# Patient Record
Sex: MALE | Race: WHITE | NOT HISPANIC OR LATINO | Employment: FULL TIME | URBAN - METROPOLITAN AREA
[De-identification: names, ages, dates, MRNs, and addresses within clinical notes are randomized per-mention and may not be internally consistent; named-entity substitution may affect disease eponyms.]

---

## 2017-03-08 ENCOUNTER — OFFICE VISIT (OUTPATIENT)
Dept: NEUROLOGY | Facility: CLINIC | Age: 67
End: 2017-03-08
Payer: COMMERCIAL

## 2017-03-08 ENCOUNTER — LAB VISIT (OUTPATIENT)
Dept: LAB | Facility: HOSPITAL | Age: 67
End: 2017-03-08
Attending: PSYCHIATRY & NEUROLOGY
Payer: COMMERCIAL

## 2017-03-08 VITALS
HEART RATE: 72 BPM | WEIGHT: 194.25 LBS | HEIGHT: 66 IN | DIASTOLIC BLOOD PRESSURE: 66 MMHG | BODY MASS INDEX: 31.22 KG/M2 | SYSTOLIC BLOOD PRESSURE: 112 MMHG

## 2017-03-08 DIAGNOSIS — G40.409 GRAND MAL EPILEPSY, CONTROLLED: ICD-10-CM

## 2017-03-08 DIAGNOSIS — G40.409 GRAND MAL EPILEPSY, CONTROLLED: Primary | ICD-10-CM

## 2017-03-08 LAB
ALBUMIN SERPL BCP-MCNC: 4.3 G/DL
ALP SERPL-CCNC: 88 U/L
ALT SERPL W/O P-5'-P-CCNC: 29 U/L
ANION GAP SERPL CALC-SCNC: 7 MMOL/L
AST SERPL-CCNC: 25 U/L
BASOPHILS # BLD AUTO: 0.03 K/UL
BASOPHILS NFR BLD: 0.5 %
BILIRUB SERPL-MCNC: 0.4 MG/DL
BUN SERPL-MCNC: 24 MG/DL
CALCIUM SERPL-MCNC: 9.8 MG/DL
CHLORIDE SERPL-SCNC: 97 MMOL/L
CO2 SERPL-SCNC: 30 MMOL/L
CREAT SERPL-MCNC: 1 MG/DL
DIFFERENTIAL METHOD: ABNORMAL
EOSINOPHIL # BLD AUTO: 0.2 K/UL
EOSINOPHIL NFR BLD: 4.3 %
ERYTHROCYTE [DISTWIDTH] IN BLOOD BY AUTOMATED COUNT: 12.7 %
EST. GFR  (AFRICAN AMERICAN): >60 ML/MIN/1.73 M^2
EST. GFR  (NON AFRICAN AMERICAN): >60 ML/MIN/1.73 M^2
GLUCOSE SERPL-MCNC: 99 MG/DL
HCT VFR BLD AUTO: 38.8 %
HGB BLD-MCNC: 13.6 G/DL
LYMPHOCYTES # BLD AUTO: 1.9 K/UL
LYMPHOCYTES NFR BLD: 35 %
MCH RBC QN AUTO: 33.9 PG
MCHC RBC AUTO-ENTMCNC: 35.1 %
MCV RBC AUTO: 97 FL
MONOCYTES # BLD AUTO: 0.6 K/UL
MONOCYTES NFR BLD: 11.2 %
NEUTROPHILS # BLD AUTO: 2.7 K/UL
NEUTROPHILS NFR BLD: 48.8 %
PHENYTOIN SERPL-MCNC: 13.9 UG/ML
PLATELET # BLD AUTO: 186 K/UL
PMV BLD AUTO: 9.8 FL
POTASSIUM SERPL-SCNC: 4.2 MMOL/L
PROT SERPL-MCNC: 7.8 G/DL
RBC # BLD AUTO: 4.01 M/UL
SODIUM SERPL-SCNC: 134 MMOL/L
WBC # BLD AUTO: 5.52 K/UL

## 2017-03-08 PROCEDURE — 1160F RVW MEDS BY RX/DR IN RCRD: CPT | Mod: S$GLB,,, | Performed by: PSYCHIATRY & NEUROLOGY

## 2017-03-08 PROCEDURE — 85025 COMPLETE CBC W/AUTO DIFF WBC: CPT

## 2017-03-08 PROCEDURE — 99215 OFFICE O/P EST HI 40 MIN: CPT | Mod: S$GLB,,, | Performed by: PSYCHIATRY & NEUROLOGY

## 2017-03-08 PROCEDURE — 80185 ASSAY OF PHENYTOIN TOTAL: CPT

## 2017-03-08 PROCEDURE — 99999 PR PBB SHADOW E&M-EST. PATIENT-LVL III: CPT | Mod: PBBFAC,,, | Performed by: PSYCHIATRY & NEUROLOGY

## 2017-03-08 PROCEDURE — 80053 COMPREHEN METABOLIC PANEL: CPT

## 2017-03-08 PROCEDURE — 3078F DIAST BP <80 MM HG: CPT | Mod: S$GLB,,, | Performed by: PSYCHIATRY & NEUROLOGY

## 2017-03-08 PROCEDURE — 36415 COLL VENOUS BLD VENIPUNCTURE: CPT | Mod: PO

## 2017-03-08 PROCEDURE — 3074F SYST BP LT 130 MM HG: CPT | Mod: S$GLB,,, | Performed by: PSYCHIATRY & NEUROLOGY

## 2017-03-09 NOTE — PROGRESS NOTES
Progress note  Neurology      Neurology follow up:  For:  Epilepsy    SUBJECTIVE:      HPI:   He is doing fine. No report of seizure. He takes Dilantin 4 tab a day.    Past Medical History:   Diagnosis Date    Epilepsy     Last seizure 2010.     Hypertension     Tubular adenoma of colon 04/02/2012     Past Surgical History:   Procedure Laterality Date    TONSILLECTOMY       Family History   Problem Relation Age of Onset    Hypertension       Social History   Substance Use Topics    Smoking status: Never Smoker    Smokeless tobacco: Never Used    Alcohol use No       Review of patient's allergies indicates:  No Known Allergies   Patient Active Problem List   Diagnosis    Tubular adenoma of colon    Hypertension    Epilepsy    Abnormal glucose     Personal history of colonic polyps    Nuclear sclerosis of both eyes    Refractive error       Review of Systems   Constitutional: Negative for fever and weight loss.   HENT: Negative for ear pain, hearing loss and tinnitus.    Eyes: Negative for blurred vision, double vision, photophobia, pain, discharge and redness.   Respiratory: Negative for cough and shortness of breath.    Cardiovascular: Negative for chest pain, palpitations, claudication and leg swelling.   Gastrointestinal: Negative for abdominal pain, heartburn, nausea and vomiting.   Genitourinary: Negative for dysuria, flank pain, frequency and urgency.   Musculoskeletal: Negative for back pain, falls, joint pain, myalgias and neck pain.   Skin: Negative for itching and rash.   Neurological: Positive for seizures. Negative for dizziness, tingling, tremors, sensory change, speech change, focal weakness, loss of consciousness, weakness and headaches.   Endo/Heme/Allergies: Does not bruise/bleed easily.   Psychiatric/Behavioral: Negative for depression, hallucinations, memory loss and suicidal ideas. The patient is not nervous/anxious and does not have insomnia.              OBJECTIVE:     Vital  Signs (Most Recent)  Pulse: 72 (03/08/17 1057)  BP: 112/66 (03/08/17 1057)    Physical Exam   Constitutional: He is oriented to person, place, and time. He appears well-developed and well-nourished. No distress.   HENT:   Head: Normocephalic.   Right Ear: External ear normal.   Left Ear: External ear normal.   Mouth/Throat: Oropharynx is clear and moist.   Eyes: Conjunctivae and EOM are normal. Pupils are equal, round, and reactive to light.   Neck: Normal range of motion. Neck supple. No tracheal deviation present. No thyromegaly present.   Cardiovascular: Regular rhythm, normal heart sounds and intact distal pulses.    Pulmonary/Chest: Effort normal and breath sounds normal. No respiratory distress.   Abdominal: Soft. Bowel sounds are normal. There is no tenderness.   Musculoskeletal: He exhibits no edema or tenderness.   Lymphadenopathy:     He has no cervical adenopathy.   Neurological: He is alert and oriented to person, place, and time. He has normal strength and normal reflexes. He displays no tremor and normal reflexes. No cranial nerve deficit or sensory deficit. He exhibits normal muscle tone. Coordination normal. He displays no Babinski's sign on the right side. He displays no Babinski's sign on the left side.   Reflex Scores:       Tricep reflexes are 2+ on the right side and 2+ on the left side.       Bicep reflexes are 2+ on the right side and 2+ on the left side.       Brachioradialis reflexes are 2+ on the right side and 2+ on the left side.       Patellar reflexes are 2+ on the right side and 2+ on the left side.       Achilles reflexes are 2+ on the right side and 2+ on the left side.  Skin: Skin is warm and dry. No rash noted. He is not diaphoretic. No erythema. No pallor.   Psychiatric: He has a normal mood and affect. His behavior is normal. Judgment and thought content normal.         Strength  Deltoids Triceps Biceps Wrist Extension Wrist Flexion Hand    Upper: R 5/5 5/5 5/5 5/5 5/5 5/5     L 5/5 5/5 5/5 5/5 5/5 5/5     Iliopsoas Quadriceps Knee  Flexion Tibialis  anterior Gastro- cnemius EHL   Lower: R 5/5 5/5 5/5 5/5 5/5 5/5    L 5/5 5/5 5/5 5/5 5/5 5/5         Laboratory:  Lab Results   Component Value Date    WBC 5.52 03/08/2017    HGB 13.6 (L) 03/08/2017    HCT 38.8 (L) 03/08/2017     03/08/2017    CHOL 229 (H) 05/27/2016    TRIG 140 05/27/2016    HDL 46 05/27/2016    ALT 29 03/08/2017    AST 25 03/08/2017     (L) 03/08/2017    K 4.2 03/08/2017    CL 97 03/08/2017    CREATININE 1.0 03/08/2017    BUN 24 (H) 03/08/2017    CO2 30 (H) 03/08/2017    TSH 0.933 05/27/2016    PSA 0.43 02/24/2012    GLUF 93 05/14/2010    HGBA1C 5.3 03/06/2015                 ASSESSMENT/PLAN:     Assessment:   Epilepsy under fairly controlled.    Encounter Diagnosis   Name Primary?   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

## 2017-06-16 ENCOUNTER — OFFICE VISIT (OUTPATIENT)
Dept: OPHTHALMOLOGY | Facility: CLINIC | Age: 67
End: 2017-06-16
Payer: COMMERCIAL

## 2017-06-16 DIAGNOSIS — H52.4 BILATERAL PRESBYOPIA: ICD-10-CM

## 2017-06-16 DIAGNOSIS — E11.9 DIABETES MELLITUS TYPE 2 WITHOUT RETINOPATHY: Primary | ICD-10-CM

## 2017-06-16 PROCEDURE — 92014 COMPRE OPH EXAM EST PT 1/>: CPT | Mod: S$GLB,,, | Performed by: OPTOMETRIST

## 2017-06-16 PROCEDURE — 99999 PR PBB SHADOW E&M-EST. PATIENT-LVL I: CPT | Mod: PBBFAC,,, | Performed by: OPTOMETRIST

## 2017-06-16 PROCEDURE — 92015 DETERMINE REFRACTIVE STATE: CPT | Mod: S$GLB,,, | Performed by: OPTOMETRIST

## 2017-07-29 NOTE — PROGRESS NOTES
HPI     Pt's last full eye exam was 6/14/16 with [facility]. Pt states no pain or   discomfort. VA stable. No gtts. Pt wears PAL glasses. Not interested in   contact lenses. Would like a new rx for glasses.     Last edited by Hakeem Melendez, Patient Care Assistant on 6/16/2017  7:54   AM. (History)            Assessment /Plan     For exam results, see Encounter Report.    Diabetes mellitus type 2 without retinopathy    Nuclear cataract, bilateral    Bilateral presbyopia      No Background Diabetic Retinopathy    Minimal cataracts OU, not surgical    Dispense Final Rx for glasses.  RTC 1 year                    Impaired repetition No abnormalities noted

## 2017-08-18 DIAGNOSIS — I10 ESSENTIAL HYPERTENSION: ICD-10-CM

## 2017-08-18 RX ORDER — LISINOPRIL AND HYDROCHLOROTHIAZIDE 20; 25 MG/1; MG/1
TABLET ORAL
Qty: 90 TABLET | Refills: 0 | Status: SHIPPED | OUTPATIENT
Start: 2017-08-18 | End: 2017-09-21 | Stop reason: SDUPTHER

## 2017-08-30 DIAGNOSIS — G40.909 EPILEPSY WITHOUT STATUS EPILEPTICUS, NOT INTRACTABLE: Chronic | ICD-10-CM

## 2017-08-30 RX ORDER — PHENYTOIN SODIUM 100 MG/1
CAPSULE, EXTENDED RELEASE ORAL
Qty: 360 CAPSULE | Refills: 0 | Status: SHIPPED | OUTPATIENT
Start: 2017-08-30 | End: 2017-11-28 | Stop reason: SDUPTHER

## 2017-09-21 ENCOUNTER — OFFICE VISIT (OUTPATIENT)
Dept: INTERNAL MEDICINE | Facility: CLINIC | Age: 67
End: 2017-09-21
Payer: COMMERCIAL

## 2017-09-21 VITALS
HEART RATE: 101 BPM | BODY MASS INDEX: 30.16 KG/M2 | SYSTOLIC BLOOD PRESSURE: 124 MMHG | OXYGEN SATURATION: 97 % | TEMPERATURE: 99 F | WEIGHT: 187.63 LBS | DIASTOLIC BLOOD PRESSURE: 70 MMHG | HEIGHT: 66 IN

## 2017-09-21 DIAGNOSIS — L21.9 SEBORRHEIC DERMATITIS OF SCALP: ICD-10-CM

## 2017-09-21 DIAGNOSIS — Z00.00 ROUTINE GENERAL MEDICAL EXAMINATION AT A HEALTH CARE FACILITY: Primary | ICD-10-CM

## 2017-09-21 DIAGNOSIS — G40.909 NONINTRACTABLE EPILEPSY WITHOUT STATUS EPILEPTICUS, UNSPECIFIED EPILEPSY TYPE: Chronic | ICD-10-CM

## 2017-09-21 DIAGNOSIS — I10 ESSENTIAL HYPERTENSION: ICD-10-CM

## 2017-09-21 PROCEDURE — 99397 PER PM REEVAL EST PAT 65+ YR: CPT | Mod: S$GLB,,, | Performed by: FAMILY MEDICINE

## 2017-09-21 PROCEDURE — 99999 PR PBB SHADOW E&M-EST. PATIENT-LVL III: CPT | Mod: PBBFAC,,, | Performed by: FAMILY MEDICINE

## 2017-09-21 RX ORDER — LISINOPRIL AND HYDROCHLOROTHIAZIDE 20; 25 MG/1; MG/1
1 TABLET ORAL DAILY
Qty: 90 TABLET | Refills: 3 | Status: SHIPPED | OUTPATIENT
Start: 2017-09-21 | End: 2018-10-15 | Stop reason: SDUPTHER

## 2017-09-21 RX ORDER — KETOCONAZOLE 20 MG/ML
SHAMPOO, SUSPENSION TOPICAL
Qty: 120 ML | Refills: 0 | Status: SHIPPED | OUTPATIENT
Start: 2017-09-21 | End: 2019-03-26

## 2017-09-22 ENCOUNTER — LAB VISIT (OUTPATIENT)
Dept: LAB | Facility: HOSPITAL | Age: 67
End: 2017-09-22
Attending: FAMILY MEDICINE
Payer: COMMERCIAL

## 2017-09-22 DIAGNOSIS — Z00.00 ROUTINE GENERAL MEDICAL EXAMINATION AT A HEALTH CARE FACILITY: ICD-10-CM

## 2017-09-22 DIAGNOSIS — Z13.29 THYROID DISORDER SCREEN: ICD-10-CM

## 2017-09-22 DIAGNOSIS — Z13.220 SCREENING FOR LIPOID DISORDERS: ICD-10-CM

## 2017-09-22 LAB
ALBUMIN SERPL BCP-MCNC: 4 G/DL
ALP SERPL-CCNC: 89 U/L
ALT SERPL W/O P-5'-P-CCNC: 24 U/L
ANION GAP SERPL CALC-SCNC: 10 MMOL/L
AST SERPL-CCNC: 25 U/L
BASOPHILS # BLD AUTO: 0.03 K/UL
BASOPHILS NFR BLD: 0.5 %
BILIRUB SERPL-MCNC: 0.5 MG/DL
BUN SERPL-MCNC: 19 MG/DL
CALCIUM SERPL-MCNC: 9.6 MG/DL
CHLORIDE SERPL-SCNC: 100 MMOL/L
CHOLEST SERPL-MCNC: 218 MG/DL
CHOLEST/HDLC SERPL: 5.6 {RATIO}
CO2 SERPL-SCNC: 28 MMOL/L
CREAT SERPL-MCNC: 1 MG/DL
DIFFERENTIAL METHOD: ABNORMAL
EOSINOPHIL # BLD AUTO: 0.2 K/UL
EOSINOPHIL NFR BLD: 4.4 %
ERYTHROCYTE [DISTWIDTH] IN BLOOD BY AUTOMATED COUNT: 12.5 %
EST. GFR  (AFRICAN AMERICAN): >60 ML/MIN/1.73 M^2
EST. GFR  (NON AFRICAN AMERICAN): >60 ML/MIN/1.73 M^2
GLUCOSE SERPL-MCNC: 111 MG/DL
HCT VFR BLD AUTO: 38.1 %
HDLC SERPL-MCNC: 39 MG/DL
HDLC SERPL: 17.9 %
HGB BLD-MCNC: 13.9 G/DL
LDLC SERPL CALC-MCNC: 132.2 MG/DL
LYMPHOCYTES # BLD AUTO: 0.7 K/UL
LYMPHOCYTES NFR BLD: 12.8 %
MCH RBC QN AUTO: 34.6 PG
MCHC RBC AUTO-ENTMCNC: 36.5 G/DL
MCV RBC AUTO: 95 FL
MONOCYTES # BLD AUTO: 0.6 K/UL
MONOCYTES NFR BLD: 10.6 %
NEUTROPHILS # BLD AUTO: 3.9 K/UL
NEUTROPHILS NFR BLD: 71.5 %
NONHDLC SERPL-MCNC: 179 MG/DL
PLATELET # BLD AUTO: 150 K/UL
PMV BLD AUTO: 9.6 FL
POTASSIUM SERPL-SCNC: 4.4 MMOL/L
PROT SERPL-MCNC: 7.3 G/DL
RBC # BLD AUTO: 4.02 M/UL
SODIUM SERPL-SCNC: 138 MMOL/L
TRIGL SERPL-MCNC: 234 MG/DL
TSH SERPL DL<=0.005 MIU/L-ACNC: 1.24 UIU/ML
WBC # BLD AUTO: 5.48 K/UL

## 2017-09-22 PROCEDURE — 80061 LIPID PANEL: CPT

## 2017-09-22 PROCEDURE — 85025 COMPLETE CBC W/AUTO DIFF WBC: CPT

## 2017-09-22 PROCEDURE — 36415 COLL VENOUS BLD VENIPUNCTURE: CPT

## 2017-09-22 PROCEDURE — 84443 ASSAY THYROID STIM HORMONE: CPT

## 2017-09-22 PROCEDURE — 80053 COMPREHEN METABOLIC PANEL: CPT

## 2017-09-23 PROBLEM — L21.9 SEBORRHEIC DERMATITIS OF SCALP: Status: ACTIVE | Noted: 2017-09-23

## 2017-09-24 NOTE — PROGRESS NOTES
Subjective:       Patient ID: Conner Lombardi Jr. is a 67 y.o. male.    Chief Complaint: Annual Exam (rash,rt side neck, and lump rear of neck baseline.)    Patient presents to clinic today for annual physical exam.      Review of Systems   Constitutional: Negative for chills, fatigue, fever and unexpected weight change.   HENT: Positive for hearing loss (has had testing, hearing not recommended). Negative for congestion, dental problem, ear pain, rhinorrhea and trouble swallowing.    Eyes: Negative for pain and visual disturbance.   Respiratory: Negative for cough and shortness of breath.    Cardiovascular: Negative for chest pain, palpitations and leg swelling.   Gastrointestinal: Negative for abdominal distention, abdominal pain, blood in stool, constipation, diarrhea, nausea and vomiting.   Genitourinary: Negative for difficulty urinating, scrotal swelling and testicular pain.   Musculoskeletal: Positive for arthralgias (finger with prolonged writing). Negative for myalgias.   Skin: Positive for rash (neck/hairline).   Neurological: Negative for dizziness, weakness, numbness and headaches.   Hematological: Negative for adenopathy. Does not bruise/bleed easily.   Psychiatric/Behavioral: Positive for sleep disturbance. Negative for dysphoric mood. The patient is not nervous/anxious.        Objective:      Physical Exam   Constitutional: He is oriented to person, place, and time. He appears well-developed and well-nourished. No distress.   HENT:   Head: Normocephalic and atraumatic.   Right Ear: Hearing, tympanic membrane, external ear and ear canal normal.   Left Ear: Hearing, tympanic membrane, external ear and ear canal normal.   Nose: Nose normal.   Mouth/Throat: Uvula is midline, oropharynx is clear and moist and mucous membranes are normal.   Rash noted on lower scalp/hairline consistent with seborrheic dermatitis   Eyes: Conjunctivae, EOM and lids are normal. Pupils are equal, round, and reactive to light.  No scleral icterus.   Neck: Normal range of motion. Neck supple. No thyromegaly present.   Cardiovascular: Normal rate and regular rhythm.  Exam reveals no gallop and no friction rub.    No murmur heard.  Pulmonary/Chest: Effort normal and breath sounds normal. He has no wheezes. He has no rales.   Abdominal: Soft. Bowel sounds are normal. He exhibits no distension and no mass. There is no hepatosplenomegaly. There is no tenderness.   Musculoskeletal: Normal range of motion. He exhibits no edema or tenderness.   Lymphadenopathy:     He has no cervical adenopathy.   Neurological: He is alert and oriented to person, place, and time. No cranial nerve deficit. Coordination normal.   Reflex Scores:       Patellar reflexes are 2+ on the right side and 2+ on the left side.  Skin: Skin is warm and dry. No rash noted.   Psychiatric: He has a normal mood and affect.   Vitals reviewed.      Assessment:       1. Routine general medical examination at a health care facility    2. Nonintractable epilepsy without status epilepticus, unspecified epilepsy type    3. Essential hypertension    4. Seborrheic dermatitis of scalp        Plan:     Problem List Items Addressed This Visit     Epilepsy (Chronic)    Overview     Last seizure 2010. Followed by Dr. Ramirez, Neurology         Essential hypertension    Current Assessment & Plan     Controlled, continue lisinopril-HCTZ         Relevant Medications    lisinopril-hydrochlorothiazide (PRINZIDE,ZESTORETIC) 20-25 mg Tab    Seborrheic dermatitis of scalp    Relevant Medications    ketoconazole (NIZORAL) 2 % shampoo      Other Visit Diagnoses     Routine general medical examination at a health care facility    -  Primary          Health Maintenance reviewed/updated. Given Rx for flu vaccine. Patient requests to defer pneumovax.

## 2017-10-11 ENCOUNTER — OFFICE VISIT (OUTPATIENT)
Dept: NEUROLOGY | Facility: CLINIC | Age: 67
End: 2017-10-11
Payer: COMMERCIAL

## 2017-10-11 ENCOUNTER — LAB VISIT (OUTPATIENT)
Dept: LAB | Facility: HOSPITAL | Age: 67
End: 2017-10-11
Attending: PSYCHIATRY & NEUROLOGY
Payer: COMMERCIAL

## 2017-10-11 VITALS
WEIGHT: 192.69 LBS | HEIGHT: 66 IN | SYSTOLIC BLOOD PRESSURE: 130 MMHG | DIASTOLIC BLOOD PRESSURE: 72 MMHG | BODY MASS INDEX: 30.97 KG/M2

## 2017-10-11 DIAGNOSIS — G40.409 GRAND MAL EPILEPSY, CONTROLLED: ICD-10-CM

## 2017-10-11 DIAGNOSIS — G40.409 GRAND MAL EPILEPSY, CONTROLLED: Primary | ICD-10-CM

## 2017-10-11 LAB
COMPLEXED PSA SERPL-MCNC: 0.6 NG/ML
PHENYTOIN SERPL-MCNC: 15.4 UG/ML

## 2017-10-11 PROCEDURE — 99999 PR PBB SHADOW E&M-EST. PATIENT-LVL III: CPT | Mod: PBBFAC,,, | Performed by: PSYCHIATRY & NEUROLOGY

## 2017-10-11 PROCEDURE — 84153 ASSAY OF PSA TOTAL: CPT

## 2017-10-11 PROCEDURE — 36415 COLL VENOUS BLD VENIPUNCTURE: CPT | Mod: PO

## 2017-10-11 PROCEDURE — 80185 ASSAY OF PHENYTOIN TOTAL: CPT

## 2017-10-11 PROCEDURE — 99215 OFFICE O/P EST HI 40 MIN: CPT | Mod: S$GLB,,, | Performed by: PSYCHIATRY & NEUROLOGY

## 2017-10-11 NOTE — PROGRESS NOTES
Progress note  Neurology      Neurology follow up:  For:  Epilepsy    SUBJECTIVE:      HPI:   He is doing fine. No report of seizure. He takes Dilantin 4 tab a day.    Past Medical History:   Diagnosis Date    Epilepsy     Last seizure 2010.     Hypertension     Tubular adenoma of colon 04/02/2012     Past Surgical History:   Procedure Laterality Date    TONSILLECTOMY       Family History   Problem Relation Age of Onset    Hypertension       Social History   Substance Use Topics    Smoking status: Never Smoker    Smokeless tobacco: Never Used    Alcohol use No       Review of patient's allergies indicates:  No Known Allergies   Patient Active Problem List   Diagnosis    Tubular adenoma of colon    Hypertension    Epilepsy    Abnormal glucose     Personal history of colonic polyps    Nuclear sclerosis of both eyes    Refractive error       Review of Systems   Constitutional: Negative for fever and weight loss.   HENT: Negative for ear pain, hearing loss and tinnitus.    Eyes: Negative for blurred vision, double vision, photophobia, pain, discharge and redness.   Respiratory: Negative for cough and shortness of breath.    Cardiovascular: Negative for chest pain, palpitations, claudication and leg swelling.   Gastrointestinal: Negative for abdominal pain, heartburn, nausea and vomiting.   Genitourinary: Negative for dysuria, flank pain, frequency and urgency.   Musculoskeletal: Negative for back pain, falls, joint pain, myalgias and neck pain.   Skin: Negative for itching and rash.   Neurological: Positive for seizures. Negative for dizziness, tingling, tremors, sensory change, speech change, focal weakness, loss of consciousness, weakness and headaches.   Endo/Heme/Allergies: Does not bruise/bleed easily.   Psychiatric/Behavioral: Negative for depression, hallucinations, memory loss and suicidal ideas. The patient is not nervous/anxious and does not have insomnia.              OBJECTIVE:          Physical Exam   Constitutional: He is oriented to person, place, and time. He appears well-developed and well-nourished. No distress.   HENT:   Head: Normocephalic.   Right Ear: External ear normal.   Left Ear: External ear normal.   Mouth/Throat: Oropharynx is clear and moist.   Eyes: Conjunctivae and EOM are normal. Pupils are equal, round, and reactive to light.   Neck: Normal range of motion. Neck supple. No tracheal deviation present. No thyromegaly present.   Cardiovascular: Regular rhythm, normal heart sounds and intact distal pulses.    Pulmonary/Chest: Effort normal and breath sounds normal. No respiratory distress.   Abdominal: Soft. Bowel sounds are normal. There is no tenderness.   Musculoskeletal: He exhibits no edema or tenderness.   Lymphadenopathy:     He has no cervical adenopathy.   Neurological: He is alert and oriented to person, place, and time. He has normal strength and normal reflexes. He displays no tremor and normal reflexes. No cranial nerve deficit or sensory deficit. He exhibits normal muscle tone. Coordination normal. He displays no Babinski's sign on the right side. He displays no Babinski's sign on the left side.   Reflex Scores:       Tricep reflexes are 2+ on the right side and 2+ on the left side.       Bicep reflexes are 2+ on the right side and 2+ on the left side.       Brachioradialis reflexes are 2+ on the right side and 2+ on the left side.       Patellar reflexes are 2+ on the right side and 2+ on the left side.       Achilles reflexes are 2+ on the right side and 2+ on the left side.  Skin: Skin is warm and dry. No rash noted. He is not diaphoretic. No erythema. No pallor.   Psychiatric: He has a normal mood and affect. His behavior is normal. Judgment and thought content normal.         Strength  Deltoids Triceps Biceps Wrist Extension Wrist Flexion Hand    Upper: R 5/5 5/5 5/5 5/5 5/5 5/5    L 5/5 5/5 5/5 5/5 5/5 5/5     Iliopsoas Quadriceps Knee  Flexion  Tibialis  anterior Gastro- cnemius EHL   Lower: R 5/5 5/5 5/5 5/5 5/5 5/5    L 5/5 5/5 5/5 5/5 5/5 5/5       ASSESSMENT/PLAN:     Assessment:   Epilepsy under fairly controlled.    Encounter Diagnosis   Name Primary?   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

## 2017-10-16 ENCOUNTER — TELEPHONE (OUTPATIENT)
Dept: NEUROLOGY | Facility: CLINIC | Age: 67
End: 2017-10-16

## 2017-10-16 NOTE — TELEPHONE ENCOUNTER
----- Message from John Paul Ramirez MD sent at 10/12/2017  8:13 AM CDT -----  Result is normal

## 2017-11-28 DIAGNOSIS — G40.909 EPILEPSY WITHOUT STATUS EPILEPTICUS, NOT INTRACTABLE: Chronic | ICD-10-CM

## 2017-11-28 RX ORDER — PHENYTOIN SODIUM 100 MG/1
CAPSULE, EXTENDED RELEASE ORAL
Qty: 360 CAPSULE | Refills: 0 | Status: SHIPPED | OUTPATIENT
Start: 2017-11-28 | End: 2018-02-25 | Stop reason: SDUPTHER

## 2018-02-25 DIAGNOSIS — G40.909 EPILEPSY WITHOUT STATUS EPILEPTICUS, NOT INTRACTABLE: Chronic | ICD-10-CM

## 2018-02-26 RX ORDER — PHENYTOIN SODIUM 100 MG/1
CAPSULE, EXTENDED RELEASE ORAL
Qty: 360 CAPSULE | Refills: 0 | Status: SHIPPED | OUTPATIENT
Start: 2018-02-26 | End: 2018-05-29 | Stop reason: SDUPTHER

## 2018-03-21 ENCOUNTER — OFFICE VISIT (OUTPATIENT)
Dept: INTERNAL MEDICINE | Facility: CLINIC | Age: 68
End: 2018-03-21
Payer: COMMERCIAL

## 2018-03-21 ENCOUNTER — LAB VISIT (OUTPATIENT)
Dept: LAB | Facility: HOSPITAL | Age: 68
End: 2018-03-21
Attending: FAMILY MEDICINE
Payer: COMMERCIAL

## 2018-03-21 VITALS
HEART RATE: 89 BPM | WEIGHT: 193.56 LBS | OXYGEN SATURATION: 96 % | TEMPERATURE: 97 F | BODY MASS INDEX: 31.11 KG/M2 | HEIGHT: 66 IN | DIASTOLIC BLOOD PRESSURE: 82 MMHG | SYSTOLIC BLOOD PRESSURE: 126 MMHG

## 2018-03-21 DIAGNOSIS — R73.09 ABNORMAL GLUCOSE: Chronic | ICD-10-CM

## 2018-03-21 DIAGNOSIS — Z23 NEED FOR 23-POLYVALENT PNEUMOCOCCAL POLYSACCHARIDE VACCINE: ICD-10-CM

## 2018-03-21 DIAGNOSIS — E66.9 OBESITY (BMI 30.0-34.9): ICD-10-CM

## 2018-03-21 DIAGNOSIS — I10 ESSENTIAL HYPERTENSION: Primary | ICD-10-CM

## 2018-03-21 DIAGNOSIS — D12.6 TUBULAR ADENOMA OF COLON: ICD-10-CM

## 2018-03-21 DIAGNOSIS — G40.909 NONINTRACTABLE EPILEPSY WITHOUT STATUS EPILEPTICUS, UNSPECIFIED EPILEPSY TYPE: Chronic | ICD-10-CM

## 2018-03-21 DIAGNOSIS — I10 ESSENTIAL HYPERTENSION: ICD-10-CM

## 2018-03-21 PROBLEM — E66.811 OBESITY (BMI 30.0-34.9): Status: ACTIVE | Noted: 2018-03-21

## 2018-03-21 LAB
ANION GAP SERPL CALC-SCNC: 9 MMOL/L
BUN SERPL-MCNC: 15 MG/DL
CALCIUM SERPL-MCNC: 10.1 MG/DL
CHLORIDE SERPL-SCNC: 102 MMOL/L
CO2 SERPL-SCNC: 27 MMOL/L
CREAT SERPL-MCNC: 0.8 MG/DL
EST. GFR  (AFRICAN AMERICAN): >60 ML/MIN/1.73 M^2
EST. GFR  (NON AFRICAN AMERICAN): >60 ML/MIN/1.73 M^2
GLUCOSE SERPL-MCNC: 112 MG/DL
POTASSIUM SERPL-SCNC: 4.4 MMOL/L
SODIUM SERPL-SCNC: 138 MMOL/L

## 2018-03-21 PROCEDURE — 3074F SYST BP LT 130 MM HG: CPT | Mod: CPTII,S$GLB,, | Performed by: FAMILY MEDICINE

## 2018-03-21 PROCEDURE — 90732 PPSV23 VACC 2 YRS+ SUBQ/IM: CPT | Mod: S$GLB,,, | Performed by: FAMILY MEDICINE

## 2018-03-21 PROCEDURE — 99214 OFFICE O/P EST MOD 30 MIN: CPT | Mod: 25,S$GLB,, | Performed by: FAMILY MEDICINE

## 2018-03-21 PROCEDURE — 90471 IMMUNIZATION ADMIN: CPT | Mod: S$GLB,,, | Performed by: FAMILY MEDICINE

## 2018-03-21 PROCEDURE — 3079F DIAST BP 80-89 MM HG: CPT | Mod: CPTII,S$GLB,, | Performed by: FAMILY MEDICINE

## 2018-03-21 PROCEDURE — 99999 PR PBB SHADOW E&M-EST. PATIENT-LVL III: CPT | Mod: PBBFAC,,, | Performed by: FAMILY MEDICINE

## 2018-03-21 PROCEDURE — 80048 BASIC METABOLIC PNL TOTAL CA: CPT

## 2018-03-21 PROCEDURE — 36415 COLL VENOUS BLD VENIPUNCTURE: CPT

## 2018-03-21 NOTE — PROGRESS NOTES
Subjective:       Patient ID: Conner Lombardi Jr. is a 67 y.o. male.    Chief Complaint: Follow-up    Patient presents to clinic today for followup of hypertension, well controlled on lisinopril-HCTZ. He reports no seizure activity and continues to follow up with Dr. Ramirez, Neurology. He reports intermittent ankle pain that occurs when first getting up to walk, 1/10, improves after moving around for a bit. He is not exercising currently or focusing on healthy diet.      Review of Systems   Constitutional: Negative for chills, fatigue, fever and unexpected weight change.   Eyes: Negative for visual disturbance.   Respiratory: Negative for shortness of breath.    Cardiovascular: Negative for chest pain.   Musculoskeletal: Negative for myalgias.   Neurological: Negative for headaches.       Objective:      Physical Exam   Constitutional: He is oriented to person, place, and time. He appears well-developed and well-nourished. No distress.   HENT:   Head: Normocephalic and atraumatic.   Eyes: Conjunctivae and EOM are normal. Pupils are equal, round, and reactive to light. No scleral icterus.   Cardiovascular: Normal rate and regular rhythm.  Exam reveals no gallop and no friction rub.    No murmur heard.  Pulmonary/Chest: Effort normal and breath sounds normal.   Neurological: He is alert and oriented to person, place, and time. No cranial nerve deficit. Gait normal.   Psychiatric: He has a normal mood and affect.   Vitals reviewed.      Assessment:       1. Essential hypertension    2. Nonintractable epilepsy without status epilepticus, unspecified epilepsy type    3. Abnormal glucose     4. Tubular adenoma of colon    5. Obesity (BMI 30.0-34.9)    6. Need for 23-polyvalent pneumococcal polysaccharide vaccine        Plan:     Problem List Items Addressed This Visit     Epilepsy (Chronic)    Overview     Last seizure 2010. Followed by Dr. Ramirez, Neurology         Abnormal glucose  (Chronic)    Current Assessment & Plan      Last glucose 111, reassessing with lab today         Tubular adenoma of colon    Current Assessment & Plan     Colonoscopy up to date         Essential hypertension - Primary    Current Assessment & Plan     Controlled, continue lisinopril-HCTZ         Relevant Orders    Basic metabolic panel    Obesity (BMI 30.0-34.9)    Current Assessment & Plan     Discussed the importance of weight loss in preventing chronic disease. Advised lifestyle modifications including diet and exercise to lose weight. Patient expressed understanding.             Other Visit Diagnoses     Need for 23-polyvalent pneumococcal polysaccharide vaccine        Relevant Orders    Pneumococcal Polysaccharide Vaccine (23 Valent) (SQ/IM) (Completed)          Health Maintenance reviewed/updated.

## 2018-03-21 NOTE — ASSESSMENT & PLAN NOTE
Discussed the importance of weight loss in preventing chronic disease. Advised lifestyle modifications including diet and exercise to lose weight. Patient expressed understanding.

## 2018-03-31 DIAGNOSIS — R73.9 HYPERGLYCEMIA: Primary | ICD-10-CM

## 2018-04-03 NOTE — PROGRESS NOTES
Left message for patient to return call regarding results.    Also mailed letter with appt slip for lab

## 2018-04-11 ENCOUNTER — LAB VISIT (OUTPATIENT)
Dept: LAB | Facility: HOSPITAL | Age: 68
End: 2018-04-11
Attending: FAMILY MEDICINE
Payer: COMMERCIAL

## 2018-04-11 ENCOUNTER — OFFICE VISIT (OUTPATIENT)
Dept: NEUROLOGY | Facility: CLINIC | Age: 68
End: 2018-04-11
Payer: COMMERCIAL

## 2018-04-11 VITALS
WEIGHT: 193.13 LBS | RESPIRATION RATE: 20 BRPM | SYSTOLIC BLOOD PRESSURE: 132 MMHG | HEIGHT: 66 IN | HEART RATE: 68 BPM | BODY MASS INDEX: 31.04 KG/M2 | DIASTOLIC BLOOD PRESSURE: 84 MMHG

## 2018-04-11 DIAGNOSIS — G40.409 GRAND MAL EPILEPSY, CONTROLLED: ICD-10-CM

## 2018-04-11 DIAGNOSIS — G40.409 GRAND MAL EPILEPSY, CONTROLLED: Primary | ICD-10-CM

## 2018-04-11 DIAGNOSIS — R73.9 HYPERGLYCEMIA: ICD-10-CM

## 2018-04-11 LAB
ALBUMIN SERPL BCP-MCNC: 4.2 G/DL
ALP SERPL-CCNC: 83 U/L
ALT SERPL W/O P-5'-P-CCNC: 26 U/L
ANION GAP SERPL CALC-SCNC: 10 MMOL/L
AST SERPL-CCNC: 23 U/L
BASOPHILS # BLD AUTO: 0.03 K/UL
BASOPHILS NFR BLD: 0.6 %
BILIRUB SERPL-MCNC: 0.4 MG/DL
BUN SERPL-MCNC: 21 MG/DL
CALCIUM SERPL-MCNC: 9.9 MG/DL
CHLORIDE SERPL-SCNC: 103 MMOL/L
CO2 SERPL-SCNC: 25 MMOL/L
CREAT SERPL-MCNC: 0.9 MG/DL
DIFFERENTIAL METHOD: ABNORMAL
EOSINOPHIL # BLD AUTO: 0.3 K/UL
EOSINOPHIL NFR BLD: 5.7 %
ERYTHROCYTE [DISTWIDTH] IN BLOOD BY AUTOMATED COUNT: 13.2 %
EST. GFR  (AFRICAN AMERICAN): >60 ML/MIN/1.73 M^2
EST. GFR  (NON AFRICAN AMERICAN): >60 ML/MIN/1.73 M^2
ESTIMATED AVG GLUCOSE: 111 MG/DL
GLUCOSE SERPL-MCNC: 112 MG/DL
HBA1C MFR BLD HPLC: 5.5 %
HCT VFR BLD AUTO: 38.8 %
HGB BLD-MCNC: 13.1 G/DL
IMM GRANULOCYTES # BLD AUTO: 0.02 K/UL
IMM GRANULOCYTES NFR BLD AUTO: 0.4 %
LYMPHOCYTES # BLD AUTO: 1.6 K/UL
LYMPHOCYTES NFR BLD: 31 %
MCH RBC QN AUTO: 34 PG
MCHC RBC AUTO-ENTMCNC: 33.8 G/DL
MCV RBC AUTO: 101 FL
MONOCYTES # BLD AUTO: 0.7 K/UL
MONOCYTES NFR BLD: 12.8 %
NEUTROPHILS # BLD AUTO: 2.6 K/UL
NEUTROPHILS NFR BLD: 49.5 %
NRBC BLD-RTO: 0 /100 WBC
PHENYTOIN SERPL-MCNC: 12.9 UG/ML
PLATELET # BLD AUTO: 175 K/UL
PMV BLD AUTO: 9.8 FL
POTASSIUM SERPL-SCNC: 4.3 MMOL/L
PROT SERPL-MCNC: 7.4 G/DL
RBC # BLD AUTO: 3.85 M/UL
SODIUM SERPL-SCNC: 138 MMOL/L
WBC # BLD AUTO: 5.22 K/UL

## 2018-04-11 PROCEDURE — 3075F SYST BP GE 130 - 139MM HG: CPT | Mod: CPTII,S$GLB,, | Performed by: PSYCHIATRY & NEUROLOGY

## 2018-04-11 PROCEDURE — 80053 COMPREHEN METABOLIC PANEL: CPT

## 2018-04-11 PROCEDURE — 36415 COLL VENOUS BLD VENIPUNCTURE: CPT | Mod: PO

## 2018-04-11 PROCEDURE — 80185 ASSAY OF PHENYTOIN TOTAL: CPT

## 2018-04-11 PROCEDURE — 3079F DIAST BP 80-89 MM HG: CPT | Mod: CPTII,S$GLB,, | Performed by: PSYCHIATRY & NEUROLOGY

## 2018-04-11 PROCEDURE — 85025 COMPLETE CBC W/AUTO DIFF WBC: CPT

## 2018-04-11 PROCEDURE — 99999 PR PBB SHADOW E&M-EST. PATIENT-LVL III: CPT | Mod: PBBFAC,,, | Performed by: PSYCHIATRY & NEUROLOGY

## 2018-04-11 PROCEDURE — 83036 HEMOGLOBIN GLYCOSYLATED A1C: CPT

## 2018-04-11 PROCEDURE — 99215 OFFICE O/P EST HI 40 MIN: CPT | Mod: S$GLB,,, | Performed by: PSYCHIATRY & NEUROLOGY

## 2018-04-12 ENCOUNTER — PATIENT OUTREACH (OUTPATIENT)
Dept: ADMINISTRATIVE | Facility: HOSPITAL | Age: 68
End: 2018-04-12

## 2018-05-29 DIAGNOSIS — G40.909 EPILEPSY WITHOUT STATUS EPILEPTICUS, NOT INTRACTABLE: Chronic | ICD-10-CM

## 2018-05-29 RX ORDER — PHENYTOIN SODIUM 100 MG/1
CAPSULE, EXTENDED RELEASE ORAL
Qty: 360 CAPSULE | Refills: 0 | Status: SHIPPED | OUTPATIENT
Start: 2018-05-29 | End: 2018-08-30 | Stop reason: SDUPTHER

## 2018-06-15 ENCOUNTER — OFFICE VISIT (OUTPATIENT)
Dept: OPHTHALMOLOGY | Facility: CLINIC | Age: 68
End: 2018-06-15
Payer: COMMERCIAL

## 2018-06-15 DIAGNOSIS — E11.9 DIABETES MELLITUS TYPE 2 WITHOUT RETINOPATHY: Primary | ICD-10-CM

## 2018-06-15 DIAGNOSIS — H52.4 BILATERAL PRESBYOPIA: ICD-10-CM

## 2018-06-15 DIAGNOSIS — H43.391 VITREOUS FLOATER, RIGHT: ICD-10-CM

## 2018-06-15 PROCEDURE — 99999 PR PBB SHADOW E&M-EST. PATIENT-LVL II: CPT | Mod: PBBFAC,,, | Performed by: OPTOMETRIST

## 2018-06-15 PROCEDURE — 92015 DETERMINE REFRACTIVE STATE: CPT | Mod: S$GLB,,, | Performed by: OPTOMETRIST

## 2018-06-15 PROCEDURE — 92014 COMPRE OPH EXAM EST PT 1/>: CPT | Mod: S$GLB,,, | Performed by: OPTOMETRIST

## 2018-06-15 NOTE — PROGRESS NOTES
HPI     Diabetic Eye Exam    Additional comments: Yearly           Comments   Last seen by [facility] on 6/16/17 for yearly DM exam.   States changes in vision since last eye exam associated with a floater in   the right eye.  Patient describes the floater as an eyelash that comes and goes. Floaters   have been present for about 2-3 months.  Wears PAL glasses full-time last updated 10-13 years ago.  No other complaints   No drops  1. DM  2. NSC OU         Last edited by Sangeetha Hamm, PCT on 6/15/2018  8:08 AM. (History)              Assessment /Plan     For exam results, see Encounter Report.    Diabetes mellitus type 2 without retinopathy    Vitreous floater, right    Bilateral presbyopia      No Background Diabetic Retinopathy    Discussed signs and symptoms of retinal detachment.  Informed patient to return to clinic if any worsening of symptoms or decreased vision.    Dispense Final Rx for glasses.  RTC 1 year  Discussed above and answered questions.

## 2018-06-15 NOTE — PATIENT INSTRUCTIONS
Diabetes mellitus type 2 without retinopathy    Vitreous floater, right    Bilateral presbyopia      No Background Diabetic Retinopathy    Discussed signs and symptoms of retinal detachment.  Informed patient to return to clinic if any worsening of symptoms or decreased vision.    Dispense Final Rx for glasses.  RTC 1 year  Discussed above and answered questions.

## 2018-08-30 DIAGNOSIS — G40.909 EPILEPSY WITHOUT STATUS EPILEPTICUS, NOT INTRACTABLE: Chronic | ICD-10-CM

## 2018-08-30 RX ORDER — PHENYTOIN SODIUM 100 MG/1
CAPSULE, EXTENDED RELEASE ORAL
Qty: 360 CAPSULE | Refills: 0 | Status: SHIPPED | OUTPATIENT
Start: 2018-08-30 | End: 2018-11-25 | Stop reason: SDUPTHER

## 2018-09-26 ENCOUNTER — OFFICE VISIT (OUTPATIENT)
Dept: INTERNAL MEDICINE | Facility: CLINIC | Age: 68
End: 2018-09-26
Payer: COMMERCIAL

## 2018-09-26 VITALS
TEMPERATURE: 98 F | BODY MASS INDEX: 31.06 KG/M2 | SYSTOLIC BLOOD PRESSURE: 118 MMHG | WEIGHT: 192.44 LBS | OXYGEN SATURATION: 97 % | HEART RATE: 102 BPM | DIASTOLIC BLOOD PRESSURE: 76 MMHG

## 2018-09-26 DIAGNOSIS — I10 ESSENTIAL HYPERTENSION: ICD-10-CM

## 2018-09-26 DIAGNOSIS — Z00.00 ROUTINE GENERAL MEDICAL EXAMINATION AT A HEALTH CARE FACILITY: Primary | ICD-10-CM

## 2018-09-26 DIAGNOSIS — G40.909 NONINTRACTABLE EPILEPSY WITHOUT STATUS EPILEPTICUS, UNSPECIFIED EPILEPSY TYPE: Chronic | ICD-10-CM

## 2018-09-26 PROCEDURE — 90662 IIV NO PRSV INCREASED AG IM: CPT | Mod: S$GLB,,, | Performed by: FAMILY MEDICINE

## 2018-09-26 PROCEDURE — 99397 PER PM REEVAL EST PAT 65+ YR: CPT | Mod: 25,S$GLB,, | Performed by: FAMILY MEDICINE

## 2018-09-26 PROCEDURE — 3074F SYST BP LT 130 MM HG: CPT | Mod: CPTII,S$GLB,, | Performed by: FAMILY MEDICINE

## 2018-09-26 PROCEDURE — 90471 IMMUNIZATION ADMIN: CPT | Mod: S$GLB,,, | Performed by: FAMILY MEDICINE

## 2018-09-26 PROCEDURE — 99999 PR PBB SHADOW E&M-EST. PATIENT-LVL IV: CPT | Mod: PBBFAC,,, | Performed by: FAMILY MEDICINE

## 2018-09-26 PROCEDURE — 3078F DIAST BP <80 MM HG: CPT | Mod: CPTII,S$GLB,, | Performed by: FAMILY MEDICINE

## 2018-09-26 NOTE — PROGRESS NOTES
Subjective:       Patient ID: Conner Lombardi Jr. is a 68 y.o. male.    Chief Complaint: Annual Exam    Patient presents to clinic today for annual physical exam.      Review of Systems   Constitutional: Negative for chills, fatigue, fever and unexpected weight change.   HENT: Negative for congestion, dental problem, ear pain, hearing loss, rhinorrhea and trouble swallowing.    Eyes: Negative for pain and visual disturbance.   Respiratory: Negative for cough and shortness of breath.    Cardiovascular: Negative for chest pain, palpitations and leg swelling.   Gastrointestinal: Negative for abdominal distention, abdominal pain, blood in stool, constipation, diarrhea, nausea and vomiting.   Genitourinary: Negative for difficulty urinating, scrotal swelling and testicular pain.   Musculoskeletal: Positive for arthralgias (right ankle and left shoulder intermittently). Negative for myalgias.   Skin: Negative for rash.   Neurological: Negative for dizziness, weakness, numbness and headaches.   Hematological: Negative for adenopathy. Does not bruise/bleed easily.   Psychiatric/Behavioral: Negative for dysphoric mood and sleep disturbance. The patient is not nervous/anxious.        Objective:      Physical Exam   Constitutional: He is oriented to person, place, and time. He appears well-developed and well-nourished. No distress.   HENT:   Head: Normocephalic and atraumatic.   Right Ear: Hearing, tympanic membrane, external ear and ear canal normal.   Left Ear: Hearing, tympanic membrane, external ear and ear canal normal.   Nose: Nose normal.   Mouth/Throat: Uvula is midline, oropharynx is clear and moist and mucous membranes are normal.   Eyes: Conjunctivae, EOM and lids are normal. Pupils are equal, round, and reactive to light. No scleral icterus.   Neck: Normal range of motion. Neck supple. No thyromegaly present.   Cardiovascular: Normal rate and regular rhythm. Exam reveals no gallop and no friction rub.   No murmur  heard.  Pulmonary/Chest: Effort normal and breath sounds normal. He has no wheezes. He has no rales.   Abdominal: Soft. Bowel sounds are normal. He exhibits no distension and no mass. There is no hepatosplenomegaly. There is no tenderness.   Musculoskeletal: Normal range of motion. He exhibits no edema or tenderness.   Lymphadenopathy:     He has no cervical adenopathy.   Neurological: He is alert and oriented to person, place, and time. No cranial nerve deficit. Coordination normal.   Reflex Scores:       Patellar reflexes are 2+ on the right side and 2+ on the left side.  Skin: Skin is warm and dry. No rash noted.   Psychiatric: He has a normal mood and affect.   Vitals reviewed.      Assessment:       1. Routine general medical examination at a health care facility    2. Essential hypertension    3. Nonintractable epilepsy without status epilepticus, unspecified epilepsy type        Plan:     Problem List Items Addressed This Visit     Epilepsy (Chronic)    Overview     Last seizure 2010. Followed by Dr. Ramirez, Neurology         Essential hypertension    Current Assessment & Plan     Controlled, continue lisinopril-HCTZ           Other Visit Diagnoses     Routine general medical examination at a health care facility    -  Primary          Health Maintenance reviewed/updated. Flu vaccine today. Routine labs next week.  Advised he can try turmeric for aches/pains. Follow up if worse/persistent.

## 2018-10-02 ENCOUNTER — LAB VISIT (OUTPATIENT)
Dept: LAB | Facility: HOSPITAL | Age: 68
End: 2018-10-02
Payer: COMMERCIAL

## 2018-10-02 DIAGNOSIS — Z13.29 THYROID DISORDER SCREEN: ICD-10-CM

## 2018-10-02 DIAGNOSIS — Z00.00 ROUTINE GENERAL MEDICAL EXAMINATION AT A HEALTH CARE FACILITY: ICD-10-CM

## 2018-10-02 DIAGNOSIS — Z13.220 SCREENING FOR LIPOID DISORDERS: ICD-10-CM

## 2018-10-02 LAB
ALBUMIN SERPL BCP-MCNC: 4.4 G/DL
ALP SERPL-CCNC: 82 U/L
ALT SERPL W/O P-5'-P-CCNC: 27 U/L
ANION GAP SERPL CALC-SCNC: 9 MMOL/L
AST SERPL-CCNC: 25 U/L
BASOPHILS # BLD AUTO: 0.04 K/UL
BASOPHILS NFR BLD: 0.7 %
BILIRUB SERPL-MCNC: 0.5 MG/DL
BUN SERPL-MCNC: 29 MG/DL
CALCIUM SERPL-MCNC: 10.2 MG/DL
CHLORIDE SERPL-SCNC: 98 MMOL/L
CHOLEST SERPL-MCNC: 246 MG/DL
CHOLEST/HDLC SERPL: 5.6 {RATIO}
CO2 SERPL-SCNC: 29 MMOL/L
CREAT SERPL-MCNC: 1 MG/DL
DIFFERENTIAL METHOD: ABNORMAL
EOSINOPHIL # BLD AUTO: 0.4 K/UL
EOSINOPHIL NFR BLD: 6.5 %
ERYTHROCYTE [DISTWIDTH] IN BLOOD BY AUTOMATED COUNT: 12.5 %
EST. GFR  (AFRICAN AMERICAN): >60 ML/MIN/1.73 M^2
EST. GFR  (NON AFRICAN AMERICAN): >60 ML/MIN/1.73 M^2
GLUCOSE SERPL-MCNC: 108 MG/DL
HCT VFR BLD AUTO: 40.2 %
HDLC SERPL-MCNC: 44 MG/DL
HDLC SERPL: 17.9 %
HGB BLD-MCNC: 13.9 G/DL
IMM GRANULOCYTES # BLD AUTO: 0.01 K/UL
IMM GRANULOCYTES NFR BLD AUTO: 0.2 %
LDLC SERPL CALC-MCNC: 143.6 MG/DL
LYMPHOCYTES # BLD AUTO: 1.8 K/UL
LYMPHOCYTES NFR BLD: 32.6 %
MCH RBC QN AUTO: 34.5 PG
MCHC RBC AUTO-ENTMCNC: 34.6 G/DL
MCV RBC AUTO: 100 FL
MONOCYTES # BLD AUTO: 0.7 K/UL
MONOCYTES NFR BLD: 11.6 %
NEUTROPHILS # BLD AUTO: 2.7 K/UL
NEUTROPHILS NFR BLD: 48.4 %
NONHDLC SERPL-MCNC: 202 MG/DL
NRBC BLD-RTO: 0 /100 WBC
PLATELET # BLD AUTO: 192 K/UL
PMV BLD AUTO: 9.6 FL
POTASSIUM SERPL-SCNC: 4.3 MMOL/L
PROT SERPL-MCNC: 7.7 G/DL
RBC # BLD AUTO: 4.03 M/UL
SODIUM SERPL-SCNC: 136 MMOL/L
TRIGL SERPL-MCNC: 292 MG/DL
TSH SERPL DL<=0.005 MIU/L-ACNC: 1.3 UIU/ML
WBC # BLD AUTO: 5.58 K/UL

## 2018-10-02 PROCEDURE — 36415 COLL VENOUS BLD VENIPUNCTURE: CPT

## 2018-10-02 PROCEDURE — 84443 ASSAY THYROID STIM HORMONE: CPT

## 2018-10-02 PROCEDURE — 85025 COMPLETE CBC W/AUTO DIFF WBC: CPT

## 2018-10-02 PROCEDURE — 80061 LIPID PANEL: CPT

## 2018-10-02 PROCEDURE — 80053 COMPREHEN METABOLIC PANEL: CPT

## 2018-10-15 DIAGNOSIS — I10 ESSENTIAL HYPERTENSION: ICD-10-CM

## 2018-10-15 RX ORDER — LISINOPRIL AND HYDROCHLOROTHIAZIDE 20; 25 MG/1; MG/1
1 TABLET ORAL DAILY
Qty: 90 TABLET | Refills: 0 | Status: SHIPPED | OUTPATIENT
Start: 2018-10-15 | End: 2019-02-03 | Stop reason: SDUPTHER

## 2018-11-25 DIAGNOSIS — G40.909 EPILEPSY WITHOUT STATUS EPILEPTICUS, NOT INTRACTABLE: Chronic | ICD-10-CM

## 2018-11-26 RX ORDER — PHENYTOIN SODIUM 100 MG/1
CAPSULE, EXTENDED RELEASE ORAL
Qty: 360 CAPSULE | Refills: 0 | Status: SHIPPED | OUTPATIENT
Start: 2018-11-26 | End: 2019-02-26 | Stop reason: SDUPTHER

## 2019-01-21 ENCOUNTER — PATIENT MESSAGE (OUTPATIENT)
Dept: INTERNAL MEDICINE | Facility: CLINIC | Age: 69
End: 2019-01-21

## 2019-02-03 DIAGNOSIS — I10 ESSENTIAL HYPERTENSION: ICD-10-CM

## 2019-02-04 RX ORDER — LISINOPRIL AND HYDROCHLOROTHIAZIDE 20; 25 MG/1; MG/1
TABLET ORAL
Qty: 90 TABLET | Refills: 1 | Status: SHIPPED | OUTPATIENT
Start: 2019-02-04 | End: 2019-08-11 | Stop reason: SDUPTHER

## 2019-02-26 DIAGNOSIS — G40.909 EPILEPSY WITHOUT STATUS EPILEPTICUS, NOT INTRACTABLE: Chronic | ICD-10-CM

## 2019-02-26 RX ORDER — PHENYTOIN SODIUM 100 MG/1
CAPSULE, EXTENDED RELEASE ORAL
Qty: 360 CAPSULE | Refills: 0 | Status: SHIPPED | OUTPATIENT
Start: 2019-02-26 | End: 2019-05-24 | Stop reason: SDUPTHER

## 2019-03-26 ENCOUNTER — OFFICE VISIT (OUTPATIENT)
Dept: INTERNAL MEDICINE | Facility: CLINIC | Age: 69
End: 2019-03-26
Payer: COMMERCIAL

## 2019-03-26 ENCOUNTER — LAB VISIT (OUTPATIENT)
Dept: LAB | Facility: HOSPITAL | Age: 69
End: 2019-03-26
Attending: FAMILY MEDICINE
Payer: COMMERCIAL

## 2019-03-26 VITALS
BODY MASS INDEX: 29.87 KG/M2 | HEART RATE: 95 BPM | DIASTOLIC BLOOD PRESSURE: 78 MMHG | SYSTOLIC BLOOD PRESSURE: 136 MMHG | HEIGHT: 66 IN | TEMPERATURE: 97 F | WEIGHT: 185.88 LBS | OXYGEN SATURATION: 96 %

## 2019-03-26 DIAGNOSIS — I10 ESSENTIAL HYPERTENSION: Primary | ICD-10-CM

## 2019-03-26 DIAGNOSIS — I10 ESSENTIAL HYPERTENSION: ICD-10-CM

## 2019-03-26 DIAGNOSIS — G40.909 NONINTRACTABLE EPILEPSY WITHOUT STATUS EPILEPTICUS, UNSPECIFIED EPILEPSY TYPE: Chronic | ICD-10-CM

## 2019-03-26 LAB
ANION GAP SERPL CALC-SCNC: 9 MMOL/L (ref 8–16)
BUN SERPL-MCNC: 24 MG/DL (ref 8–23)
CALCIUM SERPL-MCNC: 10.3 MG/DL (ref 8.7–10.5)
CHLORIDE SERPL-SCNC: 101 MMOL/L (ref 95–110)
CO2 SERPL-SCNC: 29 MMOL/L (ref 23–29)
CREAT SERPL-MCNC: 1 MG/DL (ref 0.5–1.4)
EST. GFR  (AFRICAN AMERICAN): >60 ML/MIN/1.73 M^2
EST. GFR  (NON AFRICAN AMERICAN): >60 ML/MIN/1.73 M^2
GLUCOSE SERPL-MCNC: 103 MG/DL (ref 70–110)
POTASSIUM SERPL-SCNC: 4.7 MMOL/L (ref 3.5–5.1)
SODIUM SERPL-SCNC: 139 MMOL/L (ref 136–145)

## 2019-03-26 PROCEDURE — 3075F SYST BP GE 130 - 139MM HG: CPT | Mod: CPTII,S$GLB,, | Performed by: NURSE PRACTITIONER

## 2019-03-26 PROCEDURE — 3075F PR MOST RECENT SYSTOLIC BLOOD PRESS GE 130-139MM HG: ICD-10-PCS | Mod: CPTII,S$GLB,, | Performed by: NURSE PRACTITIONER

## 2019-03-26 PROCEDURE — 1101F PR PT FALLS ASSESS DOC 0-1 FALLS W/OUT INJ PAST YR: ICD-10-PCS | Mod: CPTII,S$GLB,, | Performed by: NURSE PRACTITIONER

## 2019-03-26 PROCEDURE — 3078F PR MOST RECENT DIASTOLIC BLOOD PRESSURE < 80 MM HG: ICD-10-PCS | Mod: CPTII,S$GLB,, | Performed by: NURSE PRACTITIONER

## 2019-03-26 PROCEDURE — 99999 PR PBB SHADOW E&M-EST. PATIENT-LVL III: ICD-10-PCS | Mod: PBBFAC,,, | Performed by: NURSE PRACTITIONER

## 2019-03-26 PROCEDURE — 99999 PR PBB SHADOW E&M-EST. PATIENT-LVL III: CPT | Mod: PBBFAC,,, | Performed by: NURSE PRACTITIONER

## 2019-03-26 PROCEDURE — 80048 BASIC METABOLIC PNL TOTAL CA: CPT

## 2019-03-26 PROCEDURE — 36415 COLL VENOUS BLD VENIPUNCTURE: CPT

## 2019-03-26 PROCEDURE — 3078F DIAST BP <80 MM HG: CPT | Mod: CPTII,S$GLB,, | Performed by: NURSE PRACTITIONER

## 2019-03-26 PROCEDURE — 99213 PR OFFICE/OUTPT VISIT, EST, LEVL III, 20-29 MIN: ICD-10-PCS | Mod: S$GLB,,, | Performed by: NURSE PRACTITIONER

## 2019-03-26 PROCEDURE — 99213 OFFICE O/P EST LOW 20 MIN: CPT | Mod: S$GLB,,, | Performed by: NURSE PRACTITIONER

## 2019-03-26 PROCEDURE — 1101F PT FALLS ASSESS-DOCD LE1/YR: CPT | Mod: CPTII,S$GLB,, | Performed by: NURSE PRACTITIONER

## 2019-03-26 NOTE — PROGRESS NOTES
Conner Lombardi Jr.  03/26/2019  [id number]    Tia Lopez MD  Patient Care Team:  Tia Lopez MD as PCP - General (Family Medicine)  John Paul Ramirez MD as Consulting Physician (Neurology)  Ann Valadez LPN as Care Coordinator (Internal Medicine)  Has the patient seen any provider outside of the [facility] network since the last visit? (no). If yes, HIPPA forms completed and records requested.        Visit Type:a scheduled routine follow-up visit    Chief Complaint:  Chief Complaint   Patient presents with    6 month follow up       History of Present Illness:    Patient presents for 6-month follow up of HTN. He reports occasional hearing loss R>L. Comes and goes. He is otherwise without concern.     History:  Past Medical History:   Diagnosis Date    Epilepsy     Last seizure 2010.     Hypertension     Tubular adenoma of colon 04/02/2012     Past Surgical History:   Procedure Laterality Date    COLONOSCOPY N/A 8/21/2015    Performed [facility]errano [facility] at [facility]    TONSILLECTOMY       Family History   Problem Relation Age of Onset    Hypertension Unknown      Social History     Socioeconomic History    Marital status:      Spouse name: Not on file    Number of children: Not on file    Years of education: Not on file    Highest education level: Not on file   Occupational History    Not on file   Social Needs    Financial resource strain: Not on file    Food insecurity:     Worry: Not on file     Inability: Not on file    Transportation needs:     Medical: Not on file     Non-medical: Not on file   Tobacco Use    Smoking status: Never Smoker    Smokeless tobacco: Never Used   Substance and Sexual Activity    Alcohol use: No     Alcohol/week: 0.0 oz    Drug use: No    Sexual activity: Not on file   Lifestyle    Physical activity:     Days per week: Not on file     Minutes per session: Not on file    Stress: Not on file   Relationships    Social connections:      Talks on phone: Not on file     Gets together: Not on file     Attends Taoism service: Not on file     Active member of club or organization: Not on file     Attends meetings of clubs or organizations: Not on file     Relationship status: Not on file    Intimate partner violence:     Fear of current or ex partner: Not on file     Emotionally abused: Not on file     Physically abused: Not on file     Forced sexual activity: Not on file   Other Topics Concern    Not on file   Social History Narrative    Not on file     Patient Active Problem List   Diagnosis    Tubular adenoma of colon    Essential hypertension    Epilepsy    Abnormal glucose     Personal history of colonic polyps    Nuclear sclerosis of both eyes    Refractive error    Seborrheic dermatitis of scalp    Obesity (BMI 30.0-34.9)     Review of patient's allergies indicates:  No Known Allergies    The following were reviewed at this visit: active problem list, medication list, allergies, family history, social history, and health maintenance.    Medications:  Current Outpatient Medications on File Prior to Visit   Medication Sig Dispense Refill    aspirin (ECOTRIN) 81 MG EC tablet Take 81 mg by mouth once daily.      lisinopril-hydrochlorothiazide (PRINZIDE,ZESTORETIC) 20-25 mg Tab TAKE 1 TABLET BY MOUTH EVERY DAY 90 tablet 1    phenytoin (DILANTIN) 100 MG ER capsule TAKE 4 CAPSULES BY MOUTH ONCE DAILY 360 capsule 0    [DISCONTINUED] ketoconazole (NIZORAL) 2 % shampoo Apply 5 to 10 mL to wet scalp, lather, leave on 3 to 5 minutes, and rinse; apply twice weekly for 30 days. 120 mL 0     No current facility-administered medications on file prior to visit.        Medications have been reviewed and reconciled with patient at this visit.  Barriers to medications present (no)    Adverse reactions to current medications (no)    Over the counter medications reviewed (Yes ), and if needed added to active Medication list at this visit.      Exam:  Wt Readings from Last 3 Encounters:   03/26/19 84.3 kg (185 lb 13.6 oz)   09/26/18 87.3 kg (192 lb 7.4 oz)   04/11/18 87.6 kg (193 lb 2 oz)     Temp Readings from Last 3 Encounters:   03/26/19 97 °F (36.1 °C) (Tympanic)   09/26/18 98.4 °F (36.9 °C) (Tympanic)   03/21/18 97.4 °F (36.3 °C) (Tympanic)     BP Readings from Last 3 Encounters:   03/26/19 136/78   09/26/18 118/76   04/11/18 132/84     Pulse Readings from Last 3 Encounters:   03/26/19 95   09/26/18 102   04/11/18 68     Body mass index is 30 kg/m².      Review of Systems   HENT: Positive for hearing loss.    Eyes: Negative for discharge.   Respiratory: Negative for wheezing.    Cardiovascular: Negative for chest pain and palpitations.   Gastrointestinal: Negative for blood in stool, constipation, diarrhea and vomiting.   Genitourinary: Negative for hematuria and urgency.   Musculoskeletal: Positive for joint pain (improving with tumeric). Negative for neck pain.   Neurological: Negative for weakness and headaches.   Endo/Heme/Allergies: Negative for polydipsia.     Physical Exam   Constitutional: He is oriented to person, place, and time. He appears well-developed and well-nourished. No distress.   HENT:   Head: Normocephalic and atraumatic.   Bilateral cerumen impaction. Able to almost fully remove soft, black wax from both ears with curette. Patient reported significant improvement in hearing. He tolerated well.   Eyes: Pupils are equal, round, and reactive to light. Conjunctivae and EOM are normal.   Cardiovascular: Normal rate and regular rhythm. Exam reveals no gallop and no friction rub.   No murmur heard.  Pulmonary/Chest: Effort normal and breath sounds normal.   Neurological: He is alert and oriented to person, place, and time.   Skin: Skin is warm and dry.   Psychiatric: He has a normal mood and affect.   Vitals reviewed.      Laboratory Reviewed ({Yes)  Lab Results   Component Value Date    WBC 5.58 10/02/2018    HGB 13.9 (L)  10/02/2018    HCT 40.2 10/02/2018     10/02/2018    CHOL 246 (H) 10/02/2018    TRIG 292 (H) 10/02/2018    HDL 44 10/02/2018    ALT 27 10/02/2018    AST 25 10/02/2018     10/02/2018    K 4.3 10/02/2018    CL 98 10/02/2018    CREATININE 1.0 10/02/2018    BUN 29 (H) 10/02/2018    CO2 29 10/02/2018    TSH 1.304 10/02/2018    PSA 0.43 02/24/2012    GLUF 93 05/14/2010    HGBA1C 5.5 04/11/2018       Conner was seen today for 6 month follow up.    Diagnoses and all orders for this visit:    Essential hypertension  Comments:  controlled, continue current medications  Orders:  -     Basic metabolic panel; Future    Nonintractable epilepsy without status epilepticus, unspecified epilepsy type  Comments:  followed by Dr. James Calvillo for ear wax buildup.         Care Plan/Goals: Reviewed  (N/A)  Goals     None          Follow up: Follow up in about 6 months (around 9/26/2019), or if symptoms worsen or fail to improve, for annual wellness/EPP with Dr. Lopez.    After visit summary was printed and given to patient upon discharge today.  Patient goals and care plan are included in After Visit Summary.    Answers for HPI/ROS submitted by the patient on 3/26/2019   activity change: No  unexpected weight change: No  rhinorrhea: No  trouble swallowing: No  visual disturbance: No  chest tightness: No  polyuria: No  difficulty urinating: No  joint swelling: No  arthralgias: Yes  confusion: No  dysphoric mood: No

## 2019-05-24 ENCOUNTER — PATIENT MESSAGE (OUTPATIENT)
Dept: NEUROLOGY | Facility: CLINIC | Age: 69
End: 2019-05-24

## 2019-05-24 DIAGNOSIS — G40.909 EPILEPSY WITHOUT STATUS EPILEPTICUS, NOT INTRACTABLE: Chronic | ICD-10-CM

## 2019-05-24 RX ORDER — PHENYTOIN SODIUM 100 MG/1
CAPSULE, EXTENDED RELEASE ORAL
Qty: 360 CAPSULE | Refills: 0 | Status: SHIPPED | OUTPATIENT
Start: 2019-05-24 | End: 2019-08-26 | Stop reason: SDUPTHER

## 2019-07-07 NOTE — MR AVS SNAPSHOT
[facility]  [address] [facility] [place] [address]  Phone: [phone]                  Conner Lombardi Jr.   3/8/2017 11:00 AM   Office Visit    Description:  Male : 1950   Provider:  John Paul Ramirez MD   Department:  [facility] - Neurology           Reason for Visit     Follow-up     Seizures           Diagnoses this Visit        Comments    Grand mal epilepsy, controlled    -  Primary            To Do List           Future Appointments        Provider Department Dept Phone    3/8/2017 12:15 PM LAB, SAME DAY [facility] [phone]    2017 8:00 AM [facility] Op[facility]mology [phone]    2017 9:00 AM John Paul Ramirez MD [facility] [phone]      Goals (5 Years of Data)     None      Follow-Up and Disposition     Return in about 6 months (around 2017).      [facility]     [facility] Nurse Care Line - 24/7 Assistance  Registered nurses in the [facility] provide clinical advisement, health education, appointment booking, and other advisory services.  Call for this free service at [phone].             Medications           Message regarding Medications     Verify the changes and/or additions to your medication regime listed below are the same as discussed with your clinician today.  If any of these changes or additions are incorrect, please notify your healthcare provider.             Verify that the below list of medications is an accurate representation of the medications you are currently taking.  If none reported, the list may be blank. If incorrect, please contact your healthcare provider. Carry this list with you in case of emergency.           Current Medications     aspirin (ECOTRIN) 81 MG EC tablet Take 81 mg by mouth once daily.    lisinopril-hydrochlorothiazide (PRINZIDE,ZESTORETIC) 20-25 mg Tab Take 1 tablet by mouth once daily.    phenytoin (DILANTIN) 100 MG ER capsule Take 4 capsules (400 mg  "total) by mouth once daily.           Clinical Reference Information           Your Vitals Were     BP Pulse Height Weight BMI    112/66 72 5' 6" (1.676 m) 88.1 kg (194 lb 3.6 oz) 31.35 kg/m2      Blood Pressure          Most Recent Value    BP  112/66      Allergies as of 3/8/2017     No Known Allergies      Immunizations Administered on Date of Encounter - 3/8/2017     None      Orders Placed During Today's Visit     Future Labs/Procedures Expected by Expires    CBC auto differential  3/8/2017 5/7/2018    Comprehensive metabolic panel  3/8/2017 5/7/2018    Phenytoin level, total  3/8/2017 5/7/2018      Language Assistance Services     ATTENTION: Language assistance services are available, free of charge. Please call [phone].      ATENCIÓN: Si елена kathleen, tiene a tovar disposición servicios gratuitos de asistencia lingüística. Llame al [phone].     [facility] Ý: N?u b?n nói Ti?ng Vi?t, có các d?ch v? h? tr? ngôn ng? mi?n phí dành cho b?n. G?i s? [phone].         [facility] complies with applicable Federal civil rights laws and does not discriminate on the basis of race, color, national origin, age, disability, or sex.        " None

## 2019-08-11 DIAGNOSIS — I10 ESSENTIAL HYPERTENSION: ICD-10-CM

## 2019-08-12 RX ORDER — LISINOPRIL AND HYDROCHLOROTHIAZIDE 20; 25 MG/1; MG/1
TABLET ORAL
Qty: 90 TABLET | Refills: 0 | Status: SHIPPED | OUTPATIENT
Start: 2019-08-12 | End: 2019-11-17 | Stop reason: SDUPTHER

## 2019-08-25 ENCOUNTER — PATIENT MESSAGE (OUTPATIENT)
Dept: INTERNAL MEDICINE | Facility: CLINIC | Age: 69
End: 2019-08-25

## 2019-08-25 DIAGNOSIS — G40.909 EPILEPSY WITHOUT STATUS EPILEPTICUS, NOT INTRACTABLE: Chronic | ICD-10-CM

## 2019-08-26 RX ORDER — PHENYTOIN SODIUM 100 MG/1
400 CAPSULE, EXTENDED RELEASE ORAL DAILY
Qty: 360 CAPSULE | Refills: 0 | Status: SHIPPED | OUTPATIENT
Start: 2019-08-26 | End: 2019-11-14 | Stop reason: SDUPTHER

## 2019-08-27 ENCOUNTER — PATIENT MESSAGE (OUTPATIENT)
Dept: INTERNAL MEDICINE | Facility: CLINIC | Age: 69
End: 2019-08-27

## 2019-11-14 ENCOUNTER — LAB VISIT (OUTPATIENT)
Dept: LAB | Facility: HOSPITAL | Age: 69
End: 2019-11-14
Attending: PSYCHIATRY & NEUROLOGY
Payer: COMMERCIAL

## 2019-11-14 ENCOUNTER — OFFICE VISIT (OUTPATIENT)
Dept: NEUROLOGY | Facility: CLINIC | Age: 69
End: 2019-11-14
Payer: COMMERCIAL

## 2019-11-14 VITALS
WEIGHT: 194.25 LBS | HEIGHT: 66 IN | HEART RATE: 95 BPM | BODY MASS INDEX: 31.22 KG/M2 | DIASTOLIC BLOOD PRESSURE: 80 MMHG | SYSTOLIC BLOOD PRESSURE: 140 MMHG

## 2019-11-14 DIAGNOSIS — I10 ESSENTIAL HYPERTENSION: ICD-10-CM

## 2019-11-14 DIAGNOSIS — G40.909 EPILEPSY WITHOUT STATUS EPILEPTICUS, NOT INTRACTABLE: Chronic | ICD-10-CM

## 2019-11-14 DIAGNOSIS — G40.309 NONINTRACTABLE GENERALIZED IDIOPATHIC EPILEPSY WITHOUT STATUS EPILEPTICUS: Chronic | ICD-10-CM

## 2019-11-14 DIAGNOSIS — G40.309 NONINTRACTABLE GENERALIZED IDIOPATHIC EPILEPSY WITHOUT STATUS EPILEPTICUS: Primary | Chronic | ICD-10-CM

## 2019-11-14 LAB
ALBUMIN SERPL BCP-MCNC: 4.5 G/DL (ref 3.5–5.2)
ALP SERPL-CCNC: 92 U/L (ref 55–135)
ALT SERPL W/O P-5'-P-CCNC: 23 U/L (ref 10–44)
ANION GAP SERPL CALC-SCNC: 8 MMOL/L (ref 8–16)
AST SERPL-CCNC: 21 U/L (ref 10–40)
BASOPHILS # BLD AUTO: 0.05 K/UL (ref 0–0.2)
BASOPHILS NFR BLD: 0.9 % (ref 0–1.9)
BILIRUB SERPL-MCNC: 0.3 MG/DL (ref 0.1–1)
BUN SERPL-MCNC: 18 MG/DL (ref 8–23)
CALCIUM SERPL-MCNC: 10.4 MG/DL (ref 8.7–10.5)
CHLORIDE SERPL-SCNC: 100 MMOL/L (ref 95–110)
CO2 SERPL-SCNC: 30 MMOL/L (ref 23–29)
CREAT SERPL-MCNC: 1.2 MG/DL (ref 0.5–1.4)
DIFFERENTIAL METHOD: ABNORMAL
EOSINOPHIL # BLD AUTO: 0.1 K/UL (ref 0–0.5)
EOSINOPHIL NFR BLD: 2.4 % (ref 0–8)
ERYTHROCYTE [DISTWIDTH] IN BLOOD BY AUTOMATED COUNT: 12.5 % (ref 11.5–14.5)
EST. GFR  (AFRICAN AMERICAN): >60 ML/MIN/1.73 M^2
EST. GFR  (NON AFRICAN AMERICAN): >60 ML/MIN/1.73 M^2
GLUCOSE SERPL-MCNC: 104 MG/DL (ref 70–110)
HCT VFR BLD AUTO: 39.6 % (ref 40–54)
HGB BLD-MCNC: 13.8 G/DL (ref 14–18)
IMM GRANULOCYTES # BLD AUTO: 0.01 K/UL (ref 0–0.04)
IMM GRANULOCYTES NFR BLD AUTO: 0.2 % (ref 0–0.5)
LYMPHOCYTES # BLD AUTO: 1.8 K/UL (ref 1–4.8)
LYMPHOCYTES NFR BLD: 33.4 % (ref 18–48)
MCH RBC QN AUTO: 34.6 PG (ref 27–31)
MCHC RBC AUTO-ENTMCNC: 34.8 G/DL (ref 32–36)
MCV RBC AUTO: 99 FL (ref 82–98)
MONOCYTES # BLD AUTO: 0.7 K/UL (ref 0.3–1)
MONOCYTES NFR BLD: 12.3 % (ref 4–15)
NEUTROPHILS # BLD AUTO: 2.7 K/UL (ref 1.8–7.7)
NEUTROPHILS NFR BLD: 50.8 % (ref 38–73)
NRBC BLD-RTO: 0 /100 WBC
PLATELET # BLD AUTO: 182 K/UL (ref 150–350)
PMV BLD AUTO: 10.4 FL (ref 9.2–12.9)
POTASSIUM SERPL-SCNC: 4.8 MMOL/L (ref 3.5–5.1)
PROT SERPL-MCNC: 7.8 G/DL (ref 6–8.4)
RBC # BLD AUTO: 3.99 M/UL (ref 4.6–6.2)
SODIUM SERPL-SCNC: 138 MMOL/L (ref 136–145)
WBC # BLD AUTO: 5.36 K/UL (ref 3.9–12.7)

## 2019-11-14 PROCEDURE — 99999 PR PBB SHADOW E&M-EST. PATIENT-LVL III: ICD-10-PCS | Mod: PBBFAC,,, | Performed by: PSYCHIATRY & NEUROLOGY

## 2019-11-14 PROCEDURE — 3079F PR MOST RECENT DIASTOLIC BLOOD PRESSURE 80-89 MM HG: ICD-10-PCS | Mod: CPTII,S$GLB,, | Performed by: PSYCHIATRY & NEUROLOGY

## 2019-11-14 PROCEDURE — 3077F PR MOST RECENT SYSTOLIC BLOOD PRESSURE >= 140 MM HG: ICD-10-PCS | Mod: CPTII,S$GLB,, | Performed by: PSYCHIATRY & NEUROLOGY

## 2019-11-14 PROCEDURE — 1101F PT FALLS ASSESS-DOCD LE1/YR: CPT | Mod: CPTII,S$GLB,, | Performed by: PSYCHIATRY & NEUROLOGY

## 2019-11-14 PROCEDURE — 36415 COLL VENOUS BLD VENIPUNCTURE: CPT | Mod: PO

## 2019-11-14 PROCEDURE — 99213 OFFICE O/P EST LOW 20 MIN: CPT | Mod: S$GLB,,, | Performed by: PSYCHIATRY & NEUROLOGY

## 2019-11-14 PROCEDURE — 3077F SYST BP >= 140 MM HG: CPT | Mod: CPTII,S$GLB,, | Performed by: PSYCHIATRY & NEUROLOGY

## 2019-11-14 PROCEDURE — 80053 COMPREHEN METABOLIC PANEL: CPT

## 2019-11-14 PROCEDURE — 99999 PR PBB SHADOW E&M-EST. PATIENT-LVL III: CPT | Mod: PBBFAC,,, | Performed by: PSYCHIATRY & NEUROLOGY

## 2019-11-14 PROCEDURE — 99213 PR OFFICE/OUTPT VISIT, EST, LEVL III, 20-29 MIN: ICD-10-PCS | Mod: S$GLB,,, | Performed by: PSYCHIATRY & NEUROLOGY

## 2019-11-14 PROCEDURE — 1101F PR PT FALLS ASSESS DOC 0-1 FALLS W/OUT INJ PAST YR: ICD-10-PCS | Mod: CPTII,S$GLB,, | Performed by: PSYCHIATRY & NEUROLOGY

## 2019-11-14 PROCEDURE — 85025 COMPLETE CBC W/AUTO DIFF WBC: CPT

## 2019-11-14 PROCEDURE — 3079F DIAST BP 80-89 MM HG: CPT | Mod: CPTII,S$GLB,, | Performed by: PSYCHIATRY & NEUROLOGY

## 2019-11-14 RX ORDER — PHENYTOIN SODIUM 100 MG/1
400 CAPSULE, EXTENDED RELEASE ORAL DAILY
Qty: 360 CAPSULE | Refills: 3 | Status: SHIPPED | OUTPATIENT
Start: 2019-11-14 | End: 2020-11-16 | Stop reason: SDUPTHER

## 2019-11-14 NOTE — PROGRESS NOTES
Subjective:      Patient ID: Conner Lombardi Jr. is a 69 y.o. male.    Chief Complaint:   Follow-up for epilepsy    The patient indicates that he 1st had seizure as an infant and has had a lifelong history of generalized tonic-clonic seizure.  The patient states that his 1st medication that he can recall was Mysoline that he had taken for a number of years.  At some point in time, the physicians added Dilantin to the Mysoline.  In 2010, his primary care physician withdrew him from Mysoline rather rapidly and he experienced a single generalized tonic clonic seizure.  Subsequently, he was placed back on Mysoline and then more gradually withdrawn from the Mysoline to be on Dilantin 400 mg once a day as his sole anticonvulsant.  The patient's last seizure occurred in 2010. Prior to that seizure, the patient states that it had been over 30 years for but throughout that entire time had been taking an anticonvulsant.    On today's visit, the patient denies any headache or dizziness.  He denies any sensation of imbalance.  He denies any blurred vision or double vision.  He has not had any fever or chills.  The patient states that he has been healthy and continues to work full-time.  He has no complaints to offer on today's visit.          ROS:  GENERAL: NO FEVER, CHILLS, FATIGABILITY OR WEIGHT LOSS.  SKIN: NO RASHES, ITCHING OR CHANGES IN COLOR OR TEXTURE OF SKIN.  HEAD: NO HEADACHES OR RECENT HEAD TRAUMA.  EYES: VISUAL ACUITY FINE. NO PHOTOPHOBIA, OCULAR PAIN OR DIPLOPIA.  EARS: DENIES EAR PAIN, DISCHARGE OR VERTIGO.  NOSE: NO LOSS OF SMELL, NO EPISTAXIS OR POSTNASAL DRIP.  MOUTH & THROAT: NO HOARSENESS OR CHANGE IN VOICE. NO EXCESSIVE GUM BLEEDING.  NODES: DENIES SWOLLEN GLANDS.  CHEST: DENIES AUSTIN, CYANOSIS, WHEEZING, COUGH AND SPUTUM PRODUCTION.  CARDIOVASCULAR: DENIES CHEST PAIN, PND, ORTHOPNEA OR REDUCED EXERCISE TOLERANCE.  ABDOMEN: APPETITE FINE. NO WEIGHT LOSS. DENIES DIARRHEA, ABDOMINAL PAIN, HEMATEMESIS OR BLOOD  IN STOOL.  URINARY: NO FLANK PAIN, DYSURIA OR HEMATURIA.  PERIPHERAL VASCULAR: NO CLAUDICATION OR CYANOSIS.  MUSCULOSKELETAL: NO JOINT STIFFNESS OR SWELLING. DENIES BACK PAIN.  NEUROLOGIC: NO HISTORY OF  PARALYSIS, ALTERATION OF GAIT OR COORDINATION.    Past Medical History:   Diagnosis Date    Epilepsy     Last seizure 2010.     Hypertension     Tubular adenoma of colon 04/02/2012     Past Surgical History:   Procedure Laterality Date    TONSILLECTOMY       Family History   Problem Relation Age of Onset    Hypertension Unknown      Social History     Socioeconomic History    Marital status:      Spouse name: Not on file    Number of children: Not on file    Years of education: Not on file    Highest education level: Not on file   Occupational History    Not on file   Social Needs    Financial resource strain: Not on file    Food insecurity:     Worry: Not on file     Inability: Not on file    Transportation needs:     Medical: Not on file     Non-medical: Not on file   Tobacco Use    Smoking status: Never Smoker    Smokeless tobacco: Never Used   Substance and Sexual Activity    Alcohol use: No     Alcohol/week: 0.0 standard drinks    Drug use: No    Sexual activity: Never   Lifestyle    Physical activity:     Days per week: Not on file     Minutes per session: Not on file    Stress: Not on file   Relationships    Social connections:     Talks on phone: Not on file     Gets together: Not on file     Attends [employer] service: Not on file     Active member of club or organization: Not on file     Attends meetings of clubs or organizations: Not on file     Relationship status: Not on file   Other Topics Concern    Not on file   Social History Narrative    Not on file         Objective:   PE:   VITAL SIGNS:   Height 5 ft 6 in, weight 88.1 kg, BMI 31.35  Vitals:    11/14/19 1008   BP: (!) 140/80   Pulse: 95     APPEARANCE: WELL NOURISHED, WELL DEVELOPED, IN NO ACUTE DISTRESS.    HEAD:  NORMOCEPHALIC, ATRAUMATIC.  EYES: PERRL. EOMI.  NON-ICTERIC SCLERAE.     NOSE: MUCOSA PINK. AIRWAY CLEAR.  MOUTH & THROAT: NO TONSILLAR ENLARGEMENT. NO PHARYNGEAL ERYTHEMA OR EXUDATE. NO STRIDOR.  NECK: SUPPLE. NO BRUITS.  CHEST: LUNGS CLEAR TO AUSCULTATION.  CARDIOVASCULAR: REGULAR RHYTHM WITHOUT SIGNIFICANT MURMURS.  ABDOMEN: BOWEL SOUNDS NORMAL. NOT DISTENDED.   MUSCULOSKELETAL:  NO BONY DEFORMITY SEEN.  MUSCLE TONE  AND MUSCLE MASS ARE NORMAL IN BOTH UPPER AND BOTH LOWER EXTREMITIES.    NEUROLOGIC:   MENTAL STATUS:  THE PATIENT IS WELL ORIENTED TO PERSON, TIME, PLACE, AND SITUATION.  THE PATIENT IS ATTENTIVE TO THE ENVIRONMENT AND COOPERATIVE FOR THE EXAM.  CRANIAL NERVES: II-XII GROSSLY INTACT. FUNDOSCOPIC EXAM IS NORMAL.  NO HEMORRHAGE, EXUDATE OR PAPILLEDEMA IS PRESENT. THE EXTRAOCULAR MUSCLES ARE INTACT IN THE CARDINAL DIRECTIONS OF GAZE.  NO PTOSIS IS PRESENT. FACIAL FEATURES ARE SYMMETRICAL.  SPEECH IS NORMAL IN FLUENCY, DICTION, AND PHRASING.  TONGUE PROTRUDES IN THE MIDLINE.    GAIT AND STATION:  ROMBERG IS NEGATIVE.  GOOD ALTERNATE ARMSWING WITH NORMAL GAIT.  MOTOR:  NO DOWNDRIFT OF EITHER ARM WHEN HELD AT SHOULDER LEVEL.  MANUAL MUSCLE TESTING OF PROXIMAL AND DISTAL MUSCLES OF BOTH UPPER AND LOWER EXTREMITIES IS NORMAL. MUSCLE MASS IS NORMAL.  MUSCLE TONE IS NORMAL.  SENSORY:  INTACT BOTH UPPER AND LOWER EXTREMITIES TO PIN PRICK, TOUCH, AND VIBRATION.  CEREBELLAR:  FINGER TO NOSE DONE WELL.  ALTERNATING MOVEMENTS INTACT.  NO INVOLUNTARY MOVEMENTS OR TREMOR SEEN.  REFLEXES:  STRETCH REFLEXES ARE 2+ BOTH UPPER AND LOWER EXTREMITIES.  PLANTAR STIMULATION IS FLEXOR BILATERALLY AND NO PATHOLOGICAL REFLEXES ARE SEEN              Assessment:     Encounter Diagnoses   Name Primary?    Nonintractable generalized idiopathic epilepsy without status epilepticus Yes    Essential hypertension     Epilepsy without status epilepticus, not intractable        Discussion:  The patient has a lifelong history of  generalized tonic-clonic seizure that has been very easily controlled with medication.  He is currently taking Dilantin 400 mg a day and has not had a seizures since 2010. He is not exhibiting any signs of Dilantin toxicity.      Plan:     1.  Lab today:  CBC and CMP  2.  Continue Dilantin 400 mg once a day at bedtime  3.  Return to Neurology for follow-up in 1 year.    This was a 25 min face-to-face visit with the patient with over 50% time spent counseling the patient regarding his management of seizure with Dilantin.  This note is generated with speech recognition software and is subject to transcription error and sound alike phrases that may be missed by proofreading.

## 2019-11-14 NOTE — LETTER
November 14, 2019      Tia Lopez MD  [id number] [place] [address] [place] [address]           [facility]  [id number] [place] [address] [place] [address]  Phone: [phone]          Patient: Conner Lombardi Jr.   MR Number: [id number]   YOB: 1950   Date of Visit: 11/14/2019       Dear Dr. Tia Lopez:    Thank you for referring Conner Lombardi to me for evaluation. Attached you will find relevant portions of my assessment and plan of care.    If you have questions, please do not hesitate to call me. I look forward to following Conner Lombardi along with you.    Sincerely,    Juan Rainey MD    Enclosure  CC:  No Recipients    If you would like to receive this communication electronically, please contact [email or web address] or [phone] to request more information on [employer] Link access.    For providers and/or their staff who would like to refer a patient to [facility], please contact us through our one-stop-shop provider referral line, [facility], at [phone].    If you feel you have received this communication in error or would no longer like to receive these types of communications, please e-mail [email or web address]

## 2019-11-17 DIAGNOSIS — I10 ESSENTIAL HYPERTENSION: ICD-10-CM

## 2019-11-18 RX ORDER — LISINOPRIL AND HYDROCHLOROTHIAZIDE 20; 25 MG/1; MG/1
TABLET ORAL
Qty: 90 TABLET | Refills: 1 | Status: SHIPPED | OUTPATIENT
Start: 2019-11-18 | End: 2020-05-27

## 2020-05-27 DIAGNOSIS — I10 ESSENTIAL HYPERTENSION: ICD-10-CM

## 2020-05-27 RX ORDER — LISINOPRIL AND HYDROCHLOROTHIAZIDE 20; 25 MG/1; MG/1
TABLET ORAL
Qty: 30 TABLET | Refills: 0 | Status: SHIPPED | OUTPATIENT
Start: 2020-05-27 | End: 2020-06-30 | Stop reason: SDUPTHER

## 2020-06-29 DIAGNOSIS — I10 ESSENTIAL HYPERTENSION: ICD-10-CM

## 2020-06-29 RX ORDER — LISINOPRIL AND HYDROCHLOROTHIAZIDE 20; 25 MG/1; MG/1
1 TABLET ORAL DAILY
Qty: 30 TABLET | Refills: 0 | Status: CANCELLED | OUTPATIENT
Start: 2020-06-29

## 2020-06-30 ENCOUNTER — OFFICE VISIT (OUTPATIENT)
Dept: INTERNAL MEDICINE | Facility: CLINIC | Age: 70
End: 2020-06-30
Payer: COMMERCIAL

## 2020-06-30 VITALS
DIASTOLIC BLOOD PRESSURE: 70 MMHG | WEIGHT: 190.94 LBS | OXYGEN SATURATION: 95 % | TEMPERATURE: 100 F | SYSTOLIC BLOOD PRESSURE: 108 MMHG | HEART RATE: 105 BPM | BODY MASS INDEX: 30.81 KG/M2

## 2020-06-30 DIAGNOSIS — Z00.00 ANNUAL PHYSICAL EXAM: Primary | ICD-10-CM

## 2020-06-30 DIAGNOSIS — I10 ESSENTIAL HYPERTENSION: ICD-10-CM

## 2020-06-30 DIAGNOSIS — E66.9 OBESITY (BMI 30.0-34.9): ICD-10-CM

## 2020-06-30 DIAGNOSIS — G40.309 NONINTRACTABLE GENERALIZED IDIOPATHIC EPILEPSY WITHOUT STATUS EPILEPTICUS: Chronic | ICD-10-CM

## 2020-06-30 DIAGNOSIS — R73.09 ABNORMAL GLUCOSE: Chronic | ICD-10-CM

## 2020-06-30 PROCEDURE — 3074F PR MOST RECENT SYSTOLIC BLOOD PRESSURE < 130 MM HG: ICD-10-PCS | Mod: CPTII,S$GLB,, | Performed by: NURSE PRACTITIONER

## 2020-06-30 PROCEDURE — 99999 PR PBB SHADOW E&M-EST. PATIENT-LVL III: CPT | Mod: PBBFAC,,, | Performed by: NURSE PRACTITIONER

## 2020-06-30 PROCEDURE — 3074F SYST BP LT 130 MM HG: CPT | Mod: CPTII,S$GLB,, | Performed by: NURSE PRACTITIONER

## 2020-06-30 PROCEDURE — 3078F PR MOST RECENT DIASTOLIC BLOOD PRESSURE < 80 MM HG: ICD-10-PCS | Mod: CPTII,S$GLB,, | Performed by: NURSE PRACTITIONER

## 2020-06-30 PROCEDURE — 3078F DIAST BP <80 MM HG: CPT | Mod: CPTII,S$GLB,, | Performed by: NURSE PRACTITIONER

## 2020-06-30 PROCEDURE — 99397 PER PM REEVAL EST PAT 65+ YR: CPT | Mod: S$GLB,,, | Performed by: NURSE PRACTITIONER

## 2020-06-30 PROCEDURE — 99999 PR PBB SHADOW E&M-EST. PATIENT-LVL III: ICD-10-PCS | Mod: PBBFAC,,, | Performed by: NURSE PRACTITIONER

## 2020-06-30 PROCEDURE — 99397 PR PREVENTIVE VISIT,EST,65 & OVER: ICD-10-PCS | Mod: S$GLB,,, | Performed by: NURSE PRACTITIONER

## 2020-06-30 RX ORDER — LISINOPRIL AND HYDROCHLOROTHIAZIDE 20; 25 MG/1; MG/1
1 TABLET ORAL DAILY
Qty: 90 TABLET | Refills: 1 | Status: SHIPPED | OUTPATIENT
Start: 2020-06-30 | End: 2020-12-30 | Stop reason: SDUPTHER

## 2020-06-30 NOTE — PROGRESS NOTES
Conner Lombardi Jr. 70 y.o. male     Chief Complaint:  Chief Complaint   Patient presents with    Medication Refill       History of Present Illness:  Pt is new to provider, but established in practice and c/o:      Annual/medication refill(prinzide)   PMH - epilepsy, obesity & HTN     HM needs:   Singles vaccine     No complaints     Exam:  Review of Systems   Constitutional: Negative for activity change, chills, fever and unexpected weight change.   HENT: Negative for trouble swallowing.    Eyes: Negative for visual disturbance.   Respiratory: Negative for cough and shortness of breath.    Cardiovascular: Negative for chest pain, palpitations and leg swelling.   Gastrointestinal: Negative for abdominal distention, abdominal pain, constipation, diarrhea, nausea and vomiting.   Genitourinary: Negative for difficulty urinating.   Skin: Negative for wound.   Neurological: Negative for speech difficulty.   Psychiatric/Behavioral: Negative for confusion.     Physical Exam  Vitals signs and nursing note reviewed.   Constitutional:       General: He is not in acute distress.     Appearance: Normal appearance. He is well-developed and overweight. He is not ill-appearing, toxic-appearing or diaphoretic.   HENT:      Head: Normocephalic and atraumatic.      Right Ear: External ear normal.      Left Ear: External ear normal.   Eyes:      General: No scleral icterus.        Right eye: No discharge.         Left eye: No discharge.      Conjunctiva/sclera: Conjunctivae normal.      Pupils: Pupils are equal, round, and reactive to light.   Neck:      Musculoskeletal: Normal range of motion.      Trachea: No tracheal deviation.   Cardiovascular:      Rate and Rhythm: Normal rate and regular rhythm.   Pulmonary:      Effort: Pulmonary effort is normal. No respiratory distress.      Breath sounds: Normal breath sounds. No stridor. No wheezing or rales.   Chest:      Chest wall: No tenderness.   Abdominal:      General: Bowel  sounds are normal.      Palpations: Abdomen is soft.   Musculoskeletal: Normal range of motion.   Skin:     General: Skin is warm and dry.      Coloration: Skin is not pale.      Findings: No erythema or rash.   Neurological:      Mental Status: He is alert and oriented to person, place, and time.   Psychiatric:         Speech: Speech normal.         Behavior: Behavior normal. Behavior is cooperative.         Thought Content: Thought content normal.         Judgment: Judgment normal.         Most Recent Laboratory Results Reviewed ({Yes)  Lab Results   Component Value Date    WBC 5.36 11/14/2019    HGB 13.8 (L) 11/14/2019    HCT 39.6 (L) 11/14/2019     11/14/2019    CHOL 246 (H) 10/02/2018    TRIG 292 (H) 10/02/2018    HDL 44 10/02/2018    ALT 23 11/14/2019    AST 21 11/14/2019     11/14/2019    K 4.8 11/14/2019     11/14/2019    CREATININE 1.2 11/14/2019    BUN 18 11/14/2019    CO2 30 (H) 11/14/2019    TSH 1.304 10/02/2018    PSA 0.43 02/24/2012    GLUF 93 05/14/2010    HGBA1C 5.5 04/11/2018       Assessment     ICD-10-CM ICD-9-CM   1. Annual physical exam  Z00.00 V70.0   2. Essential hypertension  I10 401.9   3. Abnormal glucose   R73.09 790.29   4. Nonintractable generalized idiopathic epilepsy without status epilepticus  G40.309 345.90   5. Obesity (BMI 30.0-34.9)  E66.9 278.00        Plan   Annual physical exam  -     CBC auto differential; Future; Expected date: 06/30/2020    Essential hypertension   controlled, continue current meds   -     Comprehensive metabolic panel; Future; Expected date: 06/30/2020  -     lisinopriL-hydrochlorothiazide (PRINZIDE,ZESTORETIC) 20-25 mg Tab; Take 1 tablet by mouth once daily.  Dispense: 90 tablet; Refill: 1    Abnormal glucose   -     Lipid Panel; Future; Expected date: 06/30/2020  -     TSH; Future; Expected date: 06/30/2020  -     Vitamin D; Future; Expected date: 06/30/2020  -     Hemoglobin A1C; Future; Expected date: 06/30/2020    Nonintractable  generalized idiopathic epilepsy without status epilepticus  Followed by neuro - [facility]   Controlled on dilantin 400mg daily     Obesity (BMI 30.0-34.9)  -     Vitamin D; Future; Expected date: 06/30/2020  - encouraged exercise most days and reduced intake of refined/processed foods including breads, cereals, muffins, pasta, cookies, sodas, candy and other packaged foods         Follow up in about 6 months (around 12/30/2020) for PCP follow up.    Future Appointments     Date Provider Specialty Appt Notes    12/30/2020 Tia Lopez MD Internal Medicine 6 mos. follow up

## 2020-07-01 ENCOUNTER — LAB VISIT (OUTPATIENT)
Dept: LAB | Facility: HOSPITAL | Age: 70
End: 2020-07-01
Attending: NURSE PRACTITIONER
Payer: COMMERCIAL

## 2020-07-01 DIAGNOSIS — I10 ESSENTIAL HYPERTENSION: ICD-10-CM

## 2020-07-01 DIAGNOSIS — Z00.00 ANNUAL PHYSICAL EXAM: ICD-10-CM

## 2020-07-01 DIAGNOSIS — E66.9 OBESITY (BMI 30.0-34.9): ICD-10-CM

## 2020-07-01 DIAGNOSIS — R73.09 ABNORMAL GLUCOSE: Chronic | ICD-10-CM

## 2020-07-01 LAB
25(OH)D3+25(OH)D2 SERPL-MCNC: 24 NG/ML (ref 30–96)
ALBUMIN SERPL BCP-MCNC: 4.2 G/DL (ref 3.5–5.2)
ALP SERPL-CCNC: 97 U/L (ref 55–135)
ALT SERPL W/O P-5'-P-CCNC: 24 U/L (ref 10–44)
ANION GAP SERPL CALC-SCNC: 8 MMOL/L (ref 8–16)
AST SERPL-CCNC: 22 U/L (ref 10–40)
BASOPHILS # BLD AUTO: 0.04 K/UL (ref 0–0.2)
BASOPHILS NFR BLD: 0.8 % (ref 0–1.9)
BILIRUB SERPL-MCNC: 0.4 MG/DL (ref 0.1–1)
BUN SERPL-MCNC: 18 MG/DL (ref 8–23)
CALCIUM SERPL-MCNC: 9.7 MG/DL (ref 8.7–10.5)
CHLORIDE SERPL-SCNC: 101 MMOL/L (ref 95–110)
CHOLEST SERPL-MCNC: 229 MG/DL (ref 120–199)
CHOLEST/HDLC SERPL: 5.1 {RATIO} (ref 2–5)
CO2 SERPL-SCNC: 29 MMOL/L (ref 23–29)
CREAT SERPL-MCNC: 0.9 MG/DL (ref 0.5–1.4)
DIFFERENTIAL METHOD: ABNORMAL
EOSINOPHIL # BLD AUTO: 0.3 K/UL (ref 0–0.5)
EOSINOPHIL NFR BLD: 6.5 % (ref 0–8)
ERYTHROCYTE [DISTWIDTH] IN BLOOD BY AUTOMATED COUNT: 12.8 % (ref 11.5–14.5)
EST. GFR  (AFRICAN AMERICAN): >60 ML/MIN/1.73 M^2
EST. GFR  (NON AFRICAN AMERICAN): >60 ML/MIN/1.73 M^2
ESTIMATED AVG GLUCOSE: 111 MG/DL (ref 68–131)
GLUCOSE SERPL-MCNC: 101 MG/DL (ref 70–110)
HBA1C MFR BLD HPLC: 5.5 % (ref 4–5.6)
HCT VFR BLD AUTO: 41 % (ref 40–54)
HDLC SERPL-MCNC: 45 MG/DL (ref 40–75)
HDLC SERPL: 19.7 % (ref 20–50)
HGB BLD-MCNC: 13.4 G/DL (ref 14–18)
IMM GRANULOCYTES # BLD AUTO: 0.01 K/UL (ref 0–0.04)
IMM GRANULOCYTES NFR BLD AUTO: 0.2 % (ref 0–0.5)
LDLC SERPL CALC-MCNC: 132.4 MG/DL (ref 63–159)
LYMPHOCYTES # BLD AUTO: 1.7 K/UL (ref 1–4.8)
LYMPHOCYTES NFR BLD: 33.3 % (ref 18–48)
MCH RBC QN AUTO: 34 PG (ref 27–31)
MCHC RBC AUTO-ENTMCNC: 32.7 G/DL (ref 32–36)
MCV RBC AUTO: 104 FL (ref 82–98)
MONOCYTES # BLD AUTO: 0.6 K/UL (ref 0.3–1)
MONOCYTES NFR BLD: 11.5 % (ref 4–15)
NEUTROPHILS # BLD AUTO: 2.5 K/UL (ref 1.8–7.7)
NEUTROPHILS NFR BLD: 47.7 % (ref 38–73)
NONHDLC SERPL-MCNC: 184 MG/DL
NRBC BLD-RTO: 0 /100 WBC
PLATELET # BLD AUTO: 197 K/UL (ref 150–350)
PMV BLD AUTO: 9.8 FL (ref 9.2–12.9)
POTASSIUM SERPL-SCNC: 4.4 MMOL/L (ref 3.5–5.1)
PROT SERPL-MCNC: 7.5 G/DL (ref 6–8.4)
RBC # BLD AUTO: 3.94 M/UL (ref 4.6–6.2)
SODIUM SERPL-SCNC: 138 MMOL/L (ref 136–145)
TRIGL SERPL-MCNC: 258 MG/DL (ref 30–150)
TSH SERPL DL<=0.005 MIU/L-ACNC: 1.22 UIU/ML (ref 0.4–4)
WBC # BLD AUTO: 5.22 K/UL (ref 3.9–12.7)

## 2020-07-01 PROCEDURE — 83036 HEMOGLOBIN GLYCOSYLATED A1C: CPT

## 2020-07-01 PROCEDURE — 84443 ASSAY THYROID STIM HORMONE: CPT

## 2020-07-01 PROCEDURE — 80053 COMPREHEN METABOLIC PANEL: CPT

## 2020-07-01 PROCEDURE — 36415 COLL VENOUS BLD VENIPUNCTURE: CPT

## 2020-07-01 PROCEDURE — 80061 LIPID PANEL: CPT

## 2020-07-01 PROCEDURE — 85025 COMPLETE CBC W/AUTO DIFF WBC: CPT

## 2020-07-01 PROCEDURE — 82306 VITAMIN D 25 HYDROXY: CPT

## 2020-11-07 ENCOUNTER — PATIENT MESSAGE (OUTPATIENT)
Dept: INTERNAL MEDICINE | Facility: CLINIC | Age: 70
End: 2020-11-07

## 2020-11-11 ENCOUNTER — PATIENT MESSAGE (OUTPATIENT)
Dept: INTERNAL MEDICINE | Facility: CLINIC | Age: 70
End: 2020-11-11

## 2020-11-13 ENCOUNTER — PATIENT MESSAGE (OUTPATIENT)
Dept: INTERNAL MEDICINE | Facility: CLINIC | Age: 70
End: 2020-11-13

## 2020-11-13 DIAGNOSIS — G40.909 EPILEPSY WITHOUT STATUS EPILEPTICUS, NOT INTRACTABLE: Chronic | ICD-10-CM

## 2020-11-16 DIAGNOSIS — G40.909 EPILEPSY WITHOUT STATUS EPILEPTICUS, NOT INTRACTABLE: Chronic | ICD-10-CM

## 2020-11-16 RX ORDER — PHENYTOIN SODIUM 100 MG/1
400 CAPSULE, EXTENDED RELEASE ORAL DAILY
Qty: 360 CAPSULE | Refills: 1 | Status: SHIPPED | OUTPATIENT
Start: 2020-11-16 | End: 2020-11-16 | Stop reason: SDUPTHER

## 2020-11-16 RX ORDER — PHENYTOIN SODIUM 100 MG/1
400 CAPSULE, EXTENDED RELEASE ORAL DAILY
Qty: 360 CAPSULE | Refills: 11 | Status: SHIPPED | OUTPATIENT
Start: 2020-11-16 | End: 2020-11-17 | Stop reason: SDUPTHER

## 2020-11-17 ENCOUNTER — LAB VISIT (OUTPATIENT)
Dept: LAB | Facility: HOSPITAL | Age: 70
End: 2020-11-17
Attending: NURSE PRACTITIONER
Payer: COMMERCIAL

## 2020-11-17 ENCOUNTER — OFFICE VISIT (OUTPATIENT)
Dept: NEUROLOGY | Facility: CLINIC | Age: 70
End: 2020-11-17
Payer: COMMERCIAL

## 2020-11-17 VITALS
HEART RATE: 72 BPM | RESPIRATION RATE: 17 BRPM | DIASTOLIC BLOOD PRESSURE: 78 MMHG | BODY MASS INDEX: 30.47 KG/M2 | SYSTOLIC BLOOD PRESSURE: 120 MMHG | WEIGHT: 189.63 LBS | HEIGHT: 66 IN

## 2020-11-17 DIAGNOSIS — I10 ESSENTIAL HYPERTENSION: ICD-10-CM

## 2020-11-17 DIAGNOSIS — D12.6 TUBULAR ADENOMA OF COLON: ICD-10-CM

## 2020-11-17 DIAGNOSIS — G40.309 NONINTRACTABLE GENERALIZED IDIOPATHIC EPILEPSY WITHOUT STATUS EPILEPTICUS: Primary | ICD-10-CM

## 2020-11-17 DIAGNOSIS — Z86.010 PERSONAL HISTORY OF COLONIC POLYPS: ICD-10-CM

## 2020-11-17 DIAGNOSIS — G40.909 NONINTRACTABLE EPILEPSY WITHOUT STATUS EPILEPTICUS, UNSPECIFIED EPILEPSY TYPE: ICD-10-CM

## 2020-11-17 DIAGNOSIS — R56.9 CONVULSIONS, UNSPECIFIED CONVULSION TYPE: ICD-10-CM

## 2020-11-17 DIAGNOSIS — H25.13 NUCLEAR SCLEROSIS OF BOTH EYES: ICD-10-CM

## 2020-11-17 DIAGNOSIS — L21.9 SEBORRHEIC DERMATITIS OF SCALP: ICD-10-CM

## 2020-11-17 DIAGNOSIS — R73.09 ABNORMAL GLUCOSE: ICD-10-CM

## 2020-11-17 DIAGNOSIS — E66.9 OBESITY (BMI 30.0-34.9): ICD-10-CM

## 2020-11-17 DIAGNOSIS — G40.909 EPILEPSY WITHOUT STATUS EPILEPTICUS, NOT INTRACTABLE: Chronic | ICD-10-CM

## 2020-11-17 DIAGNOSIS — H52.7 REFRACTIVE ERROR: ICD-10-CM

## 2020-11-17 LAB — PHENYTOIN SERPL-MCNC: 15 UG/ML (ref 10–20)

## 2020-11-17 PROCEDURE — 3008F BODY MASS INDEX DOCD: CPT | Mod: CPTII,S$GLB,, | Performed by: NURSE PRACTITIONER

## 2020-11-17 PROCEDURE — 1101F PT FALLS ASSESS-DOCD LE1/YR: CPT | Mod: CPTII,S$GLB,, | Performed by: NURSE PRACTITIONER

## 2020-11-17 PROCEDURE — 1159F MED LIST DOCD IN RCRD: CPT | Mod: S$GLB,,, | Performed by: NURSE PRACTITIONER

## 2020-11-17 PROCEDURE — 3078F DIAST BP <80 MM HG: CPT | Mod: CPTII,S$GLB,, | Performed by: NURSE PRACTITIONER

## 2020-11-17 PROCEDURE — 3288F FALL RISK ASSESSMENT DOCD: CPT | Mod: CPTII,S$GLB,, | Performed by: NURSE PRACTITIONER

## 2020-11-17 PROCEDURE — 36415 COLL VENOUS BLD VENIPUNCTURE: CPT

## 2020-11-17 PROCEDURE — 99999 PR PBB SHADOW E&M-EST. PATIENT-LVL III: CPT | Mod: PBBFAC,,, | Performed by: NURSE PRACTITIONER

## 2020-11-17 PROCEDURE — 99214 OFFICE O/P EST MOD 30 MIN: CPT | Mod: S$GLB,,, | Performed by: NURSE PRACTITIONER

## 2020-11-17 PROCEDURE — 3074F PR MOST RECENT SYSTOLIC BLOOD PRESSURE < 130 MM HG: ICD-10-PCS | Mod: CPTII,S$GLB,, | Performed by: NURSE PRACTITIONER

## 2020-11-17 PROCEDURE — 99999 PR PBB SHADOW E&M-EST. PATIENT-LVL III: ICD-10-PCS | Mod: PBBFAC,,, | Performed by: NURSE PRACTITIONER

## 2020-11-17 PROCEDURE — 99214 PR OFFICE/OUTPT VISIT, EST, LEVL IV, 30-39 MIN: ICD-10-PCS | Mod: S$GLB,,, | Performed by: NURSE PRACTITIONER

## 2020-11-17 PROCEDURE — 1126F AMNT PAIN NOTED NONE PRSNT: CPT | Mod: S$GLB,,, | Performed by: NURSE PRACTITIONER

## 2020-11-17 PROCEDURE — 3078F PR MOST RECENT DIASTOLIC BLOOD PRESSURE < 80 MM HG: ICD-10-PCS | Mod: CPTII,S$GLB,, | Performed by: NURSE PRACTITIONER

## 2020-11-17 PROCEDURE — 1101F PR PT FALLS ASSESS DOC 0-1 FALLS W/OUT INJ PAST YR: ICD-10-PCS | Mod: CPTII,S$GLB,, | Performed by: NURSE PRACTITIONER

## 2020-11-17 PROCEDURE — 1159F PR MEDICATION LIST DOCUMENTED IN MEDICAL RECORD: ICD-10-PCS | Mod: S$GLB,,, | Performed by: NURSE PRACTITIONER

## 2020-11-17 PROCEDURE — 3288F PR FALLS RISK ASSESSMENT DOCUMENTED: ICD-10-PCS | Mod: CPTII,S$GLB,, | Performed by: NURSE PRACTITIONER

## 2020-11-17 PROCEDURE — 3008F PR BODY MASS INDEX (BMI) DOCUMENTED: ICD-10-PCS | Mod: CPTII,S$GLB,, | Performed by: NURSE PRACTITIONER

## 2020-11-17 PROCEDURE — 3074F SYST BP LT 130 MM HG: CPT | Mod: CPTII,S$GLB,, | Performed by: NURSE PRACTITIONER

## 2020-11-17 PROCEDURE — 1126F PR PAIN SEVERITY QUANTIFIED, NO PAIN PRESENT: ICD-10-PCS | Mod: S$GLB,,, | Performed by: NURSE PRACTITIONER

## 2020-11-17 PROCEDURE — 80185 ASSAY OF PHENYTOIN TOTAL: CPT

## 2020-11-17 RX ORDER — PHENYTOIN SODIUM 100 MG/1
400 CAPSULE, EXTENDED RELEASE ORAL NIGHTLY
Qty: 360 CAPSULE | Refills: 11 | Status: SHIPPED | OUTPATIENT
Start: 2020-11-17 | End: 2021-11-16

## 2020-11-17 NOTE — PROGRESS NOTES
Subjective:       Patient ID: Conner Lombardi Jr. is a 70 y.o. male.    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 Rainey: The patient indicates that he 1st had seizure as an infant and has had a lifelong history of generalized tonic-clonic seizure.  The patient states that his 1st medication that he can recall was Mysoline that he had taken for a number of years.  At some point in time, the physicians added Dilantin to the Mysoline.  In 2010, his primary care physician withdrew him from Mysoline rather rapidly and he experienced a single generalized tonic clonic seizure.  Subsequently, he was placed back on Mysoline and then more gradually withdrawn from the Mysoline to be on Dilantin 400 mg once a day as his sole anticonvulsant.  The patient's last seizure occurred in 2010. Prior to that seizure, the patient states that it had been over 30 years for but throughout that entire time had been taking an anticonvulsant.     On today's visit, the patient denies any headache or dizziness.  He denies any sensation of imbalance.  He denies any blurred vision or double vision.  He has not had any fever or chills.  The patient states that he has been healthy and continues to work full-time.  He has no complaints to offer on today's visit.     Interval Note: 11-17-20: Patient is a former Patient of Dr. Cantrell, but new to me Patient present today for mangment of Epilepsy GTC/Focal partial epilespy. The patient  Reports he initially began having seizures at birth. The patient reports that they attributed to the traumatic use of forceps at his birth. The patient reports he would have Grand mal seizures, he would shaky and jerk and he would have post ictal phase following, other times the patient report he would be totally awake and he would know what's going on around him but he couldn't speak or if he spoke it would be totally inappropriate. The patient was initially started on  Mysoline ( Primidone) for seizure control and he was stable on it for many years. He states that his prior Neurologist Dr. Polanco recommended weaning the medication due to prolong use could potentially causue memory loss. Dr. Ramirez assisted with weaning him off the Mysoline in 2010 and he add Dilantin the patient report that his last seizure was during the time of weaning in 2010. The patient hasn't had seizure of any kind in 10 years. The patient reports no SE to Dilantin. The patient has been informed about new generation AED but states he would like to continue his current course of treatment.       Past Medical History:   Diagnosis Date    Epilepsy     Last seizure 2010.     Hypertension     Tubular adenoma of colon 04/02/2012     Social History     Socioeconomic History    Marital status:      Spouse name: Not on file    Number of children: Not on file    Years of education: Not on file    Highest education level: Not on file   Occupational History    Not on file   Social Needs    Financial resource strain: Not on file    Food insecurity     Worry: Not on file     Inability: Not on file    Transportation needs     Medical: Not on file     Non-medical: Not on file   Tobacco Use    Smoking status: Never Smoker    Smokeless tobacco: Never Used   Substance and Sexual Activity    Alcohol use: No     Alcohol/week: 0.0 standard drinks    Drug use: No    Sexual activity: Never   Lifestyle    Physical activity     Days per week: Not on file     Minutes per session: Not on file    Stress: Not on file   Relationships    Social connections     Talks on phone: Not on file     Gets together: Not on file     Attends [employer] service: Not on file     Active member of club or organization: Not on file     Attends meetings of clubs or organizations: Not on file     Relationship status: Not on file   Other Topics Concern    Not on file   Social History Narrative    Not on file     Past Surgical  History:   Procedure Laterality Date    TONSILLECTOMY       Review of Systems   Constitutional: Negative for activity change, appetite change, chills, diaphoresis, fatigue, fever and unexpected weight change.   HENT: Negative for congestion, dental problem, drooling, ear discharge, ear pain, facial swelling, hearing loss, mouth sores, nosebleeds, postnasal drip, rhinorrhea, sinus pressure, sinus pain, sneezing, sore throat, tinnitus, trouble swallowing and voice change.    Eyes: Negative for photophobia, pain, discharge, redness, itching and visual disturbance.   Respiratory: Negative for apnea, cough, choking, chest tightness, shortness of breath, wheezing and stridor.    Cardiovascular: Negative for chest pain, palpitations and leg swelling.   Gastrointestinal: Negative for abdominal distention, abdominal pain, anal bleeding, blood in stool, constipation, diarrhea, nausea, rectal pain and vomiting.   Endocrine: Negative for cold intolerance, heat intolerance, polydipsia, polyphagia and polyuria.   Genitourinary: Negative for decreased urine volume, difficulty urinating, dysuria, enuresis, flank pain, frequency, genital sores, hematuria and urgency.   Musculoskeletal: Negative for arthralgias, back pain, gait problem, joint swelling, myalgias, neck pain and neck stiffness.   Skin: Negative for color change, pallor, rash and wound.   Allergic/Immunologic: Negative for environmental allergies, food allergies and immunocompromised state.   Neurological: Positive for seizures. Negative for dizziness, tremors, syncope, facial asymmetry, speech difficulty, weakness, light-headedness, numbness and headaches.   Hematological: Negative for adenopathy. Does not bruise/bleed easily.   Psychiatric/Behavioral: Negative for agitation, behavioral problems, confusion, decreased concentration, dysphoric mood, hallucinations, self-injury, sleep disturbance and suicidal ideas. The patient is not nervous/anxious and is not  hyperactive.        Objective:      Neurologic Exam     Mental Status   Oriented to person, place, and time.   Registration: recalls 3 of 3 objects. Recall at 5 minutes: recalls 3 of 3 objects. Follows 3 step commands.   Attention: normal. Concentration: normal.   Speech: speech is normal   Level of consciousness: alert  Knowledge: good and consistent with education. Able to perform simple calculations.   Able to name object. Able to read. Able to repeat. Able to write. Normal comprehension.     Cranial Nerves     CN II   Visual fields full to confrontation.   Visual acuity: normal  Right visual field deficit: none  Left visual field deficit: none     CN III, IV, VI   Pupils are equal, round, and reactive to light.  Extraocular motions are normal.   Right pupil: Size: 2 mm. Shape: regular. Reactivity: brisk. Consensual response: intact. Accommodation: intact.   Left pupil: Size: 2 mm. Shape: regular. Reactivity: brisk. Consensual response: intact. Accommodation: intact.   CN III: no CN III palsy  CN VI: no CN VI palsy  Nystagmus: none   Diplopia: none  Ophthalmoparesis: none  Upgaze: normal  Downgaze: normal  Conjugate gaze: present  Vestibulo-ocular reflex: present    CN V   Facial sensation intact.   Right facial sensation deficit: none  Left facial sensation deficit: none  Right corneal reflex: normal  Left corneal reflex: normal    CN VII   Right facial weakness: none  Left facial weakness: none  Right taste: normal  Left taste: normal    CN VIII   CN VIII normal.   Hearing: intact    CN IX, X   CN IX normal.   CN X normal.   Palate: symmetric    CN XI   CN XI normal.   Right sternocleidomastoid strength: normal  Left sternocleidomastoid strength: normal  Right trapezius strength: normal  Left trapezius strength: normal    CN XII   CN XII normal.   Tongue: not atrophic  Fasciculations: absent  Tongue deviation: none    Motor Exam   Muscle bulk: normal  Overall muscle tone: normal  Right arm tone: normal  Left  arm tone: normal  Right arm pronator drift: absent  Left arm pronator drift: absent  Right leg tone: normal  Left leg tone: normal    Strength   Strength 5/5 throughout.   Right neck flexion: 5/5  Left neck flexion: 5/5  Right neck extension: 5/5  Left neck extension: 5/5  Right deltoid: 5/5  Left deltoid: 5/5  Right biceps: 5/5  Left biceps: 5/5  Right triceps: 5/5  Left triceps: 5/5  Right wrist flexion: 5/5  Left wrist flexion: 5/5  Right wrist extension: 5/5  Left wrist extension: 5/5  Right interossei: 5/5  Left interossei: 5/5  Right abdominals: 5/5  Left abdominals: 5/5  Right iliopsoas: 5/5  Left iliopsoas: 5/5  Right quadriceps: 5/5  Left quadriceps: 5/5  Right hamstrin/5  Left hamstrin/5  Right glutei: 5/5  Left glutei: 5/5  Right anterior tibial: 5/5  Left anterior tibial: 5/5  Right posterior tibial: 5/5  Left posterior tibial: 5/5  Right peroneal: 5/5  Left peroneal: 5/5  Right gastroc: 5/5  Left gastroc: 5/5    Sensory Exam   Light touch normal.   Right arm light touch: normal  Left arm light touch: normal  Right leg light touch: normal  Left leg light touch: normal  Vibration normal.   Right arm vibration: normal  Left arm vibration: normal  Right leg vibration: normal  Left leg vibration: normal  Proprioception normal.   Right arm proprioception: normal  Left arm proprioception: normal  Right leg proprioception: normal  Left leg proprioception: normal  Pinprick normal.   Right arm pinprick: normal  Left arm pinprick: normal  Right leg pinprick: normal  Left leg pinprick: normal  Sensory deficit distribution on left: lateral cutaneous thigh  Graphesthesia: normal  Stereognosis: normal    Gait, Coordination, and Reflexes     Gait  Gait: normal    Coordination   Romberg: negative  Finger to nose coordination: normal  Heel to shin coordination: normal  Tandem walking coordination: normal    Tremor   Resting tremor: absent  Intention tremor: absent  Action tremor: absent    Reflexes   Right  brachioradialis: 2+  Left brachioradialis: 2+  Right biceps: 2+  Left biceps: 2+  Right triceps: 2+  Left triceps: 2+  Right patellar: 2+  Left patellar: 2+  Right achilles: 2+  Left achilles: 2+  Right plantar: normal  Left plantar: normal  Right Harris: absent  Left Harris: absent  Right ankle clonus: absent  Left ankle clonus: absent  Right pendular knee jerk: absent  Left pendular knee jerk: absent      Physical Exam  Vitals signs reviewed.   Constitutional:       Appearance: He is well-developed.   HENT:      Head: Normocephalic and atraumatic.      Mouth/Throat:      Mouth: Mucous membranes are moist.      Pharynx: Oropharynx is clear.   Eyes:      Extraocular Movements: EOM normal.      Conjunctiva/sclera: Conjunctivae normal.      Pupils: Pupils are equal, round, and reactive to light.   Neck:      Musculoskeletal: Normal range of motion and neck supple.   Cardiovascular:      Rate and Rhythm: Normal rate.   Pulmonary:      Effort: Pulmonary effort is normal.      Breath sounds: Normal breath sounds.   Abdominal:      General: Bowel sounds are normal.      Palpations: Abdomen is soft.   Musculoskeletal: Normal range of motion.   Skin:     General: Skin is warm and dry.      Capillary Refill: Capillary refill takes 2 to 3 seconds.   Neurological:      Mental Status: He is alert and oriented to person, place, and time.      Coordination: Finger-Nose-Finger Test, Heel to Shin Test and Romberg Test normal.      Gait: Gait is intact. Tandem walk normal.      Deep Tendon Reflexes: Strength normal.      Reflex Scores:       Tricep reflexes are 2+ on the right side and 2+ on the left side.       Bicep reflexes are 2+ on the right side and 2+ on the left side.       Brachioradialis reflexes are 2+ on the right side and 2+ on the left side.       Patellar reflexes are 2+ on the right side and 2+ on the left side.       Achilles reflexes are 2+ on the right side and 2+ on the left side.  Psychiatric:          Attention and Perception: Attention and perception normal.         Mood and Affect: Mood and affect normal.         Speech: Speech normal.         Behavior: Behavior normal.         Thought Content: Thought content normal.         Cognition and Memory: Cognition and memory normal.         Judgment: Judgment normal.             Past/Current Medical/Surgical History, Past/Current Social History, Past/Current Family History and Past/Current Medications were reviewed in detail.    Significant Images:     MRI of Brain pending     Significant Labs:  Lab Results   Component Value Date    TSH 1.221 07/01/2020     Lab Results   Component Value Date    PHENYTOIN 12.9 04/11/2018    PHENOBARB 4.4 (L) 12/03/2010     Lab Results   Component Value Date    WBC 5.22 07/01/2020    HGB 13.4 (L) 07/01/2020    HCT 41.0 07/01/2020     (H) 07/01/2020     07/01/2020       CMP  Sodium   Date Value Ref Range Status   07/01/2020 138 136 - 145 mmol/L Final     Potassium   Date Value Ref Range Status   07/01/2020 4.4 3.5 - 5.1 mmol/L Final     Chloride   Date Value Ref Range Status   07/01/2020 101 95 - 110 mmol/L Final     CO2   Date Value Ref Range Status   07/01/2020 29 23 - 29 mmol/L Final     Glucose   Date Value Ref Range Status   07/01/2020 101 70 - 110 mg/dL Final     BUN   Date Value Ref Range Status   07/01/2020 18 8 - 23 mg/dL Final     Creatinine   Date Value Ref Range Status   07/01/2020 0.9 0.5 - 1.4 mg/dL Final     Calcium   Date Value Ref Range Status   07/01/2020 9.7 8.7 - 10.5 mg/dL Final     Total Protein   Date Value Ref Range Status   07/01/2020 7.5 6.0 - 8.4 g/dL Final     Albumin   Date Value Ref Range Status   07/01/2020 4.2 3.5 - 5.2 g/dL Final     Total Bilirubin   Date Value Ref Range Status   07/01/2020 0.4 0.1 - 1.0 mg/dL Final     Comment:     For infants and newborns, interpretation of results should be based  on gestational age, weight and in agreement with clinical  observations.  Premature Infant  recommended reference ranges:  Up to 24 hours.............<8.0 mg/dL  Up to 48 hours............<12.0 mg/dL  3-5 days..................<15.0 mg/dL  6-29 days.................<15.0 mg/dL       Alkaline Phosphatase   Date Value Ref Range Status   07/01/2020 97 55 - 135 U/L Final     AST   Date Value Ref Range Status   07/01/2020 22 10 - 40 U/L Final     ALT   Date Value Ref Range Status   07/01/2020 24 10 - 44 U/L Final     Anion Gap   Date Value Ref Range Status   07/01/2020 8 8 - 16 mmol/L Final     eGFR if    Date Value Ref Range Status   07/01/2020 >60.0 >60 mL/min/1.73 m^2 Final     eGFR if non    Date Value Ref Range Status   07/01/2020 >60.0 >60 mL/min/1.73 m^2 Final     Comment:     Calculation used to obtain the estimated glomerular filtration  rate (eGFR) is the CKD-EPI equation.        BMP  Lab Results   Component Value Date     07/01/2020    K 4.4 07/01/2020     07/01/2020    CO2 29 07/01/2020    BUN 18 07/01/2020    CREATININE 0.9 07/01/2020    CALCIUM 9.7 07/01/2020    ANIONGAP 8 07/01/2020    ESTGFRAFRICA >60.0 07/01/2020    EGFRNONAA >60.0 07/01/2020     Labs Reviewed:     05-    Phenytoin Level 12.8 NL     VITAL SIGNS WERE REVIEWED        GENERAL APPEARANCE:      The patient looks comfortable.     Body habitus is overweight.      No signs of respiratory distress.     Normal breathing pattern.     No dysmorphic features     Normal eye contact.      GENERAL MEDICAL EXAM:     HEENT:  Head is atraumatic normocephalic.      No tender temporal arteries. Fundoscopic (Ophthalmoscopic) exam showed no disc edema.       Neck and Axillae: No JVD. No visible lesions.     No carotid bruits. No thyromegaly. No lymphadenopathy.     Cardiopulmonary: No cyanosis. No tachypnea. Normal respiratory effort.     Clear breath sounds. S1, S2 with regular rhythm . No murmurs.      Gastrointestinal/Urogenital:  No jaundice. No stomas or lesions. No visible hernias. No  catheters. S/P Partial Colectomy.       Abdomen is soft non-tender. No masses or organomegaly.     Skin, Hair and Nails: RUE burn marks. No neurofibromatosis. No visible lesions.No stigmata of autoimmune disease. No clubbing.     Skin is warm and moist. No palpable masses.     Limbs: No varicose veins. No visible swelling.     No palpable edema. Pulses are symmetric. Pedal pulses are palpable.       Muskoskeletal: OA visible deformities.No visible lesions.     No spine tenderness. No signs of longstanding neuropathy. No dislocations or fractures.         Assessment:        1. Epilepsy without status epilepticus, not intractable    2. Convulsions, unspecified convulsion type        EPILEPSY CLASSIFICATION     SEMIOLOGY: GTC/ Partial complex seizure      EPILEPTOGENIC ZONE (S): UNKNOWN      ETIOLOGY: UNKNOWN      PRIOR AEDS: Mysoline Primidone      CURRENT AEDS: Dilantin      LAST SEIZURE DATE:         COMPREHENSIVE LIST OF AEDs:      Acetazolamide (AZM-Diamox)   Benzos: clonazepam (CZP Klonopin), lorazepam (LZP-Ativan), diazepam (DZP-Valium), clorazepate (CLZ- Tranxene)  Brivaracetam (BRV-Briviact)  Cannabidiol (CBD- Epidiolex)  Carbamazepine (CBZ-Tegretol)  Cenobamate (CNB-Xcopri)  Clobazam (CLB-Onfi)  Eslicarbazepine (ESL-Aptiom)  Ethosuximide (ESX-Zarontin)  Felbamate (FBM-Felbatol)  Gabapentin (GBP-Neurontin)  Lacosamide (LCM-Vimpat)  Lamotrigine (LTG-Lamitcal)  Levetiracetam (LEV- Keppra)  Oxcarbazepine (OXC-Trileptal)  Perampanel (PML-Fycompa)  Phenobarbital (PB)  Phenytoin (PHT-Dilantin) Current medication   Pregabalin (PGB-Lyrica)  Primidone (PRM) discontinued due to long-term  side effects   Retigabine (RTG- Potiga)   Rufinamide (RFN-Benzil)  Stiripentol (STP-Diacomit)  Tiagabine (TGB-Gabitril)  Topiramate (TPM-Topamax)  Valproate (VPA-Depakote)  Vigabatrin (VGB-Sabril)  Zonisamide (ZNS-Zonegran)      Plan:   GTC Epilepsy without status epilepticus, not intractable Focal-Partial Complex Epilepsy      Check Dilantin (Phenytoin) level    Continue Phenytoin (DILANTIN) 100 MG ER capsule; Take 4 capsules (400 mg total) by mouth every evening.  Dispense: 360 capsule; Refill: 11    MRI Brain Without Contrast    EEG w/awake & drowsy record 30 min routine       The patient was encouraged to maintain full traditional seizure precautions which include but not limited to avoid driving, avoid high altitudes, avoid being close to fire or fire source, avoid being close to a body of water or swimming alone, avoid operating heavy machinery and avoid using sharp objects if possible. The patient was encouraged to shower (without accumulation of water) instead of taking a bath if unsupervised. The patient was made aware that these precautions are especially important during concurrent illness. Adequate sleep and avoidance of alcohol as important measures to assure good seizure control were discussed with the patient. The patient was also advised not to care for children without company. The patient was advised to pad the side rails with pillows and blankets if applicable.I strongly recommended lowering the bed to the floor level to decrease the risk of falls.         Made the patient aware that the duration of restrictions/precautions varies and in [place] it is 6 months. The patient verbalized  full understanding.                  Continue AEDs INDEFINITELY, FOR GOOD AND NEVER SKIP A DOSE. The patient verbalized full understanding. Stressed the extreme medical, personal safety, public safety and legal importance of compliance and seizure control. Will give as many refills as possible with or without face-to-face evaluation to make sure the patient and any patient with epilepsy will never run out of medications. Running out of seizure medications should never happen under our care. I explained to the patient that he should not, under any circumstances, adjust or stop taking his seizure medication without discussing with me. The  patient verbalized full understanding.     AVOID any substance that could lower seizure threshold including but not limited to:      ALCOHOL AND WITHDRAWAL      TRAMADOL.     DEMEROL      ALL STIMULANTS-ALL ADHD MEDICATIONS.      CLOZAPINE.      BUPROPION.     CIPROFLOXACIN          Rescue therapy IN midazolam (Nayzilam) /IN diazepam (Valtoco)      SEIZURE FREEDOM DEFINITION: No seizures for 1 year or for 3 times the interval between the last 2 seizures whichever is longer (Some studies suggest 6 times even)!      Recommend Sleep Hygiene   Going to bed at the same time     Bedroom is only for sleep    No TV or computers in the bedroom    Avoid exercise at bedtime    Avoid stimulants like caffeine at bedtime    No heavy meals at bedtime    Exercise during the day    No major stimulating activity at bedtime      Follow up in 3 months or sooner if seizure changes frequency or presentation go to ED for further evaluation.     Continue AEDs INDEFINITELY, FOR GOOD AND NEVER SKIP A DOSE. The patient verbalized full understanding. Stressed the extreme medical, personal safety, public safety and legal importance of compliance and seizure control. Will give as many refills as possible with or without face-to-face evaluation to make sure the patient and any patient with epilepsy will never run out of medications. Running out of seizure medications should never happen under our care. I explained to the patient that he should not, under any circumstances, adjust or stop taking his seizure medication without discussing with me. The patient verbalized full understanding.      AVOID any substance that could lower seizure threshold including but not limited to:         MEDICAL/SURGICAL COMORBIDITIES      All relevant medical comorbidities noted and managed by primary care physician and medical care team.          MISCELLANEOUS MEDICAL PROBLEMS         HEALTHY LIFESTYLE AND PREVENTATIVE CARE     Encouraged the patient to adhere to  the age-appropriate health maintenance guidelines including screening tests and vaccinations.      Discussed the overall importance of healthy lifestyle, optimal weight, exercise, healthy diet, good sleep hygiene and avoiding drugs including smoking, alcohol and recreational drugs. The patient verbalized full understanding.         Advised the patient to follow COVID-19 prevention measures.         I spent  45 minutes face to face with the patient     More than 30  minutes of the time spent in counseling and coordination of care including discussions etiology of diagnosis (GTC Epilepsy without status epilepticus, not intractable Focal-Partial Complex Epilepsy ), pathogenesis of diagnosis, prognosis of diagnosis,, diagnostic results, impression and recommendations, diagnostic studies, management, risks and benefits of treatment, instructions of disease self-management, treatment instructions, follow up requirements, patient and family counseling/involvement in care compliance with treatment regimen. All of the patient's questions were answered during this discussion.           RTC in 6  months       Jamar Flaherty, MSN NP      Collaborating Provider: Michelle Pena MD, FAAN Neurologist/Epileptologist

## 2020-11-17 NOTE — PATIENT INSTRUCTIONS
Phenytoin capsules and extended-release capsules  What is this medicine?  PHENYTOIN (FEN i toyn) is used to control seizures in certain types of epilepsy. It is also used to prevent seizures during or after surgery.  How should I use this medicine?  Take this medicine by mouth with a glass of water. Follow the directions on the prescription label. If you are taking extended-release capsules, swallow them whole. Do not crush or chew. Take this medicine with food if it upsets your stomach. It may be best to take your medicine consistently with or without food. Take your doses at regular intervals. Do not take your medicine more often than directed. Do not stop taking this medicine suddenly. This increases the risk of seizures. Your doctor will tell you how much medicine to take. If your doctor wants you to stop the medicine, the dose will be slowly lowered over time to avoid any side effects.  Talk to your pediatrician regarding the use of this medicine in children. Special care may be needed.  What side effects may I notice from receiving this medicine?  Side effects that you should report to your doctor or health care professional as soon as possible:  · allergic reactions like skin rash, itching or hives, swelling of the face, lips, or tongue  · breathing problems  · changes in vision  · chest pain or tightness  · confusion  · dark yellow or brown urine  · fast or irregular heartbeat  · fever, sore throat  · headache  · loss of seizure control  · poor control of body movements or difficulty walking  · redness, blistering, peeling or loosening of the skin, including inside the mouth  · unusual bleeding or bruising, pinpoint red spots on skin  · vomiting  · worsening of mood, thoughts or actions of suicide or dying  · yellowing of the eyes or skin  Side effects that usually do not require medical attention (report to your doctor or health care professional if they continue or are  bothersome):  · constipation  · difficulty sleeping  · excessive hair growth on the face or body  · nausea  What may interact with this medicine?  Do not take this medicine with any of the following medications:  · delavirdine  · ibrutinib  · ranolazine  This medicine may also interact with the following medications:  · albendazole  · alcohol  · antiviral medicines for HIV or AIDS  · aspirin and aspirin-like medicines  · certain medicines for blood pressure like nifedipine, nimodipine, and verapamil  · certain medicines for cancer  · certain medicines for cholesterol like atorvastatin, simvastatin, and fluvastatin  · certain medicines for depression, anxiety, or psychotic disturbances  · certain medicines for fungal infections like ketoconazole and itraconazole  · certain medicines for irregular heart beat like amiodarone and quinidine  · certain medicines for seizures like carbamazepine, phenobarbital, and topiramate  · certain medicines for stomach problems like cimetidine and omeprazole  · chloramphenicol  · cyclosporine  · diazoxide  · digoxin  · disulfiram  · doxycycline  · female hormones, like estrogens and birth control pills  · furosemide  · halothane  · isoniazid  · medicines that relax muscles for surgery  · methylphenidate  · narcotic medicines for pain  · phenothiazines like chlorpromazine, mesoridazine, prochlorperazine, thioridazine  · praziquantel  · reserpine  · rifampin  · Andra's Wort  · steroid medicines like prednisone or cortisone  · sulfonamides like sulfamethoxazole or sulfasalazine  · supplements like folic acid or vitamin D  · theophylline  · ticlopidine  · tolbutamide  · warfarin  What if I miss a dose?  If you miss a dose, take it as soon as you can. If it is almost time for your next dose, take only that dose. Do not take double or extra doses.  Where should I keep my medicine?  Keep out of the reach of children.  Store at room temperature between 15 and 30 degrees C (59 and 86  degrees F). Protect from light and moisture. Throw away any unused medicine after the expiration date.  What should I tell my health care provider before I take this medicine?  They need to know if you have any of these conditions:  · an alcohol abuse problem  ·  ancestry  · blood disorders or disease  · diabetes  · heart problems  · kidney disease  · liver disease  · porphyria  · receiving radiation therapy  · suicidal thoughts, plans, or attempt; a previous suicide attempt by you or a family member  · thyroid disease  · an unusual or allergic reaction to phenytoin, other medicines, foods, dyes, or preservatives  · pregnant or trying to get pregnant  · breast-feeding  What should I watch for while using this medicine?  Visit your doctor or health care professional for regular checks on your progress. This medicine needs careful monitoring. Your doctor or health care professional may schedule regular blood tests.  Wear a medical ID bracelet or chain, and carry a card that describes your disease and details of your medicine and dosage times.  Do not change brands or dosage forms of this medicine without discussing the change with your doctor or health care professional.  You may get drowsy or dizzy. Do not drive, use machinery, or do anything that needs mental alertness until you know how this medicine affects you. Do not stand or sit up quickly, especially if you are an older patient. This reduces the risk of dizzy or fainting spells. Alcohol may interfere with the effect of this medicine. Avoid alcoholic drinks.  Birth control pills may not work properly while you are taking this medicine. Talk to your doctor about using an extra method of birth control.  This medicine can cause unusual growth of gum tissues. Visit your dentist regularly. Problems can arise if you need dental work, and in the day to day care of your teeth. Try to avoid damage to your teeth and gums when you brush or floss your teeth.  Do not  take antacids at the same time as this medicine. If you get an upset stomach and want to take an antacid or medicine for diarrhea, make sure there is an interval of 2 to 3 hours before or after you took your phenytoin.  The use of this medicine may increase the chance of suicidal thoughts or actions. Pay special attention to how you are responding while on this medicine. Any worsening of mood, or thoughts of suicide or dying should be reported to your health care professional right away.  Women who become pregnant while using this medicine may enroll in the [place] Antiepileptic Drug Pregnancy Registry by calling 1-424.358.4252. This registry collects information about the safety of antiepileptic drug use during pregnancy.  NOTE:This sheet is a summary. It may not cover all possible information. If you have questions about this medicine, talk to your doctor, pharmacist, or health care provider. Copyright© 2017 [employer]        Phenytoin capsules and extended-release capsules  What is this medicine?  PHENYTOIN (FEN i toyn) is used to control seizures in certain types of epilepsy. It is also used to prevent seizures during or after surgery.  How should I use this medicine?  Take this medicine by mouth with a glass of water. Follow the directions on the prescription label. If you are taking extended-release capsules, swallow them whole. Do not crush or chew. Take this medicine with food if it upsets your stomach. It may be best to take your medicine consistently with or without food. Take your doses at regular intervals. Do not take your medicine more often than directed. Do not stop taking this medicine suddenly. This increases the risk of seizures. Your doctor will tell you how much medicine to take. If your doctor wants you to stop the medicine, the dose will be slowly lowered over time to avoid any side effects.  Talk to your pediatrician regarding the use of this medicine in children. Special care may  be needed.  What side effects may I notice from receiving this medicine?  Side effects that you should report to your doctor or health care professional as soon as possible:  · allergic reactions like skin rash, itching or hives, swelling of the face, lips, or tongue  · breathing problems  · changes in vision  · chest pain or tightness  · confusion  · dark yellow or brown urine  · fast or irregular heartbeat  · fever, sore throat  · headache  · loss of seizure control  · poor control of body movements or difficulty walking  · redness, blistering, peeling or loosening of the skin, including inside the mouth  · unusual bleeding or bruising, pinpoint red spots on skin  · vomiting  · worsening of mood, thoughts or actions of suicide or dying  · yellowing of the eyes or skin  Side effects that usually do not require medical attention (report to your doctor or health care professional if they continue or are bothersome):  · constipation  · difficulty sleeping  · excessive hair growth on the face or body  · nausea  What may interact with this medicine?  Do not take this medicine with any of the following medications:  · delavirdine  · ibrutinib  · ranolazine  This medicine may also interact with the following medications:  · albendazole  · alcohol  · antiviral medicines for HIV or AIDS  · aspirin and aspirin-like medicines  · certain medicines for blood pressure like nifedipine, nimodipine, and verapamil  · certain medicines for cancer  · certain medicines for cholesterol like atorvastatin, simvastatin, and fluvastatin  · certain medicines for depression, anxiety, or psychotic disturbances  · certain medicines for fungal infections like ketoconazole and itraconazole  · certain medicines for irregular heart beat like amiodarone and quinidine  · certain medicines for seizures like carbamazepine, phenobarbital, and topiramate  · certain medicines for stomach problems like cimetidine and  omeprazole  · chloramphenicol  · cyclosporine  · diazoxide  · digoxin  · disulfiram  · doxycycline  · female hormones, like estrogens and birth control pills  · furosemide  · halothane  · isoniazid  · medicines that relax muscles for surgery  · methylphenidate  · narcotic medicines for pain  · phenothiazines like chlorpromazine, mesoridazine, prochlorperazine, thioridazine  · praziquantel  · reserpine  · rifampin  · [place]ley's Wort  · steroid medicines like prednisone or cortisone  · sulfonamides like sulfamethoxazole or sulfasalazine  · supplements like folic acid or vitamin D  · theophylline  · ticlopidine  · tolbutamide  · warfarin  What if I miss a dose?  If you miss a dose, take it as soon as you can. If it is almost time for your next dose, take only that dose. Do not take double or extra doses.  Where should I keep my medicine?  Keep out of the reach of children.  Store at room temperature between 15 and 30 degrees C (59 and 86 degrees F). Protect from light and moisture. Throw away any unused medicine after the expiration date.  What should I tell my health care provider before I take this medicine?  They need to know if you have any of these conditions:  · an alcohol abuse problem  ·  ancestry  · blood disorders or disease  · diabetes  · heart problems  · kidney disease  · liver disease  · porphyria  · receiving radiation therapy  · suicidal thoughts, plans, or attempt; a previous suicide attempt by you or a family member  · thyroid disease  · an unusual or allergic reaction to phenytoin, other medicines, foods, dyes, or preservatives  · pregnant or trying to get pregnant  · breast-feeding  What should I watch for while using this medicine?  Visit your doctor or health care professional for regular checks on your progress. This medicine needs careful monitoring. Your doctor or health care professional may schedule regular blood tests.  Wear a medical ID bracelet or chain, and carry a card that  describes your disease and details of your medicine and dosage times.  Do not change brands or dosage forms of this medicine without discussing the change with your doctor or health care professional.  You may get drowsy or dizzy. Do not drive, use machinery, or do anything that needs mental alertness until you know how this medicine affects you. Do not stand or sit up quickly, especially if you are an older patient. This reduces the risk of dizzy or fainting spells. Alcohol may interfere with the effect of this medicine. Avoid alcoholic drinks.  Birth control pills may not work properly while you are taking this medicine. Talk to your doctor about using an extra method of birth control.  This medicine can cause unusual growth of gum tissues. Visit your dentist regularly. Problems can arise if you need dental work, and in the day to day care of your teeth. Try to avoid damage to your teeth and gums when you brush or floss your teeth.  Do not take antacids at the same time as this medicine. If you get an upset stomach and want to take an antacid or medicine for diarrhea, make sure there is an interval of 2 to 3 hours before or after you took your phenytoin.  The use of this medicine may increase the chance of suicidal thoughts or actions. Pay special attention to how you are responding while on this medicine. Any worsening of mood, or thoughts of suicide or dying should be reported to your health care professional right away.  Women who become pregnant while using this medicine may enroll in the [place] Antiepileptic Drug Pregnancy Registry by calling 1-463.415.9939. This registry collects information about the safety of antiepileptic drug use during pregnancy.  NOTE:This sheet is a summary. It may not cover all possible information. If you have questions about this medicine, talk to your doctor, pharmacist, or health care provider. Copyright© 2017 [employer]        Self-Care for Epilepsy  You can do many  things to help control your seizures. First, follow your treatment plan. If your healthcare provider has prescribed medicines, be sure to take them as directed. Also, take the following steps.    Track and avoid triggers  Triggers are things that seem to provoke seizures. Keep track of your triggers and try to avoid them. Here are 2 common triggers and ways to cope with them:  · Too little sleep. Be sure to get enough sleep. If you have trouble sleeping, talk to your healthcare provider.  · Alcohol and drugs. Avoid alcohol. Never take any illegal drugs. If you do have substance abuse issues, seek the help of your healthcare provider for the safest way to become free of alcohol and drugs.   Keep a healthy lifestyle  A healthy lifestyle can help you feel good and cope better with epilepsy.  · Exercise often. Frequent exercise can help keep you healthy. Try to exercise for 30 minutes most days of the week. Yoga is a good choice.  · Eat well and regularly. Good nutrition can give you energy and make you feel better. Eat lots of fruits, vegetables, and whole grains. Avoid skipping meals, because seizures are more likely if you have low blood sugar.  · Control stress. Keeping stress levels low can help you cope better with epilepsy. To manage stress, try an exercise program.  · Manage illness. Get proper treatment when youre sick. Check with your healthcare provider and pharmacist about the risk of seizures with medicines you take for illnesses. If your healthcare provider has prescribed antiepileptic medicines, be sure to take them even when youre ill.  Date Last Reviewed: 9/8/2015  © 1334-2620 [employer]. [address] [place] [address], [place] [address]. All rights reserved. This information is not intended as a substitute for professional medical care. Always follow your healthcare professional's instructions.

## 2020-11-19 ENCOUNTER — TELEPHONE (OUTPATIENT)
Dept: NEUROLOGY | Facility: CLINIC | Age: 70
End: 2020-11-19

## 2020-11-19 NOTE — TELEPHONE ENCOUNTER
----- Message from Rekha Jacobsen sent at 11/19/2020  8:38 AM CST -----  Contact: Patient [phone]  Patient is returning a phone call.  Who left a message for the patient: this office  Does patient know what this is regarding: n/a    Please call and advise.    Thank You

## 2020-11-30 ENCOUNTER — HOSPITAL ENCOUNTER (OUTPATIENT)
Dept: RADIOLOGY | Facility: HOSPITAL | Age: 70
Discharge: HOME | End: 2020-11-30
Attending: NURSE PRACTITIONER
Payer: COMMERCIAL

## 2020-11-30 DIAGNOSIS — R56.9 CONVULSIONS, UNSPECIFIED CONVULSION TYPE: ICD-10-CM

## 2020-11-30 DIAGNOSIS — G40.909 NONINTRACTABLE EPILEPSY WITHOUT STATUS EPILEPTICUS, UNSPECIFIED EPILEPSY TYPE: ICD-10-CM

## 2020-11-30 PROCEDURE — 70551 MRI BRAIN STEM W/O DYE: CPT | Mod: TC

## 2020-12-03 ENCOUNTER — TELEPHONE (OUTPATIENT)
Dept: NEUROLOGY | Facility: CLINIC | Age: 70
End: 2020-12-03

## 2020-12-03 NOTE — TELEPHONE ENCOUNTER
----- Message from Shira Qureshi sent at 12/3/2020  8:46 AM CST -----  Regarding: missed call  Type:  Patient Returning Call    Who Called:pt  Who Left Message for Patient:staff  Does the patient know what this is regarding?:n/a  Would the patient rather a call back or a response via [facility]? Call back   Best Call Back Number:[phone]  Additional Information: Please call back>Thanks

## 2020-12-21 ENCOUNTER — PATIENT MESSAGE (OUTPATIENT)
Dept: INTERNAL MEDICINE | Facility: CLINIC | Age: 70
End: 2020-12-21

## 2020-12-30 ENCOUNTER — PATIENT MESSAGE (OUTPATIENT)
Dept: INTERNAL MEDICINE | Facility: CLINIC | Age: 70
End: 2020-12-30

## 2020-12-30 DIAGNOSIS — I10 ESSENTIAL HYPERTENSION: ICD-10-CM

## 2020-12-30 RX ORDER — LISINOPRIL AND HYDROCHLOROTHIAZIDE 20; 25 MG/1; MG/1
1 TABLET ORAL DAILY
Qty: 30 TABLET | Refills: 0 | Status: SHIPPED | OUTPATIENT
Start: 2020-12-30 | End: 2021-02-01 | Stop reason: SDUPTHER

## 2021-01-21 ENCOUNTER — LAB VISIT (OUTPATIENT)
Dept: LAB | Facility: HOSPITAL | Age: 71
End: 2021-01-21
Attending: FAMILY MEDICINE
Payer: COMMERCIAL

## 2021-01-21 ENCOUNTER — OFFICE VISIT (OUTPATIENT)
Dept: INTERNAL MEDICINE | Facility: CLINIC | Age: 71
End: 2021-01-21
Payer: COMMERCIAL

## 2021-01-21 VITALS
WEIGHT: 180.88 LBS | OXYGEN SATURATION: 96 % | TEMPERATURE: 98 F | DIASTOLIC BLOOD PRESSURE: 82 MMHG | BODY MASS INDEX: 29.07 KG/M2 | SYSTOLIC BLOOD PRESSURE: 138 MMHG | HEART RATE: 100 BPM | HEIGHT: 66 IN

## 2021-01-21 DIAGNOSIS — E78.2 MIXED HYPERLIPIDEMIA: ICD-10-CM

## 2021-01-21 DIAGNOSIS — Z12.11 COLON CANCER SCREENING: ICD-10-CM

## 2021-01-21 DIAGNOSIS — E55.9 VITAMIN D DEFICIENCY: ICD-10-CM

## 2021-01-21 DIAGNOSIS — E66.3 OVERWEIGHT (BMI 25.0-29.9): ICD-10-CM

## 2021-01-21 DIAGNOSIS — Z86.010 PERSONAL HISTORY OF COLONIC POLYPS: ICD-10-CM

## 2021-01-21 DIAGNOSIS — D64.9 ANEMIA, UNSPECIFIED TYPE: ICD-10-CM

## 2021-01-21 DIAGNOSIS — I10 ESSENTIAL HYPERTENSION: Primary | ICD-10-CM

## 2021-01-21 LAB
25(OH)D3+25(OH)D2 SERPL-MCNC: 22 NG/ML (ref 30–96)
ALBUMIN SERPL BCP-MCNC: 4.1 G/DL (ref 3.5–5.2)
ALP SERPL-CCNC: 100 U/L (ref 55–135)
ALT SERPL W/O P-5'-P-CCNC: 20 U/L (ref 10–44)
ANION GAP SERPL CALC-SCNC: 10 MMOL/L (ref 8–16)
AST SERPL-CCNC: 20 U/L (ref 10–40)
BASOPHILS # BLD AUTO: 0.05 K/UL (ref 0–0.2)
BASOPHILS NFR BLD: 1 % (ref 0–1.9)
BILIRUB SERPL-MCNC: 0.3 MG/DL (ref 0.1–1)
BUN SERPL-MCNC: 16 MG/DL (ref 8–23)
CALCIUM SERPL-MCNC: 9.9 MG/DL (ref 8.7–10.5)
CHLORIDE SERPL-SCNC: 103 MMOL/L (ref 95–110)
CHOLEST SERPL-MCNC: 219 MG/DL (ref 120–199)
CHOLEST/HDLC SERPL: 5.2 {RATIO} (ref 2–5)
CO2 SERPL-SCNC: 28 MMOL/L (ref 23–29)
CREAT SERPL-MCNC: 0.8 MG/DL (ref 0.5–1.4)
DIFFERENTIAL METHOD: ABNORMAL
EOSINOPHIL # BLD AUTO: 0.3 K/UL (ref 0–0.5)
EOSINOPHIL NFR BLD: 7 % (ref 0–8)
ERYTHROCYTE [DISTWIDTH] IN BLOOD BY AUTOMATED COUNT: 12.3 % (ref 11.5–14.5)
EST. GFR  (AFRICAN AMERICAN): >60 ML/MIN/1.73 M^2
EST. GFR  (NON AFRICAN AMERICAN): >60 ML/MIN/1.73 M^2
FERRITIN SERPL-MCNC: 96 NG/ML (ref 20–300)
GLUCOSE SERPL-MCNC: 108 MG/DL (ref 70–110)
HCT VFR BLD AUTO: 40.1 % (ref 40–54)
HDLC SERPL-MCNC: 42 MG/DL (ref 40–75)
HDLC SERPL: 19.2 % (ref 20–50)
HGB BLD-MCNC: 13.3 G/DL (ref 14–18)
IMM GRANULOCYTES # BLD AUTO: 0.01 K/UL (ref 0–0.04)
IMM GRANULOCYTES NFR BLD AUTO: 0.2 % (ref 0–0.5)
IRON SERPL-MCNC: 95 UG/DL (ref 45–160)
LDLC SERPL CALC-MCNC: 141.2 MG/DL (ref 63–159)
LYMPHOCYTES # BLD AUTO: 1.7 K/UL (ref 1–4.8)
LYMPHOCYTES NFR BLD: 35.2 % (ref 18–48)
MCH RBC QN AUTO: 34.5 PG (ref 27–31)
MCHC RBC AUTO-ENTMCNC: 33.2 G/DL (ref 32–36)
MCV RBC AUTO: 104 FL (ref 82–98)
MONOCYTES # BLD AUTO: 0.6 K/UL (ref 0.3–1)
MONOCYTES NFR BLD: 11.9 % (ref 4–15)
NEUTROPHILS # BLD AUTO: 2.2 K/UL (ref 1.8–7.7)
NEUTROPHILS NFR BLD: 44.7 % (ref 38–73)
NONHDLC SERPL-MCNC: 177 MG/DL
NRBC BLD-RTO: 0 /100 WBC
PLATELET # BLD AUTO: 183 K/UL (ref 150–350)
PMV BLD AUTO: 10.1 FL (ref 9.2–12.9)
POTASSIUM SERPL-SCNC: 4.9 MMOL/L (ref 3.5–5.1)
PROT SERPL-MCNC: 7.2 G/DL (ref 6–8.4)
RBC # BLD AUTO: 3.86 M/UL (ref 4.6–6.2)
SATURATED IRON: 27 % (ref 20–50)
SODIUM SERPL-SCNC: 141 MMOL/L (ref 136–145)
TOTAL IRON BINDING CAPACITY: 346 UG/DL (ref 250–450)
TRANSFERRIN SERPL-MCNC: 234 MG/DL (ref 200–375)
TRIGL SERPL-MCNC: 179 MG/DL (ref 30–150)
WBC # BLD AUTO: 4.86 K/UL (ref 3.9–12.7)

## 2021-01-21 PROCEDURE — 3079F PR MOST RECENT DIASTOLIC BLOOD PRESSURE 80-89 MM HG: ICD-10-PCS | Mod: CPTII,S$GLB,, | Performed by: FAMILY MEDICINE

## 2021-01-21 PROCEDURE — 1101F PT FALLS ASSESS-DOCD LE1/YR: CPT | Mod: CPTII,S$GLB,, | Performed by: FAMILY MEDICINE

## 2021-01-21 PROCEDURE — 80061 LIPID PANEL: CPT

## 2021-01-21 PROCEDURE — 3008F PR BODY MASS INDEX (BMI) DOCUMENTED: ICD-10-PCS | Mod: CPTII,S$GLB,, | Performed by: FAMILY MEDICINE

## 2021-01-21 PROCEDURE — 3075F PR MOST RECENT SYSTOLIC BLOOD PRESS GE 130-139MM HG: ICD-10-PCS | Mod: CPTII,S$GLB,, | Performed by: FAMILY MEDICINE

## 2021-01-21 PROCEDURE — 85025 COMPLETE CBC W/AUTO DIFF WBC: CPT

## 2021-01-21 PROCEDURE — 80053 COMPREHEN METABOLIC PANEL: CPT

## 2021-01-21 PROCEDURE — 3075F SYST BP GE 130 - 139MM HG: CPT | Mod: CPTII,S$GLB,, | Performed by: FAMILY MEDICINE

## 2021-01-21 PROCEDURE — 3288F FALL RISK ASSESSMENT DOCD: CPT | Mod: CPTII,S$GLB,, | Performed by: FAMILY MEDICINE

## 2021-01-21 PROCEDURE — 99999 PR PBB SHADOW E&M-EST. PATIENT-LVL III: ICD-10-PCS | Mod: PBBFAC,,, | Performed by: FAMILY MEDICINE

## 2021-01-21 PROCEDURE — 3008F BODY MASS INDEX DOCD: CPT | Mod: CPTII,S$GLB,, | Performed by: FAMILY MEDICINE

## 2021-01-21 PROCEDURE — 99214 PR OFFICE/OUTPT VISIT, EST, LEVL IV, 30-39 MIN: ICD-10-PCS | Mod: S$GLB,,, | Performed by: FAMILY MEDICINE

## 2021-01-21 PROCEDURE — 1126F AMNT PAIN NOTED NONE PRSNT: CPT | Mod: S$GLB,,, | Performed by: FAMILY MEDICINE

## 2021-01-21 PROCEDURE — 1126F PR PAIN SEVERITY QUANTIFIED, NO PAIN PRESENT: ICD-10-PCS | Mod: S$GLB,,, | Performed by: FAMILY MEDICINE

## 2021-01-21 PROCEDURE — 82306 VITAMIN D 25 HYDROXY: CPT

## 2021-01-21 PROCEDURE — 82728 ASSAY OF FERRITIN: CPT

## 2021-01-21 PROCEDURE — 1159F PR MEDICATION LIST DOCUMENTED IN MEDICAL RECORD: ICD-10-PCS | Mod: S$GLB,,, | Performed by: FAMILY MEDICINE

## 2021-01-21 PROCEDURE — 83540 ASSAY OF IRON: CPT

## 2021-01-21 PROCEDURE — 99214 OFFICE O/P EST MOD 30 MIN: CPT | Mod: S$GLB,,, | Performed by: FAMILY MEDICINE

## 2021-01-21 PROCEDURE — 36415 COLL VENOUS BLD VENIPUNCTURE: CPT

## 2021-01-21 PROCEDURE — 1159F MED LIST DOCD IN RCRD: CPT | Mod: S$GLB,,, | Performed by: FAMILY MEDICINE

## 2021-01-21 PROCEDURE — 99999 PR PBB SHADOW E&M-EST. PATIENT-LVL III: CPT | Mod: PBBFAC,,, | Performed by: FAMILY MEDICINE

## 2021-01-21 PROCEDURE — 1101F PR PT FALLS ASSESS DOC 0-1 FALLS W/OUT INJ PAST YR: ICD-10-PCS | Mod: CPTII,S$GLB,, | Performed by: FAMILY MEDICINE

## 2021-01-21 PROCEDURE — 3288F PR FALLS RISK ASSESSMENT DOCUMENTED: ICD-10-PCS | Mod: CPTII,S$GLB,, | Performed by: FAMILY MEDICINE

## 2021-01-21 PROCEDURE — 3079F DIAST BP 80-89 MM HG: CPT | Mod: CPTII,S$GLB,, | Performed by: FAMILY MEDICINE

## 2021-01-22 PROBLEM — E55.9 VITAMIN D DEFICIENCY: Status: ACTIVE | Noted: 2021-01-22

## 2021-01-22 PROBLEM — E78.2 MIXED HYPERLIPIDEMIA: Status: ACTIVE | Noted: 2021-01-22

## 2021-01-22 PROBLEM — E66.3 OVERWEIGHT (BMI 25.0-29.9): Status: ACTIVE | Noted: 2018-03-21

## 2021-01-22 PROBLEM — D64.9 ANEMIA: Status: ACTIVE | Noted: 2021-01-22

## 2021-02-01 DIAGNOSIS — I10 ESSENTIAL HYPERTENSION: ICD-10-CM

## 2021-02-01 RX ORDER — LISINOPRIL AND HYDROCHLOROTHIAZIDE 20; 25 MG/1; MG/1
1 TABLET ORAL DAILY
Qty: 30 TABLET | Refills: 11 | Status: SHIPPED | OUTPATIENT
Start: 2021-02-01 | End: 2022-02-15

## 2021-02-05 DIAGNOSIS — E55.9 VITAMIN D DEFICIENCY: Primary | ICD-10-CM

## 2021-02-05 RX ORDER — VIT C/E/ZN/COPPR/LUTEIN/ZEAXAN 250MG-90MG
1000 CAPSULE ORAL DAILY
Refills: 0 | COMMUNITY
Start: 2021-02-05

## 2021-02-18 ENCOUNTER — IMMUNIZATION (OUTPATIENT)
Dept: INTERNAL MEDICINE | Facility: CLINIC | Age: 71
End: 2021-02-18
Payer: COMMERCIAL

## 2021-02-18 DIAGNOSIS — Z23 NEED FOR VACCINATION: Primary | ICD-10-CM

## 2021-02-18 PROCEDURE — 91300 COVID-19, MRNA, LNP-S, PF, 30 MCG/0.3 ML DOSE VACCINE: CPT | Mod: PBBFAC | Performed by: INTERNAL MEDICINE

## 2021-03-03 ENCOUNTER — HOSPITAL ENCOUNTER (OUTPATIENT)
Dept: PULMONOLOGY | Facility: HOSPITAL | Age: 71
Discharge: HOME | End: 2021-03-03
Attending: NURSE PRACTITIONER
Payer: COMMERCIAL

## 2021-03-03 DIAGNOSIS — G40.909 NONINTRACTABLE EPILEPSY WITHOUT STATUS EPILEPTICUS, UNSPECIFIED EPILEPSY TYPE: ICD-10-CM

## 2021-03-03 PROCEDURE — 95819 EEG AWAKE AND ASLEEP: CPT

## 2021-03-03 PROCEDURE — 95819 PR EEG,W/AWAKE & ASLEEP RECORD: ICD-10-PCS | Mod: 26,,, | Performed by: PSYCHIATRY & NEUROLOGY

## 2021-03-03 PROCEDURE — 95819 EEG AWAKE AND ASLEEP: CPT | Mod: 26,,, | Performed by: PSYCHIATRY & NEUROLOGY

## 2021-03-04 ENCOUNTER — TELEPHONE (OUTPATIENT)
Dept: NEUROLOGY | Facility: CLINIC | Age: 71
End: 2021-03-04

## 2021-03-11 ENCOUNTER — IMMUNIZATION (OUTPATIENT)
Dept: INTERNAL MEDICINE | Facility: CLINIC | Age: 71
End: 2021-03-11
Payer: COMMERCIAL

## 2021-03-11 DIAGNOSIS — Z23 NEED FOR VACCINATION: Primary | ICD-10-CM

## 2021-03-11 PROCEDURE — 91300 COVID-19, MRNA, LNP-S, PF, 30 MCG/0.3 ML DOSE VACCINE: CPT | Mod: PBBFAC | Performed by: FAMILY MEDICINE

## 2021-03-11 PROCEDURE — 0002A COVID-19, MRNA, LNP-S, PF, 30 MCG/0.3 ML DOSE VACCINE: CPT | Mod: PBBFAC | Performed by: FAMILY MEDICINE

## 2021-05-20 ENCOUNTER — PATIENT OUTREACH (OUTPATIENT)
Dept: ADMINISTRATIVE | Facility: OTHER | Age: 71
End: 2021-05-20

## 2021-05-24 ENCOUNTER — OFFICE VISIT (OUTPATIENT)
Dept: NEUROLOGY | Facility: CLINIC | Age: 71
End: 2021-05-24
Payer: COMMERCIAL

## 2021-05-24 ENCOUNTER — LAB VISIT (OUTPATIENT)
Dept: LAB | Facility: HOSPITAL | Age: 71
End: 2021-05-24
Attending: PSYCHIATRY & NEUROLOGY
Payer: COMMERCIAL

## 2021-05-24 VITALS
DIASTOLIC BLOOD PRESSURE: 76 MMHG | SYSTOLIC BLOOD PRESSURE: 122 MMHG | OXYGEN SATURATION: 98 % | HEIGHT: 66 IN | WEIGHT: 184.94 LBS | HEART RATE: 90 BPM | RESPIRATION RATE: 16 BRPM | BODY MASS INDEX: 29.72 KG/M2

## 2021-05-24 DIAGNOSIS — Z86.010 PERSONAL HISTORY OF COLONIC POLYPS: ICD-10-CM

## 2021-05-24 DIAGNOSIS — E55.9 VITAMIN D DEFICIENCY: ICD-10-CM

## 2021-05-24 DIAGNOSIS — E78.2 MIXED HYPERLIPIDEMIA: ICD-10-CM

## 2021-05-24 DIAGNOSIS — E66.3 OVERWEIGHT (BMI 25.0-29.9): ICD-10-CM

## 2021-05-24 DIAGNOSIS — I10 ESSENTIAL HYPERTENSION: ICD-10-CM

## 2021-05-24 DIAGNOSIS — G40.109 TEMPORAL LOBE EPILEPSY: Primary | ICD-10-CM

## 2021-05-24 DIAGNOSIS — Z12.11 SPECIAL SCREENING FOR MALIGNANT NEOPLASM OF COLON: Primary | ICD-10-CM

## 2021-05-24 DIAGNOSIS — D64.9 ANEMIA, UNSPECIFIED TYPE: ICD-10-CM

## 2021-05-24 DIAGNOSIS — H25.13 NUCLEAR SCLEROSIS OF BOTH EYES: ICD-10-CM

## 2021-05-24 DIAGNOSIS — E66.9 OBESITY (BMI 30.0-34.9): ICD-10-CM

## 2021-05-24 DIAGNOSIS — H52.7 REFRACTIVE ERROR: ICD-10-CM

## 2021-05-24 DIAGNOSIS — D12.6 TUBULAR ADENOMA OF COLON: ICD-10-CM

## 2021-05-24 DIAGNOSIS — G40.109 TEMPORAL LOBE EPILEPSY: ICD-10-CM

## 2021-05-24 DIAGNOSIS — R73.09 ABNORMAL GLUCOSE: ICD-10-CM

## 2021-05-24 DIAGNOSIS — R56.9 CONVULSIONS, UNSPECIFIED CONVULSION TYPE: ICD-10-CM

## 2021-05-24 DIAGNOSIS — L21.9 SEBORRHEIC DERMATITIS OF SCALP: ICD-10-CM

## 2021-05-24 PROBLEM — E66.811 OBESITY (BMI 30.0-34.9): Status: ACTIVE | Noted: 2021-05-24

## 2021-05-24 PROCEDURE — 1126F AMNT PAIN NOTED NONE PRSNT: CPT | Mod: S$GLB,,, | Performed by: PSYCHIATRY & NEUROLOGY

## 2021-05-24 PROCEDURE — 99215 PR OFFICE/OUTPT VISIT, EST, LEVL V, 40-54 MIN: ICD-10-PCS | Mod: S$GLB,,, | Performed by: PSYCHIATRY & NEUROLOGY

## 2021-05-24 PROCEDURE — 80185 ASSAY OF PHENYTOIN TOTAL: CPT | Performed by: PSYCHIATRY & NEUROLOGY

## 2021-05-24 PROCEDURE — 99999 PR PBB SHADOW E&M-EST. PATIENT-LVL IV: ICD-10-PCS | Mod: PBBFAC,,, | Performed by: PSYCHIATRY & NEUROLOGY

## 2021-05-24 PROCEDURE — 3288F PR FALLS RISK ASSESSMENT DOCUMENTED: ICD-10-PCS | Mod: CPTII,S$GLB,, | Performed by: PSYCHIATRY & NEUROLOGY

## 2021-05-24 PROCEDURE — 36415 COLL VENOUS BLD VENIPUNCTURE: CPT | Performed by: PSYCHIATRY & NEUROLOGY

## 2021-05-24 PROCEDURE — 3008F PR BODY MASS INDEX (BMI) DOCUMENTED: ICD-10-PCS | Mod: CPTII,S$GLB,, | Performed by: PSYCHIATRY & NEUROLOGY

## 2021-05-24 PROCEDURE — 3008F BODY MASS INDEX DOCD: CPT | Mod: CPTII,S$GLB,, | Performed by: PSYCHIATRY & NEUROLOGY

## 2021-05-24 PROCEDURE — 1126F PR PAIN SEVERITY QUANTIFIED, NO PAIN PRESENT: ICD-10-PCS | Mod: S$GLB,,, | Performed by: PSYCHIATRY & NEUROLOGY

## 2021-05-24 PROCEDURE — 1101F PR PT FALLS ASSESS DOC 0-1 FALLS W/OUT INJ PAST YR: ICD-10-PCS | Mod: CPTII,S$GLB,, | Performed by: PSYCHIATRY & NEUROLOGY

## 2021-05-24 PROCEDURE — 99417 PR PROLONGED SVC, OUTPT, W/WO DIRECT PT CONTACT,  EA ADDTL 15 MIN: ICD-10-PCS | Mod: S$GLB,,, | Performed by: PSYCHIATRY & NEUROLOGY

## 2021-05-24 PROCEDURE — 99999 PR PBB SHADOW E&M-EST. PATIENT-LVL IV: CPT | Mod: PBBFAC,,, | Performed by: PSYCHIATRY & NEUROLOGY

## 2021-05-24 PROCEDURE — 1101F PT FALLS ASSESS-DOCD LE1/YR: CPT | Mod: CPTII,S$GLB,, | Performed by: PSYCHIATRY & NEUROLOGY

## 2021-05-24 PROCEDURE — 3288F FALL RISK ASSESSMENT DOCD: CPT | Mod: CPTII,S$GLB,, | Performed by: PSYCHIATRY & NEUROLOGY

## 2021-05-24 PROCEDURE — 99215 OFFICE O/P EST HI 40 MIN: CPT | Mod: S$GLB,,, | Performed by: PSYCHIATRY & NEUROLOGY

## 2021-05-24 PROCEDURE — 99417 PROLNG OP E/M EACH 15 MIN: CPT | Mod: S$GLB,,, | Performed by: PSYCHIATRY & NEUROLOGY

## 2021-05-25 ENCOUNTER — TELEPHONE (OUTPATIENT)
Dept: ENDOSCOPY | Facility: HOSPITAL | Age: 71
End: 2021-05-25

## 2021-05-25 ENCOUNTER — PATIENT MESSAGE (OUTPATIENT)
Dept: ENDOSCOPY | Facility: HOSPITAL | Age: 71
End: 2021-05-25

## 2021-05-25 DIAGNOSIS — Z12.11 COLON CANCER SCREENING: Primary | ICD-10-CM

## 2021-05-25 LAB — PHENYTOIN SERPL-MCNC: 13.6 UG/ML (ref 10–20)

## 2021-05-26 ENCOUNTER — CLINICAL SUPPORT (OUTPATIENT)
Dept: AUDIOLOGY | Facility: CLINIC | Age: 71
End: 2021-05-26
Payer: COMMERCIAL

## 2021-05-26 ENCOUNTER — OFFICE VISIT (OUTPATIENT)
Dept: OTOLARYNGOLOGY | Facility: CLINIC | Age: 71
End: 2021-05-26
Payer: COMMERCIAL

## 2021-05-26 VITALS — HEART RATE: 94 BPM | SYSTOLIC BLOOD PRESSURE: 133 MMHG | TEMPERATURE: 98 F | DIASTOLIC BLOOD PRESSURE: 73 MMHG

## 2021-05-26 DIAGNOSIS — H61.23 BILATERAL IMPACTED CERUMEN: ICD-10-CM

## 2021-05-26 DIAGNOSIS — H93.13 TINNITUS, BILATERAL: ICD-10-CM

## 2021-05-26 DIAGNOSIS — H90.3 SENSORINEURAL HEARING LOSS, ASYMMETRICAL: Primary | ICD-10-CM

## 2021-05-26 PROCEDURE — 1126F PR PAIN SEVERITY QUANTIFIED, NO PAIN PRESENT: ICD-10-PCS | Mod: S$GLB,,, | Performed by: PHYSICIAN ASSISTANT

## 2021-05-26 PROCEDURE — 99999 PR PBB SHADOW E&M-EST. PATIENT-LVL I: ICD-10-PCS | Mod: PBBFAC,,, | Performed by: AUDIOLOGIST-HEARING AID FITTER

## 2021-05-26 PROCEDURE — 92557 COMPREHENSIVE HEARING TEST: CPT | Mod: S$GLB,,, | Performed by: AUDIOLOGIST-HEARING AID FITTER

## 2021-05-26 PROCEDURE — 92567 PR TYMPA2METRY: ICD-10-PCS | Mod: S$GLB,,, | Performed by: AUDIOLOGIST-HEARING AID FITTER

## 2021-05-26 PROCEDURE — 92557 PR COMPREHENSIVE HEARING TEST: ICD-10-PCS | Mod: S$GLB,,, | Performed by: AUDIOLOGIST-HEARING AID FITTER

## 2021-05-26 PROCEDURE — 69210 REMOVE IMPACTED EAR WAX UNI: CPT | Mod: S$GLB,,, | Performed by: PHYSICIAN ASSISTANT

## 2021-05-26 PROCEDURE — 99203 PR OFFICE/OUTPT VISIT, NEW, LEVL III, 30-44 MIN: ICD-10-PCS | Mod: 25,S$GLB,, | Performed by: PHYSICIAN ASSISTANT

## 2021-05-26 PROCEDURE — 1126F AMNT PAIN NOTED NONE PRSNT: CPT | Mod: S$GLB,,, | Performed by: PHYSICIAN ASSISTANT

## 2021-05-26 PROCEDURE — 99999 PR PBB SHADOW E&M-EST. PATIENT-LVL III: ICD-10-PCS | Mod: PBBFAC,,, | Performed by: PHYSICIAN ASSISTANT

## 2021-05-26 PROCEDURE — 1159F PR MEDICATION LIST DOCUMENTED IN MEDICAL RECORD: ICD-10-PCS | Mod: S$GLB,,, | Performed by: PHYSICIAN ASSISTANT

## 2021-05-26 PROCEDURE — 92567 TYMPANOMETRY: CPT | Mod: S$GLB,,, | Performed by: AUDIOLOGIST-HEARING AID FITTER

## 2021-05-26 PROCEDURE — 1159F MED LIST DOCD IN RCRD: CPT | Mod: S$GLB,,, | Performed by: PHYSICIAN ASSISTANT

## 2021-05-26 PROCEDURE — 99999 PR PBB SHADOW E&M-EST. PATIENT-LVL I: CPT | Mod: PBBFAC,,, | Performed by: AUDIOLOGIST-HEARING AID FITTER

## 2021-05-26 PROCEDURE — 69210 PR REMOVAL IMPACTED CERUMEN REQUIRING INSTRUMENTATION, UNILATERAL: ICD-10-PCS | Mod: S$GLB,,, | Performed by: PHYSICIAN ASSISTANT

## 2021-05-26 PROCEDURE — 99203 OFFICE O/P NEW LOW 30 MIN: CPT | Mod: 25,S$GLB,, | Performed by: PHYSICIAN ASSISTANT

## 2021-05-26 PROCEDURE — 99999 PR PBB SHADOW E&M-EST. PATIENT-LVL III: CPT | Mod: PBBFAC,,, | Performed by: PHYSICIAN ASSISTANT

## 2021-05-26 RX ORDER — SODIUM, POTASSIUM,MAG SULFATES 17.5-3.13G
1 SOLUTION, RECONSTITUTED, ORAL ORAL DAILY
Qty: 1 KIT | Refills: 0 | Status: SHIPPED | OUTPATIENT
Start: 2021-05-26 | End: 2021-05-28

## 2021-07-06 ENCOUNTER — PATIENT MESSAGE (OUTPATIENT)
Dept: INTERNAL MEDICINE | Facility: CLINIC | Age: 71
End: 2021-07-06

## 2021-07-06 DIAGNOSIS — Z12.11 COLON CANCER SCREENING: Primary | ICD-10-CM

## 2021-07-06 RX ORDER — SODIUM, POTASSIUM,MAG SULFATES 17.5-3.13G
1 SOLUTION, RECONSTITUTED, ORAL ORAL DAILY
Qty: 1 KIT | Refills: 0 | Status: SHIPPED | OUTPATIENT
Start: 2021-07-06 | End: 2021-07-08

## 2021-07-12 ENCOUNTER — HOSPITAL ENCOUNTER (OUTPATIENT)
Facility: HOSPITAL | Age: 71
Discharge: HOME | End: 2021-07-12
Attending: INTERNAL MEDICINE | Admitting: INTERNAL MEDICINE
Payer: COMMERCIAL

## 2021-07-12 ENCOUNTER — ANESTHESIA (OUTPATIENT)
Dept: ENDOSCOPY | Facility: HOSPITAL | Age: 71
End: 2021-07-12
Payer: COMMERCIAL

## 2021-07-12 ENCOUNTER — ANESTHESIA EVENT (OUTPATIENT)
Dept: ENDOSCOPY | Facility: HOSPITAL | Age: 71
End: 2021-07-12
Payer: COMMERCIAL

## 2021-07-12 VITALS
TEMPERATURE: 98 F | DIASTOLIC BLOOD PRESSURE: 78 MMHG | SYSTOLIC BLOOD PRESSURE: 114 MMHG | BODY MASS INDEX: 29.05 KG/M2 | HEART RATE: 77 BPM | RESPIRATION RATE: 16 BRPM | WEIGHT: 180.75 LBS | OXYGEN SATURATION: 98 % | HEIGHT: 66 IN

## 2021-07-12 DIAGNOSIS — K63.5 POLYP OF COLON, UNSPECIFIED PART OF COLON, UNSPECIFIED TYPE: ICD-10-CM

## 2021-07-12 DIAGNOSIS — D12.6 TUBULAR ADENOMA OF COLON: Primary | ICD-10-CM

## 2021-07-12 PROCEDURE — 63600175 PHARM REV CODE 636 W HCPCS: Performed by: NURSE ANESTHETIST, CERTIFIED REGISTERED

## 2021-07-12 PROCEDURE — 45385 PR COLONOSCOPY,REMV LESN,SNARE: ICD-10-PCS | Mod: 33,,, | Performed by: INTERNAL MEDICINE

## 2021-07-12 PROCEDURE — 88305 TISSUE EXAM BY PATHOLOGIST: CPT | Performed by: PATHOLOGY

## 2021-07-12 PROCEDURE — 27201089 HC SNARE, DISP (ANY): Performed by: INTERNAL MEDICINE

## 2021-07-12 PROCEDURE — 45385 COLONOSCOPY W/LESION REMOVAL: CPT | Performed by: INTERNAL MEDICINE

## 2021-07-12 PROCEDURE — 37000009 HC ANESTHESIA EA ADD 15 MINS: Performed by: INTERNAL MEDICINE

## 2021-07-12 PROCEDURE — 88305 TISSUE EXAM BY PATHOLOGIST: ICD-10-PCS | Mod: 26,,, | Performed by: PATHOLOGY

## 2021-07-12 PROCEDURE — 45385 COLONOSCOPY W/LESION REMOVAL: CPT | Mod: 33,,, | Performed by: INTERNAL MEDICINE

## 2021-07-12 PROCEDURE — 88305 TISSUE EXAM BY PATHOLOGIST: CPT | Mod: 26,,, | Performed by: PATHOLOGY

## 2021-07-12 PROCEDURE — 37000008 HC ANESTHESIA 1ST 15 MINUTES: Performed by: INTERNAL MEDICINE

## 2021-07-12 PROCEDURE — 25000003 PHARM REV CODE 250: Performed by: NURSE ANESTHETIST, CERTIFIED REGISTERED

## 2021-07-12 RX ORDER — SODIUM CHLORIDE, SODIUM LACTATE, POTASSIUM CHLORIDE, CALCIUM CHLORIDE 600; 310; 30; 20 MG/100ML; MG/100ML; MG/100ML; MG/100ML
INJECTION, SOLUTION INTRAVENOUS CONTINUOUS PRN
Status: DISCONTINUED | OUTPATIENT
Start: 2021-07-12 | End: 2021-07-12

## 2021-07-12 RX ORDER — LIDOCAINE HYDROCHLORIDE 10 MG/ML
INJECTION, SOLUTION EPIDURAL; INFILTRATION; INTRACAUDAL; PERINEURAL
Status: DISCONTINUED | OUTPATIENT
Start: 2021-07-12 | End: 2021-07-12

## 2021-07-12 RX ORDER — PROPOFOL 10 MG/ML
VIAL (ML) INTRAVENOUS
Status: DISCONTINUED | OUTPATIENT
Start: 2021-07-12 | End: 2021-07-12

## 2021-07-12 RX ADMIN — PROPOFOL 50 MG: 10 INJECTION, EMULSION INTRAVENOUS at 07:07

## 2021-07-12 RX ADMIN — PROPOFOL 90 MG: 10 INJECTION, EMULSION INTRAVENOUS at 08:07

## 2021-07-12 RX ADMIN — PROPOFOL 100 MG: 10 INJECTION, EMULSION INTRAVENOUS at 07:07

## 2021-07-12 RX ADMIN — SODIUM CHLORIDE, SODIUM LACTATE, POTASSIUM CHLORIDE, AND CALCIUM CHLORIDE: .6; .31; .03; .02 INJECTION, SOLUTION INTRAVENOUS at 07:07

## 2021-07-12 RX ADMIN — LIDOCAINE HYDROCHLORIDE 50 MG: 10 INJECTION, SOLUTION EPIDURAL; INFILTRATION; INTRACAUDAL; PERINEURAL at 07:07

## 2021-07-13 ENCOUNTER — LAB VISIT (OUTPATIENT)
Dept: LAB | Facility: HOSPITAL | Age: 71
End: 2021-07-13
Attending: FAMILY MEDICINE
Payer: COMMERCIAL

## 2021-07-13 DIAGNOSIS — D64.9 ANEMIA, UNSPECIFIED TYPE: ICD-10-CM

## 2021-07-13 DIAGNOSIS — E55.9 VITAMIN D DEFICIENCY: ICD-10-CM

## 2021-07-13 DIAGNOSIS — I10 ESSENTIAL HYPERTENSION: ICD-10-CM

## 2021-07-13 DIAGNOSIS — E78.2 MIXED HYPERLIPIDEMIA: ICD-10-CM

## 2021-07-13 LAB
25(OH)D3+25(OH)D2 SERPL-MCNC: 35 NG/ML (ref 30–96)
ALBUMIN SERPL BCP-MCNC: 4.3 G/DL (ref 3.5–5.2)
ALP SERPL-CCNC: 96 U/L (ref 55–135)
ALT SERPL W/O P-5'-P-CCNC: 22 U/L (ref 10–44)
ANION GAP SERPL CALC-SCNC: 11 MMOL/L (ref 8–16)
AST SERPL-CCNC: 21 U/L (ref 10–40)
BASOPHILS # BLD AUTO: 0.05 K/UL (ref 0–0.2)
BASOPHILS NFR BLD: 1 % (ref 0–1.9)
BILIRUB SERPL-MCNC: 0.4 MG/DL (ref 0.1–1)
BUN SERPL-MCNC: 19 MG/DL (ref 8–23)
CALCIUM SERPL-MCNC: 10.1 MG/DL (ref 8.7–10.5)
CHLORIDE SERPL-SCNC: 105 MMOL/L (ref 95–110)
CHOLEST SERPL-MCNC: 240 MG/DL (ref 120–199)
CHOLEST/HDLC SERPL: 6 {RATIO} (ref 2–5)
CO2 SERPL-SCNC: 26 MMOL/L (ref 23–29)
CREAT SERPL-MCNC: 1.1 MG/DL (ref 0.5–1.4)
DIFFERENTIAL METHOD: ABNORMAL
EOSINOPHIL # BLD AUTO: 0.4 K/UL (ref 0–0.5)
EOSINOPHIL NFR BLD: 7.9 % (ref 0–8)
ERYTHROCYTE [DISTWIDTH] IN BLOOD BY AUTOMATED COUNT: 12.5 % (ref 11.5–14.5)
EST. GFR  (AFRICAN AMERICAN): >60 ML/MIN/1.73 M^2
EST. GFR  (NON AFRICAN AMERICAN): >60 ML/MIN/1.73 M^2
GLUCOSE SERPL-MCNC: 109 MG/DL (ref 70–110)
HCT VFR BLD AUTO: 41.9 % (ref 40–54)
HDLC SERPL-MCNC: 40 MG/DL (ref 40–75)
HDLC SERPL: 16.7 % (ref 20–50)
HGB BLD-MCNC: 14.2 G/DL (ref 14–18)
IMM GRANULOCYTES # BLD AUTO: 0 K/UL (ref 0–0.04)
IMM GRANULOCYTES NFR BLD AUTO: 0 % (ref 0–0.5)
LDLC SERPL CALC-MCNC: 143.4 MG/DL (ref 63–159)
LYMPHOCYTES # BLD AUTO: 1.6 K/UL (ref 1–4.8)
LYMPHOCYTES NFR BLD: 33.1 % (ref 18–48)
MCH RBC QN AUTO: 34.5 PG (ref 27–31)
MCHC RBC AUTO-ENTMCNC: 33.9 G/DL (ref 32–36)
MCV RBC AUTO: 102 FL (ref 82–98)
MONOCYTES # BLD AUTO: 0.5 K/UL (ref 0.3–1)
MONOCYTES NFR BLD: 10.9 % (ref 4–15)
NEUTROPHILS # BLD AUTO: 2.3 K/UL (ref 1.8–7.7)
NEUTROPHILS NFR BLD: 47.1 % (ref 38–73)
NONHDLC SERPL-MCNC: 200 MG/DL
NRBC BLD-RTO: 0 /100 WBC
PLATELET # BLD AUTO: 199 K/UL (ref 150–450)
PMV BLD AUTO: 10.1 FL (ref 9.2–12.9)
POTASSIUM SERPL-SCNC: 4.2 MMOL/L (ref 3.5–5.1)
PROT SERPL-MCNC: 7.4 G/DL (ref 6–8.4)
RBC # BLD AUTO: 4.11 M/UL (ref 4.6–6.2)
SODIUM SERPL-SCNC: 142 MMOL/L (ref 136–145)
TRIGL SERPL-MCNC: 283 MG/DL (ref 30–150)
TSH SERPL DL<=0.005 MIU/L-ACNC: 0.89 UIU/ML (ref 0.4–4)
WBC # BLD AUTO: 4.96 K/UL (ref 3.9–12.7)

## 2021-07-13 PROCEDURE — 80061 LIPID PANEL: CPT | Performed by: FAMILY MEDICINE

## 2021-07-13 PROCEDURE — 36415 COLL VENOUS BLD VENIPUNCTURE: CPT | Performed by: FAMILY MEDICINE

## 2021-07-13 PROCEDURE — 82306 VITAMIN D 25 HYDROXY: CPT | Performed by: FAMILY MEDICINE

## 2021-07-13 PROCEDURE — 84443 ASSAY THYROID STIM HORMONE: CPT | Performed by: FAMILY MEDICINE

## 2021-07-13 PROCEDURE — 80053 COMPREHEN METABOLIC PANEL: CPT | Performed by: FAMILY MEDICINE

## 2021-07-13 PROCEDURE — 85025 COMPLETE CBC W/AUTO DIFF WBC: CPT | Performed by: FAMILY MEDICINE

## 2021-07-15 LAB
FINAL PATHOLOGIC DIAGNOSIS: NORMAL
GROSS: NORMAL
Lab: NORMAL

## 2021-07-22 ENCOUNTER — OFFICE VISIT (OUTPATIENT)
Dept: INTERNAL MEDICINE | Facility: CLINIC | Age: 71
End: 2021-07-22
Payer: COMMERCIAL

## 2021-07-22 VITALS
OXYGEN SATURATION: 96 % | SYSTOLIC BLOOD PRESSURE: 120 MMHG | RESPIRATION RATE: 20 BRPM | DIASTOLIC BLOOD PRESSURE: 70 MMHG | TEMPERATURE: 98 F | WEIGHT: 184.88 LBS | HEART RATE: 100 BPM | BODY MASS INDEX: 29.84 KG/M2

## 2021-07-22 DIAGNOSIS — E55.9 VITAMIN D DEFICIENCY: ICD-10-CM

## 2021-07-22 DIAGNOSIS — E78.2 MIXED HYPERLIPIDEMIA: ICD-10-CM

## 2021-07-22 DIAGNOSIS — I10 ESSENTIAL HYPERTENSION: ICD-10-CM

## 2021-07-22 DIAGNOSIS — Z00.00 ROUTINE GENERAL MEDICAL EXAMINATION AT A HEALTH CARE FACILITY: Primary | ICD-10-CM

## 2021-07-22 DIAGNOSIS — G40.909 NONINTRACTABLE EPILEPSY WITHOUT STATUS EPILEPTICUS, UNSPECIFIED EPILEPSY TYPE: ICD-10-CM

## 2021-07-22 DIAGNOSIS — D64.9 ANEMIA, UNSPECIFIED TYPE: ICD-10-CM

## 2021-07-22 PROCEDURE — 99999 PR PBB SHADOW E&M-EST. PATIENT-LVL IV: ICD-10-PCS | Mod: PBBFAC,,, | Performed by: FAMILY MEDICINE

## 2021-07-22 PROCEDURE — 99397 PR PREVENTIVE VISIT,EST,65 & OVER: ICD-10-PCS | Mod: S$GLB,,, | Performed by: FAMILY MEDICINE

## 2021-07-22 PROCEDURE — 3078F DIAST BP <80 MM HG: CPT | Mod: CPTII,S$GLB,, | Performed by: FAMILY MEDICINE

## 2021-07-22 PROCEDURE — 3008F PR BODY MASS INDEX (BMI) DOCUMENTED: ICD-10-PCS | Mod: CPTII,S$GLB,, | Performed by: FAMILY MEDICINE

## 2021-07-22 PROCEDURE — 3074F PR MOST RECENT SYSTOLIC BLOOD PRESSURE < 130 MM HG: ICD-10-PCS | Mod: CPTII,S$GLB,, | Performed by: FAMILY MEDICINE

## 2021-07-22 PROCEDURE — 1126F AMNT PAIN NOTED NONE PRSNT: CPT | Mod: CPTII,S$GLB,, | Performed by: FAMILY MEDICINE

## 2021-07-22 PROCEDURE — 1100F PR PT FALLS ASSESS DOC 2+ FALLS/FALL W/INJURY/YR: ICD-10-PCS | Mod: CPTII,S$GLB,, | Performed by: FAMILY MEDICINE

## 2021-07-22 PROCEDURE — 1100F PTFALLS ASSESS-DOCD GE2>/YR: CPT | Mod: CPTII,S$GLB,, | Performed by: FAMILY MEDICINE

## 2021-07-22 PROCEDURE — 3074F SYST BP LT 130 MM HG: CPT | Mod: CPTII,S$GLB,, | Performed by: FAMILY MEDICINE

## 2021-07-22 PROCEDURE — 1126F PR PAIN SEVERITY QUANTIFIED, NO PAIN PRESENT: ICD-10-PCS | Mod: CPTII,S$GLB,, | Performed by: FAMILY MEDICINE

## 2021-07-22 PROCEDURE — 3288F PR FALLS RISK ASSESSMENT DOCUMENTED: ICD-10-PCS | Mod: CPTII,S$GLB,, | Performed by: FAMILY MEDICINE

## 2021-07-22 PROCEDURE — 3008F BODY MASS INDEX DOCD: CPT | Mod: CPTII,S$GLB,, | Performed by: FAMILY MEDICINE

## 2021-07-22 PROCEDURE — 99999 PR PBB SHADOW E&M-EST. PATIENT-LVL IV: CPT | Mod: PBBFAC,,, | Performed by: FAMILY MEDICINE

## 2021-07-22 PROCEDURE — 3288F FALL RISK ASSESSMENT DOCD: CPT | Mod: CPTII,S$GLB,, | Performed by: FAMILY MEDICINE

## 2021-07-22 PROCEDURE — 3078F PR MOST RECENT DIASTOLIC BLOOD PRESSURE < 80 MM HG: ICD-10-PCS | Mod: CPTII,S$GLB,, | Performed by: FAMILY MEDICINE

## 2021-07-22 PROCEDURE — 99397 PER PM REEVAL EST PAT 65+ YR: CPT | Mod: S$GLB,,, | Performed by: FAMILY MEDICINE

## 2022-01-19 ENCOUNTER — LAB VISIT (OUTPATIENT)
Dept: LAB | Facility: HOSPITAL | Age: 72
End: 2022-01-19
Attending: FAMILY MEDICINE
Payer: COMMERCIAL

## 2022-01-19 DIAGNOSIS — D64.9 ANEMIA, UNSPECIFIED TYPE: ICD-10-CM

## 2022-01-19 DIAGNOSIS — E78.2 MIXED HYPERLIPIDEMIA: ICD-10-CM

## 2022-01-19 DIAGNOSIS — E55.9 VITAMIN D DEFICIENCY: ICD-10-CM

## 2022-01-19 LAB
25(OH)D3+25(OH)D2 SERPL-MCNC: 29 NG/ML (ref 30–96)
ALBUMIN SERPL BCP-MCNC: 4.1 G/DL (ref 3.5–5.2)
ALP SERPL-CCNC: 90 U/L (ref 55–135)
ALT SERPL W/O P-5'-P-CCNC: 25 U/L (ref 10–44)
ANION GAP SERPL CALC-SCNC: 10 MMOL/L (ref 8–16)
AST SERPL-CCNC: 20 U/L (ref 10–40)
BASOPHILS # BLD AUTO: 0.04 K/UL (ref 0–0.2)
BASOPHILS NFR BLD: 0.9 % (ref 0–1.9)
BILIRUB SERPL-MCNC: 0.5 MG/DL (ref 0.1–1)
BUN SERPL-MCNC: 17 MG/DL (ref 8–23)
CALCIUM SERPL-MCNC: 9.5 MG/DL (ref 8.7–10.5)
CHLORIDE SERPL-SCNC: 103 MMOL/L (ref 95–110)
CHOLEST SERPL-MCNC: 245 MG/DL (ref 120–199)
CHOLEST/HDLC SERPL: 5.4 {RATIO} (ref 2–5)
CO2 SERPL-SCNC: 27 MMOL/L (ref 23–29)
CREAT SERPL-MCNC: 0.8 MG/DL (ref 0.5–1.4)
DIFFERENTIAL METHOD: ABNORMAL
EOSINOPHIL # BLD AUTO: 0.3 K/UL (ref 0–0.5)
EOSINOPHIL NFR BLD: 6.1 % (ref 0–8)
ERYTHROCYTE [DISTWIDTH] IN BLOOD BY AUTOMATED COUNT: 12.4 % (ref 11.5–14.5)
EST. GFR  (AFRICAN AMERICAN): >60 ML/MIN/1.73 M^2
EST. GFR  (NON AFRICAN AMERICAN): >60 ML/MIN/1.73 M^2
GLUCOSE SERPL-MCNC: 116 MG/DL (ref 70–110)
HCT VFR BLD AUTO: 38.5 % (ref 40–54)
HDLC SERPL-MCNC: 45 MG/DL (ref 40–75)
HDLC SERPL: 18.4 % (ref 20–50)
HGB BLD-MCNC: 13.1 G/DL (ref 14–18)
IMM GRANULOCYTES # BLD AUTO: 0.02 K/UL (ref 0–0.04)
IMM GRANULOCYTES NFR BLD AUTO: 0.4 % (ref 0–0.5)
LDLC SERPL CALC-MCNC: 159.2 MG/DL (ref 63–159)
LYMPHOCYTES # BLD AUTO: 1.8 K/UL (ref 1–4.8)
LYMPHOCYTES NFR BLD: 38.9 % (ref 18–48)
MCH RBC QN AUTO: 34.6 PG (ref 27–31)
MCHC RBC AUTO-ENTMCNC: 34 G/DL (ref 32–36)
MCV RBC AUTO: 102 FL (ref 82–98)
MONOCYTES # BLD AUTO: 0.6 K/UL (ref 0.3–1)
MONOCYTES NFR BLD: 12 % (ref 4–15)
NEUTROPHILS # BLD AUTO: 1.9 K/UL (ref 1.8–7.7)
NEUTROPHILS NFR BLD: 41.7 % (ref 38–73)
NONHDLC SERPL-MCNC: 200 MG/DL
NRBC BLD-RTO: 0 /100 WBC
PLATELET # BLD AUTO: 215 K/UL (ref 150–450)
PMV BLD AUTO: 10.2 FL (ref 9.2–12.9)
POTASSIUM SERPL-SCNC: 4.4 MMOL/L (ref 3.5–5.1)
PROT SERPL-MCNC: 7 G/DL (ref 6–8.4)
RBC # BLD AUTO: 3.79 M/UL (ref 4.6–6.2)
SODIUM SERPL-SCNC: 140 MMOL/L (ref 136–145)
TRIGL SERPL-MCNC: 204 MG/DL (ref 30–150)
WBC # BLD AUTO: 4.6 K/UL (ref 3.9–12.7)

## 2022-01-19 PROCEDURE — 80061 LIPID PANEL: CPT | Performed by: FAMILY MEDICINE

## 2022-01-19 PROCEDURE — 36415 COLL VENOUS BLD VENIPUNCTURE: CPT | Performed by: FAMILY MEDICINE

## 2022-01-19 PROCEDURE — 80053 COMPREHEN METABOLIC PANEL: CPT | Performed by: FAMILY MEDICINE

## 2022-01-19 PROCEDURE — 85025 COMPLETE CBC W/AUTO DIFF WBC: CPT | Performed by: FAMILY MEDICINE

## 2022-01-19 PROCEDURE — 82306 VITAMIN D 25 HYDROXY: CPT | Performed by: FAMILY MEDICINE

## 2022-01-27 ENCOUNTER — OFFICE VISIT (OUTPATIENT)
Dept: INTERNAL MEDICINE | Facility: CLINIC | Age: 72
End: 2022-01-27
Payer: COMMERCIAL

## 2022-01-27 VITALS
HEART RATE: 102 BPM | WEIGHT: 188.5 LBS | TEMPERATURE: 98 F | HEIGHT: 66 IN | SYSTOLIC BLOOD PRESSURE: 138 MMHG | BODY MASS INDEX: 30.29 KG/M2 | DIASTOLIC BLOOD PRESSURE: 72 MMHG | OXYGEN SATURATION: 96 %

## 2022-01-27 DIAGNOSIS — D64.9 ANEMIA, UNSPECIFIED TYPE: ICD-10-CM

## 2022-01-27 DIAGNOSIS — R73.9 HYPERGLYCEMIA: ICD-10-CM

## 2022-01-27 DIAGNOSIS — E66.9 OBESITY (BMI 30.0-34.9): ICD-10-CM

## 2022-01-27 DIAGNOSIS — E78.2 MIXED HYPERLIPIDEMIA: Primary | ICD-10-CM

## 2022-01-27 DIAGNOSIS — I10 ESSENTIAL HYPERTENSION: ICD-10-CM

## 2022-01-27 DIAGNOSIS — E55.9 VITAMIN D DEFICIENCY: ICD-10-CM

## 2022-01-27 PROCEDURE — 90694 FLU VACCINE - QUADRIVALENT - ADJUVANTED: ICD-10-PCS | Mod: S$GLB,,, | Performed by: FAMILY MEDICINE

## 2022-01-27 PROCEDURE — 99214 PR OFFICE/OUTPT VISIT, EST, LEVL IV, 30-39 MIN: ICD-10-PCS | Mod: 25,S$GLB,, | Performed by: FAMILY MEDICINE

## 2022-01-27 PROCEDURE — 3288F FALL RISK ASSESSMENT DOCD: CPT | Mod: CPTII,S$GLB,, | Performed by: FAMILY MEDICINE

## 2022-01-27 PROCEDURE — 1126F PR PAIN SEVERITY QUANTIFIED, NO PAIN PRESENT: ICD-10-PCS | Mod: CPTII,S$GLB,, | Performed by: FAMILY MEDICINE

## 2022-01-27 PROCEDURE — 90694 VACC AIIV4 NO PRSRV 0.5ML IM: CPT | Mod: S$GLB,,, | Performed by: FAMILY MEDICINE

## 2022-01-27 PROCEDURE — 1159F PR MEDICATION LIST DOCUMENTED IN MEDICAL RECORD: ICD-10-PCS | Mod: CPTII,S$GLB,, | Performed by: FAMILY MEDICINE

## 2022-01-27 PROCEDURE — 99214 OFFICE O/P EST MOD 30 MIN: CPT | Mod: 25,S$GLB,, | Performed by: FAMILY MEDICINE

## 2022-01-27 PROCEDURE — 3075F PR MOST RECENT SYSTOLIC BLOOD PRESS GE 130-139MM HG: ICD-10-PCS | Mod: CPTII,S$GLB,, | Performed by: FAMILY MEDICINE

## 2022-01-27 PROCEDURE — 90471 IMMUNIZATION ADMIN: CPT | Mod: S$GLB,,, | Performed by: FAMILY MEDICINE

## 2022-01-27 PROCEDURE — 1101F PT FALLS ASSESS-DOCD LE1/YR: CPT | Mod: CPTII,S$GLB,, | Performed by: FAMILY MEDICINE

## 2022-01-27 PROCEDURE — 3078F PR MOST RECENT DIASTOLIC BLOOD PRESSURE < 80 MM HG: ICD-10-PCS | Mod: CPTII,S$GLB,, | Performed by: FAMILY MEDICINE

## 2022-01-27 PROCEDURE — 4010F PR ACE/ARB THEARPY RXD/TAKEN: ICD-10-PCS | Mod: CPTII,S$GLB,, | Performed by: FAMILY MEDICINE

## 2022-01-27 PROCEDURE — 3075F SYST BP GE 130 - 139MM HG: CPT | Mod: CPTII,S$GLB,, | Performed by: FAMILY MEDICINE

## 2022-01-27 PROCEDURE — 90471 FLU VACCINE - QUADRIVALENT - ADJUVANTED: ICD-10-PCS | Mod: S$GLB,,, | Performed by: FAMILY MEDICINE

## 2022-01-27 PROCEDURE — 3288F PR FALLS RISK ASSESSMENT DOCUMENTED: ICD-10-PCS | Mod: CPTII,S$GLB,, | Performed by: FAMILY MEDICINE

## 2022-01-27 PROCEDURE — 1101F PR PT FALLS ASSESS DOC 0-1 FALLS W/OUT INJ PAST YR: ICD-10-PCS | Mod: CPTII,S$GLB,, | Performed by: FAMILY MEDICINE

## 2022-01-27 PROCEDURE — 1126F AMNT PAIN NOTED NONE PRSNT: CPT | Mod: CPTII,S$GLB,, | Performed by: FAMILY MEDICINE

## 2022-01-27 PROCEDURE — 3008F PR BODY MASS INDEX (BMI) DOCUMENTED: ICD-10-PCS | Mod: CPTII,S$GLB,, | Performed by: FAMILY MEDICINE

## 2022-01-27 PROCEDURE — 3008F BODY MASS INDEX DOCD: CPT | Mod: CPTII,S$GLB,, | Performed by: FAMILY MEDICINE

## 2022-01-27 PROCEDURE — 99999 PR PBB SHADOW E&M-EST. PATIENT-LVL IV: CPT | Mod: PBBFAC,,, | Performed by: FAMILY MEDICINE

## 2022-01-27 PROCEDURE — 3078F DIAST BP <80 MM HG: CPT | Mod: CPTII,S$GLB,, | Performed by: FAMILY MEDICINE

## 2022-01-27 PROCEDURE — 99999 PR PBB SHADOW E&M-EST. PATIENT-LVL IV: ICD-10-PCS | Mod: PBBFAC,,, | Performed by: FAMILY MEDICINE

## 2022-01-27 PROCEDURE — 1159F MED LIST DOCD IN RCRD: CPT | Mod: CPTII,S$GLB,, | Performed by: FAMILY MEDICINE

## 2022-01-27 PROCEDURE — 4010F ACE/ARB THERAPY RXD/TAKEN: CPT | Mod: CPTII,S$GLB,, | Performed by: FAMILY MEDICINE

## 2022-01-27 NOTE — PATIENT INSTRUCTIONS
Moderna and Pfizer vaccine booster shots are approved for adults at increased risk at least six months after their initial immunization and Dwight & Dwight (J&J) COVID-19 vaccine booster shots are approved for all adults at least two months after their initial immunization.    Booster doses for all three COVID-19 vaccines, Pfizer, Moderna and Dwight & Dwight, are now available to the public and are being administered at [facility] vaccination locations.     If you received an mRNA vaccine (Pfizer or Moderna) it is recommended that you receive the same booster dose as your original vaccine series. However, all patients eligible for a booster dose may decide to mix and match your booster dose.     Below are the current mix and match options [facility] currently offers:    Pfizer Vaccine Recipients:   Booster Dose 6 months following 2nd dose can be, in order of recommendation:  o Pfizer Booster Dose,  o Moderna Booster Dose, or  o Dwight & Dwight Booster Dose    Dwight & Dwight Vaccine Recipients:    Booster Dose 2 months following initial dose can be, in order of recommendation:  o Pfizer Booster Dose,  o Moderna Booster Dose, or  o Dwight & Dwight Booster Dose    Both Pfizer and Dwight & Dwight boosters have the same dosage as the original vaccine regimen.    Moderna Vaccine Recipients:    Booster Dose 6 months following 2nd dose can be, in order of recommendation:  o Moderna Booster Dose,  o Pfizer Booster Dose, or  o Dwight & Dwight Booster Dose    The Moderna booster is half the dosage of the original two-dose Moderna series, and [facility] will be following this guidance as outlined by the FDA and CDC.    International patients who have received a non-[place] approved COVID-19 vaccine are eligible for either a Pfizer booster dose or a Dwight & Dwight booster dose 28 days following their original vaccine dose.     Please schedule your appointment via [facility] or by calling [phone].  Walk-ins will also be accepted as supply allows.    To learn more about COVID-19 vaccination and community vaccine locations, please visit [email or web address].

## 2022-01-27 NOTE — PROGRESS NOTES
Subjective:       Patient ID: Conner Lombardi Jr. is a 71 y.o. male.    Chief Complaint: Follow-up    Patient presents to clinic today for followup of chronic conditions.    Review of Systems   Constitutional: Negative for chills, fatigue, fever and unexpected weight change.   Eyes: Negative for visual disturbance.   Respiratory: Negative for shortness of breath.    Cardiovascular: Negative for chest pain.   Musculoskeletal: Negative for myalgias.   Neurological: Negative for headaches.         Objective:      Physical Exam  Vitals reviewed.   Constitutional:       General: He is not in acute distress.     Appearance: He is well-developed.   HENT:      Head: Normocephalic and atraumatic.   Eyes:      General: Lids are normal. No scleral icterus.     Extraocular Movements: Extraocular movements intact.      Conjunctiva/sclera: Conjunctivae normal.      Pupils: Pupils are equal, round, and reactive to light.   Pulmonary:      Effort: Pulmonary effort is normal.   Neurological:      Mental Status: He is alert and oriented to person, place, and time.      Cranial Nerves: No cranial nerve deficit.      Gait: Gait normal.   Psychiatric:         Mood and Affect: Mood and affect normal.         Assessment:       1. Mixed hyperlipidemia    2. Essential hypertension    3. Anemia, unspecified type    4. Vitamin D deficiency    5. Hyperglycemia    6. Obesity (BMI 30.0-34.9)        Plan:     Problem List Items Addressed This Visit     Anemia    Current Assessment & Plan     stable         Essential hypertension    Current Assessment & Plan     Controlled, continue lisinopril-HCTZ         Relevant Orders    TSH    CBC Auto Differential    Mixed hyperlipidemia - Primary    Current Assessment & Plan     TG improving, LDL increased; discussed medications; patient prefers to work on diet and we will consider medication if not improving         Relevant Orders    Comprehensive Metabolic Panel    Lipid Panel    Obesity (BMI 30.0-34.9)     Current Assessment & Plan     Body mass index is 30.42 kg/m².    Wt Readings from Last 10 Encounters:   01/27/22 85.5 kg (188 lb 7.9 oz)   07/22/21 83.8 kg (184 lb 13.7 oz)   07/12/21 82 kg (180 lb 12.4 oz)   05/24/21 83.9 kg (184 lb 15.5 oz)   01/21/21 82.1 kg (180 lb 14.2 oz)   11/17/20 86 kg (189 lb 9.5 oz)   06/30/20 86.6 kg (190 lb 14.7 oz)   11/14/19 88.1 kg (194 lb 3.6 oz)   03/26/19 84.3 kg (185 lb 13.6 oz)   09/26/18 87.3 kg (192 lb 7.4 oz)              Vitamin D deficiency    Current Assessment & Plan     Low; advised to resume supplement           Other Visit Diagnoses     Hyperglycemia        Relevant Orders    Hemoglobin A1C          Health Maintenance reviewed/updated.  Flu vaccine today.  Discussed eligibility for covid booster, given scheduling information.  Discussed shingrix.

## 2022-01-30 NOTE — ASSESSMENT & PLAN NOTE
TG improving, LDL increased; discussed medications; patient prefers to work on diet and we will consider medication if not improving

## 2022-01-30 NOTE — ASSESSMENT & PLAN NOTE
Body mass index is 30.42 kg/m².    Wt Readings from Last 10 Encounters:   01/27/22 85.5 kg (188 lb 7.9 oz)   07/22/21 83.8 kg (184 lb 13.7 oz)   07/12/21 82 kg (180 lb 12.4 oz)   05/24/21 83.9 kg (184 lb 15.5 oz)   01/21/21 82.1 kg (180 lb 14.2 oz)   11/17/20 86 kg (189 lb 9.5 oz)   06/30/20 86.6 kg (190 lb 14.7 oz)   11/14/19 88.1 kg (194 lb 3.6 oz)   03/26/19 84.3 kg (185 lb 13.6 oz)   09/26/18 87.3 kg (192 lb 7.4 oz)

## 2022-05-23 ENCOUNTER — OFFICE VISIT (OUTPATIENT)
Dept: NEUROLOGY | Facility: CLINIC | Age: 72
End: 2022-05-23
Payer: COMMERCIAL

## 2022-05-23 ENCOUNTER — LAB VISIT (OUTPATIENT)
Dept: LAB | Facility: HOSPITAL | Age: 72
End: 2022-05-23
Attending: NURSE PRACTITIONER
Payer: COMMERCIAL

## 2022-05-23 VITALS
OXYGEN SATURATION: 95 % | DIASTOLIC BLOOD PRESSURE: 75 MMHG | BODY MASS INDEX: 30.4 KG/M2 | RESPIRATION RATE: 16 BRPM | WEIGHT: 189.13 LBS | HEIGHT: 66 IN | SYSTOLIC BLOOD PRESSURE: 114 MMHG | HEART RATE: 99 BPM

## 2022-05-23 DIAGNOSIS — G40.109 TEMPORAL LOBE EPILEPSY: Primary | ICD-10-CM

## 2022-05-23 DIAGNOSIS — R56.9 CONVULSIONS, UNSPECIFIED CONVULSION TYPE: ICD-10-CM

## 2022-05-23 DIAGNOSIS — G40.909 NONINTRACTABLE EPILEPSY WITHOUT STATUS EPILEPTICUS, UNSPECIFIED EPILEPSY TYPE: ICD-10-CM

## 2022-05-23 DIAGNOSIS — G40.109 TEMPORAL LOBE EPILEPSY: ICD-10-CM

## 2022-05-23 LAB — PHENYTOIN SERPL-MCNC: 12.8 UG/ML (ref 10–20)

## 2022-05-23 PROCEDURE — 1126F PR PAIN SEVERITY QUANTIFIED, NO PAIN PRESENT: ICD-10-PCS | Mod: CPTII,S$GLB,, | Performed by: NURSE PRACTITIONER

## 2022-05-23 PROCEDURE — 1159F MED LIST DOCD IN RCRD: CPT | Mod: CPTII,S$GLB,, | Performed by: NURSE PRACTITIONER

## 2022-05-23 PROCEDURE — 4010F ACE/ARB THERAPY RXD/TAKEN: CPT | Mod: CPTII,S$GLB,, | Performed by: NURSE PRACTITIONER

## 2022-05-23 PROCEDURE — 36415 COLL VENOUS BLD VENIPUNCTURE: CPT | Performed by: NURSE PRACTITIONER

## 2022-05-23 PROCEDURE — 80185 ASSAY OF PHENYTOIN TOTAL: CPT | Performed by: NURSE PRACTITIONER

## 2022-05-23 PROCEDURE — 3074F SYST BP LT 130 MM HG: CPT | Mod: CPTII,S$GLB,, | Performed by: NURSE PRACTITIONER

## 2022-05-23 PROCEDURE — 3074F PR MOST RECENT SYSTOLIC BLOOD PRESSURE < 130 MM HG: ICD-10-PCS | Mod: CPTII,S$GLB,, | Performed by: NURSE PRACTITIONER

## 2022-05-23 PROCEDURE — 3288F FALL RISK ASSESSMENT DOCD: CPT | Mod: CPTII,S$GLB,, | Performed by: NURSE PRACTITIONER

## 2022-05-23 PROCEDURE — 3078F PR MOST RECENT DIASTOLIC BLOOD PRESSURE < 80 MM HG: ICD-10-PCS | Mod: CPTII,S$GLB,, | Performed by: NURSE PRACTITIONER

## 2022-05-23 PROCEDURE — 1101F PR PT FALLS ASSESS DOC 0-1 FALLS W/OUT INJ PAST YR: ICD-10-PCS | Mod: CPTII,S$GLB,, | Performed by: NURSE PRACTITIONER

## 2022-05-23 PROCEDURE — 99213 OFFICE O/P EST LOW 20 MIN: CPT | Mod: S$GLB,,, | Performed by: NURSE PRACTITIONER

## 2022-05-23 PROCEDURE — 3288F PR FALLS RISK ASSESSMENT DOCUMENTED: ICD-10-PCS | Mod: CPTII,S$GLB,, | Performed by: NURSE PRACTITIONER

## 2022-05-23 PROCEDURE — 3008F PR BODY MASS INDEX (BMI) DOCUMENTED: ICD-10-PCS | Mod: CPTII,S$GLB,, | Performed by: NURSE PRACTITIONER

## 2022-05-23 PROCEDURE — 99999 PR PBB SHADOW E&M-EST. PATIENT-LVL IV: CPT | Mod: PBBFAC,,, | Performed by: NURSE PRACTITIONER

## 2022-05-23 PROCEDURE — 3078F DIAST BP <80 MM HG: CPT | Mod: CPTII,S$GLB,, | Performed by: NURSE PRACTITIONER

## 2022-05-23 PROCEDURE — 1160F RVW MEDS BY RX/DR IN RCRD: CPT | Mod: CPTII,S$GLB,, | Performed by: NURSE PRACTITIONER

## 2022-05-23 PROCEDURE — 1160F PR REVIEW ALL MEDS BY PRESCRIBER/CLIN PHARMACIST DOCUMENTED: ICD-10-PCS | Mod: CPTII,S$GLB,, | Performed by: NURSE PRACTITIONER

## 2022-05-23 PROCEDURE — 99999 PR PBB SHADOW E&M-EST. PATIENT-LVL IV: ICD-10-PCS | Mod: PBBFAC,,, | Performed by: NURSE PRACTITIONER

## 2022-05-23 PROCEDURE — 3008F BODY MASS INDEX DOCD: CPT | Mod: CPTII,S$GLB,, | Performed by: NURSE PRACTITIONER

## 2022-05-23 PROCEDURE — 4010F PR ACE/ARB THEARPY RXD/TAKEN: ICD-10-PCS | Mod: CPTII,S$GLB,, | Performed by: NURSE PRACTITIONER

## 2022-05-23 PROCEDURE — 1126F AMNT PAIN NOTED NONE PRSNT: CPT | Mod: CPTII,S$GLB,, | Performed by: NURSE PRACTITIONER

## 2022-05-23 PROCEDURE — 1101F PT FALLS ASSESS-DOCD LE1/YR: CPT | Mod: CPTII,S$GLB,, | Performed by: NURSE PRACTITIONER

## 2022-05-23 PROCEDURE — 99213 PR OFFICE/OUTPT VISIT, EST, LEVL III, 20-29 MIN: ICD-10-PCS | Mod: S$GLB,,, | Performed by: NURSE PRACTITIONER

## 2022-05-23 PROCEDURE — 1159F PR MEDICATION LIST DOCUMENTED IN MEDICAL RECORD: ICD-10-PCS | Mod: CPTII,S$GLB,, | Performed by: NURSE PRACTITIONER

## 2022-05-23 RX ORDER — PHENYTOIN SODIUM 100 MG/1
400 CAPSULE, EXTENDED RELEASE ORAL NIGHTLY
Qty: 360 CAPSULE | Refills: 11 | Status: SHIPPED | OUTPATIENT
Start: 2022-05-23 | End: 2023-02-06

## 2022-05-23 NOTE — PROGRESS NOTES
Subjective:       Patient ID: Conner Lombardi Jr. is a 71 y.o. male.    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 Lopez for seizure evaluation.     The patient is new to me. Used to see Dr. Ramirez and Dr. Rainey.    The patient started having seizures since he was an infant after forceps delivery. The seizures start with dizziness, could progress to inability to communicate and GTC (grand mal seizures). He was maintained on Primidone and PHT was added much later. The last seizure was in  and was attributed to rapid tapering of Primidone. He is currently doing very well on  mg QHS with no seizure and no side effects. On 11- PHT was 15.0. On 11- Brain MRI was unremarkable. On 03- EEG LT TLE.       Interval Note 05-: Patient present for follow up for Epilepsy [facility]. Patient doing quite well, no seizures. The last seizure was in . He is currently doing very well on  mg QHS no side effects. 05- PHT Level 13.6. No new neurological complaints.       Review of Systems   Constitutional: Negative for appetite change and fatigue.   HENT: Negative for hearing loss and tinnitus.    Eyes: Negative for photophobia and visual disturbance.   Respiratory: Negative for apnea and shortness of breath.    Cardiovascular: Negative for chest pain and palpitations.   Gastrointestinal: Negative for nausea and vomiting.   Endocrine: Negative for cold intolerance and heat intolerance.   Genitourinary: Negative for difficulty urinating and urgency.   Musculoskeletal: Negative for arthralgias, back pain, gait problem, joint swelling, myalgias, neck pain and neck stiffness.   Skin: Negative for color change and rash.   Allergic/Immunologic: Negative for environmental allergies and immunocompromised state.   Neurological: Negative for dizziness, tremors, seizures, syncope, facial asymmetry,  speech difficulty, weakness, light-headedness, numbness and headaches.   Hematological: Negative for adenopathy. Does not bruise/bleed easily.   Psychiatric/Behavioral: Negative for agitation, behavioral problems, confusion, decreased concentration, dysphoric mood, hallucinations, self-injury, sleep disturbance and suicidal ideas. The patient is not hyperactive.                  Current Outpatient Medications:     aspirin (ECOTRIN) 81 MG EC tablet, Take 81 mg by mouth once daily., Disp: , Rfl:     cholecalciferol, vitamin D3, (VITAMIN D3) 25 mcg (1,000 unit) capsule, Take 1 capsule (1,000 Units total) by mouth once daily., Disp: , Rfl: 0    KRILL OIL ORAL, Take by mouth., Disp: , Rfl:     lisinopriL-hydrochlorothiazide (PRINZIDE,ZESTORETIC) 20-25 mg Tab, TAKE 1 TABLET BY MOUTH EVERY DAY, Disp: 30 tablet, Rfl: 11    phenytoin (DILANTIN) 100 MG ER capsule, Take 4 capsules (400 mg total) by mouth every evening., Disp: 360 capsule, Rfl: 11  Past Medical History:   Diagnosis Date    Epilepsy     Last seizure 2010.     Hypertension     Tubular adenoma of colon 04/02/2012     Past Surgical History:   Procedure Laterality Date    COLONOSCOPY N/A 7/12/2021    Procedure: COLONOSCOPY;  Surgeon: Subhash Martin MD;  Location: [facility];  Service: Endoscopy;  Laterality: N/A;    TONSILLECTOMY      VASECTOMY  1984/1985     Social History     Socioeconomic History    Marital status:    Tobacco Use    Smoking status: Never Smoker    Smokeless tobacco: Never Used   Substance and Sexual Activity    Alcohol use: No     Alcohol/week: 0.0 standard drinks    Drug use: Never    Sexual activity: Not Currently     Partners: Female     Birth control/protection: None             Past/Current Medical/Surgical History, Past/Current Social History, Past/Current Family History and Past/Current Medications were reviewed in detail.        Objective:           VITAL SIGNS WERE REVIEWED      GENERAL APPEARANCE:     The  patient looks comfortable.    BMI 29.85     No signs of respiratory distress.    Normal breathing pattern.    No dysmorphic features    Normal eye contact.     GENERAL MEDICAL EXAM:    HEENT:  Head is atraumatic normocephalic. Fundoscopic (Ophthalmoscopic) exam showed no disc edema.      Neck and Axillae: No JVD. No visible lesions.    Cardiopulmonary: No cyanosis. No tachypnea. Normal respiratory effort.    Gastrointestinal/Urogenital:  No jaundice. No stomas or lesions. No visible hernias. No catheters.     Skin, Hair and Nails: No pathognonomic skin rash. No neurofibromatosis. No visible lesions.No stigmata of autoimmune disease. No clubbing.    Limbs: No varicose veins. No visible swelling.    Muskoskeletal: No visible deformities.No visible lesions.           Neurologic Exam     Mental Status   Oriented to person, place, and time.   Registration: recalls 3 of 3 objects. Recall at 5 minutes: recalls 3 of 3 objects. Follows 3 step commands.   Attention: normal. Concentration: normal.   Speech: speech is normal   Level of consciousness: alert  Knowledge: good and consistent with education. Able to perform simple calculations.   Able to name object. Able to read. Able to repeat. Able to write. Normal comprehension.     Cranial Nerves   Cranial nerves II through XII intact.     CN II   Visual fields full to confrontation.   Visual acuity: normal with correction  Right visual field deficit: none  Left visual field deficit: none     CN III, IV, VI   Pupils are equal, round, and reactive to light.  Extraocular motions are normal.   Right pupil: Size: 2 mm. Shape: regular. Reactivity: brisk. Consensual response: intact. Accommodation: intact.   Left pupil: Size: 2 mm. Shape: regular. Reactivity: brisk. Consensual response: intact. Accommodation: intact.   CN III: no CN III palsy  CN VI: no CN VI palsy  Nystagmus: none   Diplopia: none  Ophthalmoparesis: none  Upgaze: normal  Downgaze: normal  Conjugate gaze:  present  Vestibulo-ocular reflex: present    CN V   Facial sensation intact.   Right facial sensation deficit: none  Left facial sensation deficit: none    CN VII   Facial expression full, symmetric.   Right facial weakness: none  Left facial weakness: none    CN VIII   CN VIII normal.   Hearing: intact    CN IX, X   CN IX normal.   CN X normal.   Palate: symmetric    CN XI   CN XI normal.   Right sternocleidomastoid strength: normal  Left sternocleidomastoid strength: normal  Right trapezius strength: normal  Left trapezius strength: normal    CN XII   CN XII normal.   Tongue: not atrophic  Fasciculations: absent  Tongue deviation: none    Motor Exam   Muscle bulk: normal  Overall muscle tone: normal  Right arm tone: normal  Left arm tone: normal  Right arm pronator drift: absent  Left arm pronator drift: absent  Right leg tone: normal  Left leg tone: normal    Strength   Strength 5/5 throughout.   Right neck flexion: 5/5  Left neck flexion: 5/5  Right neck extension: 5/5  Left neck extension: 5/5  Right deltoid: 5/5  Left deltoid: 5/5  Right biceps: 5/5  Left biceps: 5/5  Right triceps: 5/5  Left triceps: 5/5  Right wrist flexion: 5/5  Left wrist flexion: 5/5  Right wrist extension: 5/5  Left wrist extension: 5/5  Right interossei: 5/5  Left interossei: 5/5  Right iliopsoas: 5/5  Left iliopsoas: 5/5  Right quadriceps: 5/5  Left quadriceps: 5/5  Right hamstrin/5  Left hamstrin/5  Right glutei: 5/5  Left glutei: 5/5  Right anterior tibial: 5/5  Left anterior tibial: 5/5  Right posterior tibial: 5/5  Left posterior tibial: 5/5  Right peroneal: 5/5  Left peroneal: 5/5  Right gastroc: 5/5  Left gastroc: 5/5    Sensory Exam   Light touch normal.   Right arm light touch: normal  Left arm light touch: normal  Right leg light touch: normal  Left leg light touch: normal  Vibration normal.   Right arm vibration: normal  Left arm vibration: normal  Right leg vibration: normal  Left leg vibration:  normal  Proprioception normal.   Right arm proprioception: normal  Left arm proprioception: normal  Right leg proprioception: normal  Left leg proprioception: normal  Pinprick normal.   Right arm pinprick: normal  Left arm pinprick: normal  Right leg pinprick: normal  Left leg pinprick: normal  Graphesthesia: normal  Stereognosis: normal    Gait, Coordination, and Reflexes     Gait  Gait: normal    Coordination   Romberg: negative  Finger to nose coordination: normal  Heel to shin coordination: normal    Tremor   Resting tremor: absent  Intention tremor: absent  Action tremor: absent    Reflexes   Right brachioradialis: 2+  Left brachioradialis: 2+  Right biceps: 2+  Left biceps: 2+  Right triceps: 2+  Left triceps: 2+  Right patellar: 2+  Left patellar: 2+  Right achilles: 1+  Left achilles: 1+  Right plantar: normal  Left plantar: normal  Right Harris: absent  Left Harris: absent  Right ankle clonus: absent  Left ankle clonus: absent  Right pendular knee jerk: absent  Left pendular knee jerk: absent      Lab Results   Component Value Date    WBC 4.60 01/19/2022    HGB 13.1 (L) 01/19/2022    HCT 38.5 (L) 01/19/2022     (H) 01/19/2022     01/19/2022     Sodium   Date Value Ref Range Status   01/19/2022 140 136 - 145 mmol/L Final     Potassium   Date Value Ref Range Status   01/19/2022 4.4 3.5 - 5.1 mmol/L Final     Chloride   Date Value Ref Range Status   01/19/2022 103 95 - 110 mmol/L Final     CO2   Date Value Ref Range Status   01/19/2022 27 23 - 29 mmol/L Final     Glucose   Date Value Ref Range Status   01/19/2022 116 (H) 70 - 110 mg/dL Final     BUN   Date Value Ref Range Status   01/19/2022 17 8 - 23 mg/dL Final     Creatinine   Date Value Ref Range Status   01/19/2022 0.8 0.5 - 1.4 mg/dL Final     Calcium   Date Value Ref Range Status   01/19/2022 9.5 8.7 - 10.5 mg/dL Final     Total Protein   Date Value Ref Range Status   01/19/2022 7.0 6.0 - 8.4 g/dL Final     Albumin   Date Value Ref Range  Status   01/19/2022 4.1 3.5 - 5.2 g/dL Final     Total Bilirubin   Date Value Ref Range Status   01/19/2022 0.5 0.1 - 1.0 mg/dL Final     Comment:     For infants and newborns, interpretation of results should be based  on gestational age, weight and in agreement with clinical  observations.    Premature Infant recommended reference ranges:  Up to 24 hours.............<8.0 mg/dL  Up to 48 hours............<12.0 mg/dL  3-5 days..................<15.0 mg/dL  6-29 days.................<15.0 mg/dL       Alkaline Phosphatase   Date Value Ref Range Status   01/19/2022 90 55 - 135 U/L Final     AST   Date Value Ref Range Status   01/19/2022 20 10 - 40 U/L Final     ALT   Date Value Ref Range Status   01/19/2022 25 10 - 44 U/L Final     Anion Gap   Date Value Ref Range Status   01/19/2022 10 8 - 16 mmol/L Final     eGFR if    Date Value Ref Range Status   01/19/2022 >60.0 >60 mL/min/1.73 m^2 Final     eGFR if non    Date Value Ref Range Status   01/19/2022 >60.0 >60 mL/min/1.73 m^2 Final     Comment:     Calculation used to obtain the estimated glomerular filtration  rate (eGFR) is the CKD-EPI equation.        No results found for: [id number]  Lab Results   Component Value Date    TSH 0.888 07/13/2021    I4ZBEWI 110 06/23/2010    E0RQLWN 4.5 06/23/2010 11-     Brain MRI was unremarkable.       03-    EEG LT TLE       AED LEVELS          AED: PHT  NORMAL LEVEL RANGE: 10-20   DATE LEVEL   11- 15.0   05- 13.6   05-  12.8 NL                       Reviewed the neuroimaging independently       Assessment:       1. Temporal lobe epilepsy    2. Nonintractable epilepsy without status epilepticus, unspecified epilepsy type    3. Convulsions, unspecified convulsion type          EPILEPSY CLASSIFICATION    SEMIOLOGY: VERTIGINOUS AURA-->APHASIC SEIZURE-->GTC     EPILEPTOGENIC ZONE (S): LT TL     ETIOLOGY: UNKNOWN     PRIOR AEDS: PRM.    CURRENT AEDS: PHT    LAST  SEIZURE DATE:       COMPREHENSIVE LIST OF AEDs:     Acetazolamide (AZM-Diamox)   Benzos: clonazepam (CZP Klonopin), lorazepam (LZP-Ativan), diazepam (DZP-Valium), clorazepate (CLZ- Tranxene)  Brivaracetam (BRV-Briviact)  Cannabidiol (CBD- Epidiolex)  Carbamazepine (CBZ-Tegretol)  Cenobamate (CNB-Xcopri)  Clobazam (CLB-Onfi)  Eslicarbazepine (ESL-Aptiom)  Ethosuximide (ESX-Zarontin)  Felbamate (FBM-Felbatol)  Fenfluramine (FFA-Fintepla)  Gabapentin (GBP-Neurontin)  Lacosamide (LCM-Vimpat)  Lamotrigine (LTG-Lamitcal)  Levetiracetam (LEV- Keppra)  Oxcarbazepine (OXC-Trileptal)  Perampanel (PML-Fycompa)  Phenobarbital (PB)  Phenytoin (PHT-Dilantin)  Pregabalin (PGB-Lyrica)  Primidone (PRM)   Retigabine (RTG- Potiga) Discontinued in 2017  Rufinamide (RFN-Benzil)  Stiripentol (STP-Diacomit)  Tiagabine (TGB-Gabitril)  Topiramate (TPM-Topamax)  Valproate (VPA-Depakote)  Vigabatrin (VGB-Sabril)  Zonisamide (ZNS-Zonegran)    Plan:       NON-LESIONAL LT TLE, WELL-CONTROLED ON PHT MONOTHERAPY         The patient was encouraged to maintain full traditional seizure precautions which include but not limited to being careful with driving, high altitudes, being close to fire or fire source, being close to a body of water or swimming alone,operating heavy machinery and avoid using sharp objects if possible. The patient was encouraged to shower (without accumulation of water) instead of taking a bath if unsupervised. The patient was made aware that these precautions are especially important during concurrent illness. Adequate sleep and avoidance of alcohol as important measures to assure good seizure control were discussed with the patient. The patient was also advised to be careful with caring for children without company. The patient was advised to pad the side rails with pillows and blankets if applicable and sleep as close to the floor as possible. I strongly recommended lowering the bed to the floor level to decrease the  risk of falls. I also instructed the patient to avoid safety sensitive duties. In general, any activity that requires full awareness and would result in serious injury to self and others if a seizure occurs should be approached carefully. The patient verbalized full understanding.    The patient has been seizure-free for >12 months, compliant with regimen with therapeutic AED level. The patient meets the legal criteria to resume driving. I explained to the patient that from a medical standpoint seizure-freedom is defined differently ( 1 year or 3 times the duration between the last 2 seizures). In addition, I explained to the patient that the risk of breakthrough seizures will ALWAYS be there. I instructed the patient to avoid alcohol, get enough sleep and be complaint with AED regimen. I instructed the patient to avoid driving if AED was skipped, if drank alcohol, sleep-deprived or not feeling well. The patient verbalized full understanding.     Had a very long discussion regarding PHT being effective but a risky AED with toxicity potential, narrow therapeutic window/index, interactions with many medications that are metabolized by the liver, interaction with radiation therapy and chemotherapy, numerous short term and long term detrimental SEs like gum problems, balance problems, neuropathy. Knowing all that, the patient does not want to make any changes.    PHT level.    Continue  mg QHS.       Continue AEDs INDEFINITELY, FOR GOOD AND NEVER SKIP A DOSE. The patient verbalized full understanding. Stressed the extreme medical, personal safety, public safety and legal importance of compliance and seizure control. Will give as many refills as possible with or without face-to-face evaluation to make sure the patient and any patient with epilepsy will never run out of medications. Running out of seizure medications should never happen under our care. I explained to the patient that he should not, under any  circumstances, adjust or stop taking his seizure medication without discussing with me. The patient verbalized full understanding.       AVOID any substance that could lower seizure threshold including but not limited to:      ALCOHOL AND WITHDRAWAL      TRAMADOL.     DEMEROL      ALL STIMULANTS-ALL ADHD MEDICATIONS.      CLOZAPINE.      BUPROPION.     CIPROFLOXACIN                   MEDICAL/SURGICAL COMORBIDITIES     All relevant medical comorbidities noted and managed by primary care physician and medical care team.          MISCELLANEOUS MEDICAL PROBLEMS       HEALTHY LIFESTYLE AND PREVENTATIVE CARE    The patient to adhere to the age-appropriate health maintenance guidelines including screening tests and vaccinations. The patient to adhere to  healthy lifestyle, optimal weight, exercise, healthy diet, good sleep hygiene and avoiding drugs including smoking, alcohol and recreational drugs.        RTC annually      Jamar Flaherty, MSN NP      Collaborating Provider: Michelle Pena MD, FAAN Neurologist/Epileptologist      F-F 20 M >50% C

## 2022-05-25 ENCOUNTER — TELEPHONE (OUTPATIENT)
Dept: NEUROLOGY | Facility: CLINIC | Age: 72
End: 2022-05-25
Payer: COMMERCIAL

## 2022-07-05 ENCOUNTER — OFFICE VISIT (OUTPATIENT)
Dept: INTERNAL MEDICINE | Facility: CLINIC | Age: 72
End: 2022-07-05
Payer: COMMERCIAL

## 2022-07-05 VITALS
DIASTOLIC BLOOD PRESSURE: 80 MMHG | HEIGHT: 66 IN | SYSTOLIC BLOOD PRESSURE: 136 MMHG | OXYGEN SATURATION: 97 % | WEIGHT: 185.44 LBS | HEART RATE: 95 BPM | TEMPERATURE: 98 F | BODY MASS INDEX: 29.8 KG/M2 | RESPIRATION RATE: 18 BRPM

## 2022-07-05 DIAGNOSIS — I10 ESSENTIAL HYPERTENSION: ICD-10-CM

## 2022-07-05 DIAGNOSIS — M54.31 RIGHT SCIATIC NERVE PAIN: Primary | ICD-10-CM

## 2022-07-05 PROCEDURE — 3075F PR MOST RECENT SYSTOLIC BLOOD PRESS GE 130-139MM HG: ICD-10-PCS | Mod: CPTII,S$GLB,, | Performed by: PHYSICIAN ASSISTANT

## 2022-07-05 PROCEDURE — 3075F SYST BP GE 130 - 139MM HG: CPT | Mod: CPTII,S$GLB,, | Performed by: PHYSICIAN ASSISTANT

## 2022-07-05 PROCEDURE — 1101F PT FALLS ASSESS-DOCD LE1/YR: CPT | Mod: CPTII,S$GLB,, | Performed by: PHYSICIAN ASSISTANT

## 2022-07-05 PROCEDURE — 1101F PR PT FALLS ASSESS DOC 0-1 FALLS W/OUT INJ PAST YR: ICD-10-PCS | Mod: CPTII,S$GLB,, | Performed by: PHYSICIAN ASSISTANT

## 2022-07-05 PROCEDURE — 3079F DIAST BP 80-89 MM HG: CPT | Mod: CPTII,S$GLB,, | Performed by: PHYSICIAN ASSISTANT

## 2022-07-05 PROCEDURE — 3008F PR BODY MASS INDEX (BMI) DOCUMENTED: ICD-10-PCS | Mod: CPTII,S$GLB,, | Performed by: PHYSICIAN ASSISTANT

## 2022-07-05 PROCEDURE — 1159F MED LIST DOCD IN RCRD: CPT | Mod: CPTII,S$GLB,, | Performed by: PHYSICIAN ASSISTANT

## 2022-07-05 PROCEDURE — 4010F PR ACE/ARB THEARPY RXD/TAKEN: ICD-10-PCS | Mod: CPTII,S$GLB,, | Performed by: PHYSICIAN ASSISTANT

## 2022-07-05 PROCEDURE — 1126F PR PAIN SEVERITY QUANTIFIED, NO PAIN PRESENT: ICD-10-PCS | Mod: CPTII,S$GLB,, | Performed by: PHYSICIAN ASSISTANT

## 2022-07-05 PROCEDURE — 3079F PR MOST RECENT DIASTOLIC BLOOD PRESSURE 80-89 MM HG: ICD-10-PCS | Mod: CPTII,S$GLB,, | Performed by: PHYSICIAN ASSISTANT

## 2022-07-05 PROCEDURE — 99214 PR OFFICE/OUTPT VISIT, EST, LEVL IV, 30-39 MIN: ICD-10-PCS | Mod: S$GLB,,, | Performed by: PHYSICIAN ASSISTANT

## 2022-07-05 PROCEDURE — 99214 OFFICE O/P EST MOD 30 MIN: CPT | Mod: S$GLB,,, | Performed by: PHYSICIAN ASSISTANT

## 2022-07-05 PROCEDURE — 3288F FALL RISK ASSESSMENT DOCD: CPT | Mod: CPTII,S$GLB,, | Performed by: PHYSICIAN ASSISTANT

## 2022-07-05 PROCEDURE — 99999 PR PBB SHADOW E&M-EST. PATIENT-LVL IV: ICD-10-PCS | Mod: PBBFAC,,, | Performed by: PHYSICIAN ASSISTANT

## 2022-07-05 PROCEDURE — 1159F PR MEDICATION LIST DOCUMENTED IN MEDICAL RECORD: ICD-10-PCS | Mod: CPTII,S$GLB,, | Performed by: PHYSICIAN ASSISTANT

## 2022-07-05 PROCEDURE — 3288F PR FALLS RISK ASSESSMENT DOCUMENTED: ICD-10-PCS | Mod: CPTII,S$GLB,, | Performed by: PHYSICIAN ASSISTANT

## 2022-07-05 PROCEDURE — 1160F PR REVIEW ALL MEDS BY PRESCRIBER/CLIN PHARMACIST DOCUMENTED: ICD-10-PCS | Mod: CPTII,S$GLB,, | Performed by: PHYSICIAN ASSISTANT

## 2022-07-05 PROCEDURE — 4010F ACE/ARB THERAPY RXD/TAKEN: CPT | Mod: CPTII,S$GLB,, | Performed by: PHYSICIAN ASSISTANT

## 2022-07-05 PROCEDURE — 1126F AMNT PAIN NOTED NONE PRSNT: CPT | Mod: CPTII,S$GLB,, | Performed by: PHYSICIAN ASSISTANT

## 2022-07-05 PROCEDURE — 99999 PR PBB SHADOW E&M-EST. PATIENT-LVL IV: CPT | Mod: PBBFAC,,, | Performed by: PHYSICIAN ASSISTANT

## 2022-07-05 PROCEDURE — 3008F BODY MASS INDEX DOCD: CPT | Mod: CPTII,S$GLB,, | Performed by: PHYSICIAN ASSISTANT

## 2022-07-05 PROCEDURE — 1160F RVW MEDS BY RX/DR IN RCRD: CPT | Mod: CPTII,S$GLB,, | Performed by: PHYSICIAN ASSISTANT

## 2022-07-05 RX ORDER — NAPROXEN 500 MG/1
500 TABLET ORAL 2 TIMES DAILY PRN
Qty: 20 TABLET | Refills: 0 | Status: SHIPPED | OUTPATIENT
Start: 2022-07-05 | End: 2022-07-15

## 2022-07-05 RX ORDER — TIZANIDINE 4 MG/1
4 TABLET ORAL EVERY 6 HOURS PRN
Qty: 20 TABLET | Refills: 0 | Status: SHIPPED | OUTPATIENT
Start: 2022-07-05 | End: 2022-07-14 | Stop reason: SDUPTHER

## 2022-07-05 NOTE — PROGRESS NOTES
"Subjective:      Patient ID: Conner Lombardi Jr. is a 72 y.o. male.    Chief Complaint: Back Pain    Patient is new to me, being seen today for back pain for several days.  Denies known trauma/injury.  Pain is lower back and radiates to R buttocks and leg.  Denies numbness/tingling.  Has tried Tylenol, heating pad, ice pack.  Seems to be improving somewhat.      History of intermittent back pain but has been 20+yrs.     Last visit Jan 2022 with Dr. Lopez     Review of Systems   Constitutional: Negative for chills, diaphoresis and fever.   HENT: Negative for congestion, rhinorrhea and sore throat.    Respiratory: Negative for cough, shortness of breath and wheezing.    Gastrointestinal: Negative for abdominal pain, constipation, diarrhea, nausea and vomiting.   Genitourinary: Negative for dysuria, frequency and hematuria.        Denies bowel/bladder incontinence    Musculoskeletal: Positive for back pain and myalgias.   Skin: Negative for rash.   Neurological: Negative for dizziness, light-headedness, numbness and headaches.       Objective:   /80   Pulse 95   Temp 97.9 °F (36.6 °C)   Resp 18   Ht 5' 6" (1.676 m)   Wt 84.1 kg (185 lb 6.5 oz)   SpO2 97%   BMI 29.93 kg/m²   Physical Exam  Constitutional:       General: He is not in acute distress.     Appearance: Normal appearance. He is well-developed. He is not ill-appearing.   HENT:      Head: Normocephalic and atraumatic.   Cardiovascular:      Rate and Rhythm: Normal rate and regular rhythm.      Heart sounds: Normal heart sounds. No murmur heard.  Pulmonary:      Effort: Pulmonary effort is normal. No respiratory distress.      Breath sounds: Normal breath sounds. No decreased breath sounds.   Musculoskeletal:      Lumbar back: Normal.        Back:       Right lower leg: No edema.      Left lower leg: No edema.   Skin:     General: Skin is warm and dry.      Findings: No rash.   Psychiatric:         Speech: Speech normal.         Behavior: " Behavior normal.         Thought Content: Thought content normal.       Assessment:      1. Right sciatic nerve pain    2. Essential hypertension       Plan:   Right sciatic nerve pain  -     naproxen (NAPROSYN) 500 MG tablet; Take 1 tablet (500 mg total) by mouth 2 (two) times daily as needed (pain).  Dispense: 20 tablet; Refill: 0  -     tiZANidine (ZANAFLEX) 4 MG tablet; Take 1 tablet (4 mg total) by mouth every 6 (six) hours as needed (spasm).  Dispense: 20 tablet; Refill: 0    Essential hypertension   Controlled on current regimen    Discussed RICE and short course NSAIDs  Caution with muscle relaxer as may cause drowsiness   Consider PT if symptoms worsen or do not improve     Discussed worsening signs/symptoms and when to return to clinic or go to ED.   Patient expresses understanding and agrees with treatment plan.

## 2022-07-14 DIAGNOSIS — M54.31 RIGHT SCIATIC NERVE PAIN: ICD-10-CM

## 2022-07-14 RX ORDER — TIZANIDINE 4 MG/1
4 TABLET ORAL EVERY 6 HOURS PRN
Qty: 10 TABLET | Refills: 0 | Status: SHIPPED | OUTPATIENT
Start: 2022-07-14 | End: 2022-07-24

## 2022-07-14 NOTE — TELEPHONE ENCOUNTER
Small supply sent, if still having pain I recommend referral to back and spine clinic, let me know if this is needed

## 2022-07-14 NOTE — TELEPHONE ENCOUNTER
Spoke with pt about medication and back an spine referral and pt stated the pain is better and its no way near what it was, pt stated he is also grateful for the option of referral but does not need it at this time but will give us a call if pain keeps coming back

## 2022-07-29 ENCOUNTER — OFFICE VISIT (OUTPATIENT)
Dept: INTERNAL MEDICINE | Facility: CLINIC | Age: 72
End: 2022-07-29
Payer: COMMERCIAL

## 2022-07-29 VITALS
HEART RATE: 107 BPM | DIASTOLIC BLOOD PRESSURE: 70 MMHG | SYSTOLIC BLOOD PRESSURE: 110 MMHG | WEIGHT: 184.5 LBS | OXYGEN SATURATION: 97 % | TEMPERATURE: 98 F | BODY MASS INDEX: 29.65 KG/M2 | HEIGHT: 66 IN

## 2022-07-29 DIAGNOSIS — I10 ESSENTIAL HYPERTENSION: ICD-10-CM

## 2022-07-29 DIAGNOSIS — E55.9 VITAMIN D DEFICIENCY: ICD-10-CM

## 2022-07-29 DIAGNOSIS — Z00.00 ROUTINE GENERAL MEDICAL EXAMINATION AT A HEALTH CARE FACILITY: Primary | ICD-10-CM

## 2022-07-29 DIAGNOSIS — E78.2 MIXED HYPERLIPIDEMIA: ICD-10-CM

## 2022-07-29 DIAGNOSIS — Z12.83 SKIN CANCER SCREENING: ICD-10-CM

## 2022-07-29 DIAGNOSIS — D64.9 ANEMIA, UNSPECIFIED TYPE: ICD-10-CM

## 2022-07-29 DIAGNOSIS — G40.909 NONINTRACTABLE EPILEPSY WITHOUT STATUS EPILEPTICUS, UNSPECIFIED EPILEPSY TYPE: ICD-10-CM

## 2022-07-29 DIAGNOSIS — Z23 NEED FOR SHINGLES VACCINE: ICD-10-CM

## 2022-07-29 PROBLEM — E66.9 OBESITY (BMI 30.0-34.9): Status: RESOLVED | Noted: 2021-05-24 | Resolved: 2022-07-29

## 2022-07-29 PROBLEM — R56.9 CONVULSIONS: Status: RESOLVED | Noted: 2020-11-17 | Resolved: 2022-07-29

## 2022-07-29 PROBLEM — E66.811 OBESITY (BMI 30.0-34.9): Status: RESOLVED | Noted: 2021-05-24 | Resolved: 2022-07-29

## 2022-07-29 PROCEDURE — 1159F PR MEDICATION LIST DOCUMENTED IN MEDICAL RECORD: ICD-10-PCS | Mod: CPTII,S$GLB,, | Performed by: PHYSICIAN ASSISTANT

## 2022-07-29 PROCEDURE — 3288F PR FALLS RISK ASSESSMENT DOCUMENTED: ICD-10-PCS | Mod: CPTII,S$GLB,, | Performed by: PHYSICIAN ASSISTANT

## 2022-07-29 PROCEDURE — 99999 PR PBB SHADOW E&M-EST. PATIENT-LVL IV: ICD-10-PCS | Mod: PBBFAC,,, | Performed by: PHYSICIAN ASSISTANT

## 2022-07-29 PROCEDURE — 1101F PR PT FALLS ASSESS DOC 0-1 FALLS W/OUT INJ PAST YR: ICD-10-PCS | Mod: CPTII,S$GLB,, | Performed by: PHYSICIAN ASSISTANT

## 2022-07-29 PROCEDURE — 90471 ZOSTER RECOMBINANT VACCINE: ICD-10-PCS | Mod: S$GLB,,, | Performed by: PHYSICIAN ASSISTANT

## 2022-07-29 PROCEDURE — 1126F AMNT PAIN NOTED NONE PRSNT: CPT | Mod: CPTII,S$GLB,, | Performed by: PHYSICIAN ASSISTANT

## 2022-07-29 PROCEDURE — 3288F FALL RISK ASSESSMENT DOCD: CPT | Mod: CPTII,S$GLB,, | Performed by: PHYSICIAN ASSISTANT

## 2022-07-29 PROCEDURE — 1159F MED LIST DOCD IN RCRD: CPT | Mod: CPTII,S$GLB,, | Performed by: PHYSICIAN ASSISTANT

## 2022-07-29 PROCEDURE — 3078F PR MOST RECENT DIASTOLIC BLOOD PRESSURE < 80 MM HG: ICD-10-PCS | Mod: CPTII,S$GLB,, | Performed by: PHYSICIAN ASSISTANT

## 2022-07-29 PROCEDURE — 99397 PER PM REEVAL EST PAT 65+ YR: CPT | Mod: 25,S$GLB,, | Performed by: PHYSICIAN ASSISTANT

## 2022-07-29 PROCEDURE — 4010F ACE/ARB THERAPY RXD/TAKEN: CPT | Mod: CPTII,S$GLB,, | Performed by: PHYSICIAN ASSISTANT

## 2022-07-29 PROCEDURE — 90471 IMMUNIZATION ADMIN: CPT | Mod: S$GLB,,, | Performed by: PHYSICIAN ASSISTANT

## 2022-07-29 PROCEDURE — 4010F PR ACE/ARB THEARPY RXD/TAKEN: ICD-10-PCS | Mod: CPTII,S$GLB,, | Performed by: PHYSICIAN ASSISTANT

## 2022-07-29 PROCEDURE — 1101F PT FALLS ASSESS-DOCD LE1/YR: CPT | Mod: CPTII,S$GLB,, | Performed by: PHYSICIAN ASSISTANT

## 2022-07-29 PROCEDURE — 3074F PR MOST RECENT SYSTOLIC BLOOD PRESSURE < 130 MM HG: ICD-10-PCS | Mod: CPTII,S$GLB,, | Performed by: PHYSICIAN ASSISTANT

## 2022-07-29 PROCEDURE — 90750 ZOSTER RECOMBINANT VACCINE: ICD-10-PCS | Mod: S$GLB,,, | Performed by: PHYSICIAN ASSISTANT

## 2022-07-29 PROCEDURE — 3074F SYST BP LT 130 MM HG: CPT | Mod: CPTII,S$GLB,, | Performed by: PHYSICIAN ASSISTANT

## 2022-07-29 PROCEDURE — 99397 PR PREVENTIVE VISIT,EST,65 & OVER: ICD-10-PCS | Mod: 25,S$GLB,, | Performed by: PHYSICIAN ASSISTANT

## 2022-07-29 PROCEDURE — 3008F BODY MASS INDEX DOCD: CPT | Mod: CPTII,S$GLB,, | Performed by: PHYSICIAN ASSISTANT

## 2022-07-29 PROCEDURE — 3078F DIAST BP <80 MM HG: CPT | Mod: CPTII,S$GLB,, | Performed by: PHYSICIAN ASSISTANT

## 2022-07-29 PROCEDURE — 99999 PR PBB SHADOW E&M-EST. PATIENT-LVL IV: CPT | Mod: PBBFAC,,, | Performed by: PHYSICIAN ASSISTANT

## 2022-07-29 PROCEDURE — 1126F PR PAIN SEVERITY QUANTIFIED, NO PAIN PRESENT: ICD-10-PCS | Mod: CPTII,S$GLB,, | Performed by: PHYSICIAN ASSISTANT

## 2022-07-29 PROCEDURE — 3008F PR BODY MASS INDEX (BMI) DOCUMENTED: ICD-10-PCS | Mod: CPTII,S$GLB,, | Performed by: PHYSICIAN ASSISTANT

## 2022-07-29 PROCEDURE — 90750 HZV VACC RECOMBINANT IM: CPT | Mod: S$GLB,,, | Performed by: PHYSICIAN ASSISTANT

## 2022-07-29 NOTE — PATIENT INSTRUCTIONS
Moderna and Pfizer vaccine booster shots are approved for adults at increased risk at least six months after their initial immunization and Dwight & Dwight (J&J) COVID-19 vaccine booster shots are approved for all adults at least two months after their initial immunization.    Booster doses for all three COVID-19 vaccines, Pfizer, Moderna and Dwight & Dwight, are now available to the public and are being administered at [facility] vaccination locations.     If you received an mRNA vaccine (Pfizer or Moderna) it is recommended that you receive the same booster dose as your original vaccine series. However, all patients eligible for a booster dose may decide to mix and match your booster dose.     Below are the current mix and match options [facility] currently offers:    Pfizer Vaccine Recipients:  Booster Dose 6 months following 2nd dose can be, in order of recommendation:  Pfizer Booster Dose,  Moderna Booster Dose, or  Dwight & Dwight Booster Dose    Dwight & Dwight Vaccine Recipients:   Booster Dose 2 months following initial dose can be, in order of recommendation:  Pfizer Booster Dose,  Moderna Booster Dose, or  Dwight & Dwight Booster Dose    Both Pfizer and Dwight & Dwight boosters have the same dosage as the original vaccine regimen.    Moderna Vaccine Recipients:   Booster Dose 6 months following 2nd dose can be, in order of recommendation:  Moderna Booster Dose,  Pfizer Booster Dose, or  Dwight & Dwight Booster Dose    The Moderna booster is half the dosage of the original two-dose Moderna series, and [facility] will be following this guidance as outlined by the FDA and CDC.    International patients who have received a non-[place] approved COVID-19 vaccine are eligible for either a Pfizer booster dose or a Dwight & Dwight booster dose 28 days following their original vaccine dose.     Please schedule your appointment via [facility] or by calling [phone]. Walk-ins will also be  accepted as supply allows.    To learn more about COVID-19 vaccination and community vaccine locations, please visit [email or web address].

## 2022-07-29 NOTE — PROGRESS NOTES
Subjective:       Patient ID: Conner Lombardi Jr. is a 72 y.o. male.    Chief Complaint: Annual Exam      Patient Care Team:  Tia Lopez MD as PCP - General (Family Medicine)  John Paul Ramirez MD (Inactive) as Consulting Physician (Neurology)  Ann Valadez LPN as Care Coordinator (Internal Medicine)  Giselle Bauman PA-C as Physician Assistant (Internal Medicine)    Patient presents to clinic today for annual physical exam.      Review of Systems   Constitutional: Negative for chills, fatigue, fever and unexpected weight change.   HENT: Negative for congestion, dental problem, ear pain, hearing loss, rhinorrhea and trouble swallowing.    Eyes: Negative for pain and visual disturbance.   Respiratory: Negative for cough and shortness of breath.    Cardiovascular: Negative for chest pain, palpitations and leg swelling.   Gastrointestinal: Negative for abdominal distention, abdominal pain, blood in stool, constipation, diarrhea, nausea and vomiting.   Genitourinary: Negative for difficulty urinating, scrotal swelling and testicular pain.   Musculoskeletal: Positive for arthralgias ( chronic - left shoulder). Negative for myalgias.   Skin: Negative for rash.   Neurological: Negative for dizziness, weakness, numbness and headaches.   Hematological: Negative for adenopathy. Bruises/bleeds easily.   Psychiatric/Behavioral: Positive for sleep disturbance ( acute, possibly work related). Negative for dysphoric mood. The patient is not nervous/anxious.        Objective:      Physical Exam  Vitals and nursing note reviewed.   Constitutional:       General: He is not in acute distress.     Appearance: He is well-developed.   HENT:      Head: Normocephalic and atraumatic.      Right Ear: Hearing, tympanic membrane, ear canal and external ear normal.      Left Ear: Hearing, tympanic membrane, ear canal and external ear normal.      Nose: Nose normal.      Mouth/Throat:      Lips: Pink.      Mouth: Mucous  membranes are moist.      Pharynx: Oropharynx is clear. Uvula midline.   Eyes:      General: Lids are normal. No scleral icterus.     Conjunctiva/sclera: Conjunctivae normal.      Pupils: Pupils are equal, round, and reactive to light.   Neck:      Thyroid: No thyromegaly.   Cardiovascular:      Rate and Rhythm: Normal rate and regular rhythm.      Pulses: Normal pulses.   Pulmonary:      Effort: Pulmonary effort is normal.      Breath sounds: Normal breath sounds. No wheezing or rales.   Abdominal:      General: Bowel sounds are normal. There is no distension.      Palpations: Abdomen is soft. There is no mass.      Tenderness: There is no abdominal tenderness.   Musculoskeletal:         General: No tenderness. Normal range of motion.      Cervical back: Normal range of motion and neck supple.      Right lower leg: No edema.      Left lower leg: No edema.   Lymphadenopathy:      Cervical: No cervical adenopathy.   Skin:     General: Skin is warm and dry.      Findings: No rash.   Neurological:      Mental Status: He is alert.      Cranial Nerves: No cranial nerve deficit.   Psychiatric:         Mood and Affect: Mood and affect normal.         Assessment:       1. Routine general medical examination at a health care facility    2. Essential hypertension    3. Mixed hyperlipidemia    4. Anemia, unspecified type    5. Nonintractable epilepsy without status epilepticus, unspecified epilepsy type    6. Vitamin D deficiency    7. Need for shingles vaccine    8. Skin cancer screening        Plan:   1. Routine general medical examination at a health care facility    2. Essential hypertension  Assessment & Plan:  /70, controlled, continue lisinopril-HCTZ      3. Mixed hyperlipidemia  Assessment & Plan:  Status pending lab, continue Krill oil    Orders:  -     Comprehensive Metabolic Panel  -     Lipid Panel    4. Anemia, unspecified type  Assessment & Plan:  Status pending lab      5. Nonintractable epilepsy without  status epilepticus, unspecified epilepsy type  Overview:  Last seizure 2010. Followed by Neurology, continue current treatment plan      6. Vitamin D deficiency  Assessment & Plan:  Status pending lab, continue supplement    Orders:  -     Vitamin D    7. Need for shingles vaccine  -     Zoster Recombinant Vaccine    8. Skin cancer screening  -     Ambulatory referral/consult to Dermatology      Fasting labs ASAP  Shingrix today  Discussed Covid booster, scheduling information given.  6 month f/u with Dr. Lopez scheduled with fasting labs PTA  Health Maintenance reviewed/updated.    Went over sleep hygiene recommendations and taking over-the-counter melatonin as needed.

## 2022-08-08 ENCOUNTER — LAB VISIT (OUTPATIENT)
Dept: LAB | Facility: HOSPITAL | Age: 72
End: 2022-08-08
Payer: COMMERCIAL

## 2022-08-08 DIAGNOSIS — R73.9 HYPERGLYCEMIA: ICD-10-CM

## 2022-08-08 DIAGNOSIS — I10 ESSENTIAL HYPERTENSION: ICD-10-CM

## 2022-08-08 DIAGNOSIS — E78.2 MIXED HYPERLIPIDEMIA: ICD-10-CM

## 2022-08-08 LAB
ALBUMIN SERPL BCP-MCNC: 3.9 G/DL (ref 3.5–5.2)
ALP SERPL-CCNC: 105 U/L (ref 55–135)
ALT SERPL W/O P-5'-P-CCNC: 27 U/L (ref 10–44)
ANION GAP SERPL CALC-SCNC: 10 MMOL/L (ref 8–16)
AST SERPL-CCNC: 24 U/L (ref 10–40)
BASOPHILS # BLD AUTO: 0.02 K/UL (ref 0–0.2)
BASOPHILS NFR BLD: 0.4 % (ref 0–1.9)
BILIRUB SERPL-MCNC: 0.4 MG/DL (ref 0.1–1)
BUN SERPL-MCNC: 20 MG/DL (ref 8–23)
CALCIUM SERPL-MCNC: 9.1 MG/DL (ref 8.7–10.5)
CHLORIDE SERPL-SCNC: 105 MMOL/L (ref 95–110)
CHOLEST SERPL-MCNC: 224 MG/DL (ref 120–199)
CHOLEST/HDLC SERPL: 5.5 {RATIO} (ref 2–5)
CO2 SERPL-SCNC: 26 MMOL/L (ref 23–29)
CREAT SERPL-MCNC: 0.9 MG/DL (ref 0.5–1.4)
DIFFERENTIAL METHOD: ABNORMAL
EOSINOPHIL # BLD AUTO: 0.5 K/UL (ref 0–0.5)
EOSINOPHIL NFR BLD: 9.9 % (ref 0–8)
ERYTHROCYTE [DISTWIDTH] IN BLOOD BY AUTOMATED COUNT: 12.7 % (ref 11.5–14.5)
EST. GFR  (NO RACE VARIABLE): >60 ML/MIN/1.73 M^2
ESTIMATED AVG GLUCOSE: 120 MG/DL (ref 68–131)
GLUCOSE SERPL-MCNC: 127 MG/DL (ref 70–110)
HBA1C MFR BLD: 5.8 % (ref 4–5.6)
HCT VFR BLD AUTO: 37 % (ref 40–54)
HDLC SERPL-MCNC: 41 MG/DL (ref 40–75)
HDLC SERPL: 18.3 % (ref 20–50)
HGB BLD-MCNC: 12.7 G/DL (ref 14–18)
IMM GRANULOCYTES # BLD AUTO: 0.01 K/UL (ref 0–0.04)
IMM GRANULOCYTES NFR BLD AUTO: 0.2 % (ref 0–0.5)
LDLC SERPL CALC-MCNC: 151.6 MG/DL (ref 63–159)
LYMPHOCYTES # BLD AUTO: 1.5 K/UL (ref 1–4.8)
LYMPHOCYTES NFR BLD: 27.9 % (ref 18–48)
MCH RBC QN AUTO: 35.3 PG (ref 27–31)
MCHC RBC AUTO-ENTMCNC: 34.3 G/DL (ref 32–36)
MCV RBC AUTO: 103 FL (ref 82–98)
MONOCYTES # BLD AUTO: 0.8 K/UL (ref 0.3–1)
MONOCYTES NFR BLD: 14.7 % (ref 4–15)
NEUTROPHILS # BLD AUTO: 2.6 K/UL (ref 1.8–7.7)
NEUTROPHILS NFR BLD: 46.9 % (ref 38–73)
NONHDLC SERPL-MCNC: 183 MG/DL
NRBC BLD-RTO: 0 /100 WBC
PLATELET # BLD AUTO: 189 K/UL (ref 150–450)
PMV BLD AUTO: 9.9 FL (ref 9.2–12.9)
POTASSIUM SERPL-SCNC: 4.7 MMOL/L (ref 3.5–5.1)
PROT SERPL-MCNC: 6.9 G/DL (ref 6–8.4)
RBC # BLD AUTO: 3.6 M/UL (ref 4.6–6.2)
SODIUM SERPL-SCNC: 141 MMOL/L (ref 136–145)
TRIGL SERPL-MCNC: 157 MG/DL (ref 30–150)
TSH SERPL DL<=0.005 MIU/L-ACNC: 1.06 UIU/ML (ref 0.4–4)
WBC # BLD AUTO: 5.44 K/UL (ref 3.9–12.7)

## 2022-08-08 PROCEDURE — 36415 COLL VENOUS BLD VENIPUNCTURE: CPT | Performed by: FAMILY MEDICINE

## 2022-08-08 PROCEDURE — 83036 HEMOGLOBIN GLYCOSYLATED A1C: CPT | Performed by: FAMILY MEDICINE

## 2022-08-08 PROCEDURE — 80053 COMPREHEN METABOLIC PANEL: CPT | Performed by: FAMILY MEDICINE

## 2022-08-08 PROCEDURE — 84443 ASSAY THYROID STIM HORMONE: CPT | Performed by: FAMILY MEDICINE

## 2022-08-08 PROCEDURE — 80061 LIPID PANEL: CPT | Performed by: FAMILY MEDICINE

## 2022-08-08 PROCEDURE — 85025 COMPLETE CBC W/AUTO DIFF WBC: CPT | Performed by: FAMILY MEDICINE

## 2022-08-15 DIAGNOSIS — R73.03 PREDIABETES: ICD-10-CM

## 2022-08-15 DIAGNOSIS — D64.9 ANEMIA, UNSPECIFIED TYPE: Primary | ICD-10-CM

## 2022-08-26 ENCOUNTER — OFFICE VISIT (OUTPATIENT)
Dept: DERMATOLOGY | Facility: CLINIC | Age: 72
End: 2022-08-26
Payer: COMMERCIAL

## 2022-08-26 DIAGNOSIS — D18.00 HEMANGIOMA, UNSPECIFIED SITE: ICD-10-CM

## 2022-08-26 DIAGNOSIS — L81.4 SOLAR LENTIGO: ICD-10-CM

## 2022-08-26 DIAGNOSIS — L57.0 ACTINIC KERATOSES: Primary | ICD-10-CM

## 2022-08-26 DIAGNOSIS — Z12.83 SKIN CANCER SCREENING: ICD-10-CM

## 2022-08-26 DIAGNOSIS — D22.9 MULTIPLE BENIGN NEVI: ICD-10-CM

## 2022-08-26 DIAGNOSIS — L82.1 SEBORRHEIC KERATOSES: ICD-10-CM

## 2022-08-26 DIAGNOSIS — B07.9 VERRUCA VULGARIS: ICD-10-CM

## 2022-08-26 PROCEDURE — 1160F PR REVIEW ALL MEDS BY PRESCRIBER/CLIN PHARMACIST DOCUMENTED: ICD-10-PCS | Mod: CPTII,S$GLB,, | Performed by: STUDENT IN AN ORGANIZED HEALTH CARE EDUCATION/TRAINING PROGRAM

## 2022-08-26 PROCEDURE — 99203 OFFICE O/P NEW LOW 30 MIN: CPT | Mod: 25,S$GLB,, | Performed by: STUDENT IN AN ORGANIZED HEALTH CARE EDUCATION/TRAINING PROGRAM

## 2022-08-26 PROCEDURE — 17000 PR DESTRUCTION(LASER SURGERY,CRYOSURGERY,CHEMOSURGERY),PREMALIGNANT LESIONS,FIRST LESION: ICD-10-PCS | Mod: 59,S$GLB,, | Performed by: STUDENT IN AN ORGANIZED HEALTH CARE EDUCATION/TRAINING PROGRAM

## 2022-08-26 PROCEDURE — 1126F AMNT PAIN NOTED NONE PRSNT: CPT | Mod: CPTII,S$GLB,, | Performed by: STUDENT IN AN ORGANIZED HEALTH CARE EDUCATION/TRAINING PROGRAM

## 2022-08-26 PROCEDURE — 1126F PR PAIN SEVERITY QUANTIFIED, NO PAIN PRESENT: ICD-10-PCS | Mod: CPTII,S$GLB,, | Performed by: STUDENT IN AN ORGANIZED HEALTH CARE EDUCATION/TRAINING PROGRAM

## 2022-08-26 PROCEDURE — 4010F ACE/ARB THERAPY RXD/TAKEN: CPT | Mod: CPTII,S$GLB,, | Performed by: STUDENT IN AN ORGANIZED HEALTH CARE EDUCATION/TRAINING PROGRAM

## 2022-08-26 PROCEDURE — 3044F HG A1C LEVEL LT 7.0%: CPT | Mod: CPTII,S$GLB,, | Performed by: STUDENT IN AN ORGANIZED HEALTH CARE EDUCATION/TRAINING PROGRAM

## 2022-08-26 PROCEDURE — 4010F PR ACE/ARB THEARPY RXD/TAKEN: ICD-10-PCS | Mod: CPTII,S$GLB,, | Performed by: STUDENT IN AN ORGANIZED HEALTH CARE EDUCATION/TRAINING PROGRAM

## 2022-08-26 PROCEDURE — 99203 PR OFFICE/OUTPT VISIT, NEW, LEVL III, 30-44 MIN: ICD-10-PCS | Mod: 25,S$GLB,, | Performed by: STUDENT IN AN ORGANIZED HEALTH CARE EDUCATION/TRAINING PROGRAM

## 2022-08-26 PROCEDURE — 1159F MED LIST DOCD IN RCRD: CPT | Mod: CPTII,S$GLB,, | Performed by: STUDENT IN AN ORGANIZED HEALTH CARE EDUCATION/TRAINING PROGRAM

## 2022-08-26 PROCEDURE — 1160F RVW MEDS BY RX/DR IN RCRD: CPT | Mod: CPTII,S$GLB,, | Performed by: STUDENT IN AN ORGANIZED HEALTH CARE EDUCATION/TRAINING PROGRAM

## 2022-08-26 PROCEDURE — 99999 PR PBB SHADOW E&M-EST. PATIENT-LVL III: CPT | Mod: PBBFAC,,, | Performed by: STUDENT IN AN ORGANIZED HEALTH CARE EDUCATION/TRAINING PROGRAM

## 2022-08-26 PROCEDURE — 3288F PR FALLS RISK ASSESSMENT DOCUMENTED: ICD-10-PCS | Mod: CPTII,S$GLB,, | Performed by: STUDENT IN AN ORGANIZED HEALTH CARE EDUCATION/TRAINING PROGRAM

## 2022-08-26 PROCEDURE — 1159F PR MEDICATION LIST DOCUMENTED IN MEDICAL RECORD: ICD-10-PCS | Mod: CPTII,S$GLB,, | Performed by: STUDENT IN AN ORGANIZED HEALTH CARE EDUCATION/TRAINING PROGRAM

## 2022-08-26 PROCEDURE — 17110 DESTRUCTION B9 LES UP TO 14: CPT | Mod: S$GLB,,, | Performed by: STUDENT IN AN ORGANIZED HEALTH CARE EDUCATION/TRAINING PROGRAM

## 2022-08-26 PROCEDURE — 3288F FALL RISK ASSESSMENT DOCD: CPT | Mod: CPTII,S$GLB,, | Performed by: STUDENT IN AN ORGANIZED HEALTH CARE EDUCATION/TRAINING PROGRAM

## 2022-08-26 PROCEDURE — 1101F PR PT FALLS ASSESS DOC 0-1 FALLS W/OUT INJ PAST YR: ICD-10-PCS | Mod: CPTII,S$GLB,, | Performed by: STUDENT IN AN ORGANIZED HEALTH CARE EDUCATION/TRAINING PROGRAM

## 2022-08-26 PROCEDURE — 17000 DESTRUCT PREMALG LESION: CPT | Mod: 59,S$GLB,, | Performed by: STUDENT IN AN ORGANIZED HEALTH CARE EDUCATION/TRAINING PROGRAM

## 2022-08-26 PROCEDURE — 17110 PR DESTRUCTION BENIGN LESIONS UP TO 14: ICD-10-PCS | Mod: S$GLB,,, | Performed by: STUDENT IN AN ORGANIZED HEALTH CARE EDUCATION/TRAINING PROGRAM

## 2022-08-26 PROCEDURE — 99999 PR PBB SHADOW E&M-EST. PATIENT-LVL III: ICD-10-PCS | Mod: PBBFAC,,, | Performed by: STUDENT IN AN ORGANIZED HEALTH CARE EDUCATION/TRAINING PROGRAM

## 2022-08-26 PROCEDURE — 1101F PT FALLS ASSESS-DOCD LE1/YR: CPT | Mod: CPTII,S$GLB,, | Performed by: STUDENT IN AN ORGANIZED HEALTH CARE EDUCATION/TRAINING PROGRAM

## 2022-08-26 PROCEDURE — 3044F PR MOST RECENT HEMOGLOBIN A1C LEVEL <7.0%: ICD-10-PCS | Mod: CPTII,S$GLB,, | Performed by: STUDENT IN AN ORGANIZED HEALTH CARE EDUCATION/TRAINING PROGRAM

## 2022-08-26 NOTE — PATIENT INSTRUCTIONS

## 2022-08-26 NOTE — PROGRESS NOTES
Patient Information  Name: Conner Lombardi Jr.  : 1950  MRN: [id number]     Referring Physician:  Dr. Bauman   Primary Care Physician:  Dr. Tia Lopez MD   Date of Visit: 2022      Subjective:       Conner Lombardi Jr. is a 72 y.o. male who presents for   Chief Complaint   Patient presents with    Skin Check     Patient presents today for a skin check. He denies a h/o skin cancer. He has not spots of concern today but his doctor referred him to us.      HPI  Patient here for skin check.     Does patient have a personal hx of skin cancers? no  Does patient have family hx of melanoma?  no  Does patient have hx of strong sun exposure or tanning bed use in the past? no  Patient was last seen:Visit date not found     Prior notes by myself reviewed.   Clinical documentation obtained by nursing staff reviewed.    Review of Systems   Skin: Negative for itching and rash.        Objective:    Physical Exam   Constitutional: He appears well-developed and well-nourished. No distress.   Neurological: He is alert and oriented to person, place, and time. He is not disoriented.   Psychiatric: He has a normal mood and affect.   Skin:   Areas Examined (abnormalities noted in diagram):   Scalp / Hair Palpated and Inspected  Head / Face Inspection Performed  Neck Inspection Performed  Chest / Axilla Inspection Performed  Abdomen Inspection Performed  Genitals / Buttocks / Groin Inspection Performed  Back Inspection Performed  RUE Inspected  LUE Inspection Performed  RLE Inspected  LLE Inspection Performed  Nails and Digits Inspection Performed                       Diagram Legend     Erythematous scaling macule/papule c/w actinic keratosis       Vascular papule c/w angioma      Pigmented verrucoid papule/plaque c/w seborrheic keratosis      Yellow umbilicated papule c/w sebaceous hyperplasia      Irregularly shaped tan macule c/w lentigo     1-2 mm smooth white papules consistent with Milia      Movable  subcutaneous cyst with punctum c/w epidermal inclusion cyst      Subcutaneous movable cyst c/w pilar cyst      Firm pink to brown papule c/w dermatofibroma      Pedunculated fleshy papule(s) c/w skin tag(s)      Evenly pigmented macule c/w junctional nevus     Mildly variegated pigmented, slightly irregular-bordered macule c/w mildly atypical nevus      Flesh colored to evenly pigmented papule c/w intradermal nevus       Pink pearly papule/plaque c/w basal cell carcinoma      Erythematous hyperkeratotic cursted plaque c/w SCC      Surgical scar with no sign of skin cancer recurrence      Open and closed comedones      Inflammatory papules and pustules      Verrucoid papule consistent consistent with wart     Erythematous eczematous patches and plaques     Dystrophic onycholytic nail with subungual debris c/w onychomycosis     Umbilicated papule    Erythematous-base heme-crusted tan verrucoid plaque consistent with inflamed seborrheic keratosis     Erythematous Silvery Scaling Plaque c/w Psoriasis     See annotation      No images are attached to the encounter or orders placed in the encounter.    [] Data reviewed  [] Independent review of test  [] Management discussed with another provider    Assessment / Plan:        Actinic keratoses  Cryosurgery Procedure Note    Verbal consent from the patient is obtained including, but not limited to, risk of hypopigmentation/hyperpigmentation, scar, recurrence of lesion. The patient is aware of the precancerous quality and need for treatment of these lesions. Liquid nitrogen cryosurgery is applied to the 1 actinic keratoses, as detailed in the physical exam, to produce a freeze injury. The patient is aware that blisters may form and is instructed on wound care with gentle cleansing and use of vaseline ointment to keep moist until healed. The patient is supplied a handout on cryosurgery and is instructed to call if lesions do not completely resolve.    Skin cancer  screening  Total body skin examination performed today including at least 12 points as noted in physical examination. No lesions suspicious for malignancy noted.    Recommend daily sun protection/avoidance, use of at least SPF 30, broad spectrum sunscreen (OTC drug), skin self examinations, and routine physician surveillance to optimize early detection    Hemangioma, unspecified site  This is a benign vascular lesion. Reassurance given. No treatment required.     Verruca vulgaris  Cryosurgery procedure note:    Verbal consent from the patient is obtained including, but not limited to, risk of hypopigmentation/hyperpigmentation, scar, recurrence of lesion. Liquid nitrogen cryosurgery is applied to 1 verruca, as detailed in the physical exam, to produce a freeze injury. 2 consecutive freeze thaw cycles are applied to each verruca. The patient is aware that blisters (possibly blood blisters) may form.    Solar lentigo  This is a benign hyperpigmented sun induced lesion. Recommend daily sun protection/avoidance and use of at least SPF 30, broad spectrum sunscreen (OTC drug) will reduce the number of new lesions. Treatment of these benign lesions are considered cosmetic.    Multiple benign nevi  Discussed ABCDE's of nevi.  Monitor for new mole or moles that are becoming bigger, darker, irritated, or developing irregular borders. Brochure provided. Instructed patient to observe lesion(s) for changes and follow up in clinic if changes are noted. Patient to monitor skin at home for new or changing lesions.     Seborrheic keratoses  These are benign inherited growths without a malignant potential. Reassurance given to patient. No treatment is necessary.              LOS NUMBER AND COMPLEXITY OF PROBLEMS    COMPLEXITY OF DATA RISK TOTAL TIME (m)   06985  [id number] [] 1 self-limited or minor problem [x] Minimal to none [] No treatment recommended or patient to monitor 15-29  10-19   08064  57795 Low  [] 2 or > self limited or  minor problems  [] 1 stable chronic illness  [] 1 acute, uncomplicated illness or injury Limited (2)  [] Prior external notes from each unique source  [] Review result of each unique test  [] Order each unique test [x]  Low  OTC medications, minor skin biopsy 30-44  20-29   69750  72644 Moderate  []  1 or > chronic illness with progression, exacerbation or SE of treatment  [x]  2 or more stable chronic illnesses  []  1 acute illness with systemic symptoms  []  1 acute complicated injury  []  1 undiagnosed new problem with uncertain prognosis Moderate (1/3 below)  []  3 or more data items        *Now includes assessment requiring independent historian  []  Independent interpretation of a test  []  Discuss management/test with another provider Moderate  []  Prescription drug mgmt  []  Minor surgery with risk discussed  []  Mgmt limited by social determinates 45-59  30-39   61567  47885 High  []  1 or more chronic illness with severe exacerbation, progression or SE of treatment  []  1 acute or chronic illness/injury that poses a threat to life or bodily function Extensive (2/3 below)  []  3 or more data items        *Now includes assessment requiring independent historian.  []  Independent interpretation of a test  []  Discuss management/test with another provider High  []  Major surgery with risk discussed  []  Drug therapy requiring intensive monitoring for toxicity  []  Hospitalization  []  Decision for DNR 60-74  40-54      No follow-ups on file.    Fernanda Cordoba MD, FAAD  [facility]             c

## 2022-08-29 ENCOUNTER — LAB VISIT (OUTPATIENT)
Dept: LAB | Facility: HOSPITAL | Age: 72
End: 2022-08-29
Attending: PHYSICIAN ASSISTANT
Payer: COMMERCIAL

## 2022-08-29 DIAGNOSIS — D64.9 ANEMIA, UNSPECIFIED TYPE: ICD-10-CM

## 2022-08-29 LAB
FERRITIN SERPL-MCNC: 167 NG/ML (ref 20–300)
FOLATE SERPL-MCNC: 8.3 NG/ML (ref 4–24)
IRON SERPL-MCNC: 148 UG/DL (ref 45–160)
SATURATED IRON: 45 % (ref 20–50)
TOTAL IRON BINDING CAPACITY: 332 UG/DL (ref 250–450)
TRANSFERRIN SERPL-MCNC: 224 MG/DL (ref 200–375)
VIT B12 SERPL-MCNC: 1029 PG/ML (ref 210–950)

## 2022-08-29 PROCEDURE — 82728 ASSAY OF FERRITIN: CPT | Performed by: PHYSICIAN ASSISTANT

## 2022-08-29 PROCEDURE — 82607 VITAMIN B-12: CPT | Performed by: PHYSICIAN ASSISTANT

## 2022-08-29 PROCEDURE — 84466 ASSAY OF TRANSFERRIN: CPT | Performed by: PHYSICIAN ASSISTANT

## 2022-08-29 PROCEDURE — 36415 COLL VENOUS BLD VENIPUNCTURE: CPT | Performed by: PHYSICIAN ASSISTANT

## 2022-08-29 PROCEDURE — 82746 ASSAY OF FOLIC ACID SERUM: CPT | Performed by: PHYSICIAN ASSISTANT

## 2023-01-23 ENCOUNTER — LAB VISIT (OUTPATIENT)
Dept: LAB | Facility: HOSPITAL | Age: 73
End: 2023-01-23
Attending: PHYSICIAN ASSISTANT
Payer: COMMERCIAL

## 2023-01-23 DIAGNOSIS — E78.2 MIXED HYPERLIPIDEMIA: ICD-10-CM

## 2023-01-23 DIAGNOSIS — R73.03 PREDIABETES: ICD-10-CM

## 2023-01-23 DIAGNOSIS — E55.9 VITAMIN D DEFICIENCY: ICD-10-CM

## 2023-01-23 LAB
25(OH)D3+25(OH)D2 SERPL-MCNC: 33 NG/ML (ref 30–96)
ALBUMIN SERPL BCP-MCNC: 4.1 G/DL (ref 3.5–5.2)
ALP SERPL-CCNC: 87 U/L (ref 55–135)
ALT SERPL W/O P-5'-P-CCNC: 24 U/L (ref 10–44)
ANION GAP SERPL CALC-SCNC: 9 MMOL/L (ref 8–16)
AST SERPL-CCNC: 19 U/L (ref 10–40)
BILIRUB SERPL-MCNC: 0.4 MG/DL (ref 0.1–1)
BUN SERPL-MCNC: 22 MG/DL (ref 8–23)
CALCIUM SERPL-MCNC: 9.9 MG/DL (ref 8.7–10.5)
CHLORIDE SERPL-SCNC: 104 MMOL/L (ref 95–110)
CHOLEST SERPL-MCNC: 226 MG/DL (ref 120–199)
CHOLEST/HDLC SERPL: 5.5 {RATIO} (ref 2–5)
CO2 SERPL-SCNC: 28 MMOL/L (ref 23–29)
CREAT SERPL-MCNC: 1 MG/DL (ref 0.5–1.4)
EST. GFR  (NO RACE VARIABLE): >60 ML/MIN/1.73 M^2
ESTIMATED AVG GLUCOSE: 120 MG/DL (ref 68–131)
GLUCOSE SERPL-MCNC: 105 MG/DL (ref 70–110)
HBA1C MFR BLD: 5.8 % (ref 4–5.6)
HDLC SERPL-MCNC: 41 MG/DL (ref 40–75)
HDLC SERPL: 18.1 % (ref 20–50)
LDLC SERPL CALC-MCNC: 147.4 MG/DL (ref 63–159)
NONHDLC SERPL-MCNC: 185 MG/DL
POTASSIUM SERPL-SCNC: 4.7 MMOL/L (ref 3.5–5.1)
PROT SERPL-MCNC: 7.4 G/DL (ref 6–8.4)
SODIUM SERPL-SCNC: 141 MMOL/L (ref 136–145)
TRIGL SERPL-MCNC: 188 MG/DL (ref 30–150)

## 2023-01-23 PROCEDURE — 82306 VITAMIN D 25 HYDROXY: CPT | Performed by: PHYSICIAN ASSISTANT

## 2023-01-23 PROCEDURE — 80061 LIPID PANEL: CPT | Performed by: PHYSICIAN ASSISTANT

## 2023-01-23 PROCEDURE — 36415 COLL VENOUS BLD VENIPUNCTURE: CPT | Performed by: PHYSICIAN ASSISTANT

## 2023-01-23 PROCEDURE — 83036 HEMOGLOBIN GLYCOSYLATED A1C: CPT | Performed by: PHYSICIAN ASSISTANT

## 2023-01-23 PROCEDURE — 80053 COMPREHEN METABOLIC PANEL: CPT | Performed by: PHYSICIAN ASSISTANT

## 2023-01-30 ENCOUNTER — OFFICE VISIT (OUTPATIENT)
Dept: INTERNAL MEDICINE | Facility: CLINIC | Age: 73
End: 2023-01-30
Payer: COMMERCIAL

## 2023-01-30 VITALS
WEIGHT: 183.06 LBS | HEART RATE: 92 BPM | SYSTOLIC BLOOD PRESSURE: 122 MMHG | OXYGEN SATURATION: 97 % | BODY MASS INDEX: 29.42 KG/M2 | TEMPERATURE: 98 F | HEIGHT: 66 IN | DIASTOLIC BLOOD PRESSURE: 74 MMHG

## 2023-01-30 DIAGNOSIS — E55.9 VITAMIN D DEFICIENCY: ICD-10-CM

## 2023-01-30 DIAGNOSIS — E78.2 MIXED HYPERLIPIDEMIA: ICD-10-CM

## 2023-01-30 DIAGNOSIS — R73.03 PREDIABETES: ICD-10-CM

## 2023-01-30 DIAGNOSIS — I10 ESSENTIAL HYPERTENSION: Primary | ICD-10-CM

## 2023-01-30 DIAGNOSIS — G40.909 NONINTRACTABLE EPILEPSY WITHOUT STATUS EPILEPTICUS, UNSPECIFIED EPILEPSY TYPE: ICD-10-CM

## 2023-01-30 PROCEDURE — 3288F FALL RISK ASSESSMENT DOCD: CPT | Mod: CPTII,S$GLB,, | Performed by: FAMILY MEDICINE

## 2023-01-30 PROCEDURE — 90694 VACC AIIV4 NO PRSRV 0.5ML IM: CPT | Mod: S$GLB,,, | Performed by: FAMILY MEDICINE

## 2023-01-30 PROCEDURE — 3288F PR FALLS RISK ASSESSMENT DOCUMENTED: ICD-10-PCS | Mod: CPTII,S$GLB,, | Performed by: FAMILY MEDICINE

## 2023-01-30 PROCEDURE — 3044F HG A1C LEVEL LT 7.0%: CPT | Mod: CPTII,S$GLB,, | Performed by: FAMILY MEDICINE

## 2023-01-30 PROCEDURE — 4010F PR ACE/ARB THEARPY RXD/TAKEN: ICD-10-PCS | Mod: CPTII,S$GLB,, | Performed by: FAMILY MEDICINE

## 2023-01-30 PROCEDURE — 90471 IMMUNIZATION ADMIN: CPT | Mod: S$GLB,,, | Performed by: FAMILY MEDICINE

## 2023-01-30 PROCEDURE — 1126F PR PAIN SEVERITY QUANTIFIED, NO PAIN PRESENT: ICD-10-PCS | Mod: CPTII,S$GLB,, | Performed by: FAMILY MEDICINE

## 2023-01-30 PROCEDURE — 3074F PR MOST RECENT SYSTOLIC BLOOD PRESSURE < 130 MM HG: ICD-10-PCS | Mod: CPTII,S$GLB,, | Performed by: FAMILY MEDICINE

## 2023-01-30 PROCEDURE — 90471 FLU VACCINE - QUADRIVALENT - ADJUVANTED: ICD-10-PCS | Mod: S$GLB,,, | Performed by: FAMILY MEDICINE

## 2023-01-30 PROCEDURE — 1101F PT FALLS ASSESS-DOCD LE1/YR: CPT | Mod: CPTII,S$GLB,, | Performed by: FAMILY MEDICINE

## 2023-01-30 PROCEDURE — 99214 OFFICE O/P EST MOD 30 MIN: CPT | Mod: 25,S$GLB,, | Performed by: FAMILY MEDICINE

## 2023-01-30 PROCEDURE — 4010F ACE/ARB THERAPY RXD/TAKEN: CPT | Mod: CPTII,S$GLB,, | Performed by: FAMILY MEDICINE

## 2023-01-30 PROCEDURE — 3008F PR BODY MASS INDEX (BMI) DOCUMENTED: ICD-10-PCS | Mod: CPTII,S$GLB,, | Performed by: FAMILY MEDICINE

## 2023-01-30 PROCEDURE — 99999 PR PBB SHADOW E&M-EST. PATIENT-LVL IV: CPT | Mod: PBBFAC,,, | Performed by: FAMILY MEDICINE

## 2023-01-30 PROCEDURE — 1101F PR PT FALLS ASSESS DOC 0-1 FALLS W/OUT INJ PAST YR: ICD-10-PCS | Mod: CPTII,S$GLB,, | Performed by: FAMILY MEDICINE

## 2023-01-30 PROCEDURE — 1126F AMNT PAIN NOTED NONE PRSNT: CPT | Mod: CPTII,S$GLB,, | Performed by: FAMILY MEDICINE

## 2023-01-30 PROCEDURE — 3008F BODY MASS INDEX DOCD: CPT | Mod: CPTII,S$GLB,, | Performed by: FAMILY MEDICINE

## 2023-01-30 PROCEDURE — 99214 PR OFFICE/OUTPT VISIT, EST, LEVL IV, 30-39 MIN: ICD-10-PCS | Mod: 25,S$GLB,, | Performed by: FAMILY MEDICINE

## 2023-01-30 PROCEDURE — 3078F DIAST BP <80 MM HG: CPT | Mod: CPTII,S$GLB,, | Performed by: FAMILY MEDICINE

## 2023-01-30 PROCEDURE — 3078F PR MOST RECENT DIASTOLIC BLOOD PRESSURE < 80 MM HG: ICD-10-PCS | Mod: CPTII,S$GLB,, | Performed by: FAMILY MEDICINE

## 2023-01-30 PROCEDURE — 90694 FLU VACCINE - QUADRIVALENT - ADJUVANTED: ICD-10-PCS | Mod: S$GLB,,, | Performed by: FAMILY MEDICINE

## 2023-01-30 PROCEDURE — 3044F PR MOST RECENT HEMOGLOBIN A1C LEVEL <7.0%: ICD-10-PCS | Mod: CPTII,S$GLB,, | Performed by: FAMILY MEDICINE

## 2023-01-30 PROCEDURE — 1159F MED LIST DOCD IN RCRD: CPT | Mod: CPTII,S$GLB,, | Performed by: FAMILY MEDICINE

## 2023-01-30 PROCEDURE — 1159F PR MEDICATION LIST DOCUMENTED IN MEDICAL RECORD: ICD-10-PCS | Mod: CPTII,S$GLB,, | Performed by: FAMILY MEDICINE

## 2023-01-30 PROCEDURE — 3074F SYST BP LT 130 MM HG: CPT | Mod: CPTII,S$GLB,, | Performed by: FAMILY MEDICINE

## 2023-01-30 PROCEDURE — 99999 PR PBB SHADOW E&M-EST. PATIENT-LVL IV: ICD-10-PCS | Mod: PBBFAC,,, | Performed by: FAMILY MEDICINE

## 2023-01-30 RX ORDER — LISINOPRIL AND HYDROCHLOROTHIAZIDE 20; 25 MG/1; MG/1
1 TABLET ORAL DAILY
Qty: 90 TABLET | Refills: 3 | Status: SHIPPED | OUTPATIENT
Start: 2023-01-30 | End: 2024-02-19

## 2023-01-30 NOTE — PATIENT INSTRUCTIONS
"Check with your pharmacist regarding shingrix vaccine #2.    The bivalent covid booster is now available. You can schedule an appointment for the vaccine through [facility] or ask  to assist you with scheduling an appointment for the vaccine.    The bivalent vaccines, known as the "Omicron" vaccines, target both the original strain of COVID-19 as well as the Omicron variant, which is now the predominant strain in the [place].    The Omicron booster is authorized for individuals 12 years and older and is given two months after last dose of primary or booster shot.   "

## 2023-01-31 NOTE — PROGRESS NOTES
Subjective:       Patient ID: Conner Lombardi Jr. is a 72 y.o. male.    Chief Complaint: Follow-up (6 mo f/u)    Patient presents to clinic today for followup of chronic conditions.    Review of Systems   Constitutional:  Negative for chills, fatigue, fever and unexpected weight change.   Eyes:  Negative for visual disturbance.   Respiratory:  Negative for shortness of breath.    Cardiovascular:  Negative for chest pain.   Musculoskeletal:  Negative for myalgias.   Neurological:  Negative for headaches.       Objective:      Physical Exam  Vitals reviewed.   Constitutional:       General: He is not in acute distress.     Appearance: He is well-developed.   HENT:      Head: Normocephalic and atraumatic.   Eyes:      General: Lids are normal. No scleral icterus.     Extraocular Movements: Extraocular movements intact.      Conjunctiva/sclera: Conjunctivae normal.      Pupils: Pupils are equal, round, and reactive to light.   Pulmonary:      Effort: Pulmonary effort is normal.   Neurological:      Mental Status: He is alert and oriented to person, place, and time.      Cranial Nerves: No cranial nerve deficit.      Gait: Gait normal.   Psychiatric:         Mood and Affect: Mood and affect normal.       Assessment:       1. Essential hypertension    2. Mixed hyperlipidemia    3. Prediabetes    4. Vitamin D deficiency    5. Nonintractable epilepsy without status epilepticus, unspecified epilepsy type          Plan:   1. Essential hypertension  Assessment & Plan:  Controlled, continue lisinopril-HCTZ    Orders:  -     TSH; Future; Expected date: 07/30/2023  -     CBC Auto Differential; Future; Expected date: 07/30/2023  -     lisinopriL-hydrochlorothiazide (PRINZIDE,ZESTORETIC) 20-25 mg Tab; Take 1 tablet by mouth once daily.  Dispense: 90 tablet; Refill: 3    2. Mixed hyperlipidemia  Assessment & Plan:  TG elevated, advised low sugar diet    Lab Results   Component Value Date    CHOL 226 (H) 01/23/2023    LDLCALC 147.4  01/23/2023    TRIG 188 (H) 01/23/2023    HDL 41 01/23/2023    ALT 24 01/23/2023    AST 19 01/23/2023    ALKPHOS 87 01/23/2023         Orders:  -     Comprehensive Metabolic Panel; Future; Expected date: 07/30/2023  -     Lipid Panel; Future; Expected date: 07/30/2023    3. Prediabetes  Assessment & Plan:  Stable, low sugar diet will improve    Orders:  -     Hemoglobin A1C; Future; Expected date: 07/30/2023    4. Vitamin D deficiency  Assessment & Plan:  Controlled, continue supplement     Orders:  -     Vitamin D; Future; Expected date: 07/30/2023    5. Nonintractable epilepsy without status epilepticus, unspecified epilepsy type  Overview:  Last seizure 2010. Followed by Neurology, continue current treatment plan      Other orders  -     Influenza - Quadrivalent (Adjuvanted)        Health Maintenance reviewed/updated.

## 2023-01-31 NOTE — ASSESSMENT & PLAN NOTE
TG elevated, advised low sugar diet    Lab Results   Component Value Date    CHOL 226 (H) 01/23/2023    LDLCALC 147.4 01/23/2023    TRIG 188 (H) 01/23/2023    HDL 41 01/23/2023    ALT 24 01/23/2023    AST 19 01/23/2023    ALKPHOS 87 01/23/2023

## 2023-07-22 DIAGNOSIS — G40.909 NONINTRACTABLE EPILEPSY WITHOUT STATUS EPILEPTICUS, UNSPECIFIED EPILEPSY TYPE: ICD-10-CM

## 2023-07-24 ENCOUNTER — LAB VISIT (OUTPATIENT)
Dept: LAB | Facility: HOSPITAL | Age: 73
End: 2023-07-24
Attending: FAMILY MEDICINE
Payer: COMMERCIAL

## 2023-07-24 DIAGNOSIS — I10 ESSENTIAL HYPERTENSION: ICD-10-CM

## 2023-07-24 DIAGNOSIS — E55.9 VITAMIN D DEFICIENCY: ICD-10-CM

## 2023-07-24 DIAGNOSIS — R73.03 PREDIABETES: ICD-10-CM

## 2023-07-24 DIAGNOSIS — E78.2 MIXED HYPERLIPIDEMIA: ICD-10-CM

## 2023-07-24 LAB
25(OH)D3+25(OH)D2 SERPL-MCNC: 37 NG/ML (ref 30–96)
ALBUMIN SERPL BCP-MCNC: 4 G/DL (ref 3.5–5.2)
ALP SERPL-CCNC: 90 U/L (ref 55–135)
ALT SERPL W/O P-5'-P-CCNC: 21 U/L (ref 10–44)
ANION GAP SERPL CALC-SCNC: 12 MMOL/L (ref 8–16)
AST SERPL-CCNC: 20 U/L (ref 10–40)
BASOPHILS # BLD AUTO: 0.04 K/UL (ref 0–0.2)
BASOPHILS NFR BLD: 0.8 % (ref 0–1.9)
BILIRUB SERPL-MCNC: 0.4 MG/DL (ref 0.1–1)
BUN SERPL-MCNC: 19 MG/DL (ref 8–23)
CALCIUM SERPL-MCNC: 10.2 MG/DL (ref 8.7–10.5)
CHLORIDE SERPL-SCNC: 103 MMOL/L (ref 95–110)
CHOLEST SERPL-MCNC: 202 MG/DL (ref 120–199)
CHOLEST/HDLC SERPL: 4.7 {RATIO} (ref 2–5)
CO2 SERPL-SCNC: 25 MMOL/L (ref 23–29)
CREAT SERPL-MCNC: 1 MG/DL (ref 0.5–1.4)
DIFFERENTIAL METHOD: ABNORMAL
EOSINOPHIL # BLD AUTO: 0.5 K/UL (ref 0–0.5)
EOSINOPHIL NFR BLD: 8.7 % (ref 0–8)
ERYTHROCYTE [DISTWIDTH] IN BLOOD BY AUTOMATED COUNT: 12.7 % (ref 11.5–14.5)
EST. GFR  (NO RACE VARIABLE): >60 ML/MIN/1.73 M^2
ESTIMATED AVG GLUCOSE: 117 MG/DL (ref 68–131)
GLUCOSE SERPL-MCNC: 115 MG/DL (ref 70–110)
HBA1C MFR BLD: 5.7 % (ref 4–5.6)
HCT VFR BLD AUTO: 37.8 % (ref 40–54)
HDLC SERPL-MCNC: 43 MG/DL (ref 40–75)
HDLC SERPL: 21.3 % (ref 20–50)
HGB BLD-MCNC: 13.1 G/DL (ref 14–18)
IMM GRANULOCYTES # BLD AUTO: 0.01 K/UL (ref 0–0.04)
IMM GRANULOCYTES NFR BLD AUTO: 0.2 % (ref 0–0.5)
LDLC SERPL CALC-MCNC: 121 MG/DL (ref 63–159)
LYMPHOCYTES # BLD AUTO: 1.6 K/UL (ref 1–4.8)
LYMPHOCYTES NFR BLD: 29.5 % (ref 18–48)
MCH RBC QN AUTO: 34.4 PG (ref 27–31)
MCHC RBC AUTO-ENTMCNC: 34.7 G/DL (ref 32–36)
MCV RBC AUTO: 99 FL (ref 82–98)
MONOCYTES # BLD AUTO: 0.6 K/UL (ref 0.3–1)
MONOCYTES NFR BLD: 11.4 % (ref 4–15)
NEUTROPHILS # BLD AUTO: 2.6 K/UL (ref 1.8–7.7)
NEUTROPHILS NFR BLD: 49.4 % (ref 38–73)
NONHDLC SERPL-MCNC: 159 MG/DL
NRBC BLD-RTO: 0 /100 WBC
PLATELET # BLD AUTO: 219 K/UL (ref 150–450)
PMV BLD AUTO: 10.1 FL (ref 9.2–12.9)
POTASSIUM SERPL-SCNC: 4.3 MMOL/L (ref 3.5–5.1)
PROT SERPL-MCNC: 7.3 G/DL (ref 6–8.4)
RBC # BLD AUTO: 3.81 M/UL (ref 4.6–6.2)
SODIUM SERPL-SCNC: 140 MMOL/L (ref 136–145)
TRIGL SERPL-MCNC: 190 MG/DL (ref 30–150)
TSH SERPL DL<=0.005 MIU/L-ACNC: 1.53 UIU/ML (ref 0.4–4)
WBC # BLD AUTO: 5.28 K/UL (ref 3.9–12.7)

## 2023-07-24 PROCEDURE — 36415 COLL VENOUS BLD VENIPUNCTURE: CPT | Performed by: FAMILY MEDICINE

## 2023-07-24 PROCEDURE — 80061 LIPID PANEL: CPT | Performed by: FAMILY MEDICINE

## 2023-07-24 PROCEDURE — 84443 ASSAY THYROID STIM HORMONE: CPT | Performed by: FAMILY MEDICINE

## 2023-07-24 PROCEDURE — 83036 HEMOGLOBIN GLYCOSYLATED A1C: CPT | Performed by: FAMILY MEDICINE

## 2023-07-24 PROCEDURE — 85025 COMPLETE CBC W/AUTO DIFF WBC: CPT | Performed by: FAMILY MEDICINE

## 2023-07-24 PROCEDURE — 82306 VITAMIN D 25 HYDROXY: CPT | Performed by: FAMILY MEDICINE

## 2023-07-24 PROCEDURE — 80053 COMPREHEN METABOLIC PANEL: CPT | Performed by: FAMILY MEDICINE

## 2023-07-24 RX ORDER — PHENYTOIN SODIUM 100 MG/1
100 CAPSULE, EXTENDED RELEASE ORAL DAILY
Qty: 360 CAPSULE | Refills: 1 | Status: SHIPPED | OUTPATIENT
Start: 2023-07-24 | End: 2023-08-22 | Stop reason: SDUPTHER

## 2023-08-07 ENCOUNTER — OFFICE VISIT (OUTPATIENT)
Dept: INTERNAL MEDICINE | Facility: CLINIC | Age: 73
End: 2023-08-07
Payer: COMMERCIAL

## 2023-08-07 VITALS
TEMPERATURE: 97 F | HEART RATE: 111 BPM | OXYGEN SATURATION: 97 % | BODY MASS INDEX: 29.91 KG/M2 | SYSTOLIC BLOOD PRESSURE: 132 MMHG | DIASTOLIC BLOOD PRESSURE: 78 MMHG | WEIGHT: 185.31 LBS

## 2023-08-07 DIAGNOSIS — R73.03 PREDIABETES: ICD-10-CM

## 2023-08-07 DIAGNOSIS — D64.9 ANEMIA, UNSPECIFIED TYPE: ICD-10-CM

## 2023-08-07 DIAGNOSIS — Z23 NEED FOR SHINGLES VACCINE: ICD-10-CM

## 2023-08-07 DIAGNOSIS — E78.2 MIXED HYPERLIPIDEMIA: ICD-10-CM

## 2023-08-07 DIAGNOSIS — G40.909 NONINTRACTABLE EPILEPSY WITHOUT STATUS EPILEPTICUS, UNSPECIFIED EPILEPSY TYPE: Primary | ICD-10-CM

## 2023-08-07 DIAGNOSIS — E55.9 VITAMIN D DEFICIENCY: ICD-10-CM

## 2023-08-07 DIAGNOSIS — I10 ESSENTIAL HYPERTENSION: ICD-10-CM

## 2023-08-07 PROBLEM — K63.5 POLYP OF COLON: Status: RESOLVED | Noted: 2021-07-12 | Resolved: 2023-08-07

## 2023-08-07 PROCEDURE — 3008F PR BODY MASS INDEX (BMI) DOCUMENTED: ICD-10-PCS | Mod: CPTII,S$GLB,, | Performed by: PHYSICIAN ASSISTANT

## 2023-08-07 PROCEDURE — 1159F PR MEDICATION LIST DOCUMENTED IN MEDICAL RECORD: ICD-10-PCS | Mod: CPTII,S$GLB,, | Performed by: PHYSICIAN ASSISTANT

## 2023-08-07 PROCEDURE — 4010F ACE/ARB THERAPY RXD/TAKEN: CPT | Mod: CPTII,S$GLB,, | Performed by: PHYSICIAN ASSISTANT

## 2023-08-07 PROCEDURE — 3075F SYST BP GE 130 - 139MM HG: CPT | Mod: CPTII,S$GLB,, | Performed by: PHYSICIAN ASSISTANT

## 2023-08-07 PROCEDURE — 1159F MED LIST DOCD IN RCRD: CPT | Mod: CPTII,S$GLB,, | Performed by: PHYSICIAN ASSISTANT

## 2023-08-07 PROCEDURE — 99999 PR PBB SHADOW E&M-EST. PATIENT-LVL IV: ICD-10-PCS | Mod: PBBFAC,,, | Performed by: PHYSICIAN ASSISTANT

## 2023-08-07 PROCEDURE — 3288F FALL RISK ASSESSMENT DOCD: CPT | Mod: CPTII,S$GLB,, | Performed by: PHYSICIAN ASSISTANT

## 2023-08-07 PROCEDURE — 1160F PR REVIEW ALL MEDS BY PRESCRIBER/CLIN PHARMACIST DOCUMENTED: ICD-10-PCS | Mod: CPTII,S$GLB,, | Performed by: PHYSICIAN ASSISTANT

## 2023-08-07 PROCEDURE — 99999 PR PBB SHADOW E&M-EST. PATIENT-LVL IV: CPT | Mod: PBBFAC,,, | Performed by: PHYSICIAN ASSISTANT

## 2023-08-07 PROCEDURE — 99397 PR PREVENTIVE VISIT,EST,65 & OVER: ICD-10-PCS | Mod: 25,S$GLB,, | Performed by: PHYSICIAN ASSISTANT

## 2023-08-07 PROCEDURE — 90471 PR IMMUNIZ ADMIN,1 SINGLE/COMB VAC/TOXOID: ICD-10-PCS | Mod: S$GLB,,, | Performed by: PHYSICIAN ASSISTANT

## 2023-08-07 PROCEDURE — 90471 IMMUNIZATION ADMIN: CPT | Mod: S$GLB,,, | Performed by: PHYSICIAN ASSISTANT

## 2023-08-07 PROCEDURE — 1160F RVW MEDS BY RX/DR IN RCRD: CPT | Mod: CPTII,S$GLB,, | Performed by: PHYSICIAN ASSISTANT

## 2023-08-07 PROCEDURE — 90750 HZV VACC RECOMBINANT IM: CPT | Mod: S$GLB,,, | Performed by: PHYSICIAN ASSISTANT

## 2023-08-07 PROCEDURE — 3078F PR MOST RECENT DIASTOLIC BLOOD PRESSURE < 80 MM HG: ICD-10-PCS | Mod: CPTII,S$GLB,, | Performed by: PHYSICIAN ASSISTANT

## 2023-08-07 PROCEDURE — 4010F PR ACE/ARB THEARPY RXD/TAKEN: ICD-10-PCS | Mod: CPTII,S$GLB,, | Performed by: PHYSICIAN ASSISTANT

## 2023-08-07 PROCEDURE — 3044F HG A1C LEVEL LT 7.0%: CPT | Mod: CPTII,S$GLB,, | Performed by: PHYSICIAN ASSISTANT

## 2023-08-07 PROCEDURE — 1125F AMNT PAIN NOTED PAIN PRSNT: CPT | Mod: CPTII,S$GLB,, | Performed by: PHYSICIAN ASSISTANT

## 2023-08-07 PROCEDURE — 3078F DIAST BP <80 MM HG: CPT | Mod: CPTII,S$GLB,, | Performed by: PHYSICIAN ASSISTANT

## 2023-08-07 PROCEDURE — 3008F BODY MASS INDEX DOCD: CPT | Mod: CPTII,S$GLB,, | Performed by: PHYSICIAN ASSISTANT

## 2023-08-07 PROCEDURE — 90750 PR ZOSTER VACCINE; RECOMBINANT, IM: ICD-10-PCS | Mod: S$GLB,,, | Performed by: PHYSICIAN ASSISTANT

## 2023-08-07 PROCEDURE — 99397 PER PM REEVAL EST PAT 65+ YR: CPT | Mod: 25,S$GLB,, | Performed by: PHYSICIAN ASSISTANT

## 2023-08-07 PROCEDURE — 1101F PR PT FALLS ASSESS DOC 0-1 FALLS W/OUT INJ PAST YR: ICD-10-PCS | Mod: CPTII,S$GLB,, | Performed by: PHYSICIAN ASSISTANT

## 2023-08-07 PROCEDURE — 3075F PR MOST RECENT SYSTOLIC BLOOD PRESS GE 130-139MM HG: ICD-10-PCS | Mod: CPTII,S$GLB,, | Performed by: PHYSICIAN ASSISTANT

## 2023-08-07 PROCEDURE — 1125F PR PAIN SEVERITY QUANTIFIED, PAIN PRESENT: ICD-10-PCS | Mod: CPTII,S$GLB,, | Performed by: PHYSICIAN ASSISTANT

## 2023-08-07 PROCEDURE — 3044F PR MOST RECENT HEMOGLOBIN A1C LEVEL <7.0%: ICD-10-PCS | Mod: CPTII,S$GLB,, | Performed by: PHYSICIAN ASSISTANT

## 2023-08-07 PROCEDURE — 1101F PT FALLS ASSESS-DOCD LE1/YR: CPT | Mod: CPTII,S$GLB,, | Performed by: PHYSICIAN ASSISTANT

## 2023-08-07 PROCEDURE — 3288F PR FALLS RISK ASSESSMENT DOCUMENTED: ICD-10-PCS | Mod: CPTII,S$GLB,, | Performed by: PHYSICIAN ASSISTANT

## 2023-08-07 NOTE — PROGRESS NOTES
Subjective:       Patient ID: Conner Lombardi Jr. is a 73 y.o. male.    Chief Complaint: Annual Exam      Patient presents to clinic today for annual physical exam.        Review of Systems   Constitutional:  Negative for chills, fatigue, fever and unexpected weight change.   HENT:  Negative for congestion, dental problem, ear pain, hearing loss, rhinorrhea and trouble swallowing.    Eyes:  Negative for pain and visual disturbance.   Respiratory:  Negative for cough and shortness of breath.    Cardiovascular:  Positive for leg swelling (intermittent, BL, worse in evening). Negative for chest pain and palpitations.   Gastrointestinal:  Negative for abdominal distention, abdominal pain, blood in stool, constipation, diarrhea, nausea and vomiting.   Genitourinary:  Negative for difficulty urinating, scrotal swelling and testicular pain.   Musculoskeletal:  Positive for arthralgias (chronic) and myalgias (chronic).   Skin:  Negative for rash.   Neurological:  Negative for dizziness, weakness, numbness and headaches.   Hematological:  Negative for adenopathy. Bruises/bleeds easily (chronic).   Psychiatric/Behavioral:  Negative for dysphoric mood and sleep disturbance. The patient is not nervous/anxious.        Objective:      Physical Exam  Vitals and nursing note reviewed.   Constitutional:       General: He is not in acute distress.     Appearance: He is well-developed.   HENT:      Head: Normocephalic and atraumatic.      Right Ear: Tympanic membrane, ear canal and external ear normal.      Left Ear: Tympanic membrane, ear canal and external ear normal.      Nose: Nose normal.      Mouth/Throat:      Lips: Pink.      Mouth: Mucous membranes are moist.      Pharynx: Oropharynx is clear. Uvula midline.   Eyes:      General: Lids are normal. No scleral icterus.     Conjunctiva/sclera: Conjunctivae normal.      Pupils: Pupils are equal, round, and reactive to light.   Neck:      Thyroid: No thyromegaly.   Cardiovascular:       Rate and Rhythm: Normal rate and regular rhythm.      Pulses: Normal pulses.   Pulmonary:      Effort: Pulmonary effort is normal.      Breath sounds: Normal breath sounds. No wheezing or rales.   Musculoskeletal:         General: No tenderness. Normal range of motion.      Cervical back: Normal range of motion and neck supple.      Right lower leg: No edema.      Left lower leg: No edema.   Lymphadenopathy:      Cervical: No cervical adenopathy.   Skin:     General: Skin is warm and dry.      Findings: No rash.   Neurological:      Mental Status: He is alert.      Cranial Nerves: No cranial nerve deficit.   Psychiatric:         Mood and Affect: Mood and affect normal.         Assessment:       1. Nonintractable epilepsy without status epilepticus, unspecified epilepsy type    2. Essential hypertension    3. Mixed hyperlipidemia    4. Anemia, unspecified type    5. Vitamin D deficiency    6. Prediabetes    7. Need for shingles vaccine        Plan:   1. Nonintractable epilepsy without status epilepticus, unspecified epilepsy type  Overview:  Last seizure 2010. Followed by Neurology, continue current treatment plan      2. Essential hypertension  Assessment & Plan:  /78, controlled, continue lisinopril-HCTZ  Lab Results   Component Value Date     07/24/2023    K 4.3 07/24/2023    BUN 19 07/24/2023    CREATININE 1.0 07/24/2023    ESTGFRAFRICA >60.0 01/19/2022    EGFRNONAA >60.0 01/19/2022    EGFRNORACEVR >60.0 07/24/2023          3. Mixed hyperlipidemia  Assessment & Plan:  Controlled, continue krill oil  Lab Results   Component Value Date    CHOL 202 (H) 07/24/2023    LDLCALC 121.0 07/24/2023    TRIG 190 (H) 07/24/2023    HDL 43 07/24/2023    ALT 21 07/24/2023    AST 20 07/24/2023    ALKPHOS 90 07/24/2023        Orders:  -     Comprehensive Metabolic Panel; Future; Expected date: 02/07/2024  -     Lipid Panel; Future; Expected date: 02/07/2024    4. Anemia, unspecified type  Assessment &  Plan:  Stable      5. Vitamin D deficiency  Assessment & Plan:  Continue supplement    Orders:  -     Vitamin D; Future; Expected date: 02/07/2024    6. Prediabetes  Assessment & Plan:  Diet controlled, stable  Lab Results   Component Value Date    HGBA1C 5.7 (H) 07/24/2023    HGBA1C 5.8 (H) 01/23/2023    LDLCALC 121.0 07/24/2023        Orders:  -     Hemoglobin A1C; Future; Expected date: 02/07/2024    7. Need for shingles vaccine  -     varicella-zoster [id number] (PF)(SHINGRIX) 50 mcg/0.5 mL injection        Recent labs reviewed with patient.    Shingles today  Declines covid booster  6 month f/u with Dr. Lopez scheduled with fasting labs PTA   Health Maintenance reviewed/updated.

## 2023-08-08 NOTE — ASSESSMENT & PLAN NOTE
Controlled, continue krill oil  Lab Results   Component Value Date    CHOL 202 (H) 07/24/2023    LDLCALC 121.0 07/24/2023    TRIG 190 (H) 07/24/2023    HDL 43 07/24/2023    ALT 21 07/24/2023    AST 20 07/24/2023    ALKPHOS 90 07/24/2023

## 2023-08-08 NOTE — ASSESSMENT & PLAN NOTE
Diet controlled, stable  Lab Results   Component Value Date    HGBA1C 5.7 (H) 07/24/2023    HGBA1C 5.8 (H) 01/23/2023    LDLCALC 121.0 07/24/2023

## 2023-08-08 NOTE — ASSESSMENT & PLAN NOTE
/78, controlled, continue lisinopril-HCTZ  Lab Results   Component Value Date     07/24/2023    K 4.3 07/24/2023    BUN 19 07/24/2023    CREATININE 1.0 07/24/2023    ESTGFRAFRICA >60.0 01/19/2022    EGFRNONAA >60.0 01/19/2022    EGFRNORACEVR >60.0 07/24/2023

## 2023-08-21 ENCOUNTER — PATIENT MESSAGE (OUTPATIENT)
Dept: NEUROLOGY | Facility: CLINIC | Age: 73
End: 2023-08-21
Payer: COMMERCIAL

## 2023-08-22 DIAGNOSIS — G40.909 NONINTRACTABLE EPILEPSY WITHOUT STATUS EPILEPTICUS, UNSPECIFIED EPILEPSY TYPE: ICD-10-CM

## 2023-08-22 RX ORDER — PHENYTOIN SODIUM 100 MG/1
400 CAPSULE, EXTENDED RELEASE ORAL DAILY
Qty: 360 CAPSULE | Refills: 0 | Status: SHIPPED | OUTPATIENT
Start: 2023-08-22 | End: 2024-01-19

## 2023-09-06 ENCOUNTER — OFFICE VISIT (OUTPATIENT)
Dept: INTERNAL MEDICINE | Facility: CLINIC | Age: 73
End: 2023-09-06
Payer: COMMERCIAL

## 2023-09-06 VITALS
TEMPERATURE: 97 F | BODY MASS INDEX: 29.51 KG/M2 | WEIGHT: 183.63 LBS | DIASTOLIC BLOOD PRESSURE: 78 MMHG | RESPIRATION RATE: 18 BRPM | SYSTOLIC BLOOD PRESSURE: 128 MMHG | HEIGHT: 66 IN | HEART RATE: 100 BPM | OXYGEN SATURATION: 97 %

## 2023-09-06 DIAGNOSIS — M79.652 ACUTE PAIN OF LEFT THIGH: Primary | ICD-10-CM

## 2023-09-06 PROCEDURE — 99999 PR PBB SHADOW E&M-EST. PATIENT-LVL IV: CPT | Mod: PBBFAC,,, | Performed by: FAMILY MEDICINE

## 2023-09-06 PROCEDURE — 99213 PR OFFICE/OUTPT VISIT, EST, LEVL III, 20-29 MIN: ICD-10-PCS | Mod: S$GLB,,, | Performed by: FAMILY MEDICINE

## 2023-09-06 PROCEDURE — 1160F RVW MEDS BY RX/DR IN RCRD: CPT | Mod: CPTII,S$GLB,, | Performed by: FAMILY MEDICINE

## 2023-09-06 PROCEDURE — 3078F DIAST BP <80 MM HG: CPT | Mod: CPTII,S$GLB,, | Performed by: FAMILY MEDICINE

## 2023-09-06 PROCEDURE — 3288F PR FALLS RISK ASSESSMENT DOCUMENTED: ICD-10-PCS | Mod: CPTII,S$GLB,, | Performed by: FAMILY MEDICINE

## 2023-09-06 PROCEDURE — 99999 PR PBB SHADOW E&M-EST. PATIENT-LVL IV: ICD-10-PCS | Mod: PBBFAC,,, | Performed by: FAMILY MEDICINE

## 2023-09-06 PROCEDURE — 1160F PR REVIEW ALL MEDS BY PRESCRIBER/CLIN PHARMACIST DOCUMENTED: ICD-10-PCS | Mod: CPTII,S$GLB,, | Performed by: FAMILY MEDICINE

## 2023-09-06 PROCEDURE — 4010F ACE/ARB THERAPY RXD/TAKEN: CPT | Mod: CPTII,S$GLB,, | Performed by: FAMILY MEDICINE

## 2023-09-06 PROCEDURE — 1101F PR PT FALLS ASSESS DOC 0-1 FALLS W/OUT INJ PAST YR: ICD-10-PCS | Mod: CPTII,S$GLB,, | Performed by: FAMILY MEDICINE

## 2023-09-06 PROCEDURE — 3008F PR BODY MASS INDEX (BMI) DOCUMENTED: ICD-10-PCS | Mod: CPTII,S$GLB,, | Performed by: FAMILY MEDICINE

## 2023-09-06 PROCEDURE — 1126F PR PAIN SEVERITY QUANTIFIED, NO PAIN PRESENT: ICD-10-PCS | Mod: CPTII,S$GLB,, | Performed by: FAMILY MEDICINE

## 2023-09-06 PROCEDURE — 3074F PR MOST RECENT SYSTOLIC BLOOD PRESSURE < 130 MM HG: ICD-10-PCS | Mod: CPTII,S$GLB,, | Performed by: FAMILY MEDICINE

## 2023-09-06 PROCEDURE — 3044F PR MOST RECENT HEMOGLOBIN A1C LEVEL <7.0%: ICD-10-PCS | Mod: CPTII,S$GLB,, | Performed by: FAMILY MEDICINE

## 2023-09-06 PROCEDURE — 1159F PR MEDICATION LIST DOCUMENTED IN MEDICAL RECORD: ICD-10-PCS | Mod: CPTII,S$GLB,, | Performed by: FAMILY MEDICINE

## 2023-09-06 PROCEDURE — 3008F BODY MASS INDEX DOCD: CPT | Mod: CPTII,S$GLB,, | Performed by: FAMILY MEDICINE

## 2023-09-06 PROCEDURE — 99213 OFFICE O/P EST LOW 20 MIN: CPT | Mod: S$GLB,,, | Performed by: FAMILY MEDICINE

## 2023-09-06 PROCEDURE — 1126F AMNT PAIN NOTED NONE PRSNT: CPT | Mod: CPTII,S$GLB,, | Performed by: FAMILY MEDICINE

## 2023-09-06 PROCEDURE — 3288F FALL RISK ASSESSMENT DOCD: CPT | Mod: CPTII,S$GLB,, | Performed by: FAMILY MEDICINE

## 2023-09-06 PROCEDURE — 4010F PR ACE/ARB THEARPY RXD/TAKEN: ICD-10-PCS | Mod: CPTII,S$GLB,, | Performed by: FAMILY MEDICINE

## 2023-09-06 PROCEDURE — 3044F HG A1C LEVEL LT 7.0%: CPT | Mod: CPTII,S$GLB,, | Performed by: FAMILY MEDICINE

## 2023-09-06 PROCEDURE — 3078F PR MOST RECENT DIASTOLIC BLOOD PRESSURE < 80 MM HG: ICD-10-PCS | Mod: CPTII,S$GLB,, | Performed by: FAMILY MEDICINE

## 2023-09-06 PROCEDURE — 1101F PT FALLS ASSESS-DOCD LE1/YR: CPT | Mod: CPTII,S$GLB,, | Performed by: FAMILY MEDICINE

## 2023-09-06 PROCEDURE — 3074F SYST BP LT 130 MM HG: CPT | Mod: CPTII,S$GLB,, | Performed by: FAMILY MEDICINE

## 2023-09-06 PROCEDURE — 1159F MED LIST DOCD IN RCRD: CPT | Mod: CPTII,S$GLB,, | Performed by: FAMILY MEDICINE

## 2023-09-06 NOTE — PROGRESS NOTES
Subjective:       Patient ID: Conner Lombadri Jr. is a 73 y.o. male.    Chief Complaint: Leg Pain    Patient presents to clinic today with concerns about left leg pain.  He reports left anterior thigh pain that would occur after prolonged sitting when he would stand up. Describes pain as sharp, pain resolved with sitting. He reports continuing to walk resolved the pain as well. Reports pain started last Wednesday, but seems to have resolved as of Sunday.  Denies current pain at this time.  Patient is otherwise without concerns today.    Review of Systems   Constitutional:  Negative for chills, fatigue, fever and unexpected weight change.   Eyes:  Negative for visual disturbance.   Respiratory:  Negative for shortness of breath.    Cardiovascular:  Negative for chest pain.   Musculoskeletal:  Negative for myalgias.   Neurological:  Negative for headaches.         Objective:      Physical Exam  Vitals reviewed.   Constitutional:       General: He is not in acute distress.     Appearance: He is well-developed.   HENT:      Head: Normocephalic and atraumatic.   Eyes:      General: Lids are normal. No scleral icterus.     Extraocular Movements: Extraocular movements intact.      Conjunctiva/sclera: Conjunctivae normal.      Pupils: Pupils are equal, round, and reactive to light.   Pulmonary:      Effort: Pulmonary effort is normal.   Musculoskeletal:      Left upper leg: No swelling, edema, deformity, lacerations, tenderness or bony tenderness.   Neurological:      Mental Status: He is alert and oriented to person, place, and time.      Cranial Nerves: No cranial nerve deficit.      Gait: Gait normal.   Psychiatric:         Mood and Affect: Mood and affect normal.         Assessment:       1. Acute pain of left thigh        Plan:   1. Acute pain of left thigh      Resolved at this time.  Advised patient that based on the history this seems most likely a muscular etiology.  Advised we can consider a muscle relaxer if his  symptoms recur.  Also advised moist heat if symptoms recur.  If he continues to have this pain intermittently advised to follow-up for further evaluation.  Patient expressed understanding and agreement with plan.

## 2023-09-06 NOTE — PATIENT INSTRUCTIONS
Get your flu vaccine this season, Flu vaccines start becoming available in September and we continue vaccinating through the winter months. The [facility] is having a drive-thru flu vaccine clinic on Saturday, September 23rd. Flu vaccines are also available at most local pharmacies.

## 2023-12-07 ENCOUNTER — HOSPITAL ENCOUNTER (OUTPATIENT)
Dept: RADIOLOGY | Facility: HOSPITAL | Age: 73
Discharge: HOME | End: 2023-12-07
Attending: FAMILY MEDICINE
Payer: COMMERCIAL

## 2023-12-07 ENCOUNTER — OFFICE VISIT (OUTPATIENT)
Dept: INTERNAL MEDICINE | Facility: CLINIC | Age: 73
End: 2023-12-07
Payer: COMMERCIAL

## 2023-12-07 VITALS
HEIGHT: 66 IN | SYSTOLIC BLOOD PRESSURE: 118 MMHG | BODY MASS INDEX: 29.51 KG/M2 | OXYGEN SATURATION: 97 % | HEART RATE: 99 BPM | DIASTOLIC BLOOD PRESSURE: 68 MMHG | TEMPERATURE: 98 F | RESPIRATION RATE: 18 BRPM | WEIGHT: 183.63 LBS

## 2023-12-07 DIAGNOSIS — Z23 NEED FOR VACCINATION: ICD-10-CM

## 2023-12-07 DIAGNOSIS — M54.42 LOW BACK PAIN WITH BILATERAL SCIATICA, UNSPECIFIED BACK PAIN LATERALITY, UNSPECIFIED CHRONICITY: ICD-10-CM

## 2023-12-07 DIAGNOSIS — M25.551 BILATERAL HIP PAIN: ICD-10-CM

## 2023-12-07 DIAGNOSIS — M25.552 BILATERAL HIP PAIN: ICD-10-CM

## 2023-12-07 DIAGNOSIS — M54.41 LOW BACK PAIN WITH BILATERAL SCIATICA, UNSPECIFIED BACK PAIN LATERALITY, UNSPECIFIED CHRONICITY: ICD-10-CM

## 2023-12-07 DIAGNOSIS — M54.41 LOW BACK PAIN WITH BILATERAL SCIATICA, UNSPECIFIED BACK PAIN LATERALITY, UNSPECIFIED CHRONICITY: Primary | ICD-10-CM

## 2023-12-07 DIAGNOSIS — M54.42 LOW BACK PAIN WITH BILATERAL SCIATICA, UNSPECIFIED BACK PAIN LATERALITY, UNSPECIFIED CHRONICITY: Primary | ICD-10-CM

## 2023-12-07 PROCEDURE — 3078F DIAST BP <80 MM HG: CPT | Mod: CPTII,S$GLB,, | Performed by: FAMILY MEDICINE

## 2023-12-07 PROCEDURE — 3078F PR MOST RECENT DIASTOLIC BLOOD PRESSURE < 80 MM HG: ICD-10-PCS | Mod: CPTII,S$GLB,, | Performed by: FAMILY MEDICINE

## 2023-12-07 PROCEDURE — 99999 PR PBB SHADOW E&M-EST. PATIENT-LVL V: ICD-10-PCS | Mod: PBBFAC,,, | Performed by: FAMILY MEDICINE

## 2023-12-07 PROCEDURE — 1126F AMNT PAIN NOTED NONE PRSNT: CPT | Mod: CPTII,S$GLB,, | Performed by: FAMILY MEDICINE

## 2023-12-07 PROCEDURE — 3044F HG A1C LEVEL LT 7.0%: CPT | Mod: CPTII,S$GLB,, | Performed by: FAMILY MEDICINE

## 2023-12-07 PROCEDURE — 1126F PR PAIN SEVERITY QUANTIFIED, NO PAIN PRESENT: ICD-10-PCS | Mod: CPTII,S$GLB,, | Performed by: FAMILY MEDICINE

## 2023-12-07 PROCEDURE — 99214 PR OFFICE/OUTPT VISIT, EST, LEVL IV, 30-39 MIN: ICD-10-PCS | Mod: 25,S$GLB,, | Performed by: FAMILY MEDICINE

## 2023-12-07 PROCEDURE — 90694 FLU VACCINE - QUADRIVALENT - ADJUVANTED: ICD-10-PCS | Mod: S$GLB,,, | Performed by: FAMILY MEDICINE

## 2023-12-07 PROCEDURE — 73521 XR HIPS BILATERAL 2 VIEW INCL AP PELVIS: ICD-10-PCS | Mod: 26,,, | Performed by: RADIOLOGY

## 2023-12-07 PROCEDURE — 90694 VACC AIIV4 NO PRSRV 0.5ML IM: CPT | Mod: S$GLB,,, | Performed by: FAMILY MEDICINE

## 2023-12-07 PROCEDURE — 4010F PR ACE/ARB THEARPY RXD/TAKEN: ICD-10-PCS | Mod: CPTII,S$GLB,, | Performed by: FAMILY MEDICINE

## 2023-12-07 PROCEDURE — 73521 X-RAY EXAM HIPS BI 2 VIEWS: CPT | Mod: TC

## 2023-12-07 PROCEDURE — 3008F BODY MASS INDEX DOCD: CPT | Mod: CPTII,S$GLB,, | Performed by: FAMILY MEDICINE

## 2023-12-07 PROCEDURE — 72100 X-RAY EXAM L-S SPINE 2/3 VWS: CPT | Mod: 26,,, | Performed by: RADIOLOGY

## 2023-12-07 PROCEDURE — 3288F FALL RISK ASSESSMENT DOCD: CPT | Mod: CPTII,S$GLB,, | Performed by: FAMILY MEDICINE

## 2023-12-07 PROCEDURE — 90471 IMMUNIZATION ADMIN: CPT | Mod: S$GLB,,, | Performed by: FAMILY MEDICINE

## 2023-12-07 PROCEDURE — 3074F PR MOST RECENT SYSTOLIC BLOOD PRESSURE < 130 MM HG: ICD-10-PCS | Mod: CPTII,S$GLB,, | Performed by: FAMILY MEDICINE

## 2023-12-07 PROCEDURE — 1101F PR PT FALLS ASSESS DOC 0-1 FALLS W/OUT INJ PAST YR: ICD-10-PCS | Mod: CPTII,S$GLB,, | Performed by: FAMILY MEDICINE

## 2023-12-07 PROCEDURE — 99999 PR PBB SHADOW E&M-EST. PATIENT-LVL V: CPT | Mod: PBBFAC,,, | Performed by: FAMILY MEDICINE

## 2023-12-07 PROCEDURE — 3288F PR FALLS RISK ASSESSMENT DOCUMENTED: ICD-10-PCS | Mod: CPTII,S$GLB,, | Performed by: FAMILY MEDICINE

## 2023-12-07 PROCEDURE — 72100 X-RAY EXAM L-S SPINE 2/3 VWS: CPT | Mod: TC

## 2023-12-07 PROCEDURE — 1101F PT FALLS ASSESS-DOCD LE1/YR: CPT | Mod: CPTII,S$GLB,, | Performed by: FAMILY MEDICINE

## 2023-12-07 PROCEDURE — 3044F PR MOST RECENT HEMOGLOBIN A1C LEVEL <7.0%: ICD-10-PCS | Mod: CPTII,S$GLB,, | Performed by: FAMILY MEDICINE

## 2023-12-07 PROCEDURE — 3074F SYST BP LT 130 MM HG: CPT | Mod: CPTII,S$GLB,, | Performed by: FAMILY MEDICINE

## 2023-12-07 PROCEDURE — 72100 XR LUMBAR SPINE AP AND LATERAL: ICD-10-PCS | Mod: 26,,, | Performed by: RADIOLOGY

## 2023-12-07 PROCEDURE — 3008F PR BODY MASS INDEX (BMI) DOCUMENTED: ICD-10-PCS | Mod: CPTII,S$GLB,, | Performed by: FAMILY MEDICINE

## 2023-12-07 PROCEDURE — 73521 X-RAY EXAM HIPS BI 2 VIEWS: CPT | Mod: 26,,, | Performed by: RADIOLOGY

## 2023-12-07 PROCEDURE — 99214 OFFICE O/P EST MOD 30 MIN: CPT | Mod: 25,S$GLB,, | Performed by: FAMILY MEDICINE

## 2023-12-07 PROCEDURE — 90471 FLU VACCINE - QUADRIVALENT - ADJUVANTED: ICD-10-PCS | Mod: S$GLB,,, | Performed by: FAMILY MEDICINE

## 2023-12-07 PROCEDURE — 4010F ACE/ARB THERAPY RXD/TAKEN: CPT | Mod: CPTII,S$GLB,, | Performed by: FAMILY MEDICINE

## 2023-12-07 RX ORDER — TIZANIDINE 2 MG/1
2-4 TABLET ORAL EVERY 8 HOURS PRN
Qty: 30 TABLET | Refills: 1 | Status: SHIPPED | OUTPATIENT
Start: 2023-12-07 | End: 2023-12-15

## 2023-12-07 NOTE — PROGRESS NOTES
Subjective:       Patient ID: Conner Lombardi Jr. is a 73 y.o. male.    Chief Complaint: Leg Pain (bilateral)    Patient presents to clinic today reporting intermittent bilateral leg pain. He reports bilateral hip pain that occurs after prolonged sitting when getting up. Also reports occasional low back pain. No current pain. Patient is otherwise without concerns today.      Review of Systems   Constitutional:  Positive for activity change. Negative for unexpected weight change.   HENT:  Negative for hearing loss, rhinorrhea and trouble swallowing.    Eyes:  Negative for discharge and visual disturbance.   Respiratory:  Negative for chest tightness and wheezing.    Cardiovascular:  Negative for chest pain and palpitations.   Gastrointestinal:  Negative for blood in stool, constipation, diarrhea and vomiting.   Endocrine: Negative for polydipsia and polyuria.   Genitourinary:  Negative for difficulty urinating, hematuria and urgency.   Musculoskeletal:  Positive for arthralgias. Negative for joint swelling and neck pain.   Neurological:  Negative for weakness and headaches.   Psychiatric/Behavioral:  Negative for confusion and dysphoric mood.          Objective:      Physical Exam  Vitals reviewed.   Constitutional:       General: He is not in acute distress.     Appearance: He is well-developed.   HENT:      Head: Normocephalic and atraumatic.   Eyes:      General: Lids are normal. No scleral icterus.     Extraocular Movements: Extraocular movements intact.      Conjunctiva/sclera: Conjunctivae normal.      Pupils: Pupils are equal, round, and reactive to light.   Pulmonary:      Effort: Pulmonary effort is normal.   Neurological:      Mental Status: He is alert and oriented to person, place, and time.      Cranial Nerves: No cranial nerve deficit.      Gait: Gait normal.   Psychiatric:         Mood and Affect: Mood and affect normal.         Assessment:       1. Low back pain with bilateral sciatica, unspecified  back pain laterality, unspecified chronicity    2. Bilateral hip pain    3. Need for vaccination        Plan:   1. Low back pain with bilateral sciatica, unspecified back pain laterality, unspecified chronicity  -     X-Ray Lumbar Spine AP And Lateral; Future; Expected date: 12/07/2023  -     Discontinue: tiZANidine (ZANAFLEX) 2 MG tablet; Take 1-2 tablets (2-4 mg total) by mouth every 8 (eight) hours as needed (leg pain/back pain). (Patient not taking: Reported on 12/15/2023)  Dispense: 30 tablet; Refill: 1    2. Bilateral hip pain  -     X-Ray Hips Bilateral 2 View Inc AP Pelvis; Future; Expected date: 12/07/2023    3. Need for vaccination  -     Influenza - Quadrivalent (Adjuvanted)      Plan physical therapy vs. Pain clinic referral after review of x-rays.  Health Maintenance reviewed/updated.

## 2023-12-08 DIAGNOSIS — M54.42 LOW BACK PAIN WITH BILATERAL SCIATICA, UNSPECIFIED BACK PAIN LATERALITY, UNSPECIFIED CHRONICITY: Primary | ICD-10-CM

## 2023-12-08 DIAGNOSIS — M16.0 PRIMARY OSTEOARTHRITIS OF BOTH HIPS: Primary | ICD-10-CM

## 2023-12-08 DIAGNOSIS — M54.41 LOW BACK PAIN WITH BILATERAL SCIATICA, UNSPECIFIED BACK PAIN LATERALITY, UNSPECIFIED CHRONICITY: Primary | ICD-10-CM

## 2023-12-15 ENCOUNTER — OFFICE VISIT (OUTPATIENT)
Dept: PAIN MEDICINE | Facility: CLINIC | Age: 73
End: 2023-12-15
Payer: COMMERCIAL

## 2023-12-15 VITALS
HEART RATE: 102 BPM | DIASTOLIC BLOOD PRESSURE: 78 MMHG | BODY MASS INDEX: 30.08 KG/M2 | WEIGHT: 187.19 LBS | SYSTOLIC BLOOD PRESSURE: 132 MMHG | HEIGHT: 66 IN

## 2023-12-15 DIAGNOSIS — M54.41 LOW BACK PAIN WITH BILATERAL SCIATICA, UNSPECIFIED BACK PAIN LATERALITY, UNSPECIFIED CHRONICITY: ICD-10-CM

## 2023-12-15 DIAGNOSIS — M54.42 LOW BACK PAIN WITH BILATERAL SCIATICA, UNSPECIFIED BACK PAIN LATERALITY, UNSPECIFIED CHRONICITY: ICD-10-CM

## 2023-12-15 DIAGNOSIS — M54.16 LUMBAR RADICULOPATHY: Primary | ICD-10-CM

## 2023-12-15 DIAGNOSIS — M54.9 DORSALGIA, UNSPECIFIED: ICD-10-CM

## 2023-12-15 DIAGNOSIS — M47.816 LUMBAR SPONDYLOSIS: ICD-10-CM

## 2023-12-15 DIAGNOSIS — M51.36 DDD (DEGENERATIVE DISC DISEASE), LUMBAR: ICD-10-CM

## 2023-12-15 DIAGNOSIS — M16.0 BILATERAL PRIMARY OSTEOARTHRITIS OF HIP: ICD-10-CM

## 2023-12-15 DIAGNOSIS — M48.07 SPINAL STENOSIS, LUMBOSACRAL REGION: ICD-10-CM

## 2023-12-15 PROCEDURE — 99999 PR PBB SHADOW E&M-EST. PATIENT-LVL IV: ICD-10-PCS | Mod: PBBFAC,,, | Performed by: ANESTHESIOLOGY

## 2023-12-15 PROCEDURE — 3078F DIAST BP <80 MM HG: CPT | Mod: CPTII,S$GLB,, | Performed by: ANESTHESIOLOGY

## 2023-12-15 PROCEDURE — 3008F BODY MASS INDEX DOCD: CPT | Mod: CPTII,S$GLB,, | Performed by: ANESTHESIOLOGY

## 2023-12-15 PROCEDURE — 99204 PR OFFICE/OUTPT VISIT, NEW, LEVL IV, 45-59 MIN: ICD-10-PCS | Mod: S$GLB,,, | Performed by: ANESTHESIOLOGY

## 2023-12-15 PROCEDURE — 3075F PR MOST RECENT SYSTOLIC BLOOD PRESS GE 130-139MM HG: ICD-10-PCS | Mod: CPTII,S$GLB,, | Performed by: ANESTHESIOLOGY

## 2023-12-15 PROCEDURE — 1126F PR PAIN SEVERITY QUANTIFIED, NO PAIN PRESENT: ICD-10-PCS | Mod: CPTII,S$GLB,, | Performed by: ANESTHESIOLOGY

## 2023-12-15 PROCEDURE — 99999 PR PBB SHADOW E&M-EST. PATIENT-LVL IV: CPT | Mod: PBBFAC,,, | Performed by: ANESTHESIOLOGY

## 2023-12-15 PROCEDURE — 99204 OFFICE O/P NEW MOD 45 MIN: CPT | Mod: S$GLB,,, | Performed by: ANESTHESIOLOGY

## 2023-12-15 PROCEDURE — 3044F HG A1C LEVEL LT 7.0%: CPT | Mod: CPTII,S$GLB,, | Performed by: ANESTHESIOLOGY

## 2023-12-15 PROCEDURE — 1159F PR MEDICATION LIST DOCUMENTED IN MEDICAL RECORD: ICD-10-PCS | Mod: CPTII,S$GLB,, | Performed by: ANESTHESIOLOGY

## 2023-12-15 PROCEDURE — 3288F PR FALLS RISK ASSESSMENT DOCUMENTED: ICD-10-PCS | Mod: CPTII,S$GLB,, | Performed by: ANESTHESIOLOGY

## 2023-12-15 PROCEDURE — 3044F PR MOST RECENT HEMOGLOBIN A1C LEVEL <7.0%: ICD-10-PCS | Mod: CPTII,S$GLB,, | Performed by: ANESTHESIOLOGY

## 2023-12-15 PROCEDURE — 4010F PR ACE/ARB THEARPY RXD/TAKEN: ICD-10-PCS | Mod: CPTII,S$GLB,, | Performed by: ANESTHESIOLOGY

## 2023-12-15 PROCEDURE — 4010F ACE/ARB THERAPY RXD/TAKEN: CPT | Mod: CPTII,S$GLB,, | Performed by: ANESTHESIOLOGY

## 2023-12-15 PROCEDURE — 3008F PR BODY MASS INDEX (BMI) DOCUMENTED: ICD-10-PCS | Mod: CPTII,S$GLB,, | Performed by: ANESTHESIOLOGY

## 2023-12-15 PROCEDURE — 3075F SYST BP GE 130 - 139MM HG: CPT | Mod: CPTII,S$GLB,, | Performed by: ANESTHESIOLOGY

## 2023-12-15 PROCEDURE — 1126F AMNT PAIN NOTED NONE PRSNT: CPT | Mod: CPTII,S$GLB,, | Performed by: ANESTHESIOLOGY

## 2023-12-15 PROCEDURE — 1101F PR PT FALLS ASSESS DOC 0-1 FALLS W/OUT INJ PAST YR: ICD-10-PCS | Mod: CPTII,S$GLB,, | Performed by: ANESTHESIOLOGY

## 2023-12-15 PROCEDURE — 3078F PR MOST RECENT DIASTOLIC BLOOD PRESSURE < 80 MM HG: ICD-10-PCS | Mod: CPTII,S$GLB,, | Performed by: ANESTHESIOLOGY

## 2023-12-15 PROCEDURE — 1159F MED LIST DOCD IN RCRD: CPT | Mod: CPTII,S$GLB,, | Performed by: ANESTHESIOLOGY

## 2023-12-15 PROCEDURE — 1101F PT FALLS ASSESS-DOCD LE1/YR: CPT | Mod: CPTII,S$GLB,, | Performed by: ANESTHESIOLOGY

## 2023-12-15 PROCEDURE — 3288F FALL RISK ASSESSMENT DOCD: CPT | Mod: CPTII,S$GLB,, | Performed by: ANESTHESIOLOGY

## 2023-12-15 RX ORDER — GABAPENTIN 300 MG/1
300 CAPSULE ORAL NIGHTLY
Qty: 30 CAPSULE | Refills: 1 | Status: SHIPPED | OUTPATIENT
Start: 2023-12-15 | End: 2024-01-25 | Stop reason: SDUPTHER

## 2023-12-15 NOTE — PROGRESS NOTES
New Patient Interventional Pain Note (Initial Visit)    Referring Physician: Tia Lopez MD    PCP: Tia Lopez MD    Chief Complaint:   Lower Back Pain       SUBJECTIVE:    Conner Lombardi Jr. is a 73 y.o. male who presents to the clinic for the evaluation of lower back pain.     Patient reports 4 month history of lower back pain.  Patient denies any significant inciting traumas to his lower back pain.  Patient denies any previous surgical intervention in his lumbar spine.  Lower back pain described as an aching throbbing pain that starts in the midline of lower back.  This pain will then radiate to his bilateral hips and into his bilateral anterior thighs to just above the knee.  Patient denies any significant radiation beyond this point.  Pain is worse with flexion and sitting for prolonged periods of time, better with stretching and anti-inflammatories.  Pain is currently rated a 2/10, but can increase to a 7/10 with exacerbating activities. Denies any fevers, chills, saddle anesthesia, or bowel and bladder incontinence      Non-Pharmacologic Treatments:  Physical Therapy/Home Exercise: yes  Ice/Heat:yes  TENS: no  Acupuncture: no  Massage: yes  Chiropractic: no        Previous Pain Medications:  Tylenol, ibuprofen, tizanidine, topicals     report:  Reviewed and consistent with medication use as prescribed.    Pain Procedures:   None    Pain Disability Index Review:         12/15/2023     8:34 AM   Last 3 PDI Scores   Pain Disability Index (PDI) 35       Imaging:       Results for orders placed during the hospital encounter of 12/07/23    X-Ray Lumbar Spine AP And Lateral    Narrative  EXAM: XR LUMBAR SPINE AP AND LATERAL    TECHNIQUE: 3 views of the lumbar spine    CLINICAL HISTORY: RFS#[M54.42]-Lumbago with sciatica, left side./[M54.41]-Lumbago with sciatica, right side.:    FINDINGS:  Moderate to severe multilevel discogenic degenerative change most notable at L4-5 and L5-S1.  Moderate  lower lumbar facet arthropathy.  Congenitally narrow spinal canal.  Lumbar vertebral body height and alignment are normal.  No evidence of acute fracture.    Impression  No acute radiographic abnormality.    Finalized on: 12/7/2023 4:53 PM By:  Rickie Sepulveda MD  [facility]# [id number]      2023-12-07 16:55:[id number]    [facility]    Results for orders placed during the hospital encounter of 12/07/23    X-Ray Hips Bilateral 2 View Inc AP Pelvis    Narrative  EXAM: XR HIPS BILATERAL 2 VIEW INCL AP PELVIS    TECHNIQUE: 5 views of the hips    CLINICAL HISTORY:  :RFS#[M25.551]-Pain in right hip./[M25.552]-Pain in left hip    FINDINGS:  Bone-on-bone bilateral hip joint space narrowing with articular surface sclerosis and osteophyte formation.  Mild remodeling of the superior aspect bilateral femoral heads.  No fracture, suspicious bone marrow lesion or other acute disease is seen in the pelvis or either hip.  Joint alignment is anatomic.  There is no radiographic evidence of femoral head osteonecrosis.    Impression  Bone-on-bone bilateral hip joint space narrowing.    Finalized on: 12/7/2023 3:25 PM By:  Rickie Sepulveda MD  [facility]# [id number]      2023-12-07 15:27:[id number]    [facility]          Past Medical History:   Diagnosis Date    Epilepsy     Last seizure 2010.     Hypertension     Tubular adenoma of colon 04/02/2012     Past Surgical History:   Procedure Laterality Date    COLONOSCOPY N/A 07/12/2021    Procedure: COLONOSCOPY;  Surgeon: Subhash Martin MD;  Location: [facility];  Service: Endoscopy;  Laterality: N/A;    TONSILLECTOMY      VASECTOMY  1984/1985     Social History     Socioeconomic History    Marital status:    Tobacco Use    Smoking status: Never    Smokeless tobacco: Never   Substance and Sexual Activity    Alcohol use: No    Drug use: Never    Sexual activity: Not Currently     Partners: Female     Birth control/protection: None     Social Determinants of Health     Financial Resource Strain: Low Risk  (12/6/2023)     Overall Financial Resource Strain (CARDIA)     Difficulty of Paying Living Expenses: Not hard at all   Food Insecurity: No Food Insecurity (12/6/2023)    Hunger Vital Sign     Worried About Running Out of Food in the Last Year: Never true     Ran Out of Food in the Last Year: Never true   Transportation Needs: No Transportation Needs (12/6/2023)    PRAPARE - Transportation     Lack of Transportation (Medical): No     Lack of Transportation (Non-Medical): No   Physical Activity: Insufficiently Active (12/6/2023)    Exercise Vital Sign     Days of Exercise per Week: 2 days     Minutes of Exercise per Session: 20 min   Stress: No Stress Concern Present (12/6/2023)    [employer] - Occupational Stress Questionnaire     Feeling of Stress : Only a little   Social Connections: Unknown (12/6/2023)    Social Connection and Isolation Panel [NHANES]     Frequency of Communication with Friends and Family: Once a week     Frequency of Social Gatherings with Friends and Family: Once a week     Active Member of Clubs or Organizations: No     Attends Club or Organization Meetings: Patient declined     Marital Status:    Housing Stability: Low Risk  (12/6/2023)    Housing Stability Vital Sign     Unable to Pay for Housing in the Last Year: No     Number of Places Lived in the Last Year: 1     Unstable Housing in the Last Year: No     Family History   Problem Relation Age of Onset    Hypertension Unknown     Cancer Mother         Brain    Cancer Father         Liver and lung    Hypertension Father        Review of patient's allergies indicates:  No Known Allergies    Current Outpatient Medications   Medication Sig    aspirin (ECOTRIN) 81 MG EC tablet Take 81 mg by mouth once daily.    cholecalciferol, vitamin D3, (VITAMIN D3) 25 mcg (1,000 unit) capsule Take 1 capsule (1,000 Units total) by mouth once daily.    KRILL OIL ORAL Take by mouth.    lisinopriL-hydrochlorothiazide (PRINZIDE,ZESTORETIC)  "20-25 mg Tab Take 1 tablet by mouth once daily.    phenytoin (DILANTIN) 100 MG ER capsule Take 4 capsules (400 mg total) by mouth once daily.    gabapentin (NEURONTIN) 300 MG capsule Take 1 capsule (300 mg total) by mouth every evening.     No current facility-administered medications for this visit.         ROS  Review of Systems   Constitutional:  Negative for activity change, appetite change and fever.   HENT:  Negative for facial swelling, rhinorrhea and sore throat.    Eyes:  Negative for pain and redness.   Respiratory:  Negative for cough, chest tightness, shortness of breath, wheezing and stridor.    Cardiovascular:  Negative for chest pain and palpitations.   Gastrointestinal:  Negative for abdominal pain, blood in stool, constipation, diarrhea, nausea and vomiting.   Endocrine: Negative for polydipsia, polyphagia and polyuria.   Genitourinary:  Negative for dysuria and hematuria.   Musculoskeletal:  Positive for arthralgias, back pain, gait problem and myalgias. Negative for joint swelling, neck pain and neck stiffness.   Skin:  Negative for rash.   Allergic/Immunologic: Negative for food allergies.   Neurological:  Positive for weakness and numbness. Negative for dizziness, tremors, seizures, syncope, facial asymmetry, speech difficulty, light-headedness and headaches.   Psychiatric/Behavioral:  Negative for agitation, hallucinations, self-injury and suicidal ideas. The patient is not nervous/anxious and is not hyperactive.             OBJECTIVE:  /78   Pulse 102   Ht 5' 6" (1.676 m)   Wt 84.9 kg (187 lb 2.7 oz)   BMI 30.21 kg/m²         Physical Exam  Constitutional:       General: He is not in acute distress.     Appearance: Normal appearance. He is not ill-appearing.   HENT:      Head: Normocephalic and atraumatic.      Nose: No congestion or rhinorrhea.   Eyes:      Extraocular Movements: Extraocular movements intact.      Pupils: Pupils are equal, round, and reactive to light.   Pulmonary: "      Effort: Pulmonary effort is normal.   Abdominal:      General: Abdomen is flat.      Palpations: Abdomen is soft.   Musculoskeletal:      Right hip: Tenderness and crepitus present. No deformity, lacerations or bony tenderness. Normal range of motion. Normal strength.      Left hip: Tenderness present. No deformity, lacerations, bony tenderness or crepitus. Normal range of motion. Decreased strength.   Skin:     General: Skin is warm and dry.      Capillary Refill: Capillary refill takes less than 2 seconds.   Neurological:      General: No focal deficit present.      Mental Status: He is alert and oriented to person, place, and time.      Sensory: No sensory deficit.      Motor: Weakness present. No abnormal muscle tone.      Gait: Gait normal.      Deep Tendon Reflexes:      Reflex Scores:       Patellar reflexes are 2+ on the right side and 2+ on the left side.       Achilles reflexes are 1+ on the right side.     Comments: 4/5 strength in left knee extension   Psychiatric:         Mood and Affect: Mood normal.         Behavior: Behavior normal.         Thought Content: Thought content normal.         Musculoskeletal:    Lumbar Exam  Incision: no  Pain with Flexion: yes, flexion worse than extension  Pain with Extension: yes  ROM:  Decreased  Paraspinous TTP:  Positive bilaterally  Facet TTP:  L5-S1  Facet Loading: positive bialterally  SLR: positive on the left at 75 degrees  SIJ TTP: negative bilaterally  JANA: positive on the left      LABS:  Lab Results   Component Value Date    WBC 5.28 07/24/2023    HGB 13.1 (L) 07/24/2023    HCT 37.8 (L) 07/24/2023    MCV 99 (H) 07/24/2023     07/24/2023       CMP  Sodium   Date Value Ref Range Status   07/24/2023 140 136 - 145 mmol/L Final     Potassium   Date Value Ref Range Status   07/24/2023 4.3 3.5 - 5.1 mmol/L Final     Chloride   Date Value Ref Range Status   07/24/2023 103 95 - 110 mmol/L Final     CO2   Date Value Ref Range Status   07/24/2023 25 23  - 29 mmol/L Final     Glucose   Date Value Ref Range Status   07/24/2023 115 (H) 70 - 110 mg/dL Final     BUN   Date Value Ref Range Status   07/24/2023 19 8 - 23 mg/dL Final     Creatinine   Date Value Ref Range Status   07/24/2023 1.0 0.5 - 1.4 mg/dL Final     Calcium   Date Value Ref Range Status   07/24/2023 10.2 8.7 - 10.5 mg/dL Final     Total Protein   Date Value Ref Range Status   07/24/2023 7.3 6.0 - 8.4 g/dL Final     Albumin   Date Value Ref Range Status   07/24/2023 4.0 3.5 - 5.2 g/dL Final     Total Bilirubin   Date Value Ref Range Status   07/24/2023 0.4 0.1 - 1.0 mg/dL Final     Comment:     For infants and newborns, interpretation of results should be based  on gestational age, weight and in agreement with clinical  observations.    Premature Infant recommended reference ranges:  Up to 24 hours.............<8.0 mg/dL  Up to 48 hours............<12.0 mg/dL  3-5 days..................<15.0 mg/dL  6-29 days.................<15.0 mg/dL       Alkaline Phosphatase   Date Value Ref Range Status   07/24/2023 90 55 - 135 U/L Final     AST   Date Value Ref Range Status   07/24/2023 20 10 - 40 U/L Final     ALT   Date Value Ref Range Status   07/24/2023 21 10 - 44 U/L Final     Anion Gap   Date Value Ref Range Status   07/24/2023 12 8 - 16 mmol/L Final     eGFR if    Date Value Ref Range Status   01/19/2022 >60.0 >60 mL/min/1.73 m^2 Final     eGFR if non    Date Value Ref Range Status   01/19/2022 >60.0 >60 mL/min/1.73 m^2 Final     Comment:     Calculation used to obtain the estimated glomerular filtration  rate (eGFR) is the CKD-EPI equation.          Lab Results   Component Value Date    HGBA1C 5.7 (H) 07/24/2023             ASSESSMENT:       73 y.o. year old male with lower back pain, consistent with     1. Lumbar radiculopathy  MRI Lumbar Spine Without Contrast    Ambulatory referral/consult to Physical/Occupational Therapy    gabapentin (NEURONTIN) 300 MG capsule      2.  Low back pain with bilateral sciatica, unspecified back pain laterality, unspecified chronicity  Ambulatory referral/consult to Pain Clinic    gabapentin (NEURONTIN) 300 MG capsule      3. Lumbar spondylosis  gabapentin (NEURONTIN) 300 MG capsule      4. DDD (degenerative disc disease), lumbar  gabapentin (NEURONTIN) 300 MG capsule      5. Dorsalgia, unspecified  gabapentin (NEURONTIN) 300 MG capsule      6. Spinal stenosis, lumbosacral region  Ambulatory referral/consult to Physical/Occupational Therapy    gabapentin (NEURONTIN) 300 MG capsule        Lumbar radiculopathy  -     MRI Lumbar Spine Without Contrast; Future; Expected date: 12/15/2023  -     Ambulatory referral/consult to Physical/Occupational Therapy; Future; Expected date: 12/22/2023  -     gabapentin (NEURONTIN) 300 MG capsule; Take 1 capsule (300 mg total) by mouth every evening.  Dispense: 30 capsule; Refill: 1    Low back pain with bilateral sciatica, unspecified back pain laterality, unspecified chronicity  -     Ambulatory referral/consult to Pain Clinic  -     gabapentin (NEURONTIN) 300 MG capsule; Take 1 capsule (300 mg total) by mouth every evening.  Dispense: 30 capsule; Refill: 1    Lumbar spondylosis  -     gabapentin (NEURONTIN) 300 MG capsule; Take 1 capsule (300 mg total) by mouth every evening.  Dispense: 30 capsule; Refill: 1    DDD (degenerative disc disease), lumbar  -     gabapentin (NEURONTIN) 300 MG capsule; Take 1 capsule (300 mg total) by mouth every evening.  Dispense: 30 capsule; Refill: 1    Dorsalgia, unspecified  -     gabapentin (NEURONTIN) 300 MG capsule; Take 1 capsule (300 mg total) by mouth every evening.  Dispense: 30 capsule; Refill: 1    Spinal stenosis, lumbosacral region  -     Ambulatory referral/consult to Physical/Occupational Therapy; Future; Expected date: 12/22/2023  -     gabapentin (NEURONTIN) 300 MG capsule; Take 1 capsule (300 mg total) by mouth every evening.  Dispense: 30 capsule; Refill: 1              PLAN:   - Interventions:   None at this time.  We will obtain updated imaging of lumbar spine to further evaluate lower back pain that appears to be consistent with lumbar radiculopathy and lumbar stenosis.  Possible overlapping bilateral hip osteoarthritis.  - Anticoagulation use:   No no anticoagulation    - Medications:   Start gabapentin 300 mg at night    - Therapy:    Patient has completed 6 weeks of formal physical therapy with moderate improvement.  We will for patient for a 2nd round of formal physical therapy for lower back pain and bilateral lower extremity pain consistent with lumbar stenosis.     - Imaging/Diagnostic:   X-rays of lumbar spine reviewed and findings discussed with patient.  Consistent for advanced loss of disc height at L5-S1 and congenital spinal narrowing.   We will obtain MRI of lumbar spine without contrast to further evaluate lower back pain and bilateral lumbar radiculopathy with possible lumbar stenosis symptoms were noted a congenital spinal narrowing on x-rays that is continued despite over 8 weeks of conservative therapy    - Consults:   None at this time        - Patient Questions: Answered all of the patient's questions regarding diagnosis, therapy, and treatment     This condition does not require this patient to take time off of work, and the primary goal of our Pain Management services is to improve the patient's functional capacity.     - Follow up visit: return to clinic in 5-6 weeks        The above plan and management options were discussed at length with patient. Patient is in agreement with the above and verbalized understanding.    I discussed the goals of interventional chronic pain management with the patient on today's visit.  I explained the utility of injections for diagnostic and therapeutic purposes.  We discussed a multimodal approach to pain including treating the patient's given worst pain at any given time.  We will use a systematic approach to  addressing pain.  We will also adopt a multimodal approach that includes injections, adjuvant medications, physical therapy, at times psychiatry.  There may be a limited role for opioid use intermittently in the treatment of pain, more particularly for acute pain although no one approach can be used as a sole treatment modality.    I emphasized the importance of regular exercise, core strengthening and stretching, diet and weight loss as a cornerstone of long-term pain management.      Helder Taylor MD  Interventional Pain Management  [facility]    Disclaimer:  This note was prepared using voice recognition system and is likely to have sound alike errors that may have been overlooked even after proof reading.  Please call me with any questions

## 2023-12-18 ENCOUNTER — HOSPITAL ENCOUNTER (OUTPATIENT)
Dept: RADIOLOGY | Facility: HOSPITAL | Age: 73
Discharge: HOME | End: 2023-12-18
Attending: ANESTHESIOLOGY
Payer: COMMERCIAL

## 2023-12-18 DIAGNOSIS — M54.16 LUMBAR RADICULOPATHY: ICD-10-CM

## 2023-12-18 PROCEDURE — 72148 MRI LUMBAR SPINE WITHOUT CONTRAST: ICD-10-PCS | Mod: 26,,, | Performed by: RADIOLOGY

## 2023-12-18 PROCEDURE — 72148 MRI LUMBAR SPINE W/O DYE: CPT | Mod: TC

## 2023-12-18 PROCEDURE — 72148 MRI LUMBAR SPINE W/O DYE: CPT | Mod: 26,,, | Performed by: RADIOLOGY

## 2024-01-19 DIAGNOSIS — G40.909 NONINTRACTABLE EPILEPSY WITHOUT STATUS EPILEPTICUS, UNSPECIFIED EPILEPSY TYPE: ICD-10-CM

## 2024-01-19 RX ORDER — PHENYTOIN SODIUM 100 MG/1
CAPSULE, EXTENDED RELEASE ORAL
Qty: 120 CAPSULE | Refills: 0 | Status: SHIPPED | OUTPATIENT
Start: 2024-01-19 | End: 2024-01-25 | Stop reason: SDUPTHER

## 2024-01-25 ENCOUNTER — LAB VISIT (OUTPATIENT)
Dept: LAB | Facility: HOSPITAL | Age: 74
End: 2024-01-25
Attending: NURSE PRACTITIONER
Payer: COMMERCIAL

## 2024-01-25 ENCOUNTER — OFFICE VISIT (OUTPATIENT)
Dept: PAIN MEDICINE | Facility: CLINIC | Age: 74
End: 2024-01-25
Payer: COMMERCIAL

## 2024-01-25 ENCOUNTER — OFFICE VISIT (OUTPATIENT)
Dept: NEUROLOGY | Facility: CLINIC | Age: 74
End: 2024-01-25
Payer: COMMERCIAL

## 2024-01-25 VITALS
WEIGHT: 185.19 LBS | RESPIRATION RATE: 17 BRPM | HEIGHT: 66 IN | BODY MASS INDEX: 29.76 KG/M2 | HEART RATE: 102 BPM | SYSTOLIC BLOOD PRESSURE: 143 MMHG | DIASTOLIC BLOOD PRESSURE: 82 MMHG

## 2024-01-25 VITALS
SYSTOLIC BLOOD PRESSURE: 130 MMHG | BODY MASS INDEX: 29.94 KG/M2 | RESPIRATION RATE: 16 BRPM | DIASTOLIC BLOOD PRESSURE: 68 MMHG | HEIGHT: 66 IN | WEIGHT: 186.31 LBS

## 2024-01-25 DIAGNOSIS — G40.109 TEMPORAL LOBE EPILEPSY: ICD-10-CM

## 2024-01-25 DIAGNOSIS — G40.909 NONINTRACTABLE EPILEPSY WITHOUT STATUS EPILEPTICUS, UNSPECIFIED EPILEPSY TYPE: ICD-10-CM

## 2024-01-25 DIAGNOSIS — M47.816 LUMBAR SPONDYLOSIS: ICD-10-CM

## 2024-01-25 DIAGNOSIS — M47.816 FACET ARTHRITIS OF LUMBAR REGION: ICD-10-CM

## 2024-01-25 DIAGNOSIS — M54.41 LOW BACK PAIN WITH BILATERAL SCIATICA, UNSPECIFIED BACK PAIN LATERALITY, UNSPECIFIED CHRONICITY: ICD-10-CM

## 2024-01-25 DIAGNOSIS — M54.9 DORSALGIA, UNSPECIFIED: ICD-10-CM

## 2024-01-25 DIAGNOSIS — M54.16 LUMBAR RADICULOPATHY: Primary | ICD-10-CM

## 2024-01-25 DIAGNOSIS — G40.909 NONINTRACTABLE EPILEPSY WITHOUT STATUS EPILEPTICUS, UNSPECIFIED EPILEPSY TYPE: Primary | ICD-10-CM

## 2024-01-25 DIAGNOSIS — M54.42 LOW BACK PAIN WITH BILATERAL SCIATICA, UNSPECIFIED BACK PAIN LATERALITY, UNSPECIFIED CHRONICITY: ICD-10-CM

## 2024-01-25 DIAGNOSIS — M48.07 SPINAL STENOSIS, LUMBOSACRAL REGION: ICD-10-CM

## 2024-01-25 DIAGNOSIS — M16.0 BILATERAL PRIMARY OSTEOARTHRITIS OF HIP: ICD-10-CM

## 2024-01-25 DIAGNOSIS — M51.36 DDD (DEGENERATIVE DISC DISEASE), LUMBAR: ICD-10-CM

## 2024-01-25 PROCEDURE — 1160F RVW MEDS BY RX/DR IN RCRD: CPT | Mod: CPTII,S$GLB,, | Performed by: NURSE PRACTITIONER

## 2024-01-25 PROCEDURE — 3075F SYST BP GE 130 - 139MM HG: CPT | Mod: CPTII,S$GLB,, | Performed by: NURSE PRACTITIONER

## 2024-01-25 PROCEDURE — 3079F DIAST BP 80-89 MM HG: CPT | Mod: CPTII,S$GLB,, | Performed by: PHYSICIAN ASSISTANT

## 2024-01-25 PROCEDURE — 3288F FALL RISK ASSESSMENT DOCD: CPT | Mod: CPTII,S$GLB,, | Performed by: NURSE PRACTITIONER

## 2024-01-25 PROCEDURE — 99213 OFFICE O/P EST LOW 20 MIN: CPT | Mod: S$GLB,,, | Performed by: NURSE PRACTITIONER

## 2024-01-25 PROCEDURE — 3078F DIAST BP <80 MM HG: CPT | Mod: CPTII,S$GLB,, | Performed by: NURSE PRACTITIONER

## 2024-01-25 PROCEDURE — 1125F AMNT PAIN NOTED PAIN PRSNT: CPT | Mod: CPTII,S$GLB,, | Performed by: NURSE PRACTITIONER

## 2024-01-25 PROCEDURE — 99999 PR PBB SHADOW E&M-EST. PATIENT-LVL III: CPT | Mod: PBBFAC,,, | Performed by: PHYSICIAN ASSISTANT

## 2024-01-25 PROCEDURE — 36415 COLL VENOUS BLD VENIPUNCTURE: CPT | Performed by: NURSE PRACTITIONER

## 2024-01-25 PROCEDURE — 1101F PT FALLS ASSESS-DOCD LE1/YR: CPT | Mod: CPTII,S$GLB,, | Performed by: NURSE PRACTITIONER

## 2024-01-25 PROCEDURE — 99999 PR PBB SHADOW E&M-EST. PATIENT-LVL III: CPT | Mod: PBBFAC,,, | Performed by: NURSE PRACTITIONER

## 2024-01-25 PROCEDURE — 3077F SYST BP >= 140 MM HG: CPT | Mod: CPTII,S$GLB,, | Performed by: PHYSICIAN ASSISTANT

## 2024-01-25 PROCEDURE — 1159F MED LIST DOCD IN RCRD: CPT | Mod: CPTII,S$GLB,, | Performed by: NURSE PRACTITIONER

## 2024-01-25 PROCEDURE — 1159F MED LIST DOCD IN RCRD: CPT | Mod: CPTII,S$GLB,, | Performed by: PHYSICIAN ASSISTANT

## 2024-01-25 PROCEDURE — 80185 ASSAY OF PHENYTOIN TOTAL: CPT | Performed by: NURSE PRACTITIONER

## 2024-01-25 PROCEDURE — 1125F AMNT PAIN NOTED PAIN PRSNT: CPT | Mod: CPTII,S$GLB,, | Performed by: PHYSICIAN ASSISTANT

## 2024-01-25 PROCEDURE — 3008F BODY MASS INDEX DOCD: CPT | Mod: CPTII,S$GLB,, | Performed by: PHYSICIAN ASSISTANT

## 2024-01-25 PROCEDURE — 99213 OFFICE O/P EST LOW 20 MIN: CPT | Mod: S$GLB,,, | Performed by: PHYSICIAN ASSISTANT

## 2024-01-25 PROCEDURE — 3008F BODY MASS INDEX DOCD: CPT | Mod: CPTII,S$GLB,, | Performed by: NURSE PRACTITIONER

## 2024-01-25 RX ORDER — PHENYTOIN SODIUM 100 MG/1
400 CAPSULE, EXTENDED RELEASE ORAL NIGHTLY
Qty: 360 CAPSULE | Refills: 3 | Status: SHIPPED | OUTPATIENT
Start: 2024-01-25 | End: 2024-02-19 | Stop reason: SDUPTHER

## 2024-01-25 RX ORDER — GABAPENTIN 300 MG/1
300 CAPSULE ORAL NIGHTLY
Qty: 30 CAPSULE | Refills: 2 | Status: SHIPPED | OUTPATIENT
Start: 2024-01-25 | End: 2024-05-19

## 2024-01-25 NOTE — PROGRESS NOTES
Subjective:       Patient ID: Conner Lombardi Jr. is a 73 y.o. male.    Chief Complaint: Temporal lobe epilepsy and Medication Refill          HPI       The patient was referred by Dr. Lopez for seizure evaluation.     The patient is new to me. Used to see Dr. Ramirez and Dr. Rainey.    The patient started having seizures since he was an infant after forceps delivery. The seizures start with dizziness, could progress to inability to communicate and GTC (grand mal seizures). He was maintained on Primidone and PHT was added much later. The last seizure was in  and was attributed to rapid tapering of Primidone. He is currently doing very well on  mg QHS with no seizure and no side effects. On 11- PHT was 15.0. On 11- Brain MRI was unremarkable. On 03- EEG LT TLE. Patient present for follow up for Epilepsy [facility]. Patient doing quite well, no seizures. The last seizure was in . He is currently doing very well on  mg QHS no side effects. 05- PHT Level 13.6. No new neurological complaints.       INTERVAL HISTORY 01-: Patient present for follow up for annual follow up for Epilepsy management and medication refills. Patient doing very well, no seizures. The last seizure was in . He is currently doing very well on  mg QHS no side effects. 05- PHT Level 13.6. No new neurological complaints.       Review of Systems   Constitutional:  Negative for appetite change and fatigue.   HENT:  Negative for hearing loss and tinnitus.    Eyes:  Negative for photophobia and visual disturbance.   Respiratory:  Negative for apnea and shortness of breath.    Cardiovascular:  Negative for chest pain and palpitations.   Gastrointestinal:  Negative for nausea and vomiting.   Endocrine: Negative for cold intolerance and heat intolerance.   Genitourinary:  Negative for difficulty urinating and urgency.   Musculoskeletal:  Negative for arthralgias, back pain,  gait problem, joint swelling, myalgias, neck pain and neck stiffness.   Skin:  Negative for color change and rash.   Allergic/Immunologic: Negative for environmental allergies and immunocompromised state.   Neurological:  Negative for dizziness, tremors, seizures, syncope, facial asymmetry, speech difficulty, weakness, light-headedness, numbness and headaches.   Hematological:  Negative for adenopathy. Does not bruise/bleed easily.   Psychiatric/Behavioral:  Negative for agitation, behavioral problems, confusion, decreased concentration, dysphoric mood, hallucinations, self-injury, sleep disturbance and suicidal ideas. The patient is not hyperactive.                  Current Outpatient Medications:     aspirin (ECOTRIN) 81 MG EC tablet, Take 81 mg by mouth once daily., Disp: , Rfl:     cholecalciferol, vitamin D3, (VITAMIN D3) 25 mcg (1,000 unit) capsule, Take 1 capsule (1,000 Units total) by mouth once daily., Disp: , Rfl: 0    gabapentin (NEURONTIN) 300 MG capsule, Take 1 capsule (300 mg total) by mouth every evening., Disp: 30 capsule, Rfl: 1    KRILL OIL ORAL, Take by mouth., Disp: , Rfl:     lisinopriL-hydrochlorothiazide (PRINZIDE,ZESTORETIC) 20-25 mg Tab, Take 1 tablet by mouth once daily., Disp: 90 tablet, Rfl: 3    phenytoin (DILANTIN) 100 MG ER capsule, Take 4 capsules (400 mg total) by mouth every evening., Disp: 360 capsule, Rfl: 3  Past Medical History:   Diagnosis Date    Epilepsy     Last seizure 2010.     Hypertension     Tubular adenoma of colon 04/02/2012     Past Surgical History:   Procedure Laterality Date    COLONOSCOPY N/A 07/12/2021    Procedure: COLONOSCOPY;  Surgeon: Subhash Martin MD;  Location: [facility];  Service: Endoscopy;  Laterality: N/A;    TONSILLECTOMY      VASECTOMY  1984/1985     Social History     Socioeconomic History    Marital status:    Tobacco Use    Smoking status: Never    Smokeless tobacco: Never   Substance and Sexual Activity    Alcohol use: No    Drug  use: Never    Sexual activity: Not Currently     Partners: Female     Birth control/protection: None     Social Determinants of Health     Financial Resource Strain: Low Risk  (12/6/2023)    Overall Financial Resource Strain (CARDIA)     Difficulty of Paying Living Expenses: Not hard at all   Food Insecurity: No Food Insecurity (12/6/2023)    Hunger Vital Sign     Worried About Running Out of Food in the Last Year: Never true     Ran Out of Food in the Last Year: Never true   Transportation Needs: No Transportation Needs (12/6/2023)    PRAPARE - Transportation     Lack of Transportation (Medical): No     Lack of Transportation (Non-Medical): No   Physical Activity: Insufficiently Active (12/6/2023)    Exercise Vital Sign     Days of Exercise per Week: 2 days     Minutes of Exercise per Session: 20 min   Stress: No Stress Concern Present (12/6/2023)    [employer] - Occupational Stress Questionnaire     Feeling of Stress : Only a little   Social Connections: Unknown (12/6/2023)    Social Connection and Isolation Panel [NHANES]     Frequency of Communication with Friends and Family: Once a week     Frequency of Social Gatherings with Friends and Family: Once a week     Active Member of Clubs or Organizations: No     Attends Club or Organization Meetings: Patient declined     Marital Status:    Housing Stability: Low Risk  (12/6/2023)    Housing Stability Vital Sign     Unable to Pay for Housing in the Last Year: No     Number of Places Lived in the Last Year: 1     Unstable Housing in the Last Year: No             Past/Current Medical/Surgical History, Past/Current Social History, Past/Current Family History and Past/Current Medications were reviewed in detail.        Objective:           VITAL SIGNS WERE REVIEWED      GENERAL APPEARANCE:     The patient looks comfortable.    BMI 30.07 KG     No signs of respiratory distress.    Normal breathing pattern.    No dysmorphic  features    Normal eye contact.     GENERAL MEDICAL EXAM:    HEENT:  Head is atraumatic normocephalic. Fundoscopic (Ophthalmoscopic) exam showed no disc edema.      Neck and Axillae: No JVD. No visible lesions.    Cardiopulmonary: No cyanosis. No tachypnea. Normal respiratory effort.    Gastrointestinal/Urogenital:  No jaundice. No stomas or lesions. No visible hernias. No catheters.     Skin, Hair and Nails: No pathognonomic skin rash. No neurofibromatosis. No visible lesions.No stigmata of autoimmune disease. No clubbing.    Limbs: No varicose veins. No visible swelling.    Muskoskeletal: No visible deformities.No visible lesions.           Neurologic Exam     Mental Status   Oriented to person, place, and time.   Registration: recalls 3 of 3 objects. Recall at 5 minutes: recalls 3 of 3 objects. Follows 3 step commands.   Attention: normal. Concentration: normal.   Speech: speech is normal   Level of consciousness: alert  Knowledge: good and consistent with education. Able to perform simple calculations.   Able to name object. Able to read. Able to repeat. Able to write. Normal comprehension.     Cranial Nerves   Cranial nerves II through XII intact.     CN II   Visual fields full to confrontation.   Visual acuity: normal with correction  Right visual field deficit: none  Left visual field deficit: none     CN III, IV, VI   Pupils are equal, round, and reactive to light.  Extraocular motions are normal.   Right pupil: Size: 2 mm. Shape: regular. Reactivity: brisk. Consensual response: intact. Accommodation: intact.   Left pupil: Size: 2 mm. Shape: regular. Reactivity: brisk. Consensual response: intact. Accommodation: intact.   CN III: no CN III palsy  CN VI: no CN VI palsy  Nystagmus: none   Diplopia: none  Ophthalmoparesis: none  Upgaze: normal  Downgaze: normal  Conjugate gaze: present  Vestibulo-ocular reflex: present    CN V   Facial sensation intact.   Right facial sensation deficit: none  Left facial  sensation deficit: none    CN VII   Facial expression full, symmetric.   Right facial weakness: none  Left facial weakness: none    CN VIII   CN VIII normal.   Hearing: intact    CN IX, X   CN IX normal.   CN X normal.   Palate: symmetric    CN XI   CN XI normal.   Right sternocleidomastoid strength: normal  Left sternocleidomastoid strength: normal  Right trapezius strength: normal  Left trapezius strength: normal    CN XII   CN XII normal.   Tongue: not atrophic  Fasciculations: absent  Tongue deviation: none    Motor Exam   Muscle bulk: normal  Overall muscle tone: normal  Right arm tone: normal  Left arm tone: normal  Right arm pronator drift: absent  Left arm pronator drift: absent  Right leg tone: normal  Left leg tone: normal    Strength   Strength 5/5 throughout.   Right neck flexion: 5/5  Left neck flexion: 5/5  Right neck extension: 5/5  Left neck extension: 5/5  Right deltoid: 5/5  Left deltoid: 5/5  Right biceps: 5/5  Left biceps: 5/5  Right triceps: 5/5  Left triceps: 5/5  Right wrist flexion: 5/5  Left wrist flexion: 5/5  Right wrist extension: 5/5  Left wrist extension: 5/5  Right interossei: 5/5  Left interossei: 5/5  Right iliopsoas: 5/5  Left iliopsoas: 5/5  Right quadriceps: 5/5  Left quadriceps: 5/5  Right hamstrin/5  Left hamstrin/5  Right glutei: 5/5  Left glutei: 5/5  Right anterior tibial: 5/5  Left anterior tibial: 5/5  Right posterior tibial: 5/5  Left posterior tibial: 5/5  Right peroneal: 5/5  Left peroneal: 5/5  Right gastroc: 5/5  Left gastroc: 5/5    Sensory Exam   Light touch normal.   Right arm light touch: normal  Left arm light touch: normal  Right leg light touch: normal  Left leg light touch: normal  Vibration normal.   Right arm vibration: normal  Left arm vibration: normal  Right leg vibration: normal  Left leg vibration: normal  Proprioception normal.   Right arm proprioception: normal  Left arm proprioception: normal  Right leg proprioception: normal  Left leg  proprioception: normal  Pinprick normal.   Right arm pinprick: normal  Left arm pinprick: normal  Right leg pinprick: normal  Left leg pinprick: normal  Graphesthesia: normal  Stereognosis: normal    Gait, Coordination, and Reflexes     Gait  Gait: normal    Coordination   Romberg: negative  Finger to nose coordination: normal  Heel to shin coordination: normal    Tremor   Resting tremor: absent  Intention tremor: absent  Action tremor: absent    Reflexes   Right brachioradialis: 2+  Left brachioradialis: 2+  Right biceps: 2+  Left biceps: 2+  Right triceps: 2+  Left triceps: 2+  Right patellar: 2+  Left patellar: 2+  Right achilles: 1+  Left achilles: 1+  Right plantar: normal  Left plantar: normal  Right Harris: absent  Left Hraris: absent  Right ankle clonus: absent  Left ankle clonus: absent  Right pendular knee jerk: absent  Left pendular knee jerk: absent      Lab Results   Component Value Date    WBC 5.28 07/24/2023    HGB 13.1 (L) 07/24/2023    HCT 37.8 (L) 07/24/2023    MCV 99 (H) 07/24/2023     07/24/2023     Sodium   Date Value Ref Range Status   07/24/2023 140 136 - 145 mmol/L Final     Potassium   Date Value Ref Range Status   07/24/2023 4.3 3.5 - 5.1 mmol/L Final     Chloride   Date Value Ref Range Status   07/24/2023 103 95 - 110 mmol/L Final     CO2   Date Value Ref Range Status   07/24/2023 25 23 - 29 mmol/L Final     Glucose   Date Value Ref Range Status   07/24/2023 115 (H) 70 - 110 mg/dL Final     BUN   Date Value Ref Range Status   07/24/2023 19 8 - 23 mg/dL Final     Creatinine   Date Value Ref Range Status   07/24/2023 1.0 0.5 - 1.4 mg/dL Final     Calcium   Date Value Ref Range Status   07/24/2023 10.2 8.7 - 10.5 mg/dL Final     Total Protein   Date Value Ref Range Status   07/24/2023 7.3 6.0 - 8.4 g/dL Final     Albumin   Date Value Ref Range Status   07/24/2023 4.0 3.5 - 5.2 g/dL Final     Total Bilirubin   Date Value Ref Range Status   07/24/2023 0.4 0.1 - 1.0 mg/dL Final      Comment:     For infants and newborns, interpretation of results should be based  on gestational age, weight and in agreement with clinical  observations.    Premature Infant recommended reference ranges:  Up to 24 hours.............<8.0 mg/dL  Up to 48 hours............<12.0 mg/dL  3-5 days..................<15.0 mg/dL  6-29 days.................<15.0 mg/dL       Alkaline Phosphatase   Date Value Ref Range Status   07/24/2023 90 55 - 135 U/L Final     AST   Date Value Ref Range Status   07/24/2023 20 10 - 40 U/L Final     ALT   Date Value Ref Range Status   07/24/2023 21 10 - 44 U/L Final     Anion Gap   Date Value Ref Range Status   07/24/2023 12 8 - 16 mmol/L Final     eGFR if    Date Value Ref Range Status   01/19/2022 >60.0 >60 mL/min/1.73 m^2 Final     eGFR if non    Date Value Ref Range Status   01/19/2022 >60.0 >60 mL/min/1.73 m^2 Final     Comment:     Calculation used to obtain the estimated glomerular filtration  rate (eGFR) is the CKD-EPI equation.        Lab Results   Component Value Date    CWKFRUUG09 1029 (H) 08/29/2022     Lab Results   Component Value Date    TSH 1.526 07/24/2023    J8PKZSM 110 06/23/2010    F2RIWSM 4.5 06/23/2010 11-     Brain MRI was unremarkable.       03-    EEG LT TLE       AED LEVELS    AED: PHT  NORMAL LEVEL RANGE: 10-20   DATE LEVEL   11- 15.0   05- 13.6   05- 12.8   01- 14.7                    Reviewed the neuroimaging independently       Assessment:       1. Nonintractable epilepsy without status epilepticus, unspecified epilepsy type    2. Temporal lobe epilepsy    3. Nonintractable epilepsy without status epilepticus, unspecified epilepsy type            EPILEPSY CLASSIFICATION    SEMIOLOGY: VERTIGINOUS AURA-->APHASIC SEIZURE-->GTC     EPILEPTOGENIC ZONE (S): LT TL     ETIOLOGY: UNKNOWN     PRIOR AEDS: PRM.    CURRENT AEDS: PHT    LAST SEIZURE DATE:       COMPREHENSIVE LIST OF AEDs:      Acetazolamide (AZM-Diamox)   Benzos: clonazepam (CZP Klonopin), lorazepam (LZP-Ativan), diazepam (DZP-Valium), clorazepate (CLZ- Tranxene)  Brivaracetam (BRV-Briviact)  Cannabidiol (CBD- Epidiolex)  Carbamazepine (CBZ-Tegretol)  Cenobamate (CNB-Xcopri)  Clobazam (CLB-Onfi)  Eslicarbazepine (ESL-Aptiom)  Ethosuximide (ESX-Zarontin)  Felbamate (FBM-Felbatol)  Fenfluramine (FFA-Fintepla)  Gabapentin (GBP-Neurontin)  Lacosamide (LCM-Vimpat)  Lamotrigine (LTG-Lamitcal)  Levetiracetam (LEV- Keppra)  Oxcarbazepine (OXC-Trileptal)  Perampanel (PML-Fycompa)  Phenobarbital (PB)  Phenytoin (PHT-Dilantin)  Pregabalin (PGB-Lyrica)  Primidone (PRM)   Retigabine (RTG- Potiga) Discontinued in 2017  Rufinamide (RFN-Benzil)  Stiripentol (STP-Diacomit)  Tiagabine (TGB-Gabitril)  Topiramate (TPM-Topamax)  Valproate (VPA-Depakote)  Vigabatrin (VGB-Sabril)  Zonisamide (ZNS-Zonegran)    Plan:       NON-LESIONAL LT TLE, WELL-CONTROLED ON PHT MONOTHERAPY         The patient was encouraged to maintain full traditional seizure precautions which include but not limited to being careful with driving, high altitudes, being close to fire or fire source, being close to a body of water or swimming alone,operating heavy machinery and avoid using sharp objects if possible. The patient was encouraged to shower (without accumulation of water) instead of taking a bath if unsupervised. The patient was made aware that these precautions are especially important during concurrent illness. Adequate sleep and avoidance of alcohol as important measures to assure good seizure control were discussed with the patient. The patient was also advised to be careful with caring for children without company. The patient was advised to pad the side rails with pillows and blankets if applicable and sleep as close to the floor as possible. I strongly recommended lowering the bed to the floor level to decrease the risk of falls. I also instructed the patient to avoid  safety sensitive duties. In general, any activity that requires full awareness and would result in serious injury to self and others if a seizure occurs should be approached carefully. The patient verbalized full understanding.    The patient has been seizure-free for >12 months, compliant with regimen with therapeutic AED level. The patient meets the legal criteria to resume driving. I explained to the patient that from a medical standpoint seizure-freedom is defined differently ( 1 year or 3 times the duration between the last 2 seizures). In addition, I explained to the patient that the risk of breakthrough seizures will ALWAYS be there. I instructed the patient to avoid alcohol, get enough sleep and be complaint with AED regimen. I instructed the patient to avoid driving if AED was skipped, if drank alcohol, sleep-deprived or not feeling well. The patient verbalized full understanding.     Had a very long discussion regarding PHT being effective but a risky AED with toxicity potential, narrow therapeutic window/index, interactions with many medications that are metabolized by the liver, interaction with radiation therapy and chemotherapy, numerous short term and long term detrimental SEs like gum problems, balance problems, neuropathy. Knowing all that, the patient does not want to make any changes.    PHT level    Continue  mg QHS. Refills sent       Continue AEDs INDEFINITELY, FOR GOOD AND NEVER SKIP A DOSE. The patient verbalized full understanding. Stressed the extreme medical, personal safety, public safety and legal importance of compliance and seizure control. Will give as many refills as possible with or without face-to-face evaluation to make sure the patient and any patient with epilepsy will never run out of medications. Running out of seizure medications should never happen under our care. I explained to the patient that he should not, under any circumstances, adjust or stop taking his seizure  medication without discussing with me. The patient verbalized full understanding.       AVOID any substance that could lower seizure threshold including but not limited to:      ALCOHOL AND WITHDRAWAL      TRAMADOL.     DEMEROL      ALL STIMULANTS-ALL ADHD MEDICATIONS.      CLOZAPINE.      BUPROPION.     CIPROFLOXACIN                   MEDICAL/SURGICAL COMORBIDITIES     All relevant medical comorbidities noted and managed by primary care physician and medical care team.          MISCELLANEOUS MEDICAL PROBLEMS       HEALTHY LIFESTYLE AND PREVENTATIVE CARE    The patient to adhere to the age-appropriate health maintenance guidelines including screening tests and vaccinations. The patient to adhere to  healthy lifestyle, optimal weight, exercise, healthy diet, good sleep hygiene and avoiding drugs including smoking, alcohol and recreational drugs.        RTC annually      Jamar Flaherty, MSN NP      Collaborating Provider: Michelle Pena MD, FAAN Neurologist/Epileptologist      F-F 20 M >50% C

## 2024-01-25 NOTE — PROGRESS NOTES
Est Patient Interventional Pain Note (Follow up Visit)    Referring Physician: No ref. provider found    PCP: Tia Lopez MD    Chief Complaint:   Lower Back Pain       SUBJECTIVE:    Interval History (1/25/2024):  Patient presents today for follow-up visit.  Patient was last seen on 12/15/2023. At that visit, the plan was to complete imaging of lumbar spine. Patient reports pain as 1/10 today.  His condition has improved. States he usually has increased lower back pain with driving for an hour or more but this pain has lessened. Whenever he has pain, it is either in the lower back or L thigh. It does not radiate from lower back down his leg. He also has Left knee pain at times.    Initial HPI (12/15/2023):  Conner Lombardi Jr. is a 73 y.o. male who presents to the clinic for the evaluation of lower back pain.     Patient reports 4 month history of lower back pain.  Patient denies any significant inciting traumas to his lower back pain.  Patient denies any previous surgical intervention in his lumbar spine.  Lower back pain described as an aching throbbing pain that starts in the midline of lower back.  This pain will then radiate to his bilateral hips and into his bilateral anterior thighs to just above the knee.  Patient denies any significant radiation beyond this point.  Pain is worse with flexion and sitting for prolonged periods of time, better with stretching and anti-inflammatories.  Pain is currently rated a 2/10, but can increase to a 7/10 with exacerbating activities. Denies any fevers, chills, saddle anesthesia, or bowel and bladder incontinence      Non-Pharmacologic Treatments:  Physical Therapy/Home Exercise: yes  Ice/Heat:yes  TENS: no  Acupuncture: no  Massage: yes  Chiropractic: no        Previous Pain Medications:  Tylenol, ibuprofen, tizanidine, topicals     report:  Reviewed and consistent with medication use as prescribed.    Pain Procedures:   None    Pain Disability Index  Review:         1/25/2024     1:48 PM 12/15/2023     8:34 AM   Last 3 PDI Scores   Pain Disability Index (PDI) 7 35     Imaging (Reviewed on 1/25/2024):      Results for orders placed during the hospital encounter of 12/18/23    MRI Lumbar Spine Without Contrast    Narrative  EXAMINATION:  MRI LUMBAR SPINE WITHOUT CONTRAST    CLINICAL HISTORY:  Spinal stenosis, lumbar;Low back pain, symptoms persist with > 6wks conservative treatment; Radiculopathy, lumbar region    TECHNIQUE:  Multiplanar, multisequence MR images were acquired from the thoracolumbar junction to the sacrum without contrast.    COMPARISON:  Lumbar radiograph 12/07/2023    FINDINGS:  Alignment: Within normal limits.    Vertebrae: Lumbar vertebral body heights are maintained.  Mild multilevel endplate degenerative changes throughout the lumbar spine.  Minor L4-L5 endplate edema.  No evidence of acute fracture.  Marrow signal is otherwise within normal limits.    Discs: Disc desiccation and height loss throughout the lumbar spine.  Disc height loss is most pronounced at L5-S1.    Cord: Within normal limits.  Conus terminates at L1-L2.    Degenerative findings:    T12-L1: Tiny central disc bulge.  Minor facet arthropathy.  No significant spinal canal stenosis or neural foraminal stenosis.    L1-L2: Minor broad-based posterior disc bulge and mild bilateral facet arthropathy with ligamentum flavum hypertrophy.  Mild spinal canal stenosis.  No significant neural foraminal stenosis.    L2-L3: Mild broad-based posterior disc bulge and bilateral facet arthropathy with ligamentum flavum hypertrophy contribute to moderate spinal canal stenosis and mild bilateral neural foraminal stenosis.    L3-L4: Broad-based posterior disc bulge and bilateral facet arthropathy with ligamentum flavum hypertrophy contribute to moderate to severe spinal canal stenosis and mild bilateral neural foraminal stenosis.    L4-L5: Broad-based posterior disc bulge.  Bilateral facet  arthropathy with ligamentum flavum hypertrophy and likely a tiny left anteriorly projecting facet synovial cyst.  Findings contribute to severe spinal canal stenosis and mild bilateral neural foraminal stenosis.    L5-S1: Broad-based posterior disc bulge and bilateral facet arthropathy ligamentum flavum hypertrophy contribute to mild spinal canal stenosis.  No significant neural foraminal stenosis.    Paraspinal muscles & soft tissues: There appear to be left parapelvic renal cysts.  Paraspinal soft tissues appear within normal limits.    Impression  1. Multilevel degenerative changes of the lumbar spine are most pronounced at L4-L5 with severe spinal canal stenosis and mild bilateral neural foraminal stenosis.  2. Moderate to severe L3-L4 spinal canal stenosis with mild bilateral neural foraminal stenosis.  3. Moderate L2-L3 spinal canal stenosis with mild bilateral neural foraminal stenosis.      Electronically signed by: Timo Davis  Date:    12/18/2023  Time:    17:01         Results for orders placed during the hospital encounter of 12/07/23    X-Ray Lumbar Spine AP And Lateral    Narrative  EXAM: XR LUMBAR SPINE AP AND LATERAL    TECHNIQUE: 3 views of the lumbar spine    CLINICAL HISTORY: RFS#[M54.42]-Lumbago with sciatica, left side./[M54.41]-Lumbago with sciatica, right side.:    FINDINGS:  Moderate to severe multilevel discogenic degenerative change most notable at L4-5 and L5-S1.  Moderate lower lumbar facet arthropathy.  Congenitally narrow spinal canal.  Lumbar vertebral body height and alignment are normal.  No evidence of acute fracture.    Impression  No acute radiographic abnormality.    Finalized on: 12/7/2023 4:53 PM By:  Rickie Sepulveda MD  [facility]# [id number]      2023-12-07 16:55:55.193    [facility]    Results for orders placed during the hospital encounter of 12/07/23      X-Ray Hips Bilateral 2 View Inc AP Pelvis    Narrative  EXAM: XR HIPS BILATERAL 2 VIEW INCL AP PELVIS    TECHNIQUE: 5 views of the  hips    CLINICAL HISTORY:  :RFS#[[id number]]-Pain in right hip./[M25.552]-Pain in left hip    FINDINGS:  Bone-on-bone bilateral hip joint space narrowing with articular surface sclerosis and osteophyte formation.  Mild remodeling of the superior aspect bilateral femoral heads.  No fracture, suspicious bone marrow lesion or other acute disease is seen in the pelvis or either hip.  Joint alignment is anatomic.  There is no radiographic evidence of femoral head osteonecrosis.    Impression  Bone-on-bone bilateral hip joint space narrowing.    Finalized on: 12/7/2023 3:25 PM By:  Rickie Sepulveda MD  [facility]# [id number]      2023-12-07 15:27:29.099    [facility]          Past Medical History:   Diagnosis Date    Epilepsy     Last seizure 2010.     Hypertension     Tubular adenoma of colon 04/02/2012     Past Surgical History:   Procedure Laterality Date    COLONOSCOPY N/A 07/12/2021    Procedure: COLONOSCOPY;  Surgeon: Subhash Martin MD;  Location: [facility];  Service: Endoscopy;  Laterality: N/A;    TONSILLECTOMY      VASECTOMY  1984/1985     Social History     Socioeconomic History    Marital status:    Tobacco Use    Smoking status: Never    Smokeless tobacco: Never   Substance and Sexual Activity    Alcohol use: No    Drug use: Never    Sexual activity: Not Currently     Partners: Female     Birth control/protection: None     Social Determinants of Health     Financial Resource Strain: Low Risk  (12/6/2023)    Overall Financial Resource Strain (CARDIA)     Difficulty of Paying Living Expenses: Not hard at all   Food Insecurity: No Food Insecurity (12/6/2023)    Hunger Vital Sign     Worried About Running Out of Food in the Last Year: Never true     Ran Out of Food in the Last Year: Never true   Transportation Needs: No Transportation Needs (12/6/2023)    PRAPARE - Transportation     Lack of Transportation (Medical): No     Lack of Transportation (Non-Medical): No   Physical Activity: Insufficiently Active (12/6/2023)     Exercise Vital Sign     Days of Exercise per Week: 2 days     Minutes of Exercise per Session: 20 min   Stress: No Stress Concern Present (12/6/2023)    [place] [employer] - Occupational Stress Questionnaire     Feeling of Stress : Only a little   Social Connections: Unknown (12/6/2023)    Social Connection and Isolation Panel [NHANES]     Frequency of Communication with Friends and Family: Once a week     Frequency of Social Gatherings with Friends and Family: Once a week     Active Member of Clubs or Organizations: No     Attends Club or Organization Meetings: Patient declined     Marital Status:    Housing Stability: Low Risk  (12/6/2023)    Housing Stability Vital Sign     Unable to Pay for Housing in the Last Year: No     Number of Places Lived in the Last Year: 1     Unstable Housing in the Last Year: No     Family History   Problem Relation Age of Onset    Hypertension Unknown     Cancer Mother         Brain    Cancer Father         Liver and lung    Hypertension Father        Review of patient's allergies indicates:  No Known Allergies    Current Outpatient Medications   Medication Sig    aspirin (ECOTRIN) 81 MG EC tablet Take 81 mg by mouth once daily.    cholecalciferol, vitamin D3, (VITAMIN D3) 25 mcg (1,000 unit) capsule Take 1 capsule (1,000 Units total) by mouth once daily.    gabapentin (NEURONTIN) 300 MG capsule Take 1 capsule (300 mg total) by mouth every evening.    KRILL OIL ORAL Take by mouth.    lisinopriL-hydrochlorothiazide (PRINZIDE,ZESTORETIC) 20-25 mg Tab Take 1 tablet by mouth once daily.    phenytoin (DILANTIN) 100 MG ER capsule Take 4 capsules (400 mg total) by mouth every evening.     No current facility-administered medications for this visit.         ROS  Review of Systems   Constitutional:  Negative for activity change, appetite change and fever.   HENT:  Negative for facial swelling, rhinorrhea and sore throat.    Eyes:  Negative for pain and redness.  "  Respiratory:  Negative for cough, chest tightness, shortness of breath, wheezing and stridor.    Cardiovascular:  Negative for chest pain and palpitations.   Gastrointestinal:  Negative for abdominal pain, blood in stool, constipation, diarrhea, nausea and vomiting.   Endocrine: Negative for polydipsia, polyphagia and polyuria.   Genitourinary:  Negative for dysuria and hematuria.   Musculoskeletal:  Positive for arthralgias, back pain and myalgias. Negative for joint swelling, neck pain and neck stiffness.   Skin:  Negative for rash.   Allergic/Immunologic: Negative for food allergies.   Neurological:  Negative for dizziness, tremors, seizures, syncope, facial asymmetry, speech difficulty, light-headedness and headaches.   Psychiatric/Behavioral:  Negative for agitation, hallucinations, self-injury and suicidal ideas. The patient is not nervous/anxious and is not hyperactive.             OBJECTIVE:  BP (!) 143/82   Pulse 102   Resp 17   Ht 5' 6" (1.676 m)   Wt 84 kg (185 lb 3 oz)   BMI 29.89 kg/m²         Physical Exam  Constitutional:       General: He is not in acute distress.     Appearance: Normal appearance. He is not ill-appearing.   HENT:      Head: Normocephalic and atraumatic.      Nose: No congestion or rhinorrhea.   Eyes:      Extraocular Movements: Extraocular movements intact.      Pupils: Pupils are equal, round, and reactive to light.   Pulmonary:      Effort: Pulmonary effort is normal.   Abdominal:      General: Abdomen is flat.      Palpations: Abdomen is soft.   Musculoskeletal:      Lumbar back: No tenderness or bony tenderness.      Right hip: No deformity, lacerations or bony tenderness. Normal range of motion. Normal strength.      Left hip: No deformity, lacerations, bony tenderness or crepitus. Normal range of motion.      Comments: With SLR testing, he only has pain in his thigh.    Skin:     General: Skin is warm and dry.      Capillary Refill: Capillary refill takes less than 2 " seconds.   Neurological:      General: No focal deficit present.      Mental Status: He is alert and oriented to person, place, and time.      Sensory: No sensory deficit.      Motor: No abnormal muscle tone.      Gait: Gait normal.      Deep Tendon Reflexes:      Reflex Scores:       Patellar reflexes are 2+ on the right side and 2+ on the left side.       Achilles reflexes are 1+ on the right side.  Psychiatric:         Mood and Affect: Mood normal.         Behavior: Behavior normal.         Thought Content: Thought content normal.           Musculoskeletal:    Lumbar Exam  Incision: no  Pain with Flexion: absent today  Pain with Extension: absent today    SLR: positive nestor- has pain in quads  SIJ TTP: negative bilaterally  JANA: absent        ASSESSMENT:       73 y.o. year old male with lower back pain, consistent with     1. Lumbar radiculopathy  gabapentin (NEURONTIN) 300 MG capsule      2. Lumbar spondylosis  gabapentin (NEURONTIN) 300 MG capsule      3. DDD (degenerative disc disease), lumbar  gabapentin (NEURONTIN) 300 MG capsule      4. Spinal stenosis, lumbosacral region  gabapentin (NEURONTIN) 300 MG capsule      5. Facet arthritis of lumbar region        6. Low back pain with bilateral sciatica, unspecified back pain laterality, unspecified chronicity  gabapentin (NEURONTIN) 300 MG capsule      7. Dorsalgia, unspecified  gabapentin (NEURONTIN) 300 MG capsule      8. Bilateral primary osteoarthritis of hip              PLAN:   - Interventions:   None at this time.  Consider injections for lower back pain and/or left hip pain in the future if needed.    - Anticoagulation use:   No no anticoagulation    - Medications:   Continue gabapentin 300 mg at night. Refills provided today.    - Therapy:    Patient instructed to reach out to [facility] physical therapy for evaluation and HEP.     - Imaging/Diagnostic:   X-rays of lumbar spine reviewed and findings discussed with patient.  Consistent for advanced loss of  disc height at L5-S1 and congenital spinal narrowing.    MRI of lumbar spine discussed and reviewed today. Findings significant for : Multilevel degenerative changes of the lumbar spine are most pronounced at L4-L5 with severe spinal canal stenosis     - Consults:   None at this time, however can consider referral to neurosurgery in the future.      - Patient Questions: Answered all of the patient's questions regarding diagnosis, therapy, and treatment     This condition does not require this patient to take time off of work, and the primary goal of our Pain Management services is to improve the patient's functional capacity.     - Follow up visit: return to clinic 6 months.      The above plan and management options were discussed at length with patient. Patient is in agreement with the above and verbalized understanding.    I discussed the goals of interventional chronic pain management with the patient on today's visit.  I explained the utility of injections for diagnostic and therapeutic purposes.  We discussed a multimodal approach to pain including treating the patient's given worst pain at any given time.  We will use a systematic approach to addressing pain.  We will also adopt a multimodal approach that includes injections, adjuvant medications, physical therapy, at times psychiatry.  There may be a limited role for opioid use intermittently in the treatment of pain, more particularly for acute pain although no one approach can be used as a sole treatment modality.    I emphasized the importance of regular exercise, core strengthening and stretching, diet and weight loss as a cornerstone of long-term pain management.      Chalino Louis PA-C  Interventional Pain Management  [facility] [place]

## 2024-01-26 LAB — PHENYTOIN SERPL-MCNC: 14.7 UG/ML (ref 10–20)

## 2024-01-31 ENCOUNTER — TELEPHONE (OUTPATIENT)
Dept: NEUROLOGY | Facility: CLINIC | Age: 74
End: 2024-01-31
Payer: COMMERCIAL

## 2024-01-31 ENCOUNTER — LAB VISIT (OUTPATIENT)
Dept: LAB | Facility: HOSPITAL | Age: 74
End: 2024-01-31
Attending: PHYSICIAN ASSISTANT
Payer: COMMERCIAL

## 2024-01-31 DIAGNOSIS — E78.2 MIXED HYPERLIPIDEMIA: ICD-10-CM

## 2024-01-31 DIAGNOSIS — R73.03 PREDIABETES: ICD-10-CM

## 2024-01-31 DIAGNOSIS — E55.9 VITAMIN D DEFICIENCY: ICD-10-CM

## 2024-01-31 LAB
ALBUMIN SERPL BCP-MCNC: 4 G/DL (ref 3.5–5.2)
ALP SERPL-CCNC: 88 U/L (ref 55–135)
ALT SERPL W/O P-5'-P-CCNC: 22 U/L (ref 10–44)
ANION GAP SERPL CALC-SCNC: 10 MMOL/L (ref 8–16)
AST SERPL-CCNC: 23 U/L (ref 10–40)
BILIRUB SERPL-MCNC: 0.3 MG/DL (ref 0.1–1)
BUN SERPL-MCNC: 24 MG/DL (ref 8–23)
CALCIUM SERPL-MCNC: 10.3 MG/DL (ref 8.7–10.5)
CHLORIDE SERPL-SCNC: 105 MMOL/L (ref 95–110)
CHOLEST SERPL-MCNC: 242 MG/DL (ref 120–199)
CHOLEST/HDLC SERPL: 5.5 {RATIO} (ref 2–5)
CO2 SERPL-SCNC: 25 MMOL/L (ref 23–29)
CREAT SERPL-MCNC: 1 MG/DL (ref 0.5–1.4)
EST. GFR  (NO RACE VARIABLE): >60 ML/MIN/1.73 M^2
ESTIMATED AVG GLUCOSE: 123 MG/DL (ref 68–131)
GLUCOSE SERPL-MCNC: 117 MG/DL (ref 70–110)
HBA1C MFR BLD: 5.9 % (ref 4–5.6)
HDLC SERPL-MCNC: 44 MG/DL (ref 40–75)
HDLC SERPL: 18.2 % (ref 20–50)
LDLC SERPL CALC-MCNC: 161.4 MG/DL (ref 63–159)
NONHDLC SERPL-MCNC: 198 MG/DL
POTASSIUM SERPL-SCNC: 4.2 MMOL/L (ref 3.5–5.1)
PROT SERPL-MCNC: 7.5 G/DL (ref 6–8.4)
SODIUM SERPL-SCNC: 140 MMOL/L (ref 136–145)
TRIGL SERPL-MCNC: 183 MG/DL (ref 30–150)

## 2024-01-31 PROCEDURE — 80053 COMPREHEN METABOLIC PANEL: CPT | Performed by: PHYSICIAN ASSISTANT

## 2024-01-31 PROCEDURE — 82306 VITAMIN D 25 HYDROXY: CPT | Performed by: PHYSICIAN ASSISTANT

## 2024-01-31 PROCEDURE — 36415 COLL VENOUS BLD VENIPUNCTURE: CPT | Performed by: PHYSICIAN ASSISTANT

## 2024-01-31 PROCEDURE — 80061 LIPID PANEL: CPT | Performed by: PHYSICIAN ASSISTANT

## 2024-01-31 PROCEDURE — 83036 HEMOGLOBIN GLYCOSYLATED A1C: CPT | Performed by: PHYSICIAN ASSISTANT

## 2024-01-31 NOTE — TELEPHONE ENCOUNTER
Attempted to contact patient wife in regards to going over lab results. I left a VM to contact office to further discuss.

## 2024-01-31 NOTE — TELEPHONE ENCOUNTER
----- Message from Stephanie Flaherty NP sent at 1/30/2024  8:46 PM CST -----  01-    PHT LEVEL 14.7 NL     NL LEVEL

## 2024-02-01 LAB — 25(OH)D3+25(OH)D2 SERPL-MCNC: 51 NG/ML (ref 30–96)

## 2024-02-05 ENCOUNTER — TELEPHONE (OUTPATIENT)
Dept: PAIN MEDICINE | Facility: CLINIC | Age: 74
End: 2024-02-05
Payer: COMMERCIAL

## 2024-02-05 NOTE — TELEPHONE ENCOUNTER
Called pt and informed him that he had a refill on his gabapentin waiting on him that the pharmacy. Pt verbalized understanding    Beulah VERGARA

## 2024-02-07 ENCOUNTER — OFFICE VISIT (OUTPATIENT)
Dept: INTERNAL MEDICINE | Facility: CLINIC | Age: 74
End: 2024-02-07
Payer: COMMERCIAL

## 2024-02-07 VITALS
OXYGEN SATURATION: 99 % | DIASTOLIC BLOOD PRESSURE: 88 MMHG | TEMPERATURE: 98 F | RESPIRATION RATE: 18 BRPM | BODY MASS INDEX: 29.48 KG/M2 | WEIGHT: 183.44 LBS | HEART RATE: 100 BPM | HEIGHT: 66 IN | SYSTOLIC BLOOD PRESSURE: 138 MMHG

## 2024-02-07 DIAGNOSIS — E78.2 MIXED HYPERLIPIDEMIA: ICD-10-CM

## 2024-02-07 DIAGNOSIS — I10 ESSENTIAL HYPERTENSION: Primary | ICD-10-CM

## 2024-02-07 DIAGNOSIS — R73.03 PREDIABETES: ICD-10-CM

## 2024-02-07 DIAGNOSIS — G40.109 TEMPORAL LOBE EPILEPSY: ICD-10-CM

## 2024-02-07 DIAGNOSIS — E55.9 VITAMIN D DEFICIENCY: ICD-10-CM

## 2024-02-07 PROCEDURE — 3044F HG A1C LEVEL LT 7.0%: CPT | Mod: CPTII,S$GLB,, | Performed by: FAMILY MEDICINE

## 2024-02-07 PROCEDURE — 99999 PR PBB SHADOW E&M-EST. PATIENT-LVL IV: CPT | Mod: PBBFAC,,, | Performed by: FAMILY MEDICINE

## 2024-02-07 PROCEDURE — 3288F FALL RISK ASSESSMENT DOCD: CPT | Mod: CPTII,S$GLB,, | Performed by: FAMILY MEDICINE

## 2024-02-07 PROCEDURE — 1101F PT FALLS ASSESS-DOCD LE1/YR: CPT | Mod: CPTII,S$GLB,, | Performed by: FAMILY MEDICINE

## 2024-02-07 PROCEDURE — 3008F BODY MASS INDEX DOCD: CPT | Mod: CPTII,S$GLB,, | Performed by: FAMILY MEDICINE

## 2024-02-07 PROCEDURE — 3079F DIAST BP 80-89 MM HG: CPT | Mod: CPTII,S$GLB,, | Performed by: FAMILY MEDICINE

## 2024-02-07 PROCEDURE — 1160F RVW MEDS BY RX/DR IN RCRD: CPT | Mod: CPTII,S$GLB,, | Performed by: FAMILY MEDICINE

## 2024-02-07 PROCEDURE — 1125F AMNT PAIN NOTED PAIN PRSNT: CPT | Mod: CPTII,S$GLB,, | Performed by: FAMILY MEDICINE

## 2024-02-07 PROCEDURE — 1159F MED LIST DOCD IN RCRD: CPT | Mod: CPTII,S$GLB,, | Performed by: FAMILY MEDICINE

## 2024-02-07 PROCEDURE — 3075F SYST BP GE 130 - 139MM HG: CPT | Mod: CPTII,S$GLB,, | Performed by: FAMILY MEDICINE

## 2024-02-07 PROCEDURE — 99214 OFFICE O/P EST MOD 30 MIN: CPT | Mod: S$GLB,,, | Performed by: FAMILY MEDICINE

## 2024-02-07 RX ORDER — ATORVASTATIN CALCIUM 40 MG/1
40 TABLET, FILM COATED ORAL DAILY
Qty: 90 TABLET | Refills: 3 | Status: SHIPPED | OUTPATIENT
Start: 2024-02-07

## 2024-02-07 NOTE — ASSESSMENT & PLAN NOTE
Above goal, elevated cardiovascular risk score, after discussion will start patient on atorvastatin 40 mg daily and reassess labs in 3 months; discussed risks/benefits of medication with patient; possible interaction with his phenytoin, message sent to his neurology team to monitor and/or adjust medications as needed; patient expressed understanding and agreement with plan    Lab Results   Component Value Date    CHOL 242 (H) 01/31/2024    LDLCALC 161.4 (H) 01/31/2024    TRIG 183 (H) 01/31/2024    HDL 44 01/31/2024    ALT 22 01/31/2024    AST 23 01/31/2024    ALKPHOS 88 01/31/2024     The 10-year ASCVD risk score (Clayton ALARCON, et al., 2019) is: 30%    Values used to calculate the score:      Age: 73 years      Sex: Male      Is Non- : No      Diabetic: No      Tobacco smoker: No      Systolic Blood Pressure: 138 mmHg      Is BP treated: Yes      HDL Cholesterol: 44 mg/dL      Total Cholesterol: 242 mg/dL

## 2024-02-07 NOTE — ASSESSMENT & PLAN NOTE
Stable, will monitor    Lab Results   Component Value Date    HGBA1C 5.9 (H) 01/31/2024    HGBA1C 5.7 (H) 07/24/2023    LDLCALC 161.4 (H) 01/31/2024

## 2024-02-07 NOTE — Clinical Note
Starting patient on statin when he returns from cruise on 2/16/24. Please advise him if any adjustments need to be made on dilantin or if he needs sooner dilantin level. Thank you!

## 2024-02-07 NOTE — PROGRESS NOTES
Subjective:       Patient ID: Conner Lombardi Jr. is a 73 y.o. male.    Chief Complaint: Follow-up    Patient presents to clinic today for followup of chronic conditions.    Review of Systems   Constitutional:  Negative for chills, fatigue, fever and unexpected weight change.   Eyes:  Negative for visual disturbance.   Respiratory:  Negative for shortness of breath.    Cardiovascular:  Negative for chest pain.   Musculoskeletal:  Negative for myalgias.   Neurological:  Negative for headaches.       Objective:      Physical Exam  Vitals reviewed.   Constitutional:       General: He is not in acute distress.     Appearance: He is well-developed.   HENT:      Head: Normocephalic and atraumatic.   Eyes:      General: Lids are normal. No scleral icterus.     Extraocular Movements: Extraocular movements intact.      Conjunctiva/sclera: Conjunctivae normal.      Pupils: Pupils are equal, round, and reactive to light.   Pulmonary:      Effort: Pulmonary effort is normal.   Neurological:      Mental Status: He is alert and oriented to person, place, and time.      Cranial Nerves: No cranial nerve deficit.      Gait: Gait normal.   Psychiatric:         Mood and Affect: Mood and affect normal.         Assessment:       1. Essential hypertension    2. Mixed hyperlipidemia    3. Prediabetes    4. Vitamin D deficiency    5. Temporal lobe epilepsy        Plan:   1. Essential hypertension  Assessment & Plan:  Controlled, continue lisinopril-hydrochlorothiazide     Orders:  -     TSH; Future; Expected date: 08/07/2024  -     CBC Auto Differential; Future; Expected date: 08/07/2024    2. Mixed hyperlipidemia  Assessment & Plan:  Above goal, elevated cardiovascular risk score, after discussion will start patient on atorvastatin 40 mg daily and reassess labs in 3 months; discussed risks/benefits of medication with patient; possible interaction with his phenytoin, message sent to his neurology team to monitor and/or adjust medications  as needed; patient expressed understanding and agreement with plan    Lab Results   Component Value Date    CHOL 242 (H) 01/31/2024    LDLCALC 161.4 (H) 01/31/2024    TRIG 183 (H) 01/31/2024    HDL 44 01/31/2024    ALT 22 01/31/2024    AST 23 01/31/2024    ALKPHOS 88 01/31/2024     The 10-year ASCVD risk score (Clayton ALARCON, et al., 2019) is: 30%    Values used to calculate the score:      Age: 73 years      Sex: Male      Is Non- : No      Diabetic: No      Tobacco smoker: No      Systolic Blood Pressure: 138 mmHg      Is BP treated: Yes      HDL Cholesterol: 44 mg/dL      Total Cholesterol: 242 mg/dL      Orders:  -     Comprehensive Metabolic Panel; Future; Expected date: 05/07/2024  -     Lipid Panel; Future; Expected date: 05/07/2024  -     Comprehensive Metabolic Panel; Future; Expected date: 08/07/2024  -     Lipid Panel; Future; Expected date: 08/07/2024  -     atorvastatin (LIPITOR) 40 MG tablet; Take 1 tablet (40 mg total) by mouth once daily.  Dispense: 90 tablet; Refill: 3    3. Prediabetes  Assessment & Plan:  Stable, will monitor    Lab Results   Component Value Date    HGBA1C 5.9 (H) 01/31/2024    HGBA1C 5.7 (H) 07/24/2023    LDLCALC 161.4 (H) 01/31/2024         Orders:  -     Hemoglobin A1C; Future; Expected date: 08/07/2024    4. Vitamin D deficiency  Assessment & Plan:  Controlled, continue supplement    Orders:  -     Vitamin D; Future; Expected date: 08/07/2024    5. Temporal lobe epilepsy  Overview:  Followed by Neurology, continue current treatment plan       Health Maintenance reviewed/updated.

## 2024-02-19 DIAGNOSIS — I10 ESSENTIAL HYPERTENSION: ICD-10-CM

## 2024-02-19 DIAGNOSIS — G40.909 NONINTRACTABLE EPILEPSY WITHOUT STATUS EPILEPTICUS, UNSPECIFIED EPILEPSY TYPE: ICD-10-CM

## 2024-02-19 RX ORDER — LISINOPRIL AND HYDROCHLOROTHIAZIDE 20; 25 MG/1; MG/1
1 TABLET ORAL
Qty: 90 TABLET | Refills: 3 | Status: SHIPPED | OUTPATIENT
Start: 2024-02-19

## 2024-02-19 NOTE — TELEPHONE ENCOUNTER
No care due was identified.  [facility] Embedded Care Due Messages. Reference number: [id number].   2/19/2024 3:44:24 AM CST

## 2024-02-19 NOTE — TELEPHONE ENCOUNTER
Refill Decision Note   Conner Maria C  is requesting a refill authorization.  Brief Assessment and Rationale for Refill:  Approve     Medication Therapy Plan:         Comments:     Note composed:11:06 AM 02/19/2024

## 2024-02-20 RX ORDER — PHENYTOIN SODIUM 100 MG/1
400 CAPSULE, EXTENDED RELEASE ORAL NIGHTLY
Qty: 360 CAPSULE | Refills: 3 | Status: SHIPPED | OUTPATIENT
Start: 2024-02-20 | End: 2025-02-19

## 2024-02-23 ENCOUNTER — CLINICAL SUPPORT (OUTPATIENT)
Dept: REHABILITATION | Facility: HOSPITAL | Age: 74
End: 2024-02-23
Payer: COMMERCIAL

## 2024-02-23 DIAGNOSIS — G89.29 CHRONIC BILATERAL LOW BACK PAIN WITHOUT SCIATICA: ICD-10-CM

## 2024-02-23 DIAGNOSIS — M54.50 CHRONIC BILATERAL LOW BACK PAIN WITHOUT SCIATICA: ICD-10-CM

## 2024-02-23 DIAGNOSIS — R29.898 LEG WEAKNESS, BILATERAL: Primary | ICD-10-CM

## 2024-02-23 PROCEDURE — 97161 PT EVAL LOW COMPLEX 20 MIN: CPT | Mod: PN

## 2024-02-23 PROCEDURE — 97112 NEUROMUSCULAR REEDUCATION: CPT | Mod: PN

## 2024-02-25 PROBLEM — M54.50 CHRONIC BILATERAL LOW BACK PAIN WITHOUT SCIATICA: Status: ACTIVE | Noted: 2024-02-25

## 2024-02-25 PROBLEM — R29.898 LEG WEAKNESS, BILATERAL: Status: ACTIVE | Noted: 2024-02-25

## 2024-02-25 PROBLEM — G89.29 CHRONIC BILATERAL LOW BACK PAIN WITHOUT SCIATICA: Status: ACTIVE | Noted: 2024-02-25

## 2024-02-26 NOTE — PLAN OF CARE
[facility]   Physical Therapy Initial Evaluation        Date: 2/23/2024     Name: Conner Lombardi Jr.  Clinic Number: [id number]  Therapy Diagnosis:   Encounter Diagnoses   Name Primary?    Leg weakness, bilateral Yes    Chronic bilateral low back pain without sciatica       Physician: Giselle Bauman, PA*      Physician Orders: PT Eval and Treat  Medical Diagnosis from Referral: Lumbar radiculopathy, spinal stenosis  Evaluation Date: 2/23/2024  Authorization Period Expiration: 12/15/2025  Plan of Care Expiration: 4/23/2024    Progress Update: 2/23/2024   Visit # / Visits authorized: 1 / 1   FOTO: 2/23/2024 - Scored: 1 / 3         PRECAUTIONS: Standard Precautions, Epilepsy     Time In: 0730  Time Out: 0825  Total Appointment Time (timed & untimed codes): 55 minutes    SUBJECTIVE     Date of onset: Oct. 2023  Chief Complaint: low back and thigh pain    History of current condition - Conner is a 73 y.o. male who presents to physical therapy reporting his pain is minimal now.  It generally bothers him after driving to work.  Started while walking in park in [place].. Pain increases with standing for a long time and subsides as he walks a little..    Falls: [x] No  [] Yes:       Imaging: [] Xray [x] MRI [] CT: Reviewed  Impression:   1. Multilevel degenerative changes of the lumbar spine are most pronounced at L4-L5 with severe spinal canal stenosis and mild bilateral neural foraminal stenosis.  2. Moderate to severe L3-L4 spinal canal stenosis with mild bilateral neural foraminal stenosis.  3. Moderate L2-L3 spinal canal stenosis with mild bilateral neural foraminal stenosis.     Electronically signed by: Timo Davis  Date:                                            12/18/2023    Prior Therapy:  [x] No  [] Yes:     Social History: Pt lives with their spouse [x] House [] Apartment/Condo []other  Stairs: [] No  [x] Yes: Couple of steps to sunken den.  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     Hypertension     Tubular adenoma of colon 04/02/2012       Surgical History:   Conner Lombardi Jr.  has a past surgical history that includes Tonsillectomy; Colonoscopy (N/A, 07/12/2021); and Vasectomy (1984/1985).    Medications:   Conner has a current medication list which includes the following prescription(s): aspirin, atorvastatin, cholecalciferol (vitamin d3), gabapentin, krill oil, lisinopril-hydrochlorothiazide, and phenytoin.    Allergies:   Review of patient's allergies indicates:  No Known Allergies       OBJECTIVE     Posture:  Pt presents with postural abnormalities which include:    [x] Forward Head   [] Increased Lumbar Lordosis   [x] Rounded Shoulder   [] Flat Back Posture   [] Increased Thoracic Kyphosis [] Inominate Rotation   [] Increased Trunk Sway  [] Genu Recurvatum   [] Increased Trunk Rotation  [] Pes Planus   [] Other:     Sensation:  Sensation is [x] Intact [] Impaired-- to light touch      LUMBAR-   Lumbar   Range of Motion Right  (spine) Left Function   & Pain Goal   Lumbar Flexion  70%  []Functional  []Dysfunctional  []Painful  []Non-Painful 90%  Functional   Nonpainful   Lumbar Extension  40%  []Functional  []Dysfunctional  []Painful  []Non-Painful 70%  Functional   Nonpainful   Lumbar Side Bending 40% 40% []Functional  []Dysfunctional  []Painful  []Non-Painful 60%  Functional   Nonpainful      HIP-   Hip   Range of Motion Right    Left   Goal   Hip Flexion (120º) 110 110 115º   Hip Abduction (45º) Not tested Not tested  30º   Hip Extension (20º) Not tested  Not tested  15º    (*) pain and/or dysfunction(*) pain and/or dysfunction      LE STRENGTH -   Lower Extremity  Strength RIGHT   Goal   Hip Flexion  []1  []2  []3  [x]4  []5                []+ [x]- 5/5 B    Hip Extension  []1  []2  []3  [x]4  []5                []+ [x]- 5/5 B   Hip Abduction  []1  []2  []3  [x]4  []5                []+ [x]- 5/5 B   Knee Flexion []1  []2  []3  [x]4  []5                []+ [x]- 5/5 B   Knee Extension []1  []2  []3  [x]4  []5                []+ []- 5/5 B   Ankle Dorsiflexion []1  []2  []3  [x]4  []5                []+ []- 5/5 B   Ankle Plantarflexion []1  []2  []3  [x]4  []5                []+ []- 5/5 B     Lower Extremity  Strength LEFT   Goal   Hip Flexion  []1  []2  []3  [x]4  []5                []+ [x]- 5/5 B    Hip Extension  []1  []2  []3  [x]4  []5                []+ [x]- 5/5 B   Hip Abduction  []1  []2  []3  [x]4  []5                []+ [x]- 5/5 B   Knee Flexion []1  []2  []3  [x]4  []5                []+ [x]- 5/5 B   Knee Extension []1  []2  []3  [x]4  []5                []+ []- 5/5 B   Ankle Dorsiflexion []1  []2  []3  [x]4  []5                []+ []- 5/5 B   Ankle Plantarflexion []1  []2  []3  [x]4  []5                []+ []- 5/5 B      Additional Objective Tests and Meausres: Special Tests-   SPECIAL TESTS:    TEST Right Left    Goal   SLR Negative Negative Negative B       FUNCTION:     Intake Outcome Measure for FOTO Lumbar Survey    Therapist reviewed FOTO scores for Conner Lombardi  on 2/23/2024.   FOTO documents entered into "[employer] - see Media section.    Intake Score: 67%         TREATMENT     Total Treatment time separate from Evaluation: (23) minutes    Therapeutic exercises to develop strength, ROM, flexibility, posture, and core stabilization for --- minutes including: ·    Activity   Details                                        Manual therapy techniques: Joint mobilizations, Manual traction, Myofacial release, and Soft tissue Mobilization were applied to the: --- for --- minutes, including:     Activity   Details                                       Neuromuscular re-education activities to improve: Balance, Coordination, Kinesthetic, Sense, Proprioception, Posture, and Core for 23 minutes. The following activities were included:    Activity   Details    Single knee to chest      Lower trunk rotation      Posterior pelvic tilt      Hamstring stretch supine with towel      Standing hip extension           Therapeutic activities to improve functional performance for ----  minutes, including:   Activity   Details                                            PATIENT EDUCATION AND HOME EXERCISES     Education/Self-Care provided:  (included in treatment) minutes   Patient educated on the impairments noted above and the effects of physical therapy intervention to improve overall condition and Quality of Life  Patient was educated on all the above exercise prior/during/after for proper posture, positioning, and execution for safe performance with home exercise program.     Written Home Exercises Provided: yes. Prefers: [x] Printed [] Electronic  Exercises were reviewed and Conner was able to demonstrate them prior to the end of the session.  Conner demonstrated good understanding of the education provided. See EMR under Patient Instructions for exercises provided during therapy sessions.      ASSESSMENT     Conner is a 73 y.o. male referred to outpatient Physical Therapy with a medical diagnosis of   Encounter Diagnoses   Name Primary?    Leg weakness, bilateral Yes    Chronic bilateral low back pain without sciatica    .    Physical exam supports lumbar radiculopathy impairments including: decreased range of motion, decreased muscular strength, decreased endurance, decreased muscular length/flexibility, impaired joint mobility, and impaired  functional mobility.     The above impairments will be addressed through manual therapy techniques, therapeutic exercises, functional training, and modalities as necessary. Patient was treated and educated on exercises for home program, progression of therapy, and benefits of therapy to achieve full functional mobility.       Pt prognosis is Good.   Pt will benefit from skilled outpatient Physical Therapy to address the deficits stated above and in the chart below, provide pt/family education, and to maximize pt's level of independence.     Plan of care discussed with patient: Yes  Pt's spiritual, cultural and educational needs considered and patient is agreeable to the plan of care and goals as stated below:     Anticipated Barriers for therapy: none    Medical Necessity is demonstrated by the following:     History  Co-morbidities and personal factors that may impact the plan of care [x] LOW: no personal factors / co-morbidities  [] MODERATE: 1-2 personal factors / co-morbidities  [] HIGH: 3+ personal factors / co-morbidities    Moderate / High Support Documentation:   Co-morbidities affecting plan of care:   Past Medical History:   Diagnosis Date    Epilepsy     Last seizure 2010.     Hypertension     Tubular adenoma of colon 04/02/2012       Personal Factors:   no deficits     Examination  Body Structures and Functions, activity limitations and participation restrictions that may impact the plan of care [x] LOW: addressing 1-2 elements  [] MODERATE: 3+ elements  [] HIGH: 4+ elements (please support below)    Moderate / High Support Documentation:   [] Head / Neck  [x] Spine  [] Upper Quarter  [x] Lower Quarter  [] Range of motion Deficits  [] Gross Symmetry Deficits  [x] Strength Deficits  [] Balance Deficits  [] Gait Deficits  [] Unable to participate in daily activities  [] Unable to perform functional tasks  [] Unable to Care for Self or others  [] Community/ Social Life changes due to impairments      Clinical Presentation [x] LOW: stable  [] MODERATE: Evolving  [] HIGH: Unstable     Decision Making/ Complexity Score: low         SHORT TERM GOALS:  4 weeks Progress Date Met   Recent signs and systems trend is improving in order to progress towards Long term goals.  [] Met  [] Not Met  [] Progressing    Patient will be independent with Home Exercise Program  in order to further progress and return to maximal function. [] Met  [] Not Met  [] Progressing    Pain rating at Worst: 5 /10 in order to progress towards increased independence with activity. [] Met  [] Not Met  [] Progressing    Patient will be able to correct postural deviations in sitting and standing, to decrease pain and promote postural awareness for injury prevention.  [] Met  [] Not Met  [] Progressing    Patient will improve functional outcome (FOTO) score: by 5% to increase self-worth & perceived functional ability towards long term goals [] Met  [] Not Met  [] Progressing      LONG TERM GOALS: 10 weeks  Progress Date Met   Patient will return to normal activites of daily living, recreational, and work related activities with less pain and limitation.  [] Met  [] Not Met  [] Progressing    Patient will improve range of motion  to stated goals in order to return to maximal functional potential.  [] Met  [] Not Met  [] Progressing    Patient will improve Strength to stated goals of appropriate musculature in order to improve functional independence.  [] Met  [] Not Met  [] Progressing    Pain Rating at Best: 1/10 to improve Quality of Life.  [] Met  [] Not Met  [] Progressing    Patient will meet predicted functional outcome (FOTO) score: 70% to increase self-worth & perceived functional ability. [] Met  [] Not Met  [] Progressing    Patient will have met/partially met personal goal of: improve pain and function  [] Met  [] Not Met  [] Progressing          PLAN   Plan of care Certification: 2/23/2024 to 04/23/2024    Outpatient Physical Therapy 2  times weekly for 10 weeks to include any combination of the following interventions: virtual visits, dry needling, modalities, electrical stimulation (IFC, Pre-Mod, Attended with Functional Dry Needling), Manual Therapy, Moist Heat/ Ice, Neuromuscular Re-ed, Patient Education, Self Care, Therapeutic Exercise, Functional Training, and Therapeutic Activites     Thank you for this referral.    Raman Mazariegos, PT

## 2024-03-05 ENCOUNTER — CLINICAL SUPPORT (OUTPATIENT)
Dept: REHABILITATION | Facility: HOSPITAL | Age: 74
End: 2024-03-05
Payer: COMMERCIAL

## 2024-03-05 DIAGNOSIS — G89.29 CHRONIC BILATERAL LOW BACK PAIN WITHOUT SCIATICA: ICD-10-CM

## 2024-03-05 DIAGNOSIS — M54.50 CHRONIC BILATERAL LOW BACK PAIN WITHOUT SCIATICA: ICD-10-CM

## 2024-03-05 DIAGNOSIS — R29.898 LEG WEAKNESS, BILATERAL: Primary | ICD-10-CM

## 2024-03-05 PROCEDURE — 97110 THERAPEUTIC EXERCISES: CPT | Mod: PN

## 2024-03-05 PROCEDURE — 97530 THERAPEUTIC ACTIVITIES: CPT | Mod: PN

## 2024-03-05 PROCEDURE — 97112 NEUROMUSCULAR REEDUCATION: CPT | Mod: PN

## 2024-03-05 NOTE — PROGRESS NOTES
[facility]   Physical Therapy Treatment Note      Name: Conner Lombardi Jr.  Clinic Number: [id number]    Therapy Diagnosis:   Encounter Diagnoses   Name Primary?    Leg weakness, bilateral Yes    Chronic bilateral low back pain without sciatica      Physician: Helder Taylor MD    Visit Date: 3/5/2024    Physician Orders: PT Eval and Treat  Medical Diagnosis from Referral: Lumbar radiculopathy, spinal stenosis  Evaluation Date: 2/23/2024  Authorization Period Expiration: 12/15/2025  Plan of Care Expiration: 4/23/2024                Progress Update: 2/23/2024   Visit # / Visits authorized: 1 / 1;    1/20  FOTO: 2/23/2024 - Scored: 1 / 3           PRECAUTIONS: Standard Precautions, Epilepsy      Time In: 0740  Time Out: 0835  Total Appointment Time (timed & untimed codes): 55 minutes    PTA Visit #: 0/5       Subjective     Patient reports: back pain.  He was compliant with home exercise program.  Response to previous treatment: N/A  Functional change: N/A    Pain: 1/10  Location: bilateral low back and thighs      Objective      Objective Measures updated at progress report unless specified.     Treatment     Conner received the treatments listed below:      Therapeutic exercises to develop strength, ROM, flexibility, posture, and core stabilization for 13 minutes including: ·     Activity   Details      UBE  x   5 minutes     Hamstring stretch with strap x   3x20 seconds     Prone quad stretch with strap  x  3x20 seconds                                 Manual therapy techniques: Joint mobilizations, Manual traction, Myofacial release, and Soft tissue Mobilization were applied to the: --- for --- minutes, including:      Activity   Details                                                               Neuromuscular re-education activities to improve: Balance, Coordination, Kinesthetic, Sense, Proprioception, Posture, and Core for 25 minutes. The following activities were included:      Activity   Details     DKTC with ball x  3x10     Lower trunk rotation x  3x10     Posterior pelvic tilt x  3x10     Standing hip extension x  3x10     Rows x  45# 3x10                             Therapeutic activities to improve functional performance for 12 minutes, including:    Activity   Details     Step ups  x  8 inch 3x10     Sit to stand  x  3x10 20 inch                                                Education provided:   - Home program    Written Home Exercises Provided: Patient instructed to cont prior HEP. Exercises were reviewed and Conner was able to demonstrate them prior to the end of the session.  Conner demonstrated good  understanding of the education provided. See Electronic Medical Record under Patient Instructions for exercises provided during therapy sessions    Assessment     The patient was instructed in and performed core strengthening and activation activity.  He was instructed in lower extremity strengthening and stretching.      Conner Is progressing well towards his goals.   Patient prognosis is Good.     Patient will continue to benefit from skilled outpatient physical therapy to address the deficits listed in the problem list box on initial evaluation, provide pt/family education and to maximize pt's level of independence in the home and community environment.     Patient's spiritual, cultural and educational needs considered and pt agreeable to plan of care and goals.     Anticipated barriers to physical therapy: none    SHORT TERM GOALS:  4 weeks Progress Date Met   Recent signs and systems trend is improving in order to progress towards Long term goals.  [] Met  [] Not Met  [] Progressing     Patient will be independent with Home Exercise Program  in order to further progress and return to maximal function. [] Met  [] Not Met  [] Progressing     Pain rating at Worst: 5 /10 in order to progress towards increased independence with activity. [] Met  [] Not Met  [] Progressing      Patient will be able to correct postural deviations in sitting and standing, to decrease pain and promote postural awareness for injury prevention.  [] Met  [] Not Met  [] Progressing     Patient will improve functional outcome (FOTO) score: by 5% to increase self-worth & perceived functional ability towards long term goals [] Met  [] Not Met  [] Progressing        LONG TERM GOALS: 10 weeks  Progress Date Met   Patient will return to normal activites of daily living, recreational, and work related activities with less pain and limitation.  [] Met  [] Not Met  [] Progressing     Patient will improve range of motion  to stated goals in order to return to maximal functional potential.  [] Met  [] Not Met  [] Progressing     Patient will improve Strength to stated goals of appropriate musculature in order to improve functional independence.  [] Met  [] Not Met  [] Progressing     Pain Rating at Best: 1/10 to improve Quality of Life.  [] Met  [] Not Met  [] Progressing     Patient will meet predicted functional outcome (FOTO) score: 70% to increase self-worth & perceived functional ability. [] Met  [] Not Met  [] Progressing     Patient will have met/partially met personal goal of: improve pain and function  [] Met  [] Not Met  [] Progressing              PLAN   Plan of care Certification: 2/23/2024 to 04/23/2024     Outpatient Physical Therapy 2 times weekly for 10 weeks to include any combination of the following interventions: virtual visits, dry needling, modalities, electrical stimulation (IFC, Pre-Mod, Attended with Functional Dry Needling), Manual Therapy, Moist Heat/ Ice, Neuromuscular Re-ed, Patient Education, Self Care, Therapeutic Exercise, Functional Training, and Therapeutic Activites        Raman Mazariegos, PT

## 2024-03-08 ENCOUNTER — CLINICAL SUPPORT (OUTPATIENT)
Dept: REHABILITATION | Facility: HOSPITAL | Age: 74
End: 2024-03-08
Payer: COMMERCIAL

## 2024-03-08 DIAGNOSIS — G89.29 CHRONIC BILATERAL LOW BACK PAIN WITHOUT SCIATICA: ICD-10-CM

## 2024-03-08 DIAGNOSIS — R29.898 LEG WEAKNESS, BILATERAL: Primary | ICD-10-CM

## 2024-03-08 DIAGNOSIS — M54.50 CHRONIC BILATERAL LOW BACK PAIN WITHOUT SCIATICA: ICD-10-CM

## 2024-03-08 PROCEDURE — 97112 NEUROMUSCULAR REEDUCATION: CPT | Mod: PN

## 2024-03-08 NOTE — PROGRESS NOTES
[facility]   Physical Therapy Treatment Note      Name: Conner Lombardi Jr.  Clinic Number: [id number]    Therapy Diagnosis:   Encounter Diagnoses   Name Primary?    Leg weakness, bilateral Yes    Chronic bilateral low back pain without sciatica        Physician: Helder Taylor MD    Visit Date: 3/8/2024    Physician Orders: PT Eval and Treat  Medical Diagnosis from Referral: Lumbar radiculopathy, spinal stenosis  Evaluation Date: 2/23/2024  Authorization Period Expiration: 12/15/2025  Plan of Care Expiration: 4/23/2024                Progress Update: 2/23/2024   Visit # / Visits authorized: 1 / 1;    2/20  FOTO: 2/23/2024 - Scored: 1 / 3           PRECAUTIONS: Standard Precautions, Epilepsy      Time In: 0630  Time Out: 0728  Total Appointment Time (timed & untimed codes): 58 minutes    PTA Visit #: 0/5       Subjective     Patient reports: back pain.  He was compliant with home exercise program.  Response to previous treatment: N/A  Functional change: N/A    Pain: 1/10  Location: bilateral low back and thighs      Objective      Objective Measures updated at progress report unless specified.     Treatment     Conner received the treatments listed below:      Therapeutic exercises to develop strength, ROM, flexibility, posture, and core stabilization for 10 minutes including: ·     Activity   Details      UBE  x   5 minutes     Hamstring stretch with strap x   3x20 seconds     Prone quad stretch with strap  x  3x20 seconds                                 Manual therapy techniques: Joint mobilizations, Manual traction, Myofacial release, and Soft tissue Mobilization were applied to the: --- for --- minutes, including:      Activity   Details                                                               Neuromuscular re-education activities to improve: Balance, Coordination, Kinesthetic, Sense, Proprioception, Posture, and Core for 30 minutes. The following activities were included:      Activity   Details     DKTC with ball x  3x10     Lower trunk rotation x  3x10     Posterior pelvic tilt x  3x10     Standing hip extension x  3x10     Rows x  45# 3x10    Shuttle x 5 bands 3 minutes    Heel raises x 3x10                 Therapeutic activities to improve functional performance for 13 minutes, including:    Activity   Details     Step ups  x  8 inch 3x10     Sit to stand  x  3x10 20 inch                                                Education provided:   - Home program    Written Home Exercises Provided: Patient instructed to cont prior HEP. Exercises were reviewed and Conner was able to demonstrate them prior to the end of the session.  Conner demonstrated good  understanding of the education provided. See Electronic Medical Record under Patient Instructions for exercises provided during therapy sessions    Assessment     The patient was instructed in and performed core strengthening and activation activity.  He was instructed in lower extremity strengthening and stretching.      Conner Is progressing well towards his goals.   Patient prognosis is Good.     Patient will continue to benefit from skilled outpatient physical therapy to address the deficits listed in the problem list box on initial evaluation, provide pt/family education and to maximize pt's level of independence in the home and community environment.     Patient's spiritual, cultural and educational needs considered and pt agreeable to plan of care and goals.     Anticipated barriers to physical therapy: none    SHORT TERM GOALS:  4 weeks Progress Date Met   Recent signs and systems trend is improving in order to progress towards Long term goals.  [] Met  [] Not Met  [] Progressing     Patient will be independent with Home Exercise Program  in order to further progress and return to maximal function. [] Met  [] Not Met  [] Progressing     Pain rating at Worst: 5 /10 in order to progress towards increased independence with activity. []  Met  [] Not Met  [] Progressing     Patient will be able to correct postural deviations in sitting and standing, to decrease pain and promote postural awareness for injury prevention.  [] Met  [] Not Met  [] Progressing     Patient will improve functional outcome (FOTO) score: by 5% to increase self-worth & perceived functional ability towards long term goals [] Met  [] Not Met  [] Progressing        LONG TERM GOALS: 10 weeks  Progress Date Met   Patient will return to normal activites of daily living, recreational, and work related activities with less pain and limitation.  [] Met  [] Not Met  [] Progressing     Patient will improve range of motion  to stated goals in order to return to maximal functional potential.  [] Met  [] Not Met  [] Progressing     Patient will improve Strength to stated goals of appropriate musculature in order to improve functional independence.  [] Met  [] Not Met  [] Progressing     Pain Rating at Best: 1/10 to improve Quality of Life.  [] Met  [] Not Met  [] Progressing     Patient will meet predicted functional outcome (FOTO) score: 70% to increase self-worth & perceived functional ability. [] Met  [] Not Met  [] Progressing     Patient will have met/partially met personal goal of: improve pain and function  [] Met  [] Not Met  [] Progressing              PLAN   Plan of care Certification: 2/23/2024 to 04/23/2024     Outpatient Physical Therapy 2 times weekly for 10 weeks to include any combination of the following interventions: virtual visits, dry needling, modalities, electrical stimulation (IFC, Pre-Mod, Attended with Functional Dry Needling), Manual Therapy, Moist Heat/ Ice, Neuromuscular Re-ed, Patient Education, Self Care, Therapeutic Exercise, Functional Training, and Therapeutic Activites        Raman Mazariegos, PT

## 2024-03-11 ENCOUNTER — CLINICAL SUPPORT (OUTPATIENT)
Dept: REHABILITATION | Facility: HOSPITAL | Age: 74
End: 2024-03-11
Payer: COMMERCIAL

## 2024-03-11 DIAGNOSIS — R29.898 LEG WEAKNESS, BILATERAL: Primary | ICD-10-CM

## 2024-03-11 DIAGNOSIS — M54.50 CHRONIC BILATERAL LOW BACK PAIN WITHOUT SCIATICA: ICD-10-CM

## 2024-03-11 DIAGNOSIS — G89.29 CHRONIC BILATERAL LOW BACK PAIN WITHOUT SCIATICA: ICD-10-CM

## 2024-03-11 PROCEDURE — 97530 THERAPEUTIC ACTIVITIES: CPT | Mod: PN

## 2024-03-11 PROCEDURE — 97110 THERAPEUTIC EXERCISES: CPT | Mod: PN

## 2024-03-11 PROCEDURE — 97112 NEUROMUSCULAR REEDUCATION: CPT | Mod: PN

## 2024-03-11 NOTE — PROGRESS NOTES
[facility]   Physical Therapy Treatment Note      Name: Conner Lombardi Jr.  Clinic Number: [id number]    Therapy Diagnosis:   Encounter Diagnoses   Name Primary?    Leg weakness, bilateral Yes    Chronic bilateral low back pain without sciatica        Physician: Helder Taylor MD    Visit Date: 3/11/2024    Physician Orders: PT Eval and Treat  Medical Diagnosis from Referral: Lumbar radiculopathy, spinal stenosis  Evaluation Date: 2/23/2024  Authorization Period Expiration: 12/15/2025  Plan of Care Expiration: 4/23/2024                Progress Update: 2/23/2024   Visit # / Visits authorized: 1 / 1;    3/20  FOTO: 2/23/2024 - Scored: 1 / 3           PRECAUTIONS: Standard Precautions, Epilepsy      Time In: 0735  Time Out: 0828  Total Appointment Time (timed & untimed codes): 53 minutes    PTA Visit #: 0/5       Subjective     Patient reports: back pain.  He was compliant with home exercise program.  Response to previous treatment: N/A  Functional change: N/A    Pain: 1/10  Location: bilateral low back and thighs      Objective      Objective Measures updated at progress report unless specified.     Treatment     Conner received the treatments listed below:      Therapeutic exercises to develop strength, ROM, flexibility, posture, and core stabilization for 10 minutes including: ·     Activity   Details      UBE  x   5 minutes     Hamstring stretch with strap x   3x20 seconds     Prone quad stretch with strap    3x20 seconds                                 Manual therapy techniques: Joint mobilizations, Manual traction, Myofacial release, and Soft tissue Mobilization were applied to the: --- for --- minutes, including:      Activity   Details                                                               Neuromuscular re-education activities to improve: Balance, Coordination, Kinesthetic, Sense, Proprioception, Posture, and Core for 30 minutes. The following activities were included:      Activity   Details     DKTC with ball x  3x10     Lower trunk rotation x  3x10     Posterior pelvic tilt x  3x10     Standing hip extension x  3x10     Rows x  45# 3x10    Shuttle x 5 bands 3 minutes    Heel raises x 3x10                 Therapeutic activities to improve functional performance for 13 minutes, including:    Activity   Details     Step ups  x  8 inch 3x10     Sit to stand  x  3x10 20 inch     [place] carry x  15# 2 laps                            Education provided:   - Home program    Written Home Exercises Provided: Patient instructed to cont prior HEP. Exercises were reviewed and Conner was able to demonstrate them prior to the end of the session.  Conner demonstrated good  understanding of the education provided. See Electronic Medical Record under Patient Instructions for exercises provided during therapy sessions    Assessment     The patient was instructed in and performed core strengthening and activation activity.  He is performing functional carry activity and lower extremity strengthening.  He has initial short shuffle steps    Conner Is progressing well towards his goals.   Patient prognosis is Good.     Patient will continue to benefit from skilled outpatient physical therapy to address the deficits listed in the problem list box on initial evaluation, provide pt/family education and to maximize pt's level of independence in the home and community environment.     Patient's spiritual, cultural and educational needs considered and pt agreeable to plan of care and goals.     Anticipated barriers to physical therapy: none    SHORT TERM GOALS:  4 weeks Progress Date Met   Recent signs and systems trend is improving in order to progress towards Long term goals.  [] Met  [] Not Met  [] Progressing     Patient will be independent with Home Exercise Program  in order to further progress and return to maximal function. [] Met  [] Not Met  [] Progressing     Pain rating at Worst: 5 /10 in order to  progress towards increased independence with activity. [] Met  [] Not Met  [] Progressing     Patient will be able to correct postural deviations in sitting and standing, to decrease pain and promote postural awareness for injury prevention.  [] Met  [] Not Met  [] Progressing     Patient will improve functional outcome (FOTO) score: by 5% to increase self-worth & perceived functional ability towards long term goals [] Met  [] Not Met  [] Progressing        LONG TERM GOALS: 10 weeks  Progress Date Met   Patient will return to normal activites of daily living, recreational, and work related activities with less pain and limitation.  [] Met  [] Not Met  [] Progressing     Patient will improve range of motion  to stated goals in order to return to maximal functional potential.  [] Met  [] Not Met  [] Progressing     Patient will improve Strength to stated goals of appropriate musculature in order to improve functional independence.  [] Met  [] Not Met  [] Progressing     Pain Rating at Best: 1/10 to improve Quality of Life.  [] Met  [] Not Met  [] Progressing     Patient will meet predicted functional outcome (FOTO) score: 70% to increase self-worth & perceived functional ability. [] Met  [] Not Met  [] Progressing     Patient will have met/partially met personal goal of: improve pain and function  [] Met  [] Not Met  [] Progressing              PLAN   Plan of care Certification: 2/23/2024 to 04/23/2024     Outpatient Physical Therapy 2 times weekly for 10 weeks to include any combination of the following interventions: virtual visits, dry needling, modalities, electrical stimulation (IFC, Pre-Mod, Attended with Functional Dry Needling), Manual Therapy, Moist Heat/ Ice, Neuromuscular Re-ed, Patient Education, Self Care, Therapeutic Exercise, Functional Training, and Therapeutic Activites        Raman Mazariegos, PT

## 2024-03-12 ENCOUNTER — CLINICAL SUPPORT (OUTPATIENT)
Dept: REHABILITATION | Facility: HOSPITAL | Age: 74
End: 2024-03-12
Payer: COMMERCIAL

## 2024-03-12 DIAGNOSIS — G89.29 CHRONIC BILATERAL LOW BACK PAIN WITHOUT SCIATICA: ICD-10-CM

## 2024-03-12 DIAGNOSIS — M54.50 CHRONIC BILATERAL LOW BACK PAIN WITHOUT SCIATICA: ICD-10-CM

## 2024-03-12 DIAGNOSIS — R29.898 LEG WEAKNESS, BILATERAL: Primary | ICD-10-CM

## 2024-03-12 PROCEDURE — 97530 THERAPEUTIC ACTIVITIES: CPT | Mod: PN

## 2024-03-12 PROCEDURE — 97112 NEUROMUSCULAR REEDUCATION: CPT | Mod: PN

## 2024-03-12 PROCEDURE — 97110 THERAPEUTIC EXERCISES: CPT | Mod: PN

## 2024-03-12 NOTE — PROGRESS NOTES
[facility]   Physical Therapy Treatment Note      Name: Conner Lombardi Jr.  Clinic Number: [id number]    Therapy Diagnosis:   Encounter Diagnoses   Name Primary?    Leg weakness, bilateral Yes    Chronic bilateral low back pain without sciatica        Physician: Helder Taylor MD    Visit Date: 3/11/2024    Physician Orders: PT Eval and Treat  Medical Diagnosis from Referral: Lumbar radiculopathy, spinal stenosis  Evaluation Date: 2/23/2024  Authorization Period Expiration: 12/15/2025  Plan of Care Expiration: 4/23/2024                Progress Update: 2/23/2024   Visit # / Visits authorized: 1 / 1;    4/20  FOTO: 2/23/2024 - Scored: 1 / 3           PRECAUTIONS: Standard Precautions, Epilepsy      Time In: 0735  Time Out: 0828  Total Appointment Time (timed & untimed codes): 53 minutes    PTA Visit #: 0/5       Subjective     Patient reports: decreased pain  He was compliant with home exercise program.  Response to previous treatment: N/A  Functional change: N/A    Pain: 1/10  Location: bilateral low back and thighs      Objective      Objective Measures updated at progress report unless specified.     Treatment     Conner received the treatments listed below:      Therapeutic exercises to develop strength, ROM, flexibility, posture, and core stabilization for 10 minutes including: ·     Activity   Details      UBE  x   5 minutes     Hamstring stretch with strap x   3x20 seconds     Prone quad stretch with strap    3x20 seconds      Calf stretch on wedge x 3x20 seconds                       Manual therapy techniques: Joint mobilizations, Manual traction, Myofacial release, and Soft tissue Mobilization were applied to the: --- for --- minutes, including:      Activity   Details                                                               Neuromuscular re-education activities to improve: Balance, Coordination, Kinesthetic, Sense, Proprioception, Posture, and Core for 30 minutes. The  following activities were included:     Activity   Details     DKTC with ball x  3x10     Lower trunk rotation x  3x10     Posterior pelvic tilt x  3x10     Standing hip extension x  3x10     Rows x  45# 3x10    Shuttle x 5 bands 3 minutes    Heel raises x 3x10                 Therapeutic activities to improve functional performance for 13 minutes, including:    Activity   Details     Step ups  x  8 inch 3x10     Sit to stand  x  3x10 20 inch     [place] carry x  15# 2 laps                            Education provided:   - Home program    Written Home Exercises Provided: Patient instructed to cont prior HEP. Exercises were reviewed and Conner was able to demonstrate them prior to the end of the session.  Conner demonstrated good  understanding of the education provided. See Electronic Medical Record under Patient Instructions for exercises provided during therapy sessions    Assessment     The patient is doing better today.  He was instructed in and performed core strengthening and activation activity.  He is performing functional carry activity and lower extremity strengthening.  He has initial short shuffle steps    Conner Is progressing well towards his goals.   Patient prognosis is Good.     Patient will continue to benefit from skilled outpatient physical therapy to address the deficits listed in the problem list box on initial evaluation, provide pt/family education and to maximize pt's level of independence in the home and community environment.     Patient's spiritual, cultural and educational needs considered and pt agreeable to plan of care and goals.     Anticipated barriers to physical therapy: none    SHORT TERM GOALS:  4 weeks Progress Date Met   Recent signs and systems trend is improving in order to progress towards Long term goals.  [] Met  [] Not Met  [] Progressing     Patient will be independent with Home Exercise Program  in order to further progress and return to maximal function. [] Met  []  Not Met  [] Progressing     Pain rating at Worst: 5 /10 in order to progress towards increased independence with activity. [] Met  [] Not Met  [] Progressing     Patient will be able to correct postural deviations in sitting and standing, to decrease pain and promote postural awareness for injury prevention.  [] Met  [] Not Met  [] Progressing     Patient will improve functional outcome (FOTO) score: by 5% to increase self-worth & perceived functional ability towards long term goals [] Met  [] Not Met  [] Progressing        LONG TERM GOALS: 10 weeks  Progress Date Met   Patient will return to normal activites of daily living, recreational, and work related activities with less pain and limitation.  [] Met  [] Not Met  [] Progressing     Patient will improve range of motion  to stated goals in order to return to maximal functional potential.  [] Met  [] Not Met  [] Progressing     Patient will improve Strength to stated goals of appropriate musculature in order to improve functional independence.  [] Met  [] Not Met  [] Progressing     Pain Rating at Best: 1/10 to improve Quality of Life.  [] Met  [] Not Met  [] Progressing     Patient will meet predicted functional outcome (FOTO) score: 70% to increase self-worth & perceived functional ability. [] Met  [] Not Met  [] Progressing     Patient will have met/partially met personal goal of: improve pain and function  [] Met  [] Not Met  [] Progressing              PLAN   Plan of care Certification: 2/23/2024 to 04/23/2024     Outpatient Physical Therapy 2 times weekly for 10 weeks to include any combination of the following interventions: virtual visits, dry needling, modalities, electrical stimulation (IFC, Pre-Mod, Attended with Functional Dry Needling), Manual Therapy, Moist Heat/ Ice, Neuromuscular Re-ed, Patient Education, Self Care, Therapeutic Exercise, Functional Training, and Therapeutic Activites        Raman Mazariegos, PT

## 2024-03-18 ENCOUNTER — CLINICAL SUPPORT (OUTPATIENT)
Dept: REHABILITATION | Facility: HOSPITAL | Age: 74
End: 2024-03-18
Payer: COMMERCIAL

## 2024-03-18 DIAGNOSIS — G89.29 CHRONIC BILATERAL LOW BACK PAIN WITHOUT SCIATICA: ICD-10-CM

## 2024-03-18 DIAGNOSIS — R29.898 LEG WEAKNESS, BILATERAL: Primary | ICD-10-CM

## 2024-03-18 DIAGNOSIS — M54.50 CHRONIC BILATERAL LOW BACK PAIN WITHOUT SCIATICA: ICD-10-CM

## 2024-03-18 PROCEDURE — 97112 NEUROMUSCULAR REEDUCATION: CPT | Mod: PN

## 2024-03-18 PROCEDURE — 97530 THERAPEUTIC ACTIVITIES: CPT | Mod: PN

## 2024-03-18 PROCEDURE — 97110 THERAPEUTIC EXERCISES: CPT | Mod: PN

## 2024-03-18 NOTE — PROGRESS NOTES
[facility]   Physical Therapy Treatment Note      Name: Conner Lombardi Jr.  Clinic Number: [id number]    Therapy Diagnosis:   Encounter Diagnoses   Name Primary?    Leg weakness, bilateral Yes    Chronic bilateral low back pain without sciatica      Physician: Helder Taylor MD    Visit Date: 3/18/2024    Physician Orders: PT Eval and Treat  Medical Diagnosis from Referral: Lumbar radiculopathy, spinal stenosis  Evaluation Date: 2/23/2024  Authorization Period Expiration: 12/15/2025  Plan of Care Expiration: 4/23/2024                Progress Update: 2/23/2024   Visit # / Visits authorized: 1 / 1;   5/20  FOTO: 2/23/2024 - Scored: 1 / 3           PRECAUTIONS: Standard Precautions, Epilepsy      Time In: 0630  Time Out: 0718  Total Appointment Time (timed & untimed codes): 48 minutes    PTA Visit #: 0/5       Subjective     Patient reports: decreased pain  He was compliant with home exercise program.  Response to previous treatment: N/A  Functional change: N/A    Pain: 1/10  Location: bilateral low back and thighs      Objective      Objective Measures updated at progress report unless specified.     Treatment     Conner received the treatments listed below:      Therapeutic exercises to develop strength, ROM, flexibility, posture, and core stabilization for 15 minutes including: ·     Activity   Details      UBE  x   5 minutes     Hamstring stretch with strap x   3x20 seconds     Prone quad stretch with strap  x  3x20 seconds      Calf stretch on wedge x 3x20 seconds                       Manual therapy techniques: Joint mobilizations, Manual traction, Myofacial release, and Soft tissue Mobilization were applied to the: --- for --- minutes, including:      Activity   Details                                                               Neuromuscular re-education activities to improve: Balance, Coordination, Kinesthetic, Sense, Proprioception, Posture, and Core for 28 minutes. The  following activities were included:     Activity   Details    MedX extension  60# x 20    MedX rotation  20# x 20     DKTC with ball x  3x10     Lower trunk rotation x  3x10     Posterior pelvic tilt x  3x10     Standing hip extension x  3x10     Matrix Rows x  45# 3x10    Shuttle x 5 bands 3 minutes    Heel raises x 3x10    Standing hip abduction  3x10    Standing hip extension  3x10     Therapeutic activities to improve functional performance for 10 minutes, including:    Activity   Details     Step ups  x  8 inch 3x10     Sit to stand  x  3x10 20 inch     [place] carry x  15# 2 laps                            Education provided:   - Home program    Written Home Exercises Provided: Patient instructed to cont prior HEP. Exercises were reviewed and Conner was able to demonstrate them prior to the end of the session.  Conner demonstrated good  understanding of the education provided. See Electronic Medical Record under Patient Instructions for exercises provided during therapy sessions    Assessment     The patient is doing better today.  He requires decreased effort to complete tasks.  He is be progressed in strengthening and range of motion    Conner Is progressing well towards his goals.   Patient prognosis is Good.     Patient will continue to benefit from skilled outpatient physical therapy to address the deficits listed in the problem list box on initial evaluation, provide pt/family education and to maximize pt's level of independence in the home and community environment.     Patient's spiritual, cultural and educational needs considered and pt agreeable to plan of care and goals.     Anticipated barriers to physical therapy: none    SHORT TERM GOALS:  4 weeks Progress Date Met   Recent signs and systems trend is improving in order to progress towards Long term goals.  [] Met  [] Not Met  [] Progressing     Patient will be independent with Home Exercise Program  in order to further progress and return to  maximal function. [] Met  [] Not Met  [] Progressing     Pain rating at Worst: 5 /10 in order to progress towards increased independence with activity. [] Met  [] Not Met  [] Progressing     Patient will be able to correct postural deviations in sitting and standing, to decrease pain and promote postural awareness for injury prevention.  [] Met  [] Not Met  [] Progressing     Patient will improve functional outcome (FOTO) score: by 5% to increase self-worth & perceived functional ability towards long term goals [] Met  [] Not Met  [] Progressing        LONG TERM GOALS: 10 weeks  Progress Date Met   Patient will return to normal activites of daily living, recreational, and work related activities with less pain and limitation.  [] Met  [] Not Met  [] Progressing     Patient will improve range of motion  to stated goals in order to return to maximal functional potential.  [] Met  [] Not Met  [] Progressing     Patient will improve Strength to stated goals of appropriate musculature in order to improve functional independence.  [] Met  [] Not Met  [] Progressing     Pain Rating at Best: 1/10 to improve Quality of Life.  [] Met  [] Not Met  [] Progressing     Patient will meet predicted functional outcome (FOTO) score: 70% to increase self-worth & perceived functional ability. [] Met  [] Not Met  [] Progressing     Patient will have met/partially met personal goal of: improve pain and function  [] Met  [] Not Met  [] Progressing              PLAN   Plan of care Certification: 2/23/2024 to 04/23/2024     Outpatient Physical Therapy 2 times weekly for 10 weeks to include any combination of the following interventions: virtual visits, dry needling, modalities, electrical stimulation (IFC, Pre-Mod, Attended with Functional Dry Needling), Manual Therapy, Moist Heat/ Ice, Neuromuscular Re-ed, Patient Education, Self Care, Therapeutic Exercise, Functional Training, and Therapeutic Activites        Raman Mazariegos, PT

## 2024-03-21 ENCOUNTER — DOCUMENTATION ONLY (OUTPATIENT)
Dept: REHABILITATION | Facility: HOSPITAL | Age: 74
End: 2024-03-21
Payer: COMMERCIAL

## 2024-03-21 ENCOUNTER — CLINICAL SUPPORT (OUTPATIENT)
Dept: REHABILITATION | Facility: HOSPITAL | Age: 74
End: 2024-03-21
Payer: COMMERCIAL

## 2024-03-21 ENCOUNTER — OFFICE VISIT (OUTPATIENT)
Dept: ORTHOPEDICS | Facility: CLINIC | Age: 74
End: 2024-03-21
Payer: COMMERCIAL

## 2024-03-21 VITALS — WEIGHT: 185.19 LBS | HEIGHT: 66 IN | BODY MASS INDEX: 29.76 KG/M2

## 2024-03-21 DIAGNOSIS — M48.061 SPINAL STENOSIS OF LUMBAR REGION AT MULTIPLE LEVELS: ICD-10-CM

## 2024-03-21 DIAGNOSIS — M16.12 ARTHRITIS OF LEFT HIP: Primary | ICD-10-CM

## 2024-03-21 DIAGNOSIS — M25.561 PAIN IN BOTH KNEES, UNSPECIFIED CHRONICITY: ICD-10-CM

## 2024-03-21 DIAGNOSIS — M16.0 PRIMARY OSTEOARTHRITIS OF BOTH HIPS: ICD-10-CM

## 2024-03-21 DIAGNOSIS — M25.561 PAIN IN BOTH KNEES, UNSPECIFIED CHRONICITY: Primary | ICD-10-CM

## 2024-03-21 DIAGNOSIS — R29.898 LEG WEAKNESS, BILATERAL: Primary | ICD-10-CM

## 2024-03-21 DIAGNOSIS — M47.816 LUMBAR FACET ARTHROPATHY: ICD-10-CM

## 2024-03-21 DIAGNOSIS — G89.29 CHRONIC BILATERAL LOW BACK PAIN WITHOUT SCIATICA: ICD-10-CM

## 2024-03-21 DIAGNOSIS — M25.562 PAIN IN BOTH KNEES, UNSPECIFIED CHRONICITY: ICD-10-CM

## 2024-03-21 DIAGNOSIS — M54.50 CHRONIC BILATERAL LOW BACK PAIN WITHOUT SCIATICA: ICD-10-CM

## 2024-03-21 DIAGNOSIS — M25.562 PAIN IN BOTH KNEES, UNSPECIFIED CHRONICITY: Primary | ICD-10-CM

## 2024-03-21 DIAGNOSIS — M16.11 ARTHRITIS OF RIGHT HIP: ICD-10-CM

## 2024-03-21 PROCEDURE — 1101F PT FALLS ASSESS-DOCD LE1/YR: CPT | Mod: CPTII,S$GLB,, | Performed by: ORTHOPAEDIC SURGERY

## 2024-03-21 PROCEDURE — 99999 PR PBB SHADOW E&M-EST. PATIENT-LVL III: CPT | Mod: PBBFAC,,, | Performed by: ORTHOPAEDIC SURGERY

## 2024-03-21 PROCEDURE — 97110 THERAPEUTIC EXERCISES: CPT | Mod: PN,CQ

## 2024-03-21 PROCEDURE — 99204 OFFICE O/P NEW MOD 45 MIN: CPT | Mod: S$GLB,,, | Performed by: ORTHOPAEDIC SURGERY

## 2024-03-21 PROCEDURE — 3044F HG A1C LEVEL LT 7.0%: CPT | Mod: CPTII,S$GLB,, | Performed by: ORTHOPAEDIC SURGERY

## 2024-03-21 PROCEDURE — 4010F ACE/ARB THERAPY RXD/TAKEN: CPT | Mod: CPTII,S$GLB,, | Performed by: ORTHOPAEDIC SURGERY

## 2024-03-21 PROCEDURE — 1159F MED LIST DOCD IN RCRD: CPT | Mod: CPTII,S$GLB,, | Performed by: ORTHOPAEDIC SURGERY

## 2024-03-21 PROCEDURE — 3008F BODY MASS INDEX DOCD: CPT | Mod: CPTII,S$GLB,, | Performed by: ORTHOPAEDIC SURGERY

## 2024-03-21 PROCEDURE — 97112 NEUROMUSCULAR REEDUCATION: CPT | Mod: PN,CQ

## 2024-03-21 PROCEDURE — 97530 THERAPEUTIC ACTIVITIES: CPT | Mod: PN,CQ

## 2024-03-21 PROCEDURE — 1125F AMNT PAIN NOTED PAIN PRSNT: CPT | Mod: CPTII,S$GLB,, | Performed by: ORTHOPAEDIC SURGERY

## 2024-03-21 PROCEDURE — 3288F FALL RISK ASSESSMENT DOCD: CPT | Mod: CPTII,S$GLB,, | Performed by: ORTHOPAEDIC SURGERY

## 2024-03-21 RX ORDER — TIZANIDINE 2 MG/1
TABLET ORAL
COMMUNITY
Start: 2024-03-17 | End: 2024-04-22

## 2024-03-21 NOTE — PATIENT INSTRUCTIONS
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    You going to physical therapy at [facility] on [address] and that is seems to have helped significantly  At this time you doing well is no reason to undergo any surgery since you not hurting as much  I showed you x-rays of people who have hip replacements and that is exactly what you would need in the future  Your right knee seems to be slightly malaligned and could be arthritic next time you come to see us will get x-ray on both your knees to make sure everything is okay  Low-impact exercises like swimming and stationary bicycle and walking seems to be beneficial for arthritic pain to keep You limber and keep your muscles strong

## 2024-03-21 NOTE — PROGRESS NOTES
[facility]   Physical Therapy Treatment Note      Name: Conner Lombardi Jr.  Clinic Number: [id number]    Therapy Diagnosis:   Encounter Diagnoses   Name Primary?    Leg weakness, bilateral Yes    Chronic bilateral low back pain without sciatica      Physician: Helder Taylor MD    Visit Date: 3/21/2024    Physician Orders: PT Eval and Treat  Medical Diagnosis from Referral: Lumbar radiculopathy, spinal stenosis  Evaluation Date: 2/23/2024  Authorization Period Expiration: 12/15/2025  Plan of Care Expiration: 4/23/2024                Progress Update: 2/23/2024   Visit # / Visits authorized: 1 / 1;   6/20  FOTO: 3/21/2024 - Scored: 2 / 3           PRECAUTIONS: Standard Precautions, Epilepsy      Time In: 12:37pm  Time Out: 1:35pm  Total Appointment Time (timed & untimed codes): 54 minutes    PTA Visit: 1/5       Subjective     Patient reports: saw MD this morning and states that though imaging wise his hips and knees look poor he does not need a surgery at this time. Patient reports minimal pain present today.  He was compliant with home exercise program.  Response to previous treatment: N/A  Functional change: N/A    Pain: 1/10  Location: bilateral low back and thighs      Objective      Objective Measures updated at progress report unless specified.     Treatment     Conner received the treatments listed below:      Therapeutic exercises to develop strength, ROM, flexibility, posture, and core stabilization for 15 minutes including: ·     Activity   Details      Nustep  x   5 minutes resistance 3     Hamstring stretch with strap x   3x20 seconds     Prone quad stretch with strap  x  3x20 seconds      Calf stretch on wedge x 3x20 seconds      piriformis pushdown x  3x 20 seconds each       shae stretch x  3 x20 seconds each     Manual therapy techniques: Joint mobilizations, Manual traction, Myofacial release, and Soft tissue Mobilization were applied to the: --- for --- minutes,  including:      Activity   Details                         Neuromuscular re-education activities to improve: Balance, Coordination, Kinesthetic, Sense, Proprioception, Posture, and Core for 29 minutes. The following activities were included:     Activity   Details    MedX extension x 60# x 20    MedX rotation  20# x 20     DKTC with ball x  3x10     Lower trunk rotation x  3x10     Posterior pelvic tilt x  3x10     Standing hip extension x  3x10     Matrix Rows x  45# 3x10    Shuttle x 5 bands 3 minutes          Standing hip abduction  3x10    Standing hip extension x 3x10     Therapeutic activities to improve functional performance for 10 minutes, including:    Activity   Details     Step ups  x  8 inch 3x10     Sit to stand  x  3x10 20 inch     [place] carry x  15# 2 laps                            Education provided:   - Home program    Written Home Exercises Provided: Patient instructed to cont prior HEP. Exercises were reviewed and Conner was able to demonstrate them prior to the end of the session.  Conner demonstrated good  understanding of the education provided. See Electronic Medical Record under Patient Instructions for exercises provided during therapy sessions    Assessment     Continued work of previous visits attempting to focus on lumbar, hip, and lower extremity mobility as well as strength. Discussed with patient about the importance of proper pacing of exercises with inclusion of rest breaks to minimize exacerbations. Patient was successful with all prescribed activities with no limitations of pain present. Plan to continue strengthening into further function as patient is able to tolerate.    Conner Is progressing well towards his goals.   Patient prognosis is Good.     Patient will continue to benefit from skilled outpatient physical therapy to address the deficits listed in the problem list box on initial evaluation, provide pt/family education and to maximize pt's level of independence in the  home and community environment.     Patient's spiritual, cultural and educational needs considered and pt agreeable to plan of care and goals.     Anticipated barriers to physical therapy: none    SHORT TERM GOALS:  4 weeks Progress Date Met   Recent signs and systems trend is improving in order to progress towards Long term goals.  [] Met  [] Not Met  [] Progressing     Patient will be independent with Home Exercise Program  in order to further progress and return to maximal function. [] Met  [] Not Met  [] Progressing     Pain rating at Worst: 5 /10 in order to progress towards increased independence with activity. [] Met  [] Not Met  [] Progressing     Patient will be able to correct postural deviations in sitting and standing, to decrease pain and promote postural awareness for injury prevention.  [] Met  [] Not Met  [] Progressing     Patient will improve functional outcome (FOTO) score: by 5% to increase self-worth & perceived functional ability towards long term goals [] Met  [] Not Met  [] Progressing        LONG TERM GOALS: 10 weeks  Progress Date Met   Patient will return to normal activites of daily living, recreational, and work related activities with less pain and limitation.  [] Met  [] Not Met  [] Progressing     Patient will improve range of motion  to stated goals in order to return to maximal functional potential.  [] Met  [] Not Met  [] Progressing     Patient will improve Strength to stated goals of appropriate musculature in order to improve functional independence.  [] Met  [] Not Met  [] Progressing     Pain Rating at Best: 1/10 to improve Quality of Life.  [] Met  [] Not Met  [] Progressing     Patient will meet predicted functional outcome (FOTO) score: 70% to increase self-worth & perceived functional ability. [] Met  [] Not Met  [] Progressing     Patient will have met/partially met personal goal of: improve pain and function  [] Met  [] Not Met  [] Progressing              PLAN   Plan of  care Certification: 2/23/2024 to 04/23/2024     Outpatient Physical Therapy 2 times weekly for 10 weeks to include any combination of the following interventions: virtual visits, dry needling, modalities, electrical stimulation (IFC, Pre-Mod, Attended with Functional Dry Needling), Manual Therapy, Moist Heat/ Ice, Neuromuscular Re-ed, Patient Education, Self Care, Therapeutic Exercise, Functional Training, and Therapeutic Activites        Chapo Gamboa, PTA

## 2024-03-21 NOTE — PROGRESS NOTES
PT/PTA met face to face to discuss pt's treatment plan and progress towards established goals. Pt will be seen by a physical therapist minimally every 6th visit or every 30 days.    Chapo Gamboa PTA

## 2024-03-21 NOTE — PROGRESS NOTES
Subjective:     Patient ID: Conner Lombardi Jr. is a 73 y.o. male.    Chief Complaint: Pain of the Right Hip and Pain of the Left Hip    HPI:  03/21/2024   Bilateral hip pain   Patient stated his pain is markedly better after he went to physical therapy it is 1/10.  He was prescribed gabapentin to take at night and tizanidine which also helps.  He has not been prescribed any and stories.  He goes to physical therapy at [facility] on [address].  Still working but desk job.  Said the drive home sometimes is very painful.  When he ambulates distances he gets some calf pain and that goes away.  I did go over the MRI showed it to him of his lumbar spine as well as x-rays that showed spinal stenosis at multiple levels from L2 down to L5.  There was no injection given to his spine.  He does see pain management Dr. Taylor.  Patient does not take over-the-counter medications like a leave or Advil or Motrin or ibuprofen or Tylenol.  He does state the physical therapy helped significantly  He has not interested in having surgery at this time.  His job is a desk job.  No evidence of loss of bowel or bladder control blurry vision double vision loss sense smell or taste    Discussed with the patient after reviewing the MRI with him and the amount of stenosis that the only emergency he has is when he loses control of bowel or bladder at that point we really need to go to the emergency room immediately/cauda equina syndrome  Past Medical History:   Diagnosis Date    Epilepsy     Last seizure 2010.     Hypertension     Tubular adenoma of colon 04/02/2012     Past Surgical History:   Procedure Laterality Date    COLONOSCOPY N/A 07/12/2021    Procedure: COLONOSCOPY;  Surgeon: Subhash Martin MD;  Location: [facility];  Service: Endoscopy;  Laterality: N/A;    TONSILLECTOMY      VASECTOMY  1984/1985     Family History   Problem Relation Age of Onset    Hypertension Other     Cancer Mother         Brain    Cancer Father          Liver and lung    Hypertension Father     Diabetes Maternal Grandfather      Social History     Socioeconomic History    Marital status:    Tobacco Use    Smoking status: Never    Smokeless tobacco: Never   Substance and Sexual Activity    Alcohol use: No    Drug use: Never    Sexual activity: Not Currently     Partners: Female     Birth control/protection: None     Social Determinants of Health     Financial Resource Strain: Low Risk  (2/4/2024)    Overall Financial Resource Strain (CARDIA)     Difficulty of Paying Living Expenses: Not hard at all   Food Insecurity: No Food Insecurity (2/4/2024)    Hunger Vital Sign     Worried About Running Out of Food in the Last Year: Never true     Ran Out of Food in the Last Year: Never true   Transportation Needs: No Transportation Needs (2/4/2024)    PRAPARE - Transportation     Lack of Transportation (Medical): No     Lack of Transportation (Non-Medical): No   Physical Activity: Insufficiently Active (2/4/2024)    Exercise Vital Sign     Days of Exercise per Week: 1 day     Minutes of Exercise per Session: 30 min   Stress: No Stress Concern Present (2/4/2024)    [employer] - Occupational Stress Questionnaire     Feeling of Stress : Only a little   Social Connections: Unknown (2/4/2024)    Social Connection and Isolation Panel [NHANES]     Frequency of Communication with Friends and Family: Once a week     Frequency of Social Gatherings with Friends and Family: Once a week     Active Member of Clubs or Organizations: No     Attends Club or Organization Meetings: Never     Marital Status:    Housing Stability: Low Risk  (2/4/2024)    Housing Stability Vital Sign     Unable to Pay for Housing in the Last Year: No     Number of Places Lived in the Last Year: 1     Unstable Housing in the Last Year: No     Medication List with Changes/Refills   Current Medications    ASPIRIN (ECOTRIN) 81 MG EC TABLET    Take 81 mg by mouth once daily.     ATORVASTATIN (LIPITOR) 40 MG TABLET    Take 1 tablet (40 mg total) by mouth once daily.    CHOLECALCIFEROL, VITAMIN D3, (VITAMIN D3) 25 MCG (1,000 UNIT) CAPSULE    Take 1 capsule (1,000 Units total) by mouth once daily.    GABAPENTIN (NEURONTIN) 300 MG CAPSULE    Take 1 capsule (300 mg total) by mouth every evening.    KRILL OIL ORAL    Take by mouth.    LISINOPRIL-HYDROCHLOROTHIAZIDE (PRINZIDE,ZESTORETIC) 20-25 MG TAB    TAKE 1 TABLET BY MOUTH EVERY DAY    PHENYTOIN (DILANTIN) 100 MG ER CAPSULE    Take 4 capsules (400 mg total) by mouth every evening.    TIZANIDINE (ZANAFLEX) 2 MG TABLET         Review of patient's allergies indicates:  No Known Allergies  Review of Systems   Constitutional: Negative for decreased appetite.   HENT:  Negative for tinnitus.    Eyes:  Negative for double vision.   Cardiovascular:  Negative for chest pain.   Respiratory:  Negative for wheezing.    Hematologic/Lymphatic: Negative for bleeding problem.   Skin:  Negative for dry skin.   Musculoskeletal:  Positive for arthritis, back pain and joint pain. Negative for gout, neck pain and stiffness.   Gastrointestinal:  Negative for abdominal pain.   Genitourinary:  Negative for bladder incontinence.   Neurological:  Negative for numbness, paresthesias and sensory change.   Psychiatric/Behavioral:  Negative for altered mental status.        Objective:   Body mass index is 29.89 kg/m².  There were no vitals filed for this visit.       General    Constitutional: He is oriented to person, place, and time. He appears well-developed.   HENT:   Head: Atraumatic.   Eyes: EOM are normal.   Pulmonary/Chest: Effort normal.   Neurological: He is alert and oriented to person, place, and time.   Psychiatric: Judgment normal.             Ambulating without assistive devices taking small steps   Pelvis is level   In the sitting position passive internal rotation of 15° and external rotation 30° in both hips.  There is minimal discomfort in the groin.   Hip flexors and abductors and adductors seems to be very strong at 5/5.  Abduction is around 30° bilaterally.  No flexion contractures.  Right knee with mild varus deformity and mild crepitus to range of motion on compression on the patella and medially.  Collaterals and cruciates are stable.  Very minimal discomfort on the medial joint.  No defect in the patella or quadriceps tendon.  Very minimal swelling.  Range of motion 5-130.  Left knee with normal alignment.  No crepitus to motion.  No defect in the patella or quadriceps tendon.  Collaterals and cruciates stable.  No swelling no effusion.  Full range of motion   Calves are soft nontender  Ankle motion intact  Relevant imaging results reviewed and interpreted by me, discussed with the patient and / or family today     X-ray 12/07/2023 with bilateral hips severe arthritis  bone-on-bone on both sides.  No evidence of fracture.  Bone quality is good   MRI lumbar spine with/15/23 showing multilevel central spinal stenosis starting at L2-3 down to L5 with some facet arthropathy  No x-rays of the knees taken  Assessment:     Encounter Diagnoses   Name Primary?    Arthritis of left hip Yes    Arthritis of right hip     Spinal stenosis of lumbar region at multiple levels     Lumbar facet arthropathy     Primary osteoarthritis of both hips     Pain in both knees, unspecified chronicity         Plan:   Arthritis of left hip    Arthritis of right hip    Spinal stenosis of lumbar region at multiple levels    Lumbar facet arthropathy    Primary osteoarthritis of both hips  -     Ambulatory referral/consult to Orthopedics    Pain in both knees, unspecified chronicity         Patient Instructions   You can take Tylenol 1000 mg twice a day as needed for pain   You do have gabapentin which you take at night which helps with the nerve pain  You do have a muscle relaxer tizanidine/Zanaflex to take on as needed basis   You not having too much pain in the groin just some pain  specially in the right knee  Your x-ray show severe arthritis of both of her hips as well as you have an MRI that showed severe spinal stenosis which is because of the arthritis this pinching of the nerves coming out of her back   Spinal stenosis will cause pain and aches in the calves and in the legs when you walk distances and then when you sit down it eases up.    You going to physical therapy at [facility] on [address] and that is seems to have helped significantly  At this time you doing well is no reason to undergo any surgery since you not hurting as much  I showed you x-rays of people who have hip replacements and that is exactly what you would need in the future  Your right knee seems to be slightly malaligned and could be arthritic next time you come to see us will get x-ray on both your knees to make sure everything is okay  Low-impact exercises like swimming and stationary bicycle and walking seems to be beneficial for arthritic pain to keep You limber and keep your muscles strong        Disclaimer: This note was prepared using a voice recognition system and is likely to have sound alike errors within the text.

## 2024-03-25 ENCOUNTER — CLINICAL SUPPORT (OUTPATIENT)
Dept: REHABILITATION | Facility: HOSPITAL | Age: 74
End: 2024-03-25
Payer: COMMERCIAL

## 2024-03-25 DIAGNOSIS — R29.898 LEG WEAKNESS, BILATERAL: Primary | ICD-10-CM

## 2024-03-25 DIAGNOSIS — M54.50 CHRONIC BILATERAL LOW BACK PAIN WITHOUT SCIATICA: ICD-10-CM

## 2024-03-25 DIAGNOSIS — G89.29 CHRONIC BILATERAL LOW BACK PAIN WITHOUT SCIATICA: ICD-10-CM

## 2024-03-25 PROCEDURE — 97112 NEUROMUSCULAR REEDUCATION: CPT | Mod: PN

## 2024-03-25 PROCEDURE — 97530 THERAPEUTIC ACTIVITIES: CPT | Mod: PN

## 2024-03-25 PROCEDURE — 97110 THERAPEUTIC EXERCISES: CPT | Mod: PN

## 2024-03-25 NOTE — PROGRESS NOTES
[facility]   Physical Therapy Treatment Note      Name: Conner Lombardi Jr.  Clinic Number: [id number]    Therapy Diagnosis:   Encounter Diagnoses   Name Primary?    Leg weakness, bilateral Yes    Chronic bilateral low back pain without sciatica        Physician: Helder Taylor MD    Visit Date: 3/25/2024    Physician Orders: PT Eval and Treat  Medical Diagnosis from Referral: Lumbar radiculopathy, spinal stenosis  Evaluation Date: 2/23/2024  Authorization Period Expiration: 12/15/2025  Plan of Care Expiration: 4/23/2024                Progress Update: 2/23/2024   Visit # / Visits authorized: 1 / 1;   6/20  FOTO: 3/21/2024 - Scored: 2 / 3           PRECAUTIONS: Standard Precautions, Epilepsy      Time In: 0630  Time Out: 0730  Total Appointment Time (timed & untimed codes): 60 minutes    PTA Visit: 1/5       Subjective     Patient reports:he is doing ok and did a lot of walking over the weekend    He was compliant with home exercise program.  Response to previous treatment: N/A  Functional change: N/A    Pain: 1/10  Location: bilateral low back and thighs      Objective      Objective Measures updated at progress report unless specified.     Treatment     Conner received the treatments listed below:      Therapeutic exercises to develop strength, ROM, flexibility, posture, and core stabilization for 15 minutes including: ·     Activity   Details      Nustep  x   5 minutes resistance 3     Hamstring stretch with strap x   3x20 seconds     Prone quad stretch with strap  x  3x20 seconds      Calf stretch on wedge x 3x20 seconds      piriformis pushdown   3x 20 seconds each       shae stretch   3 x20 seconds each     Manual therapy techniques: Joint mobilizations, Manual traction, Myofacial release, and Soft tissue Mobilization were applied to the: --- for --- minutes, including:      Activity   Details                         Neuromuscular re-education activities to improve: Balance,  Coordination, Kinesthetic, Sense, Proprioception, Posture, and Core for 30 minutes. The following activities were included:     Activity   Details    MedX extension x 60# x 20    MedX rotation  20# x 20     DKTC with ball x  3x10     Lower trunk rotation x  3x10     Posterior pelvic tilt x  3x10     Standing hip extension x  3x10     Matrix Rows x  45# 3x10    Shuttle x 5 bands 3 minutes          Standing hip abduction  3x10    Standing hip extension x 3x10     Therapeutic activities to improve functional performance for 10 minutes, including:    Activity   Details     Step ups  x  8 inch 3x10     Sit to stand  x  3x10 20 inch     [place] carry x  15# 2 laps                            Education provided:   - Home program    Written Home Exercises Provided: Patient instructed to cont prior HEP. Exercises were reviewed and Conner was able to demonstrate them prior to the end of the session.  Conner demonstrated good  understanding of the education provided. See Electronic Medical Record under Patient Instructions for exercises provided during therapy sessions    Assessment     Continued work of previous visits attempting to focus on lumbar, hip, and lower extremity mobility as well as strength. Discussed with patient about the importance of proper pacing of exercises with inclusion of rest breaks to minimize exacerbations. Patient was successful with all prescribed activities with no limitations of pain present. Plan to continue strengthening into further function as patient is able to tolerate.    Conner Is progressing well towards his goals.   Patient prognosis is Good.     Patient will continue to benefit from skilled outpatient physical therapy to address the deficits listed in the problem list box on initial evaluation, provide pt/family education and to maximize pt's level of independence in the home and community environment.     Patient's spiritual, cultural and educational needs considered and pt agreeable to  plan of care and goals.     Anticipated barriers to physical therapy: none    SHORT TERM GOALS:  4 weeks Progress Date Met   Recent signs and systems trend is improving in order to progress towards Long term goals.  [] Met  [] Not Met  [] Progressing     Patient will be independent with Home Exercise Program  in order to further progress and return to maximal function. [] Met  [] Not Met  [] Progressing     Pain rating at Worst: 5 /10 in order to progress towards increased independence with activity. [] Met  [] Not Met  [] Progressing     Patient will be able to correct postural deviations in sitting and standing, to decrease pain and promote postural awareness for injury prevention.  [] Met  [] Not Met  [] Progressing     Patient will improve functional outcome (FOTO) score: by 5% to increase self-worth & perceived functional ability towards long term goals [] Met  [] Not Met  [] Progressing        LONG TERM GOALS: 10 weeks  Progress Date Met   Patient will return to normal activites of daily living, recreational, and work related activities with less pain and limitation.  [] Met  [] Not Met  [] Progressing     Patient will improve range of motion  to stated goals in order to return to maximal functional potential.  [] Met  [] Not Met  [] Progressing     Patient will improve Strength to stated goals of appropriate musculature in order to improve functional independence.  [] Met  [] Not Met  [] Progressing     Pain Rating at Best: 1/10 to improve Quality of Life.  [] Met  [] Not Met  [] Progressing     Patient will meet predicted functional outcome (FOTO) score: 70% to increase self-worth & perceived functional ability. [] Met  [] Not Met  [] Progressing     Patient will have met/partially met personal goal of: improve pain and function  [] Met  [] Not Met  [] Progressing              PLAN   Plan of care Certification: 2/23/2024 to 04/23/2024     Outpatient Physical Therapy 2 times weekly for 10 weeks to include any  combination of the following interventions: virtual visits, dry needling, modalities, electrical stimulation (IFC, Pre-Mod, Attended with Functional Dry Needling), Manual Therapy, Moist Heat/ Ice, Neuromuscular Re-ed, Patient Education, Self Care, Therapeutic Exercise, Functional Training, and Therapeutic Activites        Raman Mazariegos, PT

## 2024-03-27 ENCOUNTER — CLINICAL SUPPORT (OUTPATIENT)
Dept: REHABILITATION | Facility: HOSPITAL | Age: 74
End: 2024-03-27
Payer: COMMERCIAL

## 2024-03-27 DIAGNOSIS — R29.898 LEG WEAKNESS, BILATERAL: Primary | ICD-10-CM

## 2024-03-27 DIAGNOSIS — G89.29 CHRONIC BILATERAL LOW BACK PAIN WITHOUT SCIATICA: ICD-10-CM

## 2024-03-27 DIAGNOSIS — M54.50 CHRONIC BILATERAL LOW BACK PAIN WITHOUT SCIATICA: ICD-10-CM

## 2024-03-27 PROCEDURE — 97530 THERAPEUTIC ACTIVITIES: CPT | Mod: PN

## 2024-03-27 PROCEDURE — 97112 NEUROMUSCULAR REEDUCATION: CPT | Mod: PN

## 2024-03-27 PROCEDURE — 97110 THERAPEUTIC EXERCISES: CPT | Mod: PN

## 2024-03-27 NOTE — PROGRESS NOTES
[facility]   Physical Therapy Treatment Note      Name: Conner Lombardi Jr.  Clinic Number: [id number]    Therapy Diagnosis:   Encounter Diagnoses   Name Primary?    Leg weakness, bilateral Yes    Chronic bilateral low back pain without sciatica        Physician: Helder Taylor MD    Visit Date: 3/27/2024    Physician Orders: PT Eval and Treat  Medical Diagnosis from Referral: Lumbar radiculopathy, spinal stenosis  Evaluation Date: 2/23/2024  Authorization Period Expiration: 12/15/2025  Plan of Care Expiration: 4/23/2024                Progress Update: 2/23/2024   Visit # / Visits authorized: 1 / 1;   8/20  FOTO: 3/21/2024 - Scored: 2 / 3           PRECAUTIONS: Standard Precautions, Epilepsy      Time In: 0630  Time Out: 0726  Total Appointment Time (timed & untimed codes): 55 minutes    PTA Visit: 0/5      Subjective     Patient reports:he is doing better    He was compliant with home exercise program.  Response to previous treatment: N/A  Functional change: N/A    Pain: 1/10  Location: bilateral low back and thighs      Objective      Objective Measures updated at progress report unless specified.     Treatment     Conner received the treatments listed below:      Therapeutic exercises to develop strength, ROM, flexibility, posture, and core stabilization for 15 minutes including: ·     Activity   Details      Nustep  x   5 minutes resistance 3     Hamstring stretch with strap x   3x20 seconds     Prone quad stretch with strap  x  3x20 seconds      Calf stretch on wedge x 3x20 seconds      piriformis pushdown   3x 20 seconds each       shae stretch   3 x20 seconds each     Manual therapy techniques: Joint mobilizations, Manual traction, Myofacial release, and Soft tissue Mobilization were applied to the: --- for --- minutes, including:      Activity   Details                         Neuromuscular re-education activities to improve: Balance, Coordination, Kinesthetic, Sense,  Proprioception, Posture, and Core for 30 minutes. The following activities were included:     Activity   Details    MedX extension x 60# x 20    MedX rotation x 20# x 20     DKTC with ball x  3x10     Lower trunk rotation x  3x10     Posterior pelvic tilt x  3x10     Standing hip extension x  3x10     Matrix Rows x  45# 3x10    Shuttle x 5 bands 3 minutes          Standing hip abduction  3x10    Standing hip extension  3x10     Therapeutic activities to improve functional performance for 10 minutes, including:    Activity   Details     Step ups  x  8 inch 3x10     Sit to stand  x  3x10 20 inch     [place] carry x  15# 2 laps                            Education provided:   - Home program    Written Home Exercises Provided: Patient instructed to cont prior HEP. Exercises were reviewed and Conner was able to demonstrate them prior to the end of the session.  Conner demonstrated good  understanding of the education provided. See Electronic Medical Record under Patient Instructions for exercises provided during therapy sessions    Assessment     The patient is progressing with core and lower extremity strengthening.  Lower extremity strengthening increasing to allow proper form with lifting.  He requires minimal to moderate cues for performance of program.    Conner Is progressing well towards his goals.   Patient prognosis is Good.     Patient will continue to benefit from skilled outpatient physical therapy to address the deficits listed in the problem list box on initial evaluation, provide pt/family education and to maximize pt's level of independence in the home and community environment.     Patient's spiritual, cultural and educational needs considered and pt agreeable to plan of care and goals.     Anticipated barriers to physical therapy: none    SHORT TERM GOALS:  4 weeks Progress Date Met   Recent signs and systems trend is improving in order to progress towards Long term goals.  [] Met  [] Not Met  []  Progressing     Patient will be independent with Home Exercise Program  in order to further progress and return to maximal function. [] Met  [] Not Met  [] Progressing     Pain rating at Worst: 5 /10 in order to progress towards increased independence with activity. [] Met  [] Not Met  [] Progressing     Patient will be able to correct postural deviations in sitting and standing, to decrease pain and promote postural awareness for injury prevention.  [] Met  [] Not Met  [] Progressing     Patient will improve functional outcome (FOTO) score: by 5% to increase self-worth & perceived functional ability towards long term goals [] Met  [] Not Met  [] Progressing        LONG TERM GOALS: 10 weeks  Progress Date Met   Patient will return to normal activites of daily living, recreational, and work related activities with less pain and limitation.  [] Met  [] Not Met  [] Progressing     Patient will improve range of motion  to stated goals in order to return to maximal functional potential.  [] Met  [] Not Met  [] Progressing     Patient will improve Strength to stated goals of appropriate musculature in order to improve functional independence.  [] Met  [] Not Met  [] Progressing     Pain Rating at Best: 1/10 to improve Quality of Life.  [] Met  [] Not Met  [] Progressing     Patient will meet predicted functional outcome (FOTO) score: 70% to increase self-worth & perceived functional ability. [] Met  [] Not Met  [] Progressing     Patient will have met/partially met personal goal of: improve pain and function  [] Met  [] Not Met  [] Progressing              PLAN   Plan of care Certification: 2/23/2024 to 04/23/2024     Outpatient Physical Therapy 2 times weekly for 10 weeks to include any combination of the following interventions: virtual visits, dry needling, modalities, electrical stimulation (IFC, Pre-Mod, Attended with Functional Dry Needling), Manual Therapy, Moist Heat/ Ice, Neuromuscular Re-ed, Patient Education,  Self Care, Therapeutic Exercise, Functional Training, and Therapeutic Activites        Raman Mazariegos, PT

## 2024-04-02 ENCOUNTER — TELEPHONE (OUTPATIENT)
Dept: PAIN MEDICINE | Facility: CLINIC | Age: 74
End: 2024-04-02
Payer: COMMERCIAL

## 2024-04-02 NOTE — TELEPHONE ENCOUNTER
Reached out to patient to reschedule appointment because the provider will be out of clinic. No answer. Left message on patients voice mail to call back at earliest convenience to schedule violetta Rebollar  Medical

## 2024-04-03 ENCOUNTER — CLINICAL SUPPORT (OUTPATIENT)
Dept: REHABILITATION | Facility: HOSPITAL | Age: 74
End: 2024-04-03
Payer: COMMERCIAL

## 2024-04-03 DIAGNOSIS — G89.29 CHRONIC BILATERAL LOW BACK PAIN WITHOUT SCIATICA: ICD-10-CM

## 2024-04-03 DIAGNOSIS — R29.898 LEG WEAKNESS, BILATERAL: Primary | ICD-10-CM

## 2024-04-03 DIAGNOSIS — M54.50 CHRONIC BILATERAL LOW BACK PAIN WITHOUT SCIATICA: ICD-10-CM

## 2024-04-03 PROCEDURE — 97530 THERAPEUTIC ACTIVITIES: CPT | Mod: PN

## 2024-04-03 PROCEDURE — 97112 NEUROMUSCULAR REEDUCATION: CPT | Mod: PN

## 2024-04-03 PROCEDURE — 97110 THERAPEUTIC EXERCISES: CPT | Mod: PN

## 2024-04-03 NOTE — PROGRESS NOTES
[facility]   Physical Therapy Treatment Note      Name: Conner Lombardi Jr.  Clinic Number: [id number]    Therapy Diagnosis:   Encounter Diagnoses   Name Primary?    Leg weakness, bilateral Yes    Chronic bilateral low back pain without sciatica        Physician: Helder Taylor MD    Visit Date: 3/27/2024    Physician Orders: PT Eval and Treat  Medical Diagnosis from Referral: Lumbar radiculopathy, spinal stenosis  Evaluation Date: 2/23/2024  Authorization Period Expiration: 12/15/2025  Plan of Care Expiration: 4/23/2024                Progress Update: 4/23/2024   Visit # / Visits authorized: 1 / 1;  9/20  FOTO: 3/21/2024 - Scored: 2 / 3           PRECAUTIONS: Standard Precautions, Epilepsy      Time In: 0635  Time Out: 0730  Total Appointment Time (timed & untimed codes): 55 minutes    PTA Visit: 0/5      Subjective     Patient reports:a little soreness    He was compliant with home exercise program.  Response to previous treatment: N/A  Functional change: N/A    Pain: 2/10  Location: bilateral low back and thighs      Objective      Objective Measures updated at progress report unless specified.     Treatment     Conner received the treatments listed below:      Therapeutic exercises to develop strength, ROM, flexibility, posture, and core stabilization for 15 minutes including: ·     Activity   Details      Nustep  x   5 minutes resistance 3     Hamstring stretch with strap x   3x20 seconds     Prone quad stretch with strap  x  3x20 seconds      Calf stretch on wedge x 3x20 seconds      piriformis pushdown   3x 20 seconds each       shae stretch   3 x20 seconds each     Manual therapy techniques: Joint mobilizations, Manual traction, Myofacial release, and Soft tissue Mobilization were applied to the: --- for --- minutes, including:      Activity   Details                         Neuromuscular re-education activities to improve: Balance, Coordination, Kinesthetic, Sense,  Proprioception, Posture, and Core for 30 minutes. The following activities were included:     Activity   Details    MedX extension x 70# x 20    MedX rotation x 26# x 20     DKTC with ball x  3x10     Lower trunk rotation x  3x10     Posterior pelvic tilt x  3x10     Matrix Rows x  45# 3x10    Shuttle x 5 bands 3 minutes    Standing hip abduction x 3x10 RTB    Standing hip extension x 3x10 RTB     Therapeutic activities to improve functional performance for 10 minutes, including:    Activity   Details     Step ups  x  8 inch 3x10     Sit to stand  x  3x10 20 inch     [place] carry x  15# 2 laps                            Education provided:   - Home program    Written Home Exercises Provided: Patient instructed to cont prior HEP. Exercises were reviewed and Conner was able to demonstrate them prior to the end of the session.  Conner demonstrated good  understanding of the education provided. See Electronic Medical Record under Patient Instructions for exercises provided during therapy sessions    Assessment     The patient is progressing with core and lower extremity strengthening.  Lower extremity strengthening increasing to allow proper form with lifting.  He requires minimal to moderate cues for performance of program.    Conner Is progressing well towards his goals.   Patient prognosis is Good.     Patient will continue to benefit from skilled outpatient physical therapy to address the deficits listed in the problem list box on initial evaluation, provide pt/family education and to maximize pt's level of independence in the home and community environment.     Patient's spiritual, cultural and educational needs considered and pt agreeable to plan of care and goals.     Anticipated barriers to physical therapy: none    SHORT TERM GOALS:  4 weeks Progress Date Met   Recent signs and systems trend is improving in order to progress towards Long term goals.  [] Met  [] Not Met  [] Progressing     Patient will be  independent with Home Exercise Program  in order to further progress and return to maximal function. [] Met  [] Not Met  [] Progressing     Pain rating at Worst: 5 /10 in order to progress towards increased independence with activity. [] Met  [] Not Met  [] Progressing     Patient will be able to correct postural deviations in sitting and standing, to decrease pain and promote postural awareness for injury prevention.  [] Met  [] Not Met  [] Progressing     Patient will improve functional outcome (FOTO) score: by 5% to increase self-worth & perceived functional ability towards long term goals [] Met  [] Not Met  [] Progressing        LONG TERM GOALS: 10 weeks  Progress Date Met   Patient will return to normal activites of daily living, recreational, and work related activities with less pain and limitation.  [] Met  [] Not Met  [] Progressing     Patient will improve range of motion  to stated goals in order to return to maximal functional potential.  [] Met  [] Not Met  [] Progressing     Patient will improve Strength to stated goals of appropriate musculature in order to improve functional independence.  [] Met  [] Not Met  [] Progressing     Pain Rating at Best: 1/10 to improve Quality of Life.  [] Met  [] Not Met  [] Progressing     Patient will meet predicted functional outcome (FOTO) score: 70% to increase self-worth & perceived functional ability. [] Met  [] Not Met  [] Progressing     Patient will have met/partially met personal goal of: improve pain and function  [] Met  [] Not Met  [] Progressing              PLAN   Plan of care Certification: 2/23/2024 to 04/23/2024     Outpatient Physical Therapy 2 times weekly for 10 weeks to include any combination of the following interventions: virtual visits, dry needling, modalities, electrical stimulation (IFC, Pre-Mod, Attended with Functional Dry Needling), Manual Therapy, Moist Heat/ Ice, Neuromuscular Re-ed, Patient Education, Self Care, Therapeutic Exercise,  Functional Training, and Therapeutic Activites        Raman Mazariegos, PT

## 2024-04-05 ENCOUNTER — CLINICAL SUPPORT (OUTPATIENT)
Dept: REHABILITATION | Facility: HOSPITAL | Age: 74
End: 2024-04-05
Payer: COMMERCIAL

## 2024-04-05 DIAGNOSIS — M54.50 CHRONIC BILATERAL LOW BACK PAIN WITHOUT SCIATICA: ICD-10-CM

## 2024-04-05 DIAGNOSIS — R29.898 LEG WEAKNESS, BILATERAL: Primary | ICD-10-CM

## 2024-04-05 DIAGNOSIS — G89.29 CHRONIC BILATERAL LOW BACK PAIN WITHOUT SCIATICA: ICD-10-CM

## 2024-04-05 PROCEDURE — 97112 NEUROMUSCULAR REEDUCATION: CPT | Mod: PN

## 2024-04-05 PROCEDURE — 97530 THERAPEUTIC ACTIVITIES: CPT | Mod: PN

## 2024-04-05 PROCEDURE — 97110 THERAPEUTIC EXERCISES: CPT | Mod: PN

## 2024-04-05 NOTE — PROGRESS NOTES
[facility]   Physical Therapy Treatment Note      Name: Conner Lombardi Jr.  Clinic Number: [id number]    Therapy Diagnosis:   Encounter Diagnoses   Name Primary?    Leg weakness, bilateral Yes    Chronic bilateral low back pain without sciatica        Physician: Helder Taylor MD    Visit Date: 3/27/2024    Physician Orders: PT Eval and Treat  Medical Diagnosis from Referral: Lumbar radiculopathy, spinal stenosis  Evaluation Date: 2/23/2024  Authorization Period Expiration: 12/15/2025  Plan of Care Expiration: 4/23/2024                Progress Update: 4/23/2024   Visit # / Visits authorized: 1 / 1;  10/20  FOTO: 3/21/2024 - Scored: 2 / 3           PRECAUTIONS: Standard Precautions, Epilepsy      Time In: 0631  Time Out: 0725  Total Appointment Time (timed & untimed codes): 54 minutes    PTA Visit: 0/5      Subjective     Patient reports:no pain    He was compliant with home exercise program.  Response to previous treatment: N/A  Functional change: N/A    Pain: 2/10  Location: bilateral low back and thighs      Objective      Objective Measures updated at progress report unless specified.     Treatment     Conner received the treatments listed below:      Therapeutic exercises to develop strength, ROM, flexibility, posture, and core stabilization for 15 minutes including: ·     Activity   Details      Nustep  x   5 minutes resistance 3     Hamstring stretch with strap x   3x20 seconds     Prone quad stretch with strap  x  3x20 seconds     Calf stretch on wedge x 3x20 seconds     Manual therapy techniques: Joint mobilizations, Manual traction, Myofacial release, and Soft tissue Mobilization were applied to the: --- for --- minutes, including:      Activity   Details                         Neuromuscular re-education activities to improve: Balance, Coordination, Kinesthetic, Sense, Proprioception, Posture, and Core for 30 minutes. The following activities were included:     Activity    Details    MedX extension x 70# x 20    MedX rotation x 26# x 20     DKTC with ball x  3x10     Lower trunk rotation x  3x10     Posterior pelvic tilt x  3x10     Matrix Rows x  45# 3x10    Shuttle x 5 bands 3 minutes    Standing hip abduction x 3x10 RTB    Standing hip extension x 3x10 RTB     Therapeutic activities to improve functional performance for 10 minutes, including:    Activity   Details     Step ups  x  8 inch 3x10     Sit to stand  x  3x10 20 inch     [place] carry x  15# 2 laps                            Education provided:   - Home program    Written Home Exercises Provided: Patient instructed to cont prior HEP. Exercises were reviewed and Conner was able to demonstrate them prior to the end of the session.  Conner demonstrated good  understanding of the education provided. See Electronic Medical Record under Patient Instructions for exercises provided during therapy sessions    Assessment     The patient is progressing with core and lower extremity strengthening.  Lower extremity strengthening increasing to allow proper form with lifting.  He requires minimal to moderate cues for performance of program.    Conner Is progressing well towards his goals.   Patient prognosis is Good.     Patient will continue to benefit from skilled outpatient physical therapy to address the deficits listed in the problem list box on initial evaluation, provide pt/family education and to maximize pt's level of independence in the home and community environment.     Patient's spiritual, cultural and educational needs considered and pt agreeable to plan of care and goals.     Anticipated barriers to physical therapy: none    SHORT TERM GOALS:  4 weeks Progress Date Met   Recent signs and systems trend is improving in order to progress towards Long term goals.  [] Met  [] Not Met  [] Progressing     Patient will be independent with Home Exercise Program  in order to further progress and return to maximal function. []  Met  [] Not Met  [] Progressing     Pain rating at Worst: 5 /10 in order to progress towards increased independence with activity. [] Met  [] Not Met  [] Progressing     Patient will be able to correct postural deviations in sitting and standing, to decrease pain and promote postural awareness for injury prevention.  [] Met  [] Not Met  [] Progressing     Patient will improve functional outcome (FOTO) score: by 5% to increase self-worth & perceived functional ability towards long term goals [] Met  [] Not Met  [] Progressing        LONG TERM GOALS: 10 weeks  Progress Date Met   Patient will return to normal activites of daily living, recreational, and work related activities with less pain and limitation.  [] Met  [] Not Met  [] Progressing     Patient will improve range of motion  to stated goals in order to return to maximal functional potential.  [] Met  [] Not Met  [] Progressing     Patient will improve Strength to stated goals of appropriate musculature in order to improve functional independence.  [] Met  [] Not Met  [] Progressing     Pain Rating at Best: 1/10 to improve Quality of Life.  [] Met  [] Not Met  [] Progressing     Patient will meet predicted functional outcome (FOTO) score: 70% to increase self-worth & perceived functional ability. [] Met  [] Not Met  [] Progressing     Patient will have met/partially met personal goal of: improve pain and function  [] Met  [] Not Met  [] Progressing              PLAN   Plan of care Certification: 2/23/2024 to 04/23/2024     Outpatient Physical Therapy 2 times weekly for 10 weeks to include any combination of the following interventions: virtual visits, dry needling, modalities, electrical stimulation (IFC, Pre-Mod, Attended with Functional Dry Needling), Manual Therapy, Moist Heat/ Ice, Neuromuscular Re-ed, Patient Education, Self Care, Therapeutic Exercise, Functional Training, and Therapeutic Activites        Raman Mazariegos, PT

## 2024-04-08 ENCOUNTER — CLINICAL SUPPORT (OUTPATIENT)
Dept: REHABILITATION | Facility: HOSPITAL | Age: 74
End: 2024-04-08
Payer: COMMERCIAL

## 2024-04-08 DIAGNOSIS — M54.50 CHRONIC BILATERAL LOW BACK PAIN WITHOUT SCIATICA: ICD-10-CM

## 2024-04-08 DIAGNOSIS — R29.898 LEG WEAKNESS, BILATERAL: Primary | ICD-10-CM

## 2024-04-08 DIAGNOSIS — G89.29 CHRONIC BILATERAL LOW BACK PAIN WITHOUT SCIATICA: ICD-10-CM

## 2024-04-08 PROCEDURE — 97530 THERAPEUTIC ACTIVITIES: CPT | Mod: PN

## 2024-04-08 PROCEDURE — 97112 NEUROMUSCULAR REEDUCATION: CPT | Mod: PN

## 2024-04-08 PROCEDURE — 97110 THERAPEUTIC EXERCISES: CPT | Mod: PN

## 2024-04-08 NOTE — PROGRESS NOTES
[facility]   Physical Therapy Treatment Note      Name: Conner Lombardi Jr.  Clinic Number: [id number]    Therapy Diagnosis:   No diagnosis found.      Physician: Helder Taylor MD    Visit Date: 4/8/2024    Physician Orders: PT [place] and Treat  Medical Diagnosis from Referral: Lumbar radiculopathy, spinal stenosis  Evaluation Date: 2/23/2024  Authorization Period Expiration: 12/15/2025  Plan of Care Expiration: 4/23/2024                Progress Update: 4/23/2024   Visit # / Visits authorized: 1 / 1;  11/20  FOTO: 3/21/2024 - Scored: 2 / 3           PRECAUTIONS: Standard Precautions, Epilepsy      Time In: 0630  Time Out: 0725  Total Appointment Time (timed & untimed codes): 54 minutes    PTA Visit: 0/5      Subjective     Patient reports:no pain    He was compliant with home exercise program.  Response to previous treatment: N/A  Functional change: N/A    Pain: 2/10  Location: bilateral low back and thighs      Objective      Objective Measures updated at progress report unless specified.     Treatment     Conner received the treatments listed below:      Therapeutic exercises to develop strength, ROM, flexibility, posture, and core stabilization for 15 minutes including: ·     Activity   Details      Nustep  x   5 minutes resistance 3     Hamstring stretch with strap x   3x20 seconds     Prone quad stretch with strap  x  3x20 seconds     Calf stretch on wedge x 3x20 seconds     Manual therapy techniques: Joint mobilizations, Manual traction, Myofacial release, and Soft tissue Mobilization were applied to the: --- for --- minutes, including:      Activity   Details                         Neuromuscular re-education activities to improve: Balance, Coordination, Kinesthetic, Sense, Proprioception, Posture, and Core for 30 minutes. The following activities were included:     Activity   Details    MedX extension x 70# x 20    MedX rotation x 26# x 20     DKTC with ball x  3x10     Lower  trunk rotation x  3x10     Posterior pelvic tilt x  3x10     Matrix Rows x  45# 3x10    Shuttle x 5 bands 3 minutes    Standing hip abduction x 3x10 RTB    Standing hip extension x 3x10 RTB     Therapeutic activities to improve functional performance for 10 minutes, including:    Activity   Details     Step ups  x  8 inch 3x10     Sit to stand  x  3x10 20 inch     [place] carry x  15# 2 laps                            Education provided:   - Home program    Written Home Exercises Provided: Patient instructed to cont prior HEP. Exercises were reviewed and Conner was able to demonstrate them prior to the end of the session.  Conner demonstrated good  understanding of the education provided. See Electronic Medical Record under Patient Instructions for exercises provided during therapy sessions    Assessment     The patient is progressing with core and lower extremity strengthening.  Lower extremity strengthening increasing to allow proper form with lifting.  He requires minimal to moderate cues for performance of program.    Conner Is progressing well towards his goals.   Patient prognosis is Good.     Patient will continue to benefit from skilled outpatient physical therapy to address the deficits listed in the problem list box on initial evaluation, provide pt/family education and to maximize pt's level of independence in the home and community environment.     Patient's spiritual, cultural and educational needs considered and pt agreeable to plan of care and goals.     Anticipated barriers to physical therapy: none    SHORT TERM GOALS:  4 weeks Progress Date Met   Recent signs and systems trend is improving in order to progress towards Long term goals.  [] Met  [] Not Met  [] Progressing     Patient will be independent with Home Exercise Program  in order to further progress and return to maximal function. [] Met  [] Not Met  [] Progressing     Pain rating at Worst: 5 /10 in order to progress towards increased  independence with activity. [] Met  [] Not Met  [] Progressing     Patient will be able to correct postural deviations in sitting and standing, to decrease pain and promote postural awareness for injury prevention.  [] Met  [] Not Met  [] Progressing     Patient will improve functional outcome (FOTO) score: by 5% to increase self-worth & perceived functional ability towards long term goals [] Met  [] Not Met  [] Progressing        LONG TERM GOALS: 10 weeks  Progress Date Met   Patient will return to normal activites of daily living, recreational, and work related activities with less pain and limitation.  [] Met  [] Not Met  [] Progressing     Patient will improve range of motion  to stated goals in order to return to maximal functional potential.  [] Met  [] Not Met  [] Progressing     Patient will improve Strength to stated goals of appropriate musculature in order to improve functional independence.  [] Met  [] Not Met  [] Progressing     Pain Rating at Best: 1/10 to improve Quality of Life.  [] Met  [] Not Met  [] Progressing     Patient will meet predicted functional outcome (FOTO) score: 70% to increase self-worth & perceived functional ability. [] Met  [] Not Met  [] Progressing     Patient will have met/partially met personal goal of: improve pain and function  [] Met  [] Not Met  [] Progressing              PLAN   Plan of care Certification: 2/23/2024 to 04/23/2024     Outpatient Physical Therapy 2 times weekly for 10 weeks to include any combination of the following interventions: virtual visits, dry needling, modalities, electrical stimulation (IFC, Pre-Mod, Attended with Functional Dry Needling), Manual Therapy, Moist Heat/ Ice, Neuromuscular Re-ed, Patient Education, Self Care, Therapeutic Exercise, Functional Training, and Therapeutic Activites        Raman Mazariegos, PT

## 2024-04-10 ENCOUNTER — TELEPHONE (OUTPATIENT)
Dept: PAIN MEDICINE | Facility: CLINIC | Age: 74
End: 2024-04-10
Payer: COMMERCIAL

## 2024-04-12 ENCOUNTER — CLINICAL SUPPORT (OUTPATIENT)
Dept: REHABILITATION | Facility: HOSPITAL | Age: 74
End: 2024-04-12
Payer: COMMERCIAL

## 2024-04-12 DIAGNOSIS — R29.898 LEG WEAKNESS, BILATERAL: Primary | ICD-10-CM

## 2024-04-12 DIAGNOSIS — G89.29 CHRONIC BILATERAL LOW BACK PAIN WITHOUT SCIATICA: ICD-10-CM

## 2024-04-12 DIAGNOSIS — M54.50 CHRONIC BILATERAL LOW BACK PAIN WITHOUT SCIATICA: ICD-10-CM

## 2024-04-12 PROCEDURE — 97112 NEUROMUSCULAR REEDUCATION: CPT | Mod: PN

## 2024-04-12 PROCEDURE — 97530 THERAPEUTIC ACTIVITIES: CPT | Mod: PN

## 2024-04-12 PROCEDURE — 97110 THERAPEUTIC EXERCISES: CPT | Mod: PN

## 2024-04-14 NOTE — PROGRESS NOTES
[facility]   Physical Therapy Treatment Note      Name: Conner Lombardi Jr.  Clinic Number: [id number]    Therapy Diagnosis:   Encounter Diagnoses   Name Primary?    Leg weakness, bilateral Yes    Chronic bilateral low back pain without sciatica          Physician: Helder Taylor MD    Visit Date: 4/12/2024    Physician Orders: PT Eval and Treat  Medical Diagnosis from Referral: Lumbar radiculopathy, spinal stenosis  Evaluation Date: 2/23/2024  Authorization Period Expiration: 12/15/2025  Plan of Care Expiration: 4/23/2024                Progress Update: 4/23/2024   Visit # / Visits authorized: 1 / 1;  11/20  FOTO: 3/21/2024 - Scored: 2 / 3           PRECAUTIONS: Standard Precautions, Epilepsy      Time In: 0630  Time Out: 0725  Total Appointment Time (timed & untimed codes): 54 minutes    PTA Visit: 0/5      Subjective     Patient reports:no pain    He was compliant with home exercise program.  Response to previous treatment: N/A  Functional change: N/A    Pain: 2/10  Location: bilateral low back and thighs      Objective      Objective Measures updated at progress report unless specified.     Treatment     Conner received the treatments listed below:      Therapeutic exercises to develop strength, ROM, flexibility, posture, and core stabilization for 15 minutes including: ·     Activity   Details      Nustep  x   5 minutes resistance 3     Hamstring stretch with strap x   3x20 seconds     Prone quad stretch with strap  x  3x20 seconds     Calf stretch on wedge x 3x20 seconds     Manual therapy techniques: Joint mobilizations, Manual traction, Myofacial release, and Soft tissue Mobilization were applied to the: --- for --- minutes, including:      Activity   Details                         Neuromuscular re-education activities to improve: Balance, Coordination, Kinesthetic, Sense, Proprioception, Posture, and Core for 30 minutes. The following activities were included:     Activity    Details    MedX extension x 70# x 20    MedX rotation x 26# x 20     DKTC with ball x  3x10     Lower trunk rotation x  3x10     Posterior pelvic tilt x  3x10     Matrix Rows x  45# 3x10    Shuttle x 5 bands 3 minutes    Standing hip abduction x 3x10 RTB    Standing hip extension x 3x10 RTB     Therapeutic activities to improve functional performance for 10 minutes, including:    Activity   Details     Step ups  x  8 inch 3x10     Sit to stand  x  3x10 20 inch     [place] carry x  15# 2 laps                            Education provided:   - Home program    Written Home Exercises Provided: Patient instructed to cont prior HEP. Exercises were reviewed and Conner was able to demonstrate them prior to the end of the session.  Conner demonstrated good  understanding of the education provided. See Electronic Medical Record under Patient Instructions for exercises provided during therapy sessions    Assessment     The patient is progressing with core and lower extremity strengthening.  Lower extremity strengthening increasing to allow proper form with lifting.  He requires minimal to moderate cues for performance of program.    Conner Is progressing well towards his goals.   Patient prognosis is Good.     Patient will continue to benefit from skilled outpatient physical therapy to address the deficits listed in the problem list box on initial evaluation, provide pt/family education and to maximize pt's level of independence in the home and community environment.     Patient's spiritual, cultural and educational needs considered and pt agreeable to plan of care and goals.     Anticipated barriers to physical therapy: none    SHORT TERM GOALS:  4 weeks Progress Date Met   Recent signs and systems trend is improving in order to progress towards Long term goals.  [] Met  [] Not Met  [] Progressing     Patient will be independent with Home Exercise Program  in order to further progress and return to maximal function. []  Met  [] Not Met  [] Progressing     Pain rating at Worst: 5 /10 in order to progress towards increased independence with activity. [] Met  [] Not Met  [] Progressing     Patient will be able to correct postural deviations in sitting and standing, to decrease pain and promote postural awareness for injury prevention.  [] Met  [] Not Met  [] Progressing     Patient will improve functional outcome (FOTO) score: by 5% to increase self-worth & perceived functional ability towards long term goals [] Met  [] Not Met  [] Progressing        LONG TERM GOALS: 10 weeks  Progress Date Met   Patient will return to normal activites of daily living, recreational, and work related activities with less pain and limitation.  [] Met  [] Not Met  [] Progressing     Patient will improve range of motion  to stated goals in order to return to maximal functional potential.  [] Met  [] Not Met  [] Progressing     Patient will improve Strength to stated goals of appropriate musculature in order to improve functional independence.  [] Met  [] Not Met  [] Progressing     Pain Rating at Best: 1/10 to improve Quality of Life.  [] Met  [] Not Met  [] Progressing     Patient will meet predicted functional outcome (FOTO) score: 70% to increase self-worth & perceived functional ability. [] Met  [] Not Met  [] Progressing     Patient will have met/partially met personal goal of: improve pain and function  [] Met  [] Not Met  [] Progressing              PLAN   Plan of care Certification: 2/23/2024 to 04/23/2024     Outpatient Physical Therapy 2 times weekly for 10 weeks to include any combination of the following interventions: virtual visits, dry needling, modalities, electrical stimulation (IFC, Pre-Mod, Attended with Functional Dry Needling), Manual Therapy, Moist Heat/ Ice, Neuromuscular Re-ed, Patient Education, Self Care, Therapeutic Exercise, Functional Training, and Therapeutic Activites        Raman Mazariegos, PT

## 2024-04-15 ENCOUNTER — CLINICAL SUPPORT (OUTPATIENT)
Dept: REHABILITATION | Facility: HOSPITAL | Age: 74
End: 2024-04-15
Payer: COMMERCIAL

## 2024-04-15 DIAGNOSIS — G89.29 CHRONIC BILATERAL LOW BACK PAIN WITHOUT SCIATICA: ICD-10-CM

## 2024-04-15 DIAGNOSIS — M54.50 CHRONIC BILATERAL LOW BACK PAIN WITHOUT SCIATICA: ICD-10-CM

## 2024-04-15 DIAGNOSIS — R29.898 LEG WEAKNESS, BILATERAL: Primary | ICD-10-CM

## 2024-04-15 PROCEDURE — 97530 THERAPEUTIC ACTIVITIES: CPT | Mod: PN

## 2024-04-15 PROCEDURE — 97110 THERAPEUTIC EXERCISES: CPT | Mod: PN

## 2024-04-15 PROCEDURE — 97112 NEUROMUSCULAR REEDUCATION: CPT | Mod: PN

## 2024-04-15 NOTE — PROGRESS NOTES
[facility]   Physical Therapy Treatment Note      Name: Conner Lombardi Jr.  Clinic Number: [id number]    Therapy Diagnosis:   Encounter Diagnoses   Name Primary?    Leg weakness, bilateral Yes    Chronic bilateral low back pain without sciatica          Physician: Helder Taylor MD    Visit Date: 4/15/2024    Physician Orders: PT Eval and Treat  Medical Diagnosis from Referral: Lumbar radiculopathy, spinal stenosis  Evaluation Date: 2/23/2024  Authorization Period Expiration: 12/15/2025  Plan of Care Expiration: 4/23/2024                Progress Update: 4/23/2024   Visit # / Visits authorized: 1 / 1;  11/20  FOTO: 3/21/2024 - Scored: 2 / 3           PRECAUTIONS: Standard Precautions, Epilepsy      Time In: 0630  Time Out: 0725  Total Appointment Time (timed & untimed codes): 55 minutes    PTA Visit: 0/5      Subjective     Patient reports:no pain    He was compliant with home exercise program.  Response to previous treatment: N/A  Functional change: N/A    Pain: 2/10  Location: bilateral low back and thighs      Objective      Objective Measures updated at progress report unless specified.     Treatment     Conner received the treatments listed below:      Therapeutic exercises to develop strength, ROM, flexibility, posture, and core stabilization for 15 minutes including: ·     Activity   Details      Nustep  x   5 minutes resistance 3     Hamstring stretch with strap x   3x20 seconds     Prone quad stretch with strap  x  3x20 seconds     Calf stretch on wedge x 3x20 seconds     Manual therapy techniques: Joint mobilizations, Manual traction, Myofacial release, and Soft tissue Mobilization were applied to the: --- for --- minutes, including:      Activity   Details                         Neuromuscular re-education activities to improve: Balance, Coordination, Kinesthetic, Sense, Proprioception, Posture, and Core for 30 minutes. The following activities were included:     Activity    Details    MedX extension x 70# x 20    MedX rotation x 26# x 20     DKTC with ball x  3x10     Lower trunk rotation x  3x10     Posterior pelvic tilt x  3x10     Matrix Rows x  45# 3x10    Shuttle x 5 bands 3 minutes    Standing hip abduction x 3x10 RTB    Standing hip extension x 3x10 RTB     Therapeutic activities to improve functional performance for 10 minutes, including:    Activity   Details     Step ups  x  8 inch 3x10     Sit to stand  x  3x10 20 inch     [place] carry x  15# 2 laps                            Education provided:   - Home program    Written Home Exercises Provided: Patient instructed to cont prior HEP. Exercises were reviewed and Conner was able to demonstrate them prior to the end of the session.  Conner demonstrated good  understanding of the education provided. See Electronic Medical Record under Patient Instructions for exercises provided during therapy sessions    Assessment     The patient is progressing with core and lower extremity strengthening.  Lower extremity strengthening increasing to allow proper form with lifting.  He requires minimal to moderate cues for performance of program.    Conner Is progressing well towards his goals.   Patient prognosis is Good.     Patient will continue to benefit from skilled outpatient physical therapy to address the deficits listed in the problem list box on initial evaluation, provide pt/family education and to maximize pt's level of independence in the home and community environment.     Patient's spiritual, cultural and educational needs considered and pt agreeable to plan of care and goals.     Anticipated barriers to physical therapy: none    SHORT TERM GOALS:  4 weeks Progress Date Met   Recent signs and systems trend is improving in order to progress towards Long term goals.  [x] Met  [] Not Met  [] Progressing     Patient will be independent with Home Exercise Program  in order to further progress and return to maximal function. [x]  Met  [] Not Met  [] Progressing     Pain rating at Worst: 5 /10 in order to progress towards increased independence with activity. [x] Met  [] Not Met  [] Progressing     Patient will be able to correct postural deviations in sitting and standing, to decrease pain and promote postural awareness for injury prevention.  [x] Met  [] Not Met  [] Progressing     Patient will improve functional outcome (FOTO) score: by 5% to increase self-worth & perceived functional ability towards long term goals [] Met  [] Not Met  [] Progressing        LONG TERM GOALS: 10 weeks  Progress Date Met   Patient will return to normal activites of daily living, recreational, and work related activities with less pain and limitation.  [x] Met  [] Not Met  [] Progressing     Patient will improve range of motion  to stated goals in order to return to maximal functional potential.  [x] Met  [] Not Met  [] Progressing     Patient will improve Strength to stated goals of appropriate musculature in order to improve functional independence.  [] Met  [] Not Met  [] Progressing     Pain Rating at Best: 1/10 to improve Quality of Life.  [] Met  [] Not Met  [] Progressing     Patient will meet predicted functional outcome (FOTO) score: 70% to increase self-worth & perceived functional ability. [] Met  [] Not Met  [] Progressing     Patient will have met/partially met personal goal of: improve pain and function  [] Met  [] Not Met  [] Progressing              PLAN   Plan of care Certification: 2/23/2024 to 04/23/2024     Outpatient Physical Therapy 2 times weekly for 10 weeks to include any combination of the following interventions: virtual visits, dry needling, modalities, electrical stimulation (IFC, Pre-Mod, Attended with Functional Dry Needling), Manual Therapy, Moist Heat/ Ice, Neuromuscular Re-ed, Patient Education, Self Care, Therapeutic Exercise, Functional Training, and Therapeutic Activites        Raman Mazariegos, PT

## 2024-04-18 ENCOUNTER — CLINICAL SUPPORT (OUTPATIENT)
Dept: REHABILITATION | Facility: HOSPITAL | Age: 74
End: 2024-04-18
Payer: COMMERCIAL

## 2024-04-18 DIAGNOSIS — M54.50 CHRONIC BILATERAL LOW BACK PAIN WITHOUT SCIATICA: ICD-10-CM

## 2024-04-18 DIAGNOSIS — R29.898 LEG WEAKNESS, BILATERAL: Primary | ICD-10-CM

## 2024-04-18 DIAGNOSIS — G89.29 CHRONIC BILATERAL LOW BACK PAIN WITHOUT SCIATICA: ICD-10-CM

## 2024-04-18 PROCEDURE — 97110 THERAPEUTIC EXERCISES: CPT | Mod: PN

## 2024-04-18 PROCEDURE — 97530 THERAPEUTIC ACTIVITIES: CPT | Mod: PN

## 2024-04-18 PROCEDURE — 97112 NEUROMUSCULAR REEDUCATION: CPT | Mod: PN

## 2024-04-18 NOTE — PROGRESS NOTES
[facility]   Physical Therapy Treatment Note      Name: Conner Lombardi Jr.  Clinic Number: [id number]    Therapy Diagnosis:   Encounter Diagnoses   Name Primary?    Leg weakness, bilateral Yes    Chronic bilateral low back pain without sciatica          Physician: Helder Taylor MD    Visit Date: 4/18/2024    Physician Orders: PT Eval and Treat  Medical Diagnosis from Referral: Lumbar radiculopathy, spinal stenosis  Evaluation Date: 2/23/2024  Authorization Period Expiration: 12/15/2025  Plan of Care Expiration: 4/23/2024                Progress Update: 4/23/2024   Visit # / Visits authorized: 1 / 1;  12/20  FOTO: 4/18/2024 - Scored: 3 / 3           PRECAUTIONS: Standard Precautions, Epilepsy      Time In: 0636  Time Out: 0730  Total Appointment Time (timed & untimed codes):54 minutes    PTA Visit: 0/5      Subjective     Patient reports:decreased pain today but increased last night while intalling a router at home.    He was compliant with home exercise program.  Response to previous treatment: N/A  Functional change: N/A    Pain: 2/10  Location: bilateral low back and thighs      Objective      Objective Measures updated at progress report unless specified.     Treatment     Conner received the treatments listed below:      Therapeutic exercises to develop strength, ROM, flexibility, posture, and core stabilization for 15 minutes including: ·     Activity   Details      Nustep  x   5 minutes resistance 3     Hamstring stretch with strap x   3x20 seconds     Prone quad stretch with strap  x  3x20 seconds     Calf stretch on wedge x 3x20 seconds     Manual therapy techniques: Joint mobilizations, Manual traction, Myofacial release, and Soft tissue Mobilization were applied to the: --- for --- minutes, including:      Activity   Details                         Neuromuscular re-education activities to improve: Balance, Coordination, Kinesthetic, Sense, Proprioception, Posture, and Core  for 30 minutes. The following activities were included:     Activity   Details    MedX extension x 70# x 20    MedX rotation x 26# x 20     DKTC with ball x  3x10     Lower trunk rotation x  3x10     Posterior pelvic tilt x  3x10     Matrix Rows x  45# 3x10    Shuttle x 5 bands 3 minutes    Standing hip abduction x 3x10 RTB    Standing hip extension x 3x10 RTB     Therapeutic activities to improve functional performance for 10 minutes, including:    Activity   Details     Step ups  x  8 inch 3x10     Sit to stand  x  3x10 20 inch     [place] carry x  15# 2 laps                            Education provided:   - Home program    Written Home Exercises Provided: Patient instructed to cont prior HEP. Exercises were reviewed and Conner was able to demonstrate them prior to the end of the session.  Conner demonstrated good  understanding of the education provided. See Electronic Medical Record under Patient Instructions for exercises provided during therapy sessions    Assessment     The patient has increased pain with activity.  Walking up and down steps and lifting.  He was instructed in lifting technique and progressive strengthening    Conner Is progressing well towards his goals.   Patient prognosis is Good.     Patient will continue to benefit from skilled outpatient physical therapy to address the deficits listed in the problem list box on initial evaluation, provide pt/family education and to maximize pt's level of independence in the home and community environment.     Patient's spiritual, cultural and educational needs considered and pt agreeable to plan of care and goals.     Anticipated barriers to physical therapy: none    SHORT TERM GOALS:  4 weeks Progress Date Met   Recent signs and systems trend is improving in order to progress towards Long term goals.  [x] Met  [] Not Met  [] Progressing     Patient will be independent with Home Exercise Program  in order to further progress and return to maximal  function. [x] Met  [] Not Met  [] Progressing     Pain rating at Worst: 5 /10 in order to progress towards increased independence with activity. [x] Met  [] Not Met  [] Progressing     Patient will be able to correct postural deviations in sitting and standing, to decrease pain and promote postural awareness for injury prevention.  [x] Met  [] Not Met  [] Progressing     Patient will improve functional outcome (FOTO) score: by 5% to increase self-worth & perceived functional ability towards long term goals [] Met  [] Not Met  [] Progressing        LONG TERM GOALS: 10 weeks  Progress Date Met   Patient will return to normal activites of daily living, recreational, and work related activities with less pain and limitation.  [x] Met  [] Not Met  [] Progressing     Patient will improve range of motion  to stated goals in order to return to maximal functional potential.  [x] Met  [] Not Met  [] Progressing     Patient will improve Strength to stated goals of appropriate musculature in order to improve functional independence.  [] Met  [] Not Met  [] Progressing     Pain Rating at Best: 1/10 to improve Quality of Life.  [] Met  [] Not Met  [] Progressing     Patient will meet predicted functional outcome (FOTO) score: 70% to increase self-worth & perceived functional ability. [] Met  [] Not Met  [] Progressing     Patient will have met/partially met personal goal of: improve pain and function  [] Met  [] Not Met  [] Progressing              PLAN   Plan of care Certification: 2/23/2024 to 04/23/2024     Outpatient Physical Therapy 2 times weekly for 10 weeks to include any combination of the following interventions: virtual visits, dry needling, modalities, electrical stimulation (IFC, Pre-Mod, Attended with Functional Dry Needling), Manual Therapy, Moist Heat/ Ice, Neuromuscular Re-ed, Patient Education, Self Care, Therapeutic Exercise, Functional Training, and Therapeutic Activites        Raman Mazariegos, PT

## 2024-04-20 NOTE — TELEPHONE ENCOUNTER
No care due was identified.  [facility] Embedded Care Due Messages. Reference number: [id number].   4/20/2024 9:00:41 AM CDT

## 2024-04-22 ENCOUNTER — CLINICAL SUPPORT (OUTPATIENT)
Dept: REHABILITATION | Facility: HOSPITAL | Age: 74
End: 2024-04-22
Payer: COMMERCIAL

## 2024-04-22 DIAGNOSIS — R29.898 LEG WEAKNESS, BILATERAL: Primary | ICD-10-CM

## 2024-04-22 DIAGNOSIS — M54.50 CHRONIC BILATERAL LOW BACK PAIN WITHOUT SCIATICA: ICD-10-CM

## 2024-04-22 DIAGNOSIS — G89.29 CHRONIC BILATERAL LOW BACK PAIN WITHOUT SCIATICA: ICD-10-CM

## 2024-04-22 PROCEDURE — 97110 THERAPEUTIC EXERCISES: CPT | Mod: PN

## 2024-04-22 PROCEDURE — 97112 NEUROMUSCULAR REEDUCATION: CPT | Mod: PN

## 2024-04-22 PROCEDURE — 97530 THERAPEUTIC ACTIVITIES: CPT | Mod: PN

## 2024-04-22 RX ORDER — TIZANIDINE 2 MG/1
TABLET ORAL
Qty: 30 TABLET | Refills: 0 | Status: SHIPPED | OUTPATIENT
Start: 2024-04-22 | End: 2024-05-17

## 2024-04-22 NOTE — PROGRESS NOTES
[facility]   Physical Therapy Treatment Note      Name: Conner Lombardi Jr.  Clinic Number: [id number]    Therapy Diagnosis:   Encounter Diagnoses   Name Primary?    Leg weakness, bilateral Yes    Chronic bilateral low back pain without sciatica      Physician: Helder Taylor MD    Visit Date: 4/22/2024    Physician Orders: PT Eval and Treat  Medical Diagnosis from Referral: Lumbar radiculopathy, spinal stenosis  Evaluation Date: 2/23/2024  Authorization Period Expiration: 12/15/2025  Plan of Care Expiration: 6/21/2024                Progress Update: 5/21/2024   Visit # / Visits authorized: 1 / 1;  15/20  FOTO: 4/18/2024 - Scored: 3 / 3           PRECAUTIONS: Standard Precautions, Epilepsy      Time In: 0633  Time Out: 0730  Total Appointment Time (timed & untimed codes):57 minutes    PTA Visit: 0/5      Subjective     Patient reports:He is doing good and without pain but has tightness and soreness with increased activity    He was compliant with home exercise program.  Response to previous treatment: N/A  Functional change: N/A    Pain: 2/10  Location: bilateral low back and thighs      Objective      Objective Measures updated at progress report unless specified.    Treatment     Conner received the treatments listed below:      Therapeutic exercises to develop strength, ROM, flexibility, posture, and core stabilization for 15 minutes including: ·     Activity   Details      Nustep  x   5 minutes resistance 3     Hamstring stretch with strap x   3x20 seconds     Prone quad stretch with strap  x  3x20 seconds     Calf stretch on wedge x 3x20 seconds     Manual therapy techniques: Joint mobilizations, Manual traction, Myofacial release, and Soft tissue Mobilization were applied to the: --- for --- minutes, including:      Activity   Details                         Neuromuscular re-education activities to improve: Balance, Coordination, Kinesthetic, Sense, Proprioception, Posture, and  Core for 30 minutes. The following activities were included:     Activity   Details    MedX extension x 70# x 20    MedX rotation x 26# x 20     DKTC with ball x  3x10     Lower trunk rotation x  3x10     Posterior pelvic tilt x  3x10     Matrix Rows x  45# 3x10    Shuttle x 5 bands 3 minutes    Standing hip abduction x 3x10 RTB    Standing hip extension x 3x10 RTB     Therapeutic activities to improve functional performance for 10 minutes, including:    Activity   Details     Step ups  x  8 inch 3x10     Sit to stand  x  3x10 20 inch     [place] carry x  15# 2 laps                            Education provided:   - Home program    Written Home Exercises Provided: Patient instructed to cont prior HEP. Exercises were reviewed and Conner was able to demonstrate them prior to the end of the session.  Conner demonstrated good  understanding of the education provided. See Electronic Medical Record under Patient Instructions for exercises provided during therapy sessions    Assessment     The patient has decreased pain and increased range of motion.  He is working to improve squatting to facilitate lifting technique.  He is progressing strengthening    Conner Is progressing well towards his goals.   Patient prognosis is Good.     Patient will continue to benefit from skilled outpatient physical therapy to address the deficits listed in the problem list box on initial evaluation, provide pt/family education and to maximize pt's level of independence in the home and community environment.     Patient's spiritual, cultural and educational needs considered and pt agreeable to plan of care and goals.     Anticipated barriers to physical therapy: none    SHORT TERM GOALS:  4 weeks Progress Date Met   Recent signs and systems trend is improving in order to progress towards Long term goals.  [x] Met  [] Not Met  [] Progressing  4/22/24   Patient will be independent with Home Exercise Program  in order to further progress and  return to maximal function. [x] Met  [] Not Met  [] Progressing  4/22/24   Pain rating at Worst: 5 /10 in order to progress towards increased independence with activity. [x] Met  [] Not Met  [] Progressing  4/22/24   Patient will be able to correct postural deviations in sitting and standing, to decrease pain and promote postural awareness for injury prevention.  [x] Met  [] Not Met  [] Progressing  4/22/24   Patient will improve functional outcome (FOTO) score: by 5% to increase self-worth & perceived functional ability towards long term goals [x] Met  [] Not Met  [] Progressing  4/22/24      LONG TERM GOALS: 10 weeks  Progress Date Met   Patient will return to normal activites of daily living, recreational, and work related activities with less pain and limitation.  [x] Met  [] Not Met  [] Progressing  4/22/24   Patient will improve range of motion  to stated goals in order to return to maximal functional potential.  [x] Met  [] Not Met  [] Progressing  4/22/24   Patient will improve Strength to stated goals of appropriate musculature in order to improve functional independence.  [] Met  [] Not Met  [] Progressing     Pain Rating at Best: 1/10 to improve Quality of Life.  [] Met  [] Not Met  [] Progressing     Patient will meet predicted functional outcome (FOTO) score: 70% to increase self-worth & perceived functional ability. [] Met  [] Not Met  [] Progressing     Patient will have met/partially met personal goal of: improve pain and function  [] Met  [] Not Met  [] Progressing              PLAN   Plan of care Certification: 04/22/2024 to 06/21/2024     Outpatient Physical Therapy 2 times weekly for 10 weeks to include any combination of the following interventions: virtual visits, dry needling, modalities, electrical stimulation (IFC, Pre-Mod, Attended with Functional Dry Needling), Manual Therapy, Moist Heat/ Ice, Neuromuscular Re-ed, Patient Education, Self Care, Therapeutic Exercise, Functional Training, and  Therapeutic Activites        Raman Mazariegos, PT

## 2024-04-25 ENCOUNTER — CLINICAL SUPPORT (OUTPATIENT)
Dept: REHABILITATION | Facility: HOSPITAL | Age: 74
End: 2024-04-25
Payer: COMMERCIAL

## 2024-04-25 DIAGNOSIS — G89.29 CHRONIC BILATERAL LOW BACK PAIN WITHOUT SCIATICA: ICD-10-CM

## 2024-04-25 DIAGNOSIS — R29.898 LEG WEAKNESS, BILATERAL: Primary | ICD-10-CM

## 2024-04-25 DIAGNOSIS — M54.50 CHRONIC BILATERAL LOW BACK PAIN WITHOUT SCIATICA: ICD-10-CM

## 2024-04-25 PROCEDURE — 97530 THERAPEUTIC ACTIVITIES: CPT | Mod: PN

## 2024-04-25 PROCEDURE — 97112 NEUROMUSCULAR REEDUCATION: CPT | Mod: PN

## 2024-04-25 PROCEDURE — 97110 THERAPEUTIC EXERCISES: CPT | Mod: PN

## 2024-04-25 NOTE — PROGRESS NOTES
[facility]   Physical Therapy Treatment Note      Name: Conner Lombardi Jr.  Clinic Number: [id number]    Therapy Diagnosis:   Encounter Diagnoses   Name Primary?    Leg weakness, bilateral Yes    Chronic bilateral low back pain without sciatica      Physician: Helder Taylor MD    Visit Date: 4/25/2024    Physician Orders: PT Eval and Treat  Medical Diagnosis from Referral: Lumbar radiculopathy, spinal stenosis  Evaluation Date: 2/23/2024  Authorization Period Expiration: 12/15/2025  Plan of Care Expiration: 6/21/2024                Progress Update: 5/21/2024   Visit # / Visits authorized: 1 / 1;  16/20  FOTO: 4/18/2024 - Scored: 3 / 3           PRECAUTIONS: Standard Precautions, Epilepsy      Time In: 0635  Time Out: 0730  Total Appointment Time (timed & untimed codes):  55 minutes    PTA Visit: 0/5      Subjective     Patient reports:He is doing good and without pain but has tightness and soreness with increased activity    He was compliant with home exercise program.  Response to previous treatment: N/A  Functional change: N/A    Pain: 2/10  Location: bilateral low back and thighs      Objective      Objective Measures updated at progress report unless specified.    Treatment     Conner received the treatments listed below:      Therapeutic exercises to develop strength, ROM, flexibility, posture, and core stabilization for 15 minutes including: ·     Activity   Details      Nustep  x   5 minutes resistance 3     Hamstring stretch with strap x   3x20 seconds     Prone quad stretch with strap  x  3x20 seconds     Calf stretch on wedge x 3x20 seconds     Manual therapy techniques: Joint mobilizations, Manual traction, Myofacial release, and Soft tissue Mobilization were applied to the: --- for --- minutes, including:      Activity   Details                         Neuromuscular re-education activities to improve: Balance, Coordination, Kinesthetic, Sense, Proprioception, Posture, and  Core for 30 minutes. The following activities were included:     Activity   Details    MedX extension x 70# x 20    MedX rotation x 26# x 20     DKTC with ball x  3x10     Lower trunk rotation x  3x10     Posterior pelvic tilt x  3x10     Matrix Rows x  45# 3x10    Shuttle x 5 bands 3 minutes    Standing hip abduction x 3x10 RTB    Standing hip extension x 3x10 RTB     Therapeutic activities to improve functional performance for 10 minutes, including:    Activity   Details     Step ups  x  8 inch 3x10     Sit to stand  x  3x10 20 inch     [place] carry x  15# 2 laps                            Education provided:   - Home program    Written Home Exercises Provided: Patient instructed to cont prior HEP. Exercises were reviewed and Conner was able to demonstrate them prior to the end of the session.  Conner demonstrated good  understanding of the education provided. See Electronic Medical Record under Patient Instructions for exercises provided during therapy sessions    Assessment     The patient has decreased pain and increased range of motion.  He is working to improve squatting to facilitate lifting technique.  He is progressing strengthening    Conner Is progressing well towards his goals.   Patient prognosis is Good.     Patient will continue to benefit from skilled outpatient physical therapy to address the deficits listed in the problem list box on initial evaluation, provide pt/family education and to maximize pt's level of independence in the home and community environment.     Patient's spiritual, cultural and educational needs considered and pt agreeable to plan of care and goals.     Anticipated barriers to physical therapy: none    SHORT TERM GOALS:  4 weeks Progress Date Met   Recent signs and systems trend is improving in order to progress towards Long term goals.  [x] Met  [] Not Met  [] Progressing  4/22/24   Patient will be independent with Home Exercise Program  in order to further progress and  return to maximal function. [x] Met  [] Not Met  [] Progressing  4/22/24   Pain rating at Worst: 5 /10 in order to progress towards increased independence with activity. [x] Met  [] Not Met  [] Progressing  4/22/24   Patient will be able to correct postural deviations in sitting and standing, to decrease pain and promote postural awareness for injury prevention.  [x] Met  [] Not Met  [] Progressing  4/22/24   Patient will improve functional outcome (FOTO) score: by 5% to increase self-worth & perceived functional ability towards long term goals [x] Met  [] Not Met  [] Progressing  4/22/24      LONG TERM GOALS: 10 weeks  Progress Date Met   Patient will return to normal activites of daily living, recreational, and work related activities with less pain and limitation.  [x] Met  [] Not Met  [] Progressing  4/22/24   Patient will improve range of motion  to stated goals in order to return to maximal functional potential.  [x] Met  [] Not Met  [] Progressing  4/22/24   Patient will improve Strength to stated goals of appropriate musculature in order to improve functional independence.  [] Met  [] Not Met  [] Progressing     Pain Rating at Best: 1/10 to improve Quality of Life.  [] Met  [] Not Met  [] Progressing     Patient will meet predicted functional outcome (FOTO) score: 70% to increase self-worth & perceived functional ability. [] Met  [] Not Met  [] Progressing     Patient will have met/partially met personal goal of: improve pain and function  [] Met  [] Not Met  [] Progressing              PLAN   Plan of care Certification: 04/22/2024 to 06/21/2024     Outpatient Physical Therapy 2 times weekly for 10 weeks to include any combination of the following interventions: virtual visits, dry needling, modalities, electrical stimulation (IFC, Pre-Mod, Attended with Functional Dry Needling), Manual Therapy, Moist Heat/ Ice, Neuromuscular Re-ed, Patient Education, Self Care, Therapeutic Exercise, Functional Training, and  Therapeutic Activites        Raman Mazariegos, PT

## 2024-04-26 NOTE — TELEPHONE ENCOUNTER
----- Message from Stephaine Flaherty NP sent at 5/24/2022  6:35 PM CDT -----  Labs Reviewed:     05-    Phenytoin Level 12.8 NL   38w2d

## 2024-04-27 NOTE — PLAN OF CARE
[facility]   Physical Therapy Treatment Note and UPOC     Name: Conner Lombardi Jr.  Clinic Number: [id number]    Therapy Diagnosis:   Encounter Diagnoses   Name Primary?    Leg weakness, bilateral Yes    Chronic bilateral low back pain without sciatica      Physician: Helder Taylor MD    Visit Date: 4/22/2024    Physician Orders: PT Eval and Treat  Medical Diagnosis from Referral: Lumbar radiculopathy, spinal stenosis  Evaluation Date: 2/23/2024  Authorization Period Expiration: 12/15/2025  Plan of Care Expiration: 6/21/2024                Progress Update: 5/21/2024   Visit # / Visits authorized: 1 / 1;  15/20  FOTO: 4/18/2024 - Scored: 3 / 3           PRECAUTIONS: Standard Precautions, Epilepsy      Time In: 0633  Time Out: 0730  Total Appointment Time (timed & untimed codes):57 minutes    PTA Visit: 0/5      Subjective     Patient reports:He is doing good and without pain but has tightness and soreness with increased activity    He was compliant with home exercise program.  Response to previous treatment: N/A  Functional change: N/A    Pain: 2/10  Location: bilateral low back and thighs      Objective      Objective Measures updated at progress report unless specified.         LUMBAR-   Lumbar   Range of Motion Right  (spine) Left Function   & Pain Goal   Lumbar Flexion  70%   []Functional  []Dysfunctional  []Painful  []Non-Painful 90%  Functional   Nonpainful   Lumbar Extension  40%   []Functional  []Dysfunctional  []Painful  []Non-Painful 70%  Functional   Nonpainful   Lumbar Side Bending 40% 40% []Functional  []Dysfunctional  []Painful  []Non-Painful 60%  Functional   Nonpainful      HIP-   Hip   Range of Motion Right    Left    Goal   Hip Flexion (120º) 115 115 115º   Hip Abduction (45º) Not tested Not tested  30º   Hip Extension (20º) Not tested  Not tested  15º               (*) pain and/or dysfunction(*) pain and/or dysfunction       LE STRENGTH -   Lower Extremity  Strength  RIGHT    Goal   Hip Flexion  []1  []2  []3  [x]4  []5                []+ []- 5/5 B    Hip Extension  []1  []2  []3  [x]4  []5                []+ []- 5/5 B   Hip Abduction  []1  []2  []3  [x]4  []5                []+ []- 5/5 B   Knee Flexion []1  []2  []3  []4  [x]5                []+ []- 5/5 B   Knee Extension []1  []2  []3  []4  [x]5                []+ []- 5/5 B   Ankle Dorsiflexion []1  []2  []3  []4  [x]5                []+ []- 5/5 B   Ankle Plantarflexion []1  []2  []3  []4  [x]5                []+ []- 5/5 B      Lower Extremity  Strength LEFT    Goal   Hip Flexion  []1  []2  []3  [x]4  []5                []+ []- 5/5 B    Hip Extension  []1  []2  []3  [x]4  []5                []+ []- 5/5 B   Hip Abduction  []1  []2  []3  [x]4  []5                []+ []- 5/5 B   Knee Flexion []1  []2  []3  []4  [x]5                []+ []- 5/5 B   Knee Extension []1  []2  []3  []4  [x]5                []+ []- 5/5 B   Ankle Dorsiflexion []1  []2  []3  []4  [x]5                []+ []- 5/5 B   Ankle Plantarflexion []1  []2  []3  []4  [x]5                []+ []- 5/5 B       Treatment     Conner received the treatments listed below:      Therapeutic exercises to develop strength, ROM, flexibility, posture, and core stabilization for 15 minutes including: ·     Activity   Details      Nustep  x   5 minutes resistance 3     Hamstring stretch with strap x   3x20 seconds     Prone quad stretch with strap  x  3x20 seconds     Calf stretch on wedge x 3x20 seconds     Manual therapy techniques: Joint mobilizations, Manual traction, Myofacial release, and Soft tissue Mobilization were applied to the: --- for --- minutes, including:      Activity   Details                         Neuromuscular re-education activities to improve: Balance, Coordination, Kinesthetic, Sense, Proprioception, Posture, and Core for 30 minutes. The following activities were included:     Activity   Details    MedX extension x 70# x 20    MedX rotation x 26# x 20      DKTC with ball x  3x10     Lower trunk rotation x  3x10     Posterior pelvic tilt x  3x10     Matrix Rows x  45# 3x10    Shuttle x 5 bands 3 minutes    Standing hip abduction x 3x10 RTB    Standing hip extension x 3x10 RTB     Therapeutic activities to improve functional performance for 10 minutes, including:    Activity   Details     Step ups  x  8 inch 3x10     Sit to stand  x  3x10 20 inch     [place] carry x  15# 2 laps                            Education provided:   - Home program    Written Home Exercises Provided: Patient instructed to cont prior HEP. Exercises were reviewed and Conner was able to demonstrate them prior to the end of the session.  Conner demonstrated good  understanding of the education provided. See Electronic Medical Record under Patient Instructions for exercises provided during therapy sessions    Assessment     The patient has decreased pain and increased range of motion.  He is working to improve squatting to facilitate lifting technique.  He is progressing strengthening    Conner Is progressing well towards his goals.   Patient prognosis is Good.     Patient will continue to benefit from skilled outpatient physical therapy to address the deficits listed in the problem list box on initial evaluation, provide pt/family education and to maximize pt's level of independence in the home and community environment.     Patient's spiritual, cultural and educational needs considered and pt agreeable to plan of care and goals.     Anticipated barriers to physical therapy: none    SHORT TERM GOALS:  4 weeks Progress Date Met   Recent signs and systems trend is improving in order to progress towards Long term goals.  [x] Met  [] Not Met  [] Progressing  4/22/24   Patient will be independent with Home Exercise Program  in order to further progress and return to maximal function. [x] Met  [] Not Met  [] Progressing  4/22/24   Pain rating at Worst: 5 /10 in order to progress towards increased  independence with activity. [x] Met  [] Not Met  [] Progressing  4/22/24   Patient will be able to correct postural deviations in sitting and standing, to decrease pain and promote postural awareness for injury prevention.  [x] Met  [] Not Met  [] Progressing  4/22/24   Patient will improve functional outcome (FOTO) score: by 5% to increase self-worth & perceived functional ability towards long term goals [x] Met  [] Not Met  [] Progressing  4/22/24      LONG TERM GOALS: 10 weeks  Progress Date Met   Patient will return to normal activites of daily living, recreational, and work related activities with less pain and limitation.  [x] Met  [] Not Met  [] Progressing  4/22/24   Patient will improve range of motion  to stated goals in order to return to maximal functional potential.  [x] Met  [] Not Met  [] Progressing  4/22/24   Patient will improve Strength to stated goals of appropriate musculature in order to improve functional independence.  [] Met  [] Not Met  [] Progressing     Pain Rating at Best: 1/10 to improve Quality of Life.  [] Met  [] Not Met  [] Progressing     Patient will meet predicted functional outcome (FOTO) score: 70% to increase self-worth & perceived functional ability. [] Met  [] Not Met  [] Progressing     Patient will have met/partially met personal goal of: improve pain and function  [] Met  [] Not Met  [] Progressing              PLAN   Plan of care Certification: 04/22/2024 to 06/21/2024     Outpatient Physical Therapy 2 times weekly for 10 weeks to include any combination of the following interventions: virtual visits, dry needling, modalities, electrical stimulation (IFC, Pre-Mod, Attended with Functional Dry Needling), Manual Therapy, Moist Heat/ Ice, Neuromuscular Re-ed, Patient Education, Self Care, Therapeutic Exercise, Functional Training, and Therapeutic Activites        Raman Mazariegos, PT

## 2024-04-30 ENCOUNTER — CLINICAL SUPPORT (OUTPATIENT)
Dept: REHABILITATION | Facility: HOSPITAL | Age: 74
End: 2024-04-30
Payer: COMMERCIAL

## 2024-04-30 DIAGNOSIS — G89.29 CHRONIC BILATERAL LOW BACK PAIN WITHOUT SCIATICA: ICD-10-CM

## 2024-04-30 DIAGNOSIS — R29.898 LEG WEAKNESS, BILATERAL: Primary | ICD-10-CM

## 2024-04-30 DIAGNOSIS — M54.50 CHRONIC BILATERAL LOW BACK PAIN WITHOUT SCIATICA: ICD-10-CM

## 2024-04-30 PROCEDURE — 97112 NEUROMUSCULAR REEDUCATION: CPT | Mod: PN

## 2024-04-30 PROCEDURE — 97110 THERAPEUTIC EXERCISES: CPT | Mod: PN

## 2024-04-30 PROCEDURE — 97530 THERAPEUTIC ACTIVITIES: CPT | Mod: PN

## 2024-04-30 NOTE — PROGRESS NOTES
[facility]   Physical Therapy Treatment Note      Name: Conner Lombardi Jr.  Clinic Number: [id number]    Therapy Diagnosis:   Encounter Diagnoses   Name Primary?    Leg weakness, bilateral Yes    Chronic bilateral low back pain without sciatica      Physician: Helder Taylor MD    Visit Date: 4/30/2024    Physician Orders: PT Eval and Treat  Medical Diagnosis from Referral: Lumbar radiculopathy, spinal stenosis  Evaluation Date: 2/23/2024  Authorization Period Expiration: 12/15/2025  Plan of Care Expiration: 6/21/2024                Progress Update: 5/21/2024   Visit # / Visits authorized: 1 / 1;  17/20  FOTO: 4/18/2024 - Scored: 3 / 3           PRECAUTIONS: Standard Precautions, Epilepsy      Time In: 0737  Time Out: 0830  Total Appointment Time (timed & untimed codes):  53 minutes    PTA Visit: 0/5      Subjective     Patient reports:He is doing good and without pain but has tightness and soreness with increased activity    He was compliant with home exercise program.  Response to previous treatment: N/A  Functional change: N/A    Pain: 2/10  Location: bilateral low back and thighs      Objective      Objective Measures updated at progress report unless specified.    Treatment     Conner received the treatments listed below:      Therapeutic exercises to develop strength, ROM, flexibility, posture, and core stabilization for 15 minutes including: ·     Activity   Details      Nustep  x   5 minutes resistance 3     Hamstring stretch with strap x   3x20 seconds     Prone quad stretch with strap  x  3x20 seconds     Calf stretch on wedge x 3x20 seconds     Manual therapy techniques: Joint mobilizations, Manual traction, Myofacial release, and Soft tissue Mobilization were applied to the: --- for --- minutes, including:      Activity   Details                         Neuromuscular re-education activities to improve: Balance, Coordination, Kinesthetic, Sense, Proprioception, Posture, and  Core for 28 minutes. The following activities were included:     Activity   Details    MedX extension x 70# x 20    MedX rotation x 26# x 20     DKTC with ball x  3x10     Lower trunk rotation x  3x10     Posterior pelvic tilt x  3x10     Matrix Rows x  45# 3x10    Shuttle x 5 bands 3 minutes    Standing hip abduction x 3x10 RTB    Standing hip extension x 3x10 RTB     Therapeutic activities to improve functional performance for 10 minutes, including:    Activity   Details     Step ups  x  8 inch 3x10     Sit to stand  x  3x10 20 inch     [place] carry x  15# 2 laps                            Education provided:   - Home program    Written Home Exercises Provided: Patient instructed to cont prior HEP. Exercises were reviewed and Conner was able to demonstrate them prior to the end of the session.  Conner demonstrated good  understanding of the education provided. See Electronic Medical Record under Patient Instructions for exercises provided during therapy sessions    Assessment     The patient has decreased pain and increased range of motion.  He is working to improve squatting to facilitate lifting technique.  He is progressing strengthening    Conner Is progressing well towards his goals.   Patient prognosis is Good.     Patient will continue to benefit from skilled outpatient physical therapy to address the deficits listed in the problem list box on initial evaluation, provide pt/family education and to maximize pt's level of independence in the home and community environment.     Patient's spiritual, cultural and educational needs considered and pt agreeable to plan of care and goals.     Anticipated barriers to physical therapy: none    SHORT TERM GOALS:  4 weeks Progress Date Met   Recent signs and systems trend is improving in order to progress towards Long term goals.  [x] Met  [] Not Met  [] Progressing  4/22/24   Patient will be independent with Home Exercise Program  in order to further progress and  return to maximal function. [x] Met  [] Not Met  [] Progressing  4/22/24   Pain rating at Worst: 5 /10 in order to progress towards increased independence with activity. [x] Met  [] Not Met  [] Progressing  4/22/24   Patient will be able to correct postural deviations in sitting and standing, to decrease pain and promote postural awareness for injury prevention.  [x] Met  [] Not Met  [] Progressing  4/22/24   Patient will improve functional outcome (FOTO) score: by 5% to increase self-worth & perceived functional ability towards long term goals [x] Met  [] Not Met  [] Progressing  4/22/24      LONG TERM GOALS: 10 weeks  Progress Date Met   Patient will return to normal activites of daily living, recreational, and work related activities with less pain and limitation.  [x] Met  [] Not Met  [] Progressing  4/22/24   Patient will improve range of motion  to stated goals in order to return to maximal functional potential.  [x] Met  [] Not Met  [] Progressing  4/22/24   Patient will improve Strength to stated goals of appropriate musculature in order to improve functional independence.  [] Met  [] Not Met  [] Progressing     Pain Rating at Best: 1/10 to improve Quality of Life.  [] Met  [] Not Met  [] Progressing     Patient will meet predicted functional outcome (FOTO) score: 70% to increase self-worth & perceived functional ability. [] Met  [] Not Met  [] Progressing     Patient will have met/partially met personal goal of: improve pain and function  [] Met  [] Not Met  [] Progressing              PLAN   Plan of care Certification: 04/22/2024 to 06/21/2024     Outpatient Physical Therapy 2 times weekly for 10 weeks to include any combination of the following interventions: virtual visits, dry needling, modalities, electrical stimulation (IFC, Pre-Mod, Attended with Functional Dry Needling), Manual Therapy, Moist Heat/ Ice, Neuromuscular Re-ed, Patient Education, Self Care, Therapeutic Exercise, Functional Training, and  Therapeutic Activites        Raman Mazariegos, PT

## 2024-05-01 ENCOUNTER — CLINICAL SUPPORT (OUTPATIENT)
Dept: REHABILITATION | Facility: HOSPITAL | Age: 74
End: 2024-05-01
Payer: COMMERCIAL

## 2024-05-01 DIAGNOSIS — M54.50 CHRONIC BILATERAL LOW BACK PAIN WITHOUT SCIATICA: ICD-10-CM

## 2024-05-01 DIAGNOSIS — R29.898 LEG WEAKNESS, BILATERAL: Primary | ICD-10-CM

## 2024-05-01 DIAGNOSIS — G89.29 CHRONIC BILATERAL LOW BACK PAIN WITHOUT SCIATICA: ICD-10-CM

## 2024-05-01 PROCEDURE — 97110 THERAPEUTIC EXERCISES: CPT | Mod: PN

## 2024-05-01 PROCEDURE — 97530 THERAPEUTIC ACTIVITIES: CPT | Mod: PN

## 2024-05-01 PROCEDURE — 97112 NEUROMUSCULAR REEDUCATION: CPT | Mod: PN

## 2024-05-01 NOTE — PROGRESS NOTES
[facility]   Physical Therapy Treatment Note      Name: Conner Lombardi Jr.  Clinic Number: [id number]    Therapy Diagnosis:   Encounter Diagnoses   Name Primary?    Leg weakness, bilateral Yes    Chronic bilateral low back pain without sciatica        Physician: Helder Taylor MD    Visit Date: 5/1/2024    Physician Orders: PT Eval and Treat  Medical Diagnosis from Referral: Lumbar radiculopathy, spinal stenosis  Evaluation Date: 2/23/2024  Authorization Period Expiration: 12/15/2025  Plan of Care Expiration: 6/21/2024                Progress Update: 5/21/2024   Visit # / Visits authorized: 1 / 1;  17/20  FOTO: 4/18/2024 - Scored: 3 / 3           PRECAUTIONS: Standard Precautions, Epilepsy      Time In: 0737  Time Out: 0830  Total Appointment Time (timed & untimed codes):  53 minutes    PTA Visit: 0/5      Subjective     Patient reports:He is doing good and without pain but has tightness and soreness with increased activity    He was compliant with home exercise program.  Response to previous treatment: N/A  Functional change: N/A    Pain: 2/10  Location: bilateral low back and thighs      Objective      Objective Measures updated at progress report unless specified.    Lumbar flexion 90% (5/1/2024)    Treatment     Conner received the treatments listed below:      Therapeutic exercises to develop strength, ROM, flexibility, posture, and core stabilization for 15 minutes including: ·     Activity   Details      Nustep  x   5 minutes resistance 3     Hamstring stretch with strap x   3x20 seconds     Prone quad stretch with strap  x  3x20 seconds     Calf stretch on wedge x 3x20 seconds    DKTC with ball  3x10    Lower trunk rotation x 3x10    Posterior pelvic tilt  3x10           Neuromuscular re-education activities to improve: Balance, Coordination, Kinesthetic, Sense, Proprioception, Posture, and Core for 28 minutes. The following activities were included:     Activity   Details     MedX extension x 80# x 20    MedX rotation x 30# x 20     Matrix Rows x 55# 3x10    Shuttle x 6 bands 3 minutes    Standing hip abduction x 3x10 RTB    Standing hip extension x 3x10 RTB                 Therapeutic activities to improve functional performance for 15 minutes, including:    Activity   Details     Step ups   8 inch 3x10     Sit to stand x  3x10 20 inch     [place] carry x  15# 2 laps     [place] carry x 20# duffle                  Education provided:   - Home program    Written Home Exercises Provided: Patient instructed to cont prior HEP. Exercises were reviewed and Conner was able to demonstrate them prior to the end of the session.  Conner demonstrated good  understanding of the education provided. See Electronic Medical Record under Patient Instructions for exercises provided during therapy sessions    Assessment     The patient has improved lumbar flexion to 90%.   He has decreased pain and has improved function    Conner Is progressing well towards his goals.   Patient prognosis is Good.     Patient will continue to benefit from skilled outpatient physical therapy to address the deficits listed in the problem list box on initial evaluation, provide pt/family education and to maximize pt's level of independence in the home and community environment.     Patient's spiritual, cultural and educational needs considered and pt agreeable to plan of care and goals.     Anticipated barriers to physical therapy: none    SHORT TERM GOALS:  4 weeks Progress Date Met   Recent signs and systems trend is improving in order to progress towards Long term goals.  [x] Met  [] Not Met  [] Progressing  4/22/24   Patient will be independent with Home Exercise Program  in order to further progress and return to maximal function. [x] Met  [] Not Met  [] Progressing  4/22/24   Pain rating at Worst: 5 /10 in order to progress towards increased independence with activity. [x] Met  [] Not Met  [] Progressing  4/22/24    Patient will be able to correct postural deviations in sitting and standing, to decrease pain and promote postural awareness for injury prevention.  [x] Met  [] Not Met  [] Progressing  4/22/24   Patient will improve functional outcome (FOTO) score: by 5% to increase self-worth & perceived functional ability towards long term goals [x] Met  [] Not Met  [] Progressing  4/22/24      LONG TERM GOALS: 10 weeks  Progress Date Met   Patient will return to normal activites of daily living, recreational, and work related activities with less pain and limitation.  [x] Met  [] Not Met  [] Progressing  4/22/24   Patient will improve range of motion  to stated goals in order to return to maximal functional potential.  [x] Met  [] Not Met  [] Progressing  4/22/24   Patient will improve Strength to stated goals of appropriate musculature in order to improve functional independence.  [] Met  [] Not Met  [] Progressing     Pain Rating at Best: 1/10 to improve Quality of Life.  [] Met  [] Not Met  [] Progressing     Patient will meet predicted functional outcome (FOTO) score: 70% to increase self-worth & perceived functional ability. [] Met  [] Not Met  [] Progressing     Patient will have met/partially met personal goal of: improve pain and function  [] Met  [] Not Met  [] Progressing              PLAN   Plan of care Certification: 04/22/2024 to 06/21/2024     Outpatient Physical Therapy 2 times weekly for 10 weeks to include any combination of the following interventions: virtual visits, dry needling, modalities, electrical stimulation (IFC, Pre-Mod, Attended with Functional Dry Needling), Manual Therapy, Moist Heat/ Ice, Neuromuscular Re-ed, Patient Education, Self Care, Therapeutic Exercise, Functional Training, and Therapeutic Activites        Raman Mazariegos, PT

## 2024-05-06 ENCOUNTER — CLINICAL SUPPORT (OUTPATIENT)
Dept: REHABILITATION | Facility: HOSPITAL | Age: 74
End: 2024-05-06
Payer: COMMERCIAL

## 2024-05-06 DIAGNOSIS — G89.29 CHRONIC BILATERAL LOW BACK PAIN WITHOUT SCIATICA: ICD-10-CM

## 2024-05-06 DIAGNOSIS — M54.50 CHRONIC BILATERAL LOW BACK PAIN WITHOUT SCIATICA: ICD-10-CM

## 2024-05-06 DIAGNOSIS — R29.898 LEG WEAKNESS, BILATERAL: Primary | ICD-10-CM

## 2024-05-06 PROCEDURE — 97530 THERAPEUTIC ACTIVITIES: CPT | Mod: PN

## 2024-05-06 PROCEDURE — 97110 THERAPEUTIC EXERCISES: CPT | Mod: PN

## 2024-05-06 PROCEDURE — 97112 NEUROMUSCULAR REEDUCATION: CPT | Mod: PN

## 2024-05-06 NOTE — PROGRESS NOTES
[facility]   Physical Therapy Treatment Note      Name: Conner Lombardi Jr.  Clinic Number: [id number]    Therapy Diagnosis:   No diagnosis found.      Physician: Helder Taylor MD    Visit Date: 5/6/2024    Physician Orders: PT Eval and Treat  Medical Diagnosis from Referral: Lumbar radiculopathy, spinal stenosis  Evaluation Date: 2/23/2024  Authorization Period Expiration: 12/15/2025  Plan of Care Expiration: 6/21/2024                Progress Update: 5/21/2024   Visit # / Visits authorized: 1 / 1;  19/20  FOTO: 4/18/2024 - Scored: 3 / 3           PRECAUTIONS: Standard Precautions, Epilepsy      Time In: 0740  Time Out: 0844  Total Appointment Time (timed & untimed codes):  64 minutes    PTA Visit: 0/5      Subjective     Patient reports:He is doing good but walked a lot yesterday    He was compliant with home exercise program.  Response to previous treatment: N/A  Functional change: N/A    Pain: 2/10  Location: bilateral low back and thighs      Objective      Objective Measures updated at progress report unless specified.    Lumbar flexion 90% (5/1/2024)    Treatment     Conner received the treatments listed below:      Therapeutic exercises to develop strength, ROM, flexibility, posture, and core stabilization for 15 minutes including: ·     Activity   Details      Nustep  x   5 minutes resistance 3     Hamstring stretch with strap x   3x20 seconds     Prone quad stretch with strap  x  3x20 seconds     Calf stretch on wedge x 3x20 seconds    DKTC with ball  3x10    Lower trunk rotation x 3x10    Posterior pelvic tilt  3x10           Neuromuscular re-education activities to improve: Balance, Coordination, Kinesthetic, Sense, Proprioception, Posture, and Core for 30 minutes. The following activities were included:     Activity   Details    MedX extension x 90# x 20  (9-10 reps with 40 as warmup)    MedX rotation x 30# x 20     Matrix Rows x 55# 3x10    Shuttle x 6 bands 3 minutes     Standing hip abduction x 3x10 RTB    Standing hip extension x 3x10 RTB                 Therapeutic activities to improve functional performance for 15 minutes, including:    Activity   Details     Step ups   8 inch 3x10     Sit to stand x  3x10 16 inch     [place] carry x  15# 2 laps     Firewood carry x 20# duffle                  Education provided:   - Home program    Written Home Exercises Provided: Patient instructed to cont prior HEP. Exercises were reviewed and Conner was able to demonstrate them prior to the end of the session.  Conner demonstrated good  understanding of the education provided. See Electronic Medical Record under Patient Instructions for exercises provided during therapy sessions    Assessment     The patient has is performing well and has decreased symptoms.  When speaking about airport walking, he complained of fatigue but not pain.    Conner Is progressing well towards his goals.   Patient prognosis is Good.     Patient will continue to benefit from skilled outpatient physical therapy to address the deficits listed in the problem list box on initial evaluation, provide pt/family education and to maximize pt's level of independence in the home and community environment.     Patient's spiritual, cultural and educational needs considered and pt agreeable to plan of care and goals.     Anticipated barriers to physical therapy: none    SHORT TERM GOALS:  4 weeks Progress Date Met   Recent signs and systems trend is improving in order to progress towards Long term goals.  [x] Met  [] Not Met  [] Progressing  4/22/24   Patient will be independent with Home Exercise Program  in order to further progress and return to maximal function. [x] Met  [] Not Met  [] Progressing  4/22/24   Pain rating at Worst: 5 /10 in order to progress towards increased independence with activity. [x] Met  [] Not Met  [] Progressing  4/22/24   Patient will be able to correct postural deviations in sitting and standing,  to decrease pain and promote postural awareness for injury prevention.  [x] Met  [] Not Met  [] Progressing  4/22/24   Patient will improve functional outcome (FOTO) score: by 5% to increase self-worth & perceived functional ability towards long term goals [x] Met  [] Not Met  [] Progressing  4/22/24      LONG TERM GOALS: 10 weeks  Progress Date Met   Patient will return to normal activites of daily living, recreational, and work related activities with less pain and limitation.  [x] Met  [] Not Met  [] Progressing  4/22/24   Patient will improve range of motion  to stated goals in order to return to maximal functional potential.  [x] Met  [] Not Met  [] Progressing  4/22/24   Patient will improve Strength to stated goals of appropriate musculature in order to improve functional independence.  [] Met  [] Not Met  [] Progressing     Pain Rating at Best: 1/10 to improve Quality of Life.  [] Met  [] Not Met  [] Progressing     Patient will meet predicted functional outcome (FOTO) score: 70% to increase self-worth & perceived functional ability. [] Met  [] Not Met  [] Progressing     Patient will have met/partially met personal goal of: improve pain and function  [] Met  [] Not Met  [] Progressing              PLAN   Plan of care Certification: 04/22/2024 to 06/21/2024     Outpatient Physical Therapy 2 times weekly for 10 weeks to include any combination of the following interventions: virtual visits, dry needling, modalities, electrical stimulation (IFC, Pre-Mod, Attended with Functional Dry Needling), Manual Therapy, Moist Heat/ Ice, Neuromuscular Re-ed, Patient Education, Self Care, Therapeutic Exercise, Functional Training, and Therapeutic Activites        Raman Mazariegos, PT

## 2024-05-07 ENCOUNTER — LAB VISIT (OUTPATIENT)
Dept: LAB | Facility: HOSPITAL | Age: 74
End: 2024-05-07
Attending: FAMILY MEDICINE
Payer: COMMERCIAL

## 2024-05-07 DIAGNOSIS — E78.2 MIXED HYPERLIPIDEMIA: ICD-10-CM

## 2024-05-07 LAB
ALBUMIN SERPL BCP-MCNC: 3.8 G/DL (ref 3.5–5.2)
ALP SERPL-CCNC: 108 U/L (ref 55–135)
ALT SERPL W/O P-5'-P-CCNC: 32 U/L (ref 10–44)
ANION GAP SERPL CALC-SCNC: 8 MMOL/L (ref 8–16)
AST SERPL-CCNC: 25 U/L (ref 10–40)
BILIRUB SERPL-MCNC: 0.3 MG/DL (ref 0.1–1)
BUN SERPL-MCNC: 21 MG/DL (ref 8–23)
CALCIUM SERPL-MCNC: 10.3 MG/DL (ref 8.7–10.5)
CHLORIDE SERPL-SCNC: 101 MMOL/L (ref 95–110)
CHOLEST SERPL-MCNC: 135 MG/DL (ref 120–199)
CHOLEST/HDLC SERPL: 4 {RATIO} (ref 2–5)
CO2 SERPL-SCNC: 29 MMOL/L (ref 23–29)
CREAT SERPL-MCNC: 1.2 MG/DL (ref 0.5–1.4)
EST. GFR  (NO RACE VARIABLE): >60 ML/MIN/1.73 M^2
GLUCOSE SERPL-MCNC: 124 MG/DL (ref 70–110)
HDLC SERPL-MCNC: 34 MG/DL (ref 40–75)
HDLC SERPL: 25.2 % (ref 20–50)
LDLC SERPL CALC-MCNC: 69.6 MG/DL (ref 63–159)
NONHDLC SERPL-MCNC: 101 MG/DL
POTASSIUM SERPL-SCNC: 4.6 MMOL/L (ref 3.5–5.1)
PROT SERPL-MCNC: 7.1 G/DL (ref 6–8.4)
SODIUM SERPL-SCNC: 138 MMOL/L (ref 136–145)
TRIGL SERPL-MCNC: 157 MG/DL (ref 30–150)

## 2024-05-07 PROCEDURE — 80053 COMPREHEN METABOLIC PANEL: CPT | Performed by: FAMILY MEDICINE

## 2024-05-07 PROCEDURE — 36415 COLL VENOUS BLD VENIPUNCTURE: CPT | Performed by: FAMILY MEDICINE

## 2024-05-07 PROCEDURE — 80061 LIPID PANEL: CPT | Performed by: FAMILY MEDICINE

## 2024-05-08 ENCOUNTER — CLINICAL SUPPORT (OUTPATIENT)
Dept: REHABILITATION | Facility: HOSPITAL | Age: 74
End: 2024-05-08
Payer: COMMERCIAL

## 2024-05-08 DIAGNOSIS — M54.50 CHRONIC BILATERAL LOW BACK PAIN WITHOUT SCIATICA: ICD-10-CM

## 2024-05-08 DIAGNOSIS — G89.29 CHRONIC BILATERAL LOW BACK PAIN WITHOUT SCIATICA: ICD-10-CM

## 2024-05-08 DIAGNOSIS — R29.898 LEG WEAKNESS, BILATERAL: Primary | ICD-10-CM

## 2024-05-08 PROCEDURE — 97530 THERAPEUTIC ACTIVITIES: CPT | Mod: PN

## 2024-05-08 PROCEDURE — 97110 THERAPEUTIC EXERCISES: CPT | Mod: PN

## 2024-05-08 PROCEDURE — 97112 NEUROMUSCULAR REEDUCATION: CPT | Mod: PN

## 2024-05-08 NOTE — PROGRESS NOTES
[facility]   Physical Therapy Treatment Note      Name: Conner Lombardi Jr.  Clinic Number: [id number]    Therapy Diagnosis:   Encounter Diagnoses   Name Primary?    Leg weakness, bilateral Yes    Chronic bilateral low back pain without sciatica        Physician: Helder Taylor MD    Visit Date: 5/8/2024    Physician Orders: PT Eval and Treat  Medical Diagnosis from Referral: Lumbar radiculopathy, spinal stenosis  Evaluation Date: 2/23/2024  Authorization Period Expiration: 12/15/2025  Plan of Care Expiration: 6/21/2024                Progress Update: 5/21/2024   Visit # / Visits authorized: 1 / 1;  20/20  FOTO: 4/18/2024 - Scored: 3 / 3           PRECAUTIONS: Standard Precautions, Epilepsy      Time In: 0630  Time Out: 0725  Total Appointment Time (timed & untimed codes):  55 minutes    PTA Visit: 0/5      Subjective     Patient reports:he has no pain    He was compliant with home exercise program.  Response to previous treatment: N/A  Functional change: N/A    Pain: 2/10  Location: bilateral low back and thighs      Objective      Objective Measures updated at progress report unless specified.    Treatment     Conner received the treatments listed below:      Therapeutic exercises to develop strength, ROM, flexibility, posture, and core stabilization for 15 minutes including: ·     Activity   Details      Nustep  x   5 minutes resistance 3     Hamstring stretch with strap x   3x20 seconds     Prone quad stretch with strap  x  3x20 seconds     Calf stretch on wedge x 3x20 seconds    DKTC with ball  3x10    Lower trunk rotation x 3x10    Posterior pelvic tilt  3x10           Neuromuscular re-education activities to improve: Balance, Coordination, Kinesthetic, Sense, Proprioception, Posture, and Core for 30 minutes. The following activities were included:     Activity   Details    MedX extension x 90# x 20  (9-10 reps with 40 as warmup)    MedX rotation x 30# x 20     Matrix Rows x 55#  3x10    Shuttle x 6 bands 3 minutes    Standing hip abduction x 3x10 RTB    Standing hip extension x 3x10 RTB                 Therapeutic activities to improve functional performance for 10 minutes, including:    Activity   Details     Step ups   8 inch 3x10     Sit to stand x  3x10 16 inch     [place] carry x  15# 2 laps     [place] carry x 20# duffle                  Education provided:   - Home program    Written Home Exercises Provided: Patient instructed to cont prior HEP. Exercises were reviewed and Conner was able to demonstrate them prior to the end of the session.  Conner demonstrated good  understanding of the education provided. See Electronic Medical Record under Patient Instructions for exercises provided during therapy sessions    Assessment     The patient has is performing well and has decreased symptoms.  He is not having pain and performing home program    Conner Is progressing well towards his goals.   Patient prognosis is Good.     Patient will continue to benefit from skilled outpatient physical therapy to address the deficits listed in the problem list box on initial evaluation, provide pt/family education and to maximize pt's level of independence in the home and community environment.     Patient's spiritual, cultural and educational needs considered and pt agreeable to plan of care and goals.     Anticipated barriers to physical therapy: none    SHORT TERM GOALS:  4 weeks Progress Date Met   Recent signs and systems trend is improving in order to progress towards Long term goals.  [x] Met  [] Not Met  [] Progressing  4/22/24   Patient will be independent with Home Exercise Program  in order to further progress and return to maximal function. [x] Met  [] Not Met  [] Progressing  4/22/24   Pain rating at Worst: 5 /10 in order to progress towards increased independence with activity. [x] Met  [] Not Met  [] Progressing  4/22/24   Patient will be able to correct postural deviations in sitting  and standing, to decrease pain and promote postural awareness for injury prevention.  [x] Met  [] Not Met  [] Progressing  4/22/24   Patient will improve functional outcome (FOTO) score: by 5% to increase self-worth & perceived functional ability towards long term goals [x] Met  [] Not Met  [] Progressing  4/22/24      LONG TERM GOALS: 10 weeks  Progress Date Met   Patient will return to normal activites of daily living, recreational, and work related activities with less pain and limitation.  [x] Met  [] Not Met  [] Progressing  4/22/24   Patient will improve range of motion  to stated goals in order to return to maximal functional potential.  [x] Met  [] Not Met  [] Progressing  4/22/24   Patient will improve Strength to stated goals of appropriate musculature in order to improve functional independence.  [x] Met  [] Not Met  [] Progressing     Pain Rating at Best: 1/10 to improve Quality of Life.  [x] Met  [] Not Met  [] Progressing     Patient will meet predicted functional outcome (FOTO) score: 70% to increase self-worth & perceived functional ability. [x] Met  [] Not Met  [] Progressing     Patient will have met/partially met personal goal of: improve pain and function  [x] Met  [] Not Met  [] Progressing              PLAN   Plan of care Certification: 04/22/2024 to 06/21/2024     Outpatient Physical Therapy 2 times weekly for 10 weeks to include any combination of the following interventions: virtual visits, dry needling, modalities, electrical stimulation (IFC, Pre-Mod, Attended with Functional Dry Needling), Manual Therapy, Moist Heat/ Ice, Neuromuscular Re-ed, Patient Education, Self Care, Therapeutic Exercise, Functional Training, and Therapeutic Activites        Raman Mazariegos, PT

## 2024-05-16 DIAGNOSIS — M48.07 SPINAL STENOSIS, LUMBOSACRAL REGION: ICD-10-CM

## 2024-05-16 DIAGNOSIS — M54.42 LOW BACK PAIN WITH BILATERAL SCIATICA, UNSPECIFIED BACK PAIN LATERALITY, UNSPECIFIED CHRONICITY: ICD-10-CM

## 2024-05-16 DIAGNOSIS — M54.41 LOW BACK PAIN WITH BILATERAL SCIATICA, UNSPECIFIED BACK PAIN LATERALITY, UNSPECIFIED CHRONICITY: ICD-10-CM

## 2024-05-16 DIAGNOSIS — M51.36 DDD (DEGENERATIVE DISC DISEASE), LUMBAR: ICD-10-CM

## 2024-05-16 DIAGNOSIS — M47.816 LUMBAR SPONDYLOSIS: ICD-10-CM

## 2024-05-16 DIAGNOSIS — M54.9 DORSALGIA, UNSPECIFIED: ICD-10-CM

## 2024-05-16 DIAGNOSIS — M54.16 LUMBAR RADICULOPATHY: ICD-10-CM

## 2024-05-16 NOTE — TELEPHONE ENCOUNTER
Can you refill? Gabapentin 300MG    Last Visit: 01/25/24  Next Visit: 07/03/24  Last refill: 01/25/24  How patient is currently taking medication requested: 1 capsule PO [facility]  Pharmacy: [id number] - [place] - [id number] AT [facility]

## 2024-05-17 RX ORDER — TIZANIDINE 2 MG/1
TABLET ORAL
Qty: 30 TABLET | Refills: 0 | Status: SHIPPED | OUTPATIENT
Start: 2024-05-17

## 2024-05-19 RX ORDER — GABAPENTIN 300 MG/1
300 CAPSULE ORAL NIGHTLY
Qty: 30 CAPSULE | Refills: 2 | Status: SHIPPED | OUTPATIENT
Start: 2024-05-19

## 2024-06-03 ENCOUNTER — OFFICE VISIT (OUTPATIENT)
Dept: INTERNAL MEDICINE | Facility: CLINIC | Age: 74
End: 2024-06-03
Payer: COMMERCIAL

## 2024-06-03 VITALS
RESPIRATION RATE: 18 BRPM | HEIGHT: 66 IN | HEART RATE: 91 BPM | BODY MASS INDEX: 29.37 KG/M2 | DIASTOLIC BLOOD PRESSURE: 76 MMHG | WEIGHT: 182.75 LBS | SYSTOLIC BLOOD PRESSURE: 124 MMHG | OXYGEN SATURATION: 98 %

## 2024-06-03 DIAGNOSIS — L98.9 SKIN LESION OF FACE: Primary | ICD-10-CM

## 2024-06-03 PROCEDURE — 99999 PR PBB SHADOW E&M-EST. PATIENT-LVL V: CPT | Mod: PBBFAC,,, | Performed by: FAMILY MEDICINE

## 2024-06-03 PROCEDURE — 1126F AMNT PAIN NOTED NONE PRSNT: CPT | Mod: CPTII,S$GLB,, | Performed by: FAMILY MEDICINE

## 2024-06-03 PROCEDURE — 3008F BODY MASS INDEX DOCD: CPT | Mod: CPTII,S$GLB,, | Performed by: FAMILY MEDICINE

## 2024-06-03 PROCEDURE — 99213 OFFICE O/P EST LOW 20 MIN: CPT | Mod: S$GLB,,, | Performed by: FAMILY MEDICINE

## 2024-06-03 PROCEDURE — 3078F DIAST BP <80 MM HG: CPT | Mod: CPTII,S$GLB,, | Performed by: FAMILY MEDICINE

## 2024-06-03 PROCEDURE — 1159F MED LIST DOCD IN RCRD: CPT | Mod: CPTII,S$GLB,, | Performed by: FAMILY MEDICINE

## 2024-06-03 PROCEDURE — 1101F PT FALLS ASSESS-DOCD LE1/YR: CPT | Mod: CPTII,S$GLB,, | Performed by: FAMILY MEDICINE

## 2024-06-03 PROCEDURE — 3074F SYST BP LT 130 MM HG: CPT | Mod: CPTII,S$GLB,, | Performed by: FAMILY MEDICINE

## 2024-06-03 PROCEDURE — 3044F HG A1C LEVEL LT 7.0%: CPT | Mod: CPTII,S$GLB,, | Performed by: FAMILY MEDICINE

## 2024-06-03 PROCEDURE — 1160F RVW MEDS BY RX/DR IN RCRD: CPT | Mod: CPTII,S$GLB,, | Performed by: FAMILY MEDICINE

## 2024-06-03 PROCEDURE — 4010F ACE/ARB THERAPY RXD/TAKEN: CPT | Mod: CPTII,S$GLB,, | Performed by: FAMILY MEDICINE

## 2024-06-03 PROCEDURE — 3288F FALL RISK ASSESSMENT DOCD: CPT | Mod: CPTII,S$GLB,, | Performed by: FAMILY MEDICINE

## 2024-06-03 NOTE — PROGRESS NOTES
Subjective:       Patient ID: Conner Lombardi Jr. is a 74 y.o. male.    Chief Complaint: skin lesion    History of Present Illness    Patient presents today for a skin lesion on forehead. Patient has a skin lesion on his forehead, previously treated by a dermatologist two years ago through cryotherapy. Initially identified as seborrheic keratosis, it may have been an actinic keratosis. There are indications that the lesion might be changing color. The patient will be referred back to the dermatologist for further evaluation.    ROS:  General: -fever, -chills, -fatigue, -weight gain, -weight loss  Eyes: -eye pain, -vision change  ENMT: -hearing loss, -ear pain, -nasal congestion, -rhinorrhea, -dental problem, -trouble swallowing  Cardiovascular: -chest pain, -palpitations, -lower extremity edema  Respiratory: -cough, -trouble breathing  Gastrointestinal: -abdominal pain, -abdominal swelling, -nausea, -vomiting, -diarrhea, -constipation, -blood in stool  Genitourinary: -difficulty urinating, -genital problem  Musculoskeletal: -joint pain, -muscle aches  Integumentary: -rash, +SKIN LESION  Lymphatics: -swollen glands, -easy bruising  Neurologic: -headache, -dizziness, -numbness, -weakness  Psychiatric: -anxiety, -depression, -sleep difficulty     Objective:      Physical Exam  Vitals reviewed.   Constitutional:       General: He is not in acute distress.     Appearance: He is well-developed.   HENT:      Head: Normocephalic and atraumatic.     Eyes:      General: Lids are normal. No scleral icterus.     Extraocular Movements: Extraocular movements intact.      Conjunctiva/sclera: Conjunctivae normal.      Pupils: Pupils are equal, round, and reactive to light.   Pulmonary:      Effort: Pulmonary effort is normal.   Neurological:      Mental Status: He is alert and oriented to person, place, and time.      Cranial Nerves: No cranial nerve deficit.      Gait: Gait normal.   Psychiatric:         Mood and Affect: Mood and  affect normal.         Assessment:       1. Skin lesion of face        Plan:   1. Skin lesion of face  -     Ambulatory referral/consult to Dermatology; Future; Expected date: 06/10/2024     Advised the patient to follow up with the dermatologist for further evaluation and treatment.      Patient expressed understanding and agreement with plan.    Follow up if symptoms worsen or fail to improve, for keep routine follow up.    This note was generated with the assistance of ambient listening technology. Verbal consent was obtained by the patient and accompanying visitor(s) for the recording of patient appointment to facilitate this note. I attest to having reviewed and edited the generated note for accuracy, though some syntax or spelling errors may persist. Please contact the author of this note for any clarification.

## 2024-06-25 RX ORDER — TIZANIDINE 2 MG/1
TABLET ORAL
Qty: 30 TABLET | Refills: 0 | Status: SHIPPED | OUTPATIENT
Start: 2024-06-25

## 2024-06-26 ENCOUNTER — OFFICE VISIT (OUTPATIENT)
Dept: DERMATOLOGY | Facility: CLINIC | Age: 74
End: 2024-06-26
Payer: COMMERCIAL

## 2024-06-26 DIAGNOSIS — L57.0 ACTINIC KERATOSES: Primary | ICD-10-CM

## 2024-06-26 DIAGNOSIS — L82.1 SEBORRHEIC KERATOSIS: ICD-10-CM

## 2024-06-26 PROCEDURE — 3288F FALL RISK ASSESSMENT DOCD: CPT | Mod: CPTII,S$GLB,, | Performed by: STUDENT IN AN ORGANIZED HEALTH CARE EDUCATION/TRAINING PROGRAM

## 2024-06-26 PROCEDURE — 1101F PT FALLS ASSESS-DOCD LE1/YR: CPT | Mod: CPTII,S$GLB,, | Performed by: STUDENT IN AN ORGANIZED HEALTH CARE EDUCATION/TRAINING PROGRAM

## 2024-06-26 PROCEDURE — 1160F RVW MEDS BY RX/DR IN RCRD: CPT | Mod: CPTII,S$GLB,, | Performed by: STUDENT IN AN ORGANIZED HEALTH CARE EDUCATION/TRAINING PROGRAM

## 2024-06-26 PROCEDURE — 99212 OFFICE O/P EST SF 10 MIN: CPT | Mod: 25,S$GLB,, | Performed by: STUDENT IN AN ORGANIZED HEALTH CARE EDUCATION/TRAINING PROGRAM

## 2024-06-26 PROCEDURE — 17000 DESTRUCT PREMALG LESION: CPT | Mod: S$GLB,,, | Performed by: STUDENT IN AN ORGANIZED HEALTH CARE EDUCATION/TRAINING PROGRAM

## 2024-06-26 PROCEDURE — 1126F AMNT PAIN NOTED NONE PRSNT: CPT | Mod: CPTII,S$GLB,, | Performed by: STUDENT IN AN ORGANIZED HEALTH CARE EDUCATION/TRAINING PROGRAM

## 2024-06-26 PROCEDURE — 1159F MED LIST DOCD IN RCRD: CPT | Mod: CPTII,S$GLB,, | Performed by: STUDENT IN AN ORGANIZED HEALTH CARE EDUCATION/TRAINING PROGRAM

## 2024-06-26 PROCEDURE — 3044F HG A1C LEVEL LT 7.0%: CPT | Mod: CPTII,S$GLB,, | Performed by: STUDENT IN AN ORGANIZED HEALTH CARE EDUCATION/TRAINING PROGRAM

## 2024-06-26 PROCEDURE — 4010F ACE/ARB THERAPY RXD/TAKEN: CPT | Mod: CPTII,S$GLB,, | Performed by: STUDENT IN AN ORGANIZED HEALTH CARE EDUCATION/TRAINING PROGRAM

## 2024-06-26 PROCEDURE — 99999 PR PBB SHADOW E&M-EST. PATIENT-LVL III: CPT | Mod: PBBFAC,,, | Performed by: STUDENT IN AN ORGANIZED HEALTH CARE EDUCATION/TRAINING PROGRAM

## 2024-06-26 NOTE — PATIENT INSTRUCTIONS

## 2024-06-26 NOTE — PROGRESS NOTES
Patient Information  Name: Conner Lombardi Jr.  : 1950  MRN: [id number]     Referring Physician:  Dr. Lopez   Primary Care Physician:  Tia Hudson MD   Date of Visit: 2024      Subjective:       Conner Lombardi Jr. is a 74 y.o. male who presents for   Chief Complaint   Patient presents with    Spot     C/o spot on forehead and neck     HPI  Patient is here with concern of: skin lesions  Location: forehead, eear  Duration: year  Symptoms: none  Prior treatments: none    Patient was last seen:Visit date not found     Prior notes by myself reviewed.   Clinical documentation obtained by nursing staff reviewed.    Review of Systems   Skin:  Negative for itching and rash.        Objective:    Physical Exam   Constitutional: He appears well-developed and well-nourished. No distress.   Neurological: He is alert and oriented to person, place, and time. He is not disoriented.   Psychiatric: He has a normal mood and affect.   Skin:   Areas Examined (abnormalities noted in diagram):   Head / Face Inspection Performed  Neck Inspection Performed              Diagram Legend     Erythematous scaling macule/papule c/w actinic keratosis       Vascular papule c/w angioma      Pigmented verrucoid papule/plaque c/w seborrheic keratosis      Yellow umbilicated papule c/w sebaceous hyperplasia      Irregularly shaped tan macule c/w lentigo     1-2 mm smooth white papules consistent with Milia      Movable subcutaneous cyst with punctum c/w epidermal inclusion cyst      Subcutaneous movable cyst c/w pilar cyst      Firm pink to brown papule c/w dermatofibroma      Pedunculated fleshy papule(s) c/w skin tag(s)      Evenly pigmented macule c/w junctional nevus     Mildly variegated pigmented, slightly irregular-bordered macule c/w mildly atypical nevus      Flesh colored to evenly pigmented papule c/w intradermal nevus       Pink pearly papule/plaque c/w basal cell carcinoma      Erythematous hyperkeratotic  cursted plaque c/w SCC      Surgical scar with no sign of skin cancer recurrence      Open and closed comedones      Inflammatory papules and pustules      Verrucoid papule consistent consistent with wart     Erythematous eczematous patches and plaques     Dystrophic onycholytic nail with subungual debris c/w onychomycosis     Umbilicated papule    Erythematous-base heme-crusted tan verrucoid plaque consistent with inflamed seborrheic keratosis     Erythematous Silvery Scaling Plaque c/w Psoriasis     See annotation      No images are attached to the encounter or orders placed in the encounter.    [] Data reviewed  [] Independent review of test  [] Management discussed with another provider    Assessment / Plan:        Actinic keratoses  Cryosurgery Procedure Note    Verbal consent from the patient is obtained including, but not limited to, risk of hypopigmentation/hyperpigmentation, scar, recurrence of lesion. The patient is aware of the precancerous quality and need for treatment of these lesions. Liquid nitrogen cryosurgery is applied to the 1 actinic keratoses, as detailed in the physical exam, to produce a freeze injury. The patient is aware that blisters may form and is instructed on wound care with gentle cleansing and use of vaseline ointment to keep moist until healed. The patient is supplied a handout on cryosurgery and is instructed to call if lesions do not completely resolve.    Seborrheic keratosis  -     These are benign inherited growths without a malignant potential. Reassurance given to patient. No treatment is necessary.                LOS NUMBER AND COMPLEXITY OF PROBLEMS    COMPLEXITY OF DATA RISK TOTAL TIME (m)   56317  43091 [] 1 self-limited or minor problem [x] Minimal to none [x] No treatment recommended or patient to monitor 15-29  10-19   68418  47097 Low  [] 2 or > self limited or minor problems  [x] 1 stable chronic illness  [] 1 acute, uncomplicated illness or injury Limited (2)  []  Prior external notes from each unique source  [] Review result of each unique test  [] Order each unique test []  Low  OTC medications, minor skin biopsy 30-44  20-29   35577  69193 Moderate  []  1 or > chronic illness with progression, exacerbation or SE of treatment  []  2 or more stable chronic illnesses  []  1 acute illness with systemic symptoms  []  1 acute complicated injury  []  1 undiagnosed new problem with uncertain prognosis Moderate (1/3 below)  []  3 or more data items        *Now includes assessment requiring independent historian  []  Independent interpretation of a test  []  Discuss management/test with another provider Moderate  []  Prescription drug mgmt  []  Minor surgery with risk discussed  []  Mgmt limited by social determinates 45-59  30-39   98689  30857 High  []  1 or more chronic illness with severe exacerbation, progression or SE of treatment  []  1 acute or chronic illness/injury that poses a threat to life or bodily function Extensive (2/3 below)  []  3 or more data items        *Now includes assessment requiring independent historian.  []  Independent interpretation of a test  []  Discuss management/test with another provider High  []  Major surgery with risk discussed  []  Drug therapy requiring intensive monitoring for toxicity  []  Hospitalization  []  Decision for DNR 60-74  40-54      No follow-ups on file.    Fernanda Cordoba MD, FAAD  [facility]

## 2024-07-31 ENCOUNTER — OFFICE VISIT (OUTPATIENT)
Dept: PAIN MEDICINE | Facility: CLINIC | Age: 74
End: 2024-07-31
Payer: COMMERCIAL

## 2024-07-31 VITALS
DIASTOLIC BLOOD PRESSURE: 74 MMHG | HEART RATE: 96 BPM | BODY MASS INDEX: 29.51 KG/M2 | WEIGHT: 183.63 LBS | SYSTOLIC BLOOD PRESSURE: 120 MMHG | HEIGHT: 66 IN | RESPIRATION RATE: 16 BRPM

## 2024-07-31 DIAGNOSIS — M16.12 LOCALIZED OSTEOARTHROSIS OF LEFT HIP: Primary | ICD-10-CM

## 2024-07-31 PROCEDURE — 99999 PR PBB SHADOW E&M-EST. PATIENT-LVL III: CPT | Mod: PBBFAC,,, | Performed by: ANESTHESIOLOGY

## 2024-07-31 PROCEDURE — 3074F SYST BP LT 130 MM HG: CPT | Mod: CPTII,S$GLB,, | Performed by: ANESTHESIOLOGY

## 2024-07-31 PROCEDURE — 3008F BODY MASS INDEX DOCD: CPT | Mod: CPTII,S$GLB,, | Performed by: ANESTHESIOLOGY

## 2024-07-31 PROCEDURE — 3044F HG A1C LEVEL LT 7.0%: CPT | Mod: CPTII,S$GLB,, | Performed by: ANESTHESIOLOGY

## 2024-07-31 PROCEDURE — 1125F AMNT PAIN NOTED PAIN PRSNT: CPT | Mod: CPTII,S$GLB,, | Performed by: ANESTHESIOLOGY

## 2024-07-31 PROCEDURE — 1159F MED LIST DOCD IN RCRD: CPT | Mod: CPTII,S$GLB,, | Performed by: ANESTHESIOLOGY

## 2024-07-31 PROCEDURE — 3078F DIAST BP <80 MM HG: CPT | Mod: CPTII,S$GLB,, | Performed by: ANESTHESIOLOGY

## 2024-07-31 PROCEDURE — 99214 OFFICE O/P EST MOD 30 MIN: CPT | Mod: S$GLB,,, | Performed by: ANESTHESIOLOGY

## 2024-07-31 PROCEDURE — 3288F FALL RISK ASSESSMENT DOCD: CPT | Mod: CPTII,S$GLB,, | Performed by: ANESTHESIOLOGY

## 2024-07-31 PROCEDURE — 4010F ACE/ARB THERAPY RXD/TAKEN: CPT | Mod: CPTII,S$GLB,, | Performed by: ANESTHESIOLOGY

## 2024-07-31 PROCEDURE — 1101F PT FALLS ASSESS-DOCD LE1/YR: CPT | Mod: CPTII,S$GLB,, | Performed by: ANESTHESIOLOGY

## 2024-07-31 NOTE — PROGRESS NOTES
Est Patient Interventional Pain Note (Follow up Visit)    Referring Physician: No ref. provider found    PCP: Tia Lopez MD    Chief Complaint:   Lower Back Pain       SUBJECTIVE:    Interval History (07/31/2024):   patient returns to clinic for evaluation and hip pain.  Since last being seen, patient reports that lower back pain has greatly improved.  Primarily pain described as throbbing aching pain over the lateral aspect of the left hip.  Patient reports occasional pain in lateral aspect of the left thigh as well.  Patient denies any numbness or tingling in his bilateral feet.  Patient denies any right lower extremity pain.  Pain is worse with prolonged standing and crossing his legs, better with rest and anti-inflammatories.  Pain is currently rated a 7/10. Denies any fevers, chills, changes in gait, saddle anesthesia, or bowel and bladder incontinence        Interval History (01/25/2024):  Patient presents today for follow-up visit.  Patient was last seen on 12/15/2023. At that visit, the plan was to complete imaging of lumbar spine. Patient reports pain as 1/10 today.  His condition has improved. States he usually has increased lower back pain with driving for an hour or more but this pain has lessened. Whenever he has pain, it is either in the lower back or L thigh. It does not radiate from lower back down his leg. He also has Left knee pain at times.    Initial HPI (12/15/2023):  Conner Lombardi Jr. is a 74 y.o. male who presents to the clinic for the evaluation of lower back pain.     Patient reports 4 month history of lower back pain.  Patient denies any significant inciting traumas to his lower back pain.  Patient denies any previous surgical intervention in his lumbar spine.  Lower back pain described as an aching throbbing pain that starts in the midline of lower back.  This pain will then radiate to his bilateral hips and into his bilateral anterior thighs to just above the knee.  Patient  denies any significant radiation beyond this point.  Pain is worse with flexion and sitting for prolonged periods of time, better with stretching and anti-inflammatories.  Pain is currently rated a 2/10, but can increase to a 7/10 with exacerbating activities. Denies any fevers, chills, saddle anesthesia, or bowel and bladder incontinence      Non-Pharmacologic Treatments:  Physical Therapy/Home Exercise: yes  Ice/Heat:yes  TENS: no  Acupuncture: no  Massage: yes  Chiropractic: no        Previous Pain Medications:  Tylenol, ibuprofen, tizanidine, topicals     report:  Reviewed and consistent with medication use as prescribed.    Pain Procedures:   None    Pain Disability Index Review:         7/31/2024     2:27 PM 1/25/2024     1:48 PM 12/15/2023     8:34 AM   Last 3 PDI Scores   Pain Disability Index (PDI) 35 7 35     Imaging (Reviewed on 7/31/2024):      Results for orders placed during the hospital encounter of 12/18/23    MRI Lumbar Spine Without Contrast    Narrative  EXAMINATION:  MRI LUMBAR SPINE WITHOUT CONTRAST    CLINICAL HISTORY:  Spinal stenosis, lumbar;Low back pain, symptoms persist with > 6wks conservative treatment; Radiculopathy, lumbar region    TECHNIQUE:  Multiplanar, multisequence MR images were acquired from the thoracolumbar junction to the sacrum without contrast.    COMPARISON:  Lumbar radiograph 12/07/2023    FINDINGS:  Alignment: Within normal limits.    Vertebrae: Lumbar vertebral body heights are maintained.  Mild multilevel endplate degenerative changes throughout the lumbar spine.  Minor L4-L5 endplate edema.  No evidence of acute fracture.  Marrow signal is otherwise within normal limits.    Discs: Disc desiccation and height loss throughout the lumbar spine.  Disc height loss is most pronounced at L5-S1.    Cord: Within normal limits.  Conus terminates at L1-L2.    Degenerative findings:    T12-L1: Tiny central disc bulge.  Minor facet arthropathy.  No significant spinal canal  stenosis or neural foraminal stenosis.    L1-L2: Minor broad-based posterior disc bulge and mild bilateral facet arthropathy with ligamentum flavum hypertrophy.  Mild spinal canal stenosis.  No significant neural foraminal stenosis.    L2-L3: Mild broad-based posterior disc bulge and bilateral facet arthropathy with ligamentum flavum hypertrophy contribute to moderate spinal canal stenosis and mild bilateral neural foraminal stenosis.    L3-L4: Broad-based posterior disc bulge and bilateral facet arthropathy with ligamentum flavum hypertrophy contribute to moderate to severe spinal canal stenosis and mild bilateral neural foraminal stenosis.    L4-L5: Broad-based posterior disc bulge.  Bilateral facet arthropathy with ligamentum flavum hypertrophy and likely a tiny left anteriorly projecting facet synovial cyst.  Findings contribute to severe spinal canal stenosis and mild bilateral neural foraminal stenosis.    L5-S1: Broad-based posterior disc bulge and bilateral facet arthropathy ligamentum flavum hypertrophy contribute to mild spinal canal stenosis.  No significant neural foraminal stenosis.    Paraspinal muscles & soft tissues: There appear to be left parapelvic renal cysts.  Paraspinal soft tissues appear within normal limits.    Impression  1. Multilevel degenerative changes of the lumbar spine are most pronounced at L4-L5 with severe spinal canal stenosis and mild bilateral neural foraminal stenosis.  2. Moderate to severe L3-L4 spinal canal stenosis with mild bilateral neural foraminal stenosis.  3. Moderate L2-L3 spinal canal stenosis with mild bilateral neural foraminal stenosis.      Electronically signed by: Timo Davis  Date:    12/18/2023  Time:    17:01         Results for orders placed during the hospital encounter of 12/07/23    X-Ray Lumbar Spine AP And Lateral    Narrative  EXAM: XR LUMBAR SPINE AP AND LATERAL    TECHNIQUE: 3 views of the lumbar spine    CLINICAL HISTORY: RFS#[[id number]]-Lumbago  with sciatica, left side./[M54.41]-Lumbago with sciatica, right side.:    FINDINGS:  Moderate to severe multilevel discogenic degenerative change most notable at L4-5 and L5-S1.  Moderate lower lumbar facet arthropathy.  Congenitally narrow spinal canal.  Lumbar vertebral body height and alignment are normal.  No evidence of acute fracture.    Impression  No acute radiographic abnormality.    Finalized on: 12/7/2023 4:53 PM By:  Rickie Sepulveda MD  [facility]# [id number]      2023-12-07 16:55:55.193    [facility]    Results for orders placed during the hospital encounter of 12/07/23      X-Ray Hips Bilateral 2 View Inc AP Pelvis    Narrative  EXAM: XR HIPS BILATERAL 2 VIEW INCL AP PELVIS    TECHNIQUE: 5 views of the hips    CLINICAL HISTORY:  :RFS#[[id number]]-Pain in right hip./[M25.552]-Pain in left hip    FINDINGS:  Bone-on-bone bilateral hip joint space narrowing with articular surface sclerosis and osteophyte formation.  Mild remodeling of the superior aspect bilateral femoral heads.  No fracture, suspicious bone marrow lesion or other acute disease is seen in the pelvis or either hip.  Joint alignment is anatomic.  There is no radiographic evidence of femoral head osteonecrosis.    Impression  Bone-on-bone bilateral hip joint space narrowing.    Finalized on: 12/7/2023 3:25 PM By:  Rickie Sepulveda MD  [facility]# [id number]      2023-12-07 15:27:29.099    [facility]          Past Medical History:   Diagnosis Date    Epilepsy     Last seizure 2010.     Hypertension     Tubular adenoma of colon 04/02/2012     Past Surgical History:   Procedure Laterality Date    COLONOSCOPY N/A 07/12/2021    Procedure: COLONOSCOPY;  Surgeon: Subhash Martin MD;  Location: [facility];  Service: Endoscopy;  Laterality: N/A;    TONSILLECTOMY      VASECTOMY  1984/1985     Social History     Socioeconomic History    Marital status:    Tobacco Use    Smoking status: Never    Smokeless tobacco: Never   Substance and Sexual Activity    Alcohol use: No    Drug  use: Never    Sexual activity: Not Currently     Partners: Female     Birth control/protection: None     Social Determinants of Health     Financial Resource Strain: Low Risk  (2/4/2024)    Overall Financial Resource Strain (CARDIA)     Difficulty of Paying Living Expenses: Not hard at all   Food Insecurity: No Food Insecurity (2/4/2024)    Hunger Vital Sign     Worried About Running Out of Food in the Last Year: Never true     Ran Out of Food in the Last Year: Never true   Transportation Needs: No Transportation Needs (2/4/2024)    PRAPARE - Transportation     Lack of Transportation (Medical): No     Lack of Transportation (Non-Medical): No   Physical Activity: Insufficiently Active (2/4/2024)    Exercise Vital Sign     Days of Exercise per Week: 1 day     Minutes of Exercise per Session: 30 min   Stress: No Stress Concern Present (2/4/2024)    [employer] - Occupational Stress Questionnaire     Feeling of Stress : Only a little   Housing Stability: Low Risk  (2/4/2024)    Housing Stability Vital Sign     Unable to Pay for Housing in the Last Year: No     Number of Places Lived in the Last Year: 1     Unstable Housing in the Last Year: No     Family History   Problem Relation Name Age of Onset    Hypertension Other      Cancer Mother Batool brown Maria C         Brain    Cancer Father Conner Lombardi, Sr.         Liver and lung    Hypertension Father Conner Lombardi Sr.     Diabetes Maternal Grandfather Aguilar Rhea        Review of patient's allergies indicates:  No Known Allergies    Current Outpatient Medications   Medication Sig    aspirin (ECOTRIN) 81 MG EC tablet Take 81 mg by mouth once daily.    atorvastatin (LIPITOR) 40 MG tablet Take 1 tablet (40 mg total) by mouth once daily.    cholecalciferol, vitamin D3, (VITAMIN D3) 25 mcg (1,000 unit) capsule Take 1 capsule (1,000 Units total) by mouth once daily.    gabapentin (NEURONTIN) 300 MG capsule TAKE 1 CAPSULE(300 MG)  "BY MOUTH EVERY EVENING    KRILL OIL ORAL Take by mouth.    lisinopriL-hydrochlorothiazide (PRINZIDE,ZESTORETIC) 20-25 mg Tab TAKE 1 TABLET BY MOUTH EVERY DAY    phenytoin (DILANTIN) 100 MG ER capsule Take 4 capsules (400 mg total) by mouth every evening.    tiZANidine (ZANAFLEX) 2 MG tablet TAKE 1 TO 2 TABLETS(2 TO 4 MG) BY MOUTH EVERY 8 HOURS AS NEEDED FOR LEG PAIN OR BACK PAIN     No current facility-administered medications for this visit.         ROS  Review of Systems   Constitutional:  Negative for activity change, appetite change and fever.   HENT:  Negative for facial swelling, rhinorrhea and sore throat.    Respiratory:  Negative for cough, chest tightness, shortness of breath, wheezing and stridor.    Cardiovascular:  Negative for chest pain and palpitations.   Gastrointestinal:  Negative for abdominal pain, blood in stool, constipation, diarrhea, nausea and vomiting.   Endocrine: Negative for polydipsia, polyphagia and polyuria.   Genitourinary:  Negative for dysuria and hematuria.   Musculoskeletal:  Positive for arthralgias, back pain and myalgias. Negative for joint swelling, neck pain and neck stiffness.   Skin:  Negative for rash.   Allergic/Immunologic: Negative for food allergies.   Neurological:  Negative for dizziness, tremors, seizures, syncope, facial asymmetry, speech difficulty, light-headedness and headaches.   Psychiatric/Behavioral:  Negative for agitation, hallucinations, self-injury and suicidal ideas. The patient is not nervous/anxious and is not hyperactive.             OBJECTIVE:  /74   Pulse 96   Resp 16   Ht 5' 6" (1.676 m)   Wt 83.3 kg (183 lb 10.3 oz)   BMI 29.64 kg/m²         Physical Exam  Constitutional:       General: He is not in acute distress.     Appearance: Normal appearance. He is not ill-appearing.   HENT:      Head: Normocephalic and atraumatic.      Nose: No congestion or rhinorrhea.   Eyes:      Extraocular Movements: Extraocular movements intact.      " Pupils: Pupils are equal, round, and reactive to light.   Pulmonary:      Effort: Pulmonary effort is normal.   Musculoskeletal:      Lumbar back: No tenderness or bony tenderness.      Right hip: No deformity, lacerations or bony tenderness. Normal range of motion. Normal strength.      Left hip: Tenderness, bony tenderness and crepitus present. No deformity or lacerations. Decreased range of motion. Decreased strength.   Skin:     General: Skin is warm and dry.      Capillary Refill: Capillary refill takes less than 2 seconds.   Neurological:      General: No focal deficit present.      Mental Status: He is alert and oriented to person, place, and time.      Sensory: No sensory deficit.      Motor: No abnormal muscle tone.      Gait: Gait normal.      Deep Tendon Reflexes:      Reflex Scores:       Patellar reflexes are 2+ on the right side and 2+ on the left side.       Achilles reflexes are 1+ on the right side.  Psychiatric:         Mood and Affect: Mood normal.         Behavior: Behavior normal.         Thought Content: Thought content normal.         Musculoskeletal:        Lumbar Exam  Incision: no  Pain with Flexion: no  Pain with Extension: no  ROM:   Improved compared to prior exam  Paraspinous TTP:  negative bilaterally  Facet TTP:  negative bilaterally  Facet Loading:  negative bilaterally  SLR:  negative bilaterally  SIJ TTP:  positive on left  JANA:  positive on left        ASSESSMENT:       74 y.o. year old male with lower back pain, consistent with     1. Localized osteoarthrosis of left hip  IR Peripheral Nerve Injection    Case Request-RAD/Other Procedure Area: Left femoral and obturator nerve block            PLAN:   - Interventions:   -  schedule patient for left femoral and obturator therapeutic nerve block.  Pain appears to be most consistent with underlying hip pathology.  Less likely to be overlapping lumbar radiculopathy.    - Anticoagulation use:   No no anticoagulation    -  Medications:   -   Continue gabapentin 300 mg at night   -  continue tizanidine 2 mg twice a day p.r.n.    - Therapy:    -  patient has completed formal physical therapy.  Continue home physical therapy exercises     - Imaging/Diagnostic:   X-rays of lumbar spine reviewed.  Consistent for advanced loss of disc height at L5-S1 and congenital spinal narrowing.    MRI of lumbar spine reviewed. Findings significant for : Multilevel degenerative changes of the lumbar spine are most pronounced at L4-L5 with severe spinal canal stenosis    -  x-rays of bilateral hips reveal advance  arthrosis     - Consults:   - patient is scheduled for follow up with Orthopedics for evaluation of of left hip pain  on 08/29/2024      - Patient Questions: Answered all of the patient's questions regarding diagnosis, therapy, and treatment     This condition does not require this patient to take time off of work, and the primary goal of our Pain Management services is to improve the patient's functional capacity.     - Follow up visit: return to clinic  4 weeks after procedure      The above plan and management options were discussed at length with patient. Patient is in agreement with the above and verbalized understanding.    I discussed the goals of interventional chronic pain management with the patient on today's visit.  I explained the utility of injections for diagnostic and therapeutic purposes.  We discussed a multimodal approach to pain including treating the patient's given worst pain at any given time.  We will use a systematic approach to addressing pain.  We will also adopt a multimodal approach that includes injections, adjuvant medications, physical therapy, at times psychiatry.  There may be a limited role for opioid use intermittently in the treatment of pain, more particularly for acute pain although no one approach can be used as a sole treatment modality.    I emphasized the importance of regular exercise, core  strengthening and stretching, diet and weight loss as a cornerstone of long-term pain management.      Helder Talyor MD  Interventional Pain Management  [facility] I spent a total of 32 minutes on the day of the visit.This includes face to face time and non-face to face time preparing to see the patient (eg, review of tests), obtaining and/or reviewing separately obtained history, documenting clinical information in the electronic or other health record, independently interpreting results and communicating results to the patient/family/caregiver, or care coordinator.

## 2024-08-02 ENCOUNTER — LAB VISIT (OUTPATIENT)
Dept: LAB | Facility: HOSPITAL | Age: 74
End: 2024-08-02
Attending: FAMILY MEDICINE
Payer: COMMERCIAL

## 2024-08-02 DIAGNOSIS — E55.9 VITAMIN D DEFICIENCY: ICD-10-CM

## 2024-08-02 DIAGNOSIS — I10 ESSENTIAL HYPERTENSION: ICD-10-CM

## 2024-08-02 DIAGNOSIS — R73.03 PREDIABETES: ICD-10-CM

## 2024-08-02 DIAGNOSIS — E78.2 MIXED HYPERLIPIDEMIA: ICD-10-CM

## 2024-08-02 LAB
25(OH)D3+25(OH)D2 SERPL-MCNC: 47 NG/ML (ref 30–96)
ALBUMIN SERPL BCP-MCNC: 3.7 G/DL (ref 3.5–5.2)
ALP SERPL-CCNC: 99 U/L (ref 55–135)
ALT SERPL W/O P-5'-P-CCNC: 23 U/L (ref 10–44)
ANION GAP SERPL CALC-SCNC: 8 MMOL/L (ref 8–16)
AST SERPL-CCNC: 20 U/L (ref 10–40)
BASOPHILS # BLD AUTO: 0.04 K/UL (ref 0–0.2)
BASOPHILS NFR BLD: 0.7 % (ref 0–1.9)
BILIRUB SERPL-MCNC: 0.3 MG/DL (ref 0.1–1)
BUN SERPL-MCNC: 20 MG/DL (ref 8–23)
CALCIUM SERPL-MCNC: 9.5 MG/DL (ref 8.7–10.5)
CHLORIDE SERPL-SCNC: 107 MMOL/L (ref 95–110)
CHOLEST SERPL-MCNC: 133 MG/DL (ref 120–199)
CHOLEST/HDLC SERPL: 3 {RATIO} (ref 2–5)
CO2 SERPL-SCNC: 27 MMOL/L (ref 23–29)
CREAT SERPL-MCNC: 0.9 MG/DL (ref 0.5–1.4)
DIFFERENTIAL METHOD BLD: ABNORMAL
EOSINOPHIL # BLD AUTO: 0.4 K/UL (ref 0–0.5)
EOSINOPHIL NFR BLD: 8 % (ref 0–8)
ERYTHROCYTE [DISTWIDTH] IN BLOOD BY AUTOMATED COUNT: 13.2 % (ref 11.5–14.5)
EST. GFR  (NO RACE VARIABLE): >60 ML/MIN/1.73 M^2
ESTIMATED AVG GLUCOSE: 120 MG/DL (ref 68–131)
GLUCOSE SERPL-MCNC: 122 MG/DL (ref 70–110)
HBA1C MFR BLD: 5.8 % (ref 4–5.6)
HCT VFR BLD AUTO: 35.3 % (ref 40–54)
HDLC SERPL-MCNC: 44 MG/DL (ref 40–75)
HDLC SERPL: 33.1 % (ref 20–50)
HGB BLD-MCNC: 12.1 G/DL (ref 14–18)
IMM GRANULOCYTES # BLD AUTO: 0.01 K/UL (ref 0–0.04)
IMM GRANULOCYTES NFR BLD AUTO: 0.2 % (ref 0–0.5)
LDLC SERPL CALC-MCNC: 71.6 MG/DL (ref 63–159)
LYMPHOCYTES # BLD AUTO: 1.6 K/UL (ref 1–4.8)
LYMPHOCYTES NFR BLD: 28.9 % (ref 18–48)
MCH RBC QN AUTO: 34.9 PG (ref 27–31)
MCHC RBC AUTO-ENTMCNC: 34.3 G/DL (ref 32–36)
MCV RBC AUTO: 102 FL (ref 82–98)
MONOCYTES # BLD AUTO: 0.7 K/UL (ref 0.3–1)
MONOCYTES NFR BLD: 13.2 % (ref 4–15)
NEUTROPHILS # BLD AUTO: 2.7 K/UL (ref 1.8–7.7)
NEUTROPHILS NFR BLD: 49 % (ref 38–73)
NONHDLC SERPL-MCNC: 89 MG/DL
NRBC BLD-RTO: 0 /100 WBC
PLATELET # BLD AUTO: 184 K/UL (ref 150–450)
PMV BLD AUTO: 9.9 FL (ref 9.2–12.9)
POTASSIUM SERPL-SCNC: 4.5 MMOL/L (ref 3.5–5.1)
PROT SERPL-MCNC: 6.8 G/DL (ref 6–8.4)
RBC # BLD AUTO: 3.47 M/UL (ref 4.6–6.2)
SODIUM SERPL-SCNC: 142 MMOL/L (ref 136–145)
TRIGL SERPL-MCNC: 87 MG/DL (ref 30–150)
TSH SERPL DL<=0.005 MIU/L-ACNC: 1.24 UIU/ML (ref 0.4–4)
WBC # BLD AUTO: 5.53 K/UL (ref 3.9–12.7)

## 2024-08-02 PROCEDURE — 80053 COMPREHEN METABOLIC PANEL: CPT | Performed by: FAMILY MEDICINE

## 2024-08-02 PROCEDURE — 80061 LIPID PANEL: CPT | Performed by: FAMILY MEDICINE

## 2024-08-02 PROCEDURE — 83036 HEMOGLOBIN GLYCOSYLATED A1C: CPT | Performed by: FAMILY MEDICINE

## 2024-08-02 PROCEDURE — 84443 ASSAY THYROID STIM HORMONE: CPT | Performed by: FAMILY MEDICINE

## 2024-08-02 PROCEDURE — 82306 VITAMIN D 25 HYDROXY: CPT | Performed by: FAMILY MEDICINE

## 2024-08-02 PROCEDURE — 36415 COLL VENOUS BLD VENIPUNCTURE: CPT | Performed by: FAMILY MEDICINE

## 2024-08-02 PROCEDURE — 85025 COMPLETE CBC W/AUTO DIFF WBC: CPT | Performed by: FAMILY MEDICINE

## 2024-08-05 RX ORDER — TIZANIDINE 2 MG/1
TABLET ORAL
Qty: 30 TABLET | Refills: 0 | Status: SHIPPED | OUTPATIENT
Start: 2024-08-05

## 2024-08-09 ENCOUNTER — OFFICE VISIT (OUTPATIENT)
Dept: INTERNAL MEDICINE | Facility: CLINIC | Age: 74
End: 2024-08-09
Payer: COMMERCIAL

## 2024-08-09 VITALS
TEMPERATURE: 98 F | OXYGEN SATURATION: 98 % | HEIGHT: 66 IN | SYSTOLIC BLOOD PRESSURE: 124 MMHG | DIASTOLIC BLOOD PRESSURE: 70 MMHG | WEIGHT: 180.25 LBS | BODY MASS INDEX: 28.97 KG/M2 | HEART RATE: 100 BPM

## 2024-08-09 DIAGNOSIS — I10 ESSENTIAL HYPERTENSION: ICD-10-CM

## 2024-08-09 DIAGNOSIS — R73.03 PREDIABETES: ICD-10-CM

## 2024-08-09 DIAGNOSIS — D64.9 ANEMIA, UNSPECIFIED TYPE: ICD-10-CM

## 2024-08-09 DIAGNOSIS — E55.9 VITAMIN D DEFICIENCY: ICD-10-CM

## 2024-08-09 DIAGNOSIS — G40.109 TEMPORAL LOBE EPILEPSY: ICD-10-CM

## 2024-08-09 DIAGNOSIS — E78.2 MIXED HYPERLIPIDEMIA: ICD-10-CM

## 2024-08-09 DIAGNOSIS — Z00.00 ROUTINE GENERAL MEDICAL EXAMINATION AT A HEALTH CARE FACILITY: Primary | ICD-10-CM

## 2024-08-09 PROCEDURE — 1101F PT FALLS ASSESS-DOCD LE1/YR: CPT | Mod: CPTII,S$GLB,, | Performed by: PHYSICIAN ASSISTANT

## 2024-08-09 PROCEDURE — 3288F FALL RISK ASSESSMENT DOCD: CPT | Mod: CPTII,S$GLB,, | Performed by: PHYSICIAN ASSISTANT

## 2024-08-09 PROCEDURE — 1126F AMNT PAIN NOTED NONE PRSNT: CPT | Mod: CPTII,S$GLB,, | Performed by: PHYSICIAN ASSISTANT

## 2024-08-09 PROCEDURE — 1159F MED LIST DOCD IN RCRD: CPT | Mod: CPTII,S$GLB,, | Performed by: PHYSICIAN ASSISTANT

## 2024-08-09 PROCEDURE — 3044F HG A1C LEVEL LT 7.0%: CPT | Mod: CPTII,S$GLB,, | Performed by: PHYSICIAN ASSISTANT

## 2024-08-09 PROCEDURE — 1160F RVW MEDS BY RX/DR IN RCRD: CPT | Mod: CPTII,S$GLB,, | Performed by: PHYSICIAN ASSISTANT

## 2024-08-09 PROCEDURE — 99999 PR PBB SHADOW E&M-EST. PATIENT-LVL IV: CPT | Mod: PBBFAC,,, | Performed by: PHYSICIAN ASSISTANT

## 2024-08-09 PROCEDURE — 3074F SYST BP LT 130 MM HG: CPT | Mod: CPTII,S$GLB,, | Performed by: PHYSICIAN ASSISTANT

## 2024-08-09 PROCEDURE — 3078F DIAST BP <80 MM HG: CPT | Mod: CPTII,S$GLB,, | Performed by: PHYSICIAN ASSISTANT

## 2024-08-09 PROCEDURE — 3008F BODY MASS INDEX DOCD: CPT | Mod: CPTII,S$GLB,, | Performed by: PHYSICIAN ASSISTANT

## 2024-08-09 PROCEDURE — 4010F ACE/ARB THERAPY RXD/TAKEN: CPT | Mod: CPTII,S$GLB,, | Performed by: PHYSICIAN ASSISTANT

## 2024-08-09 PROCEDURE — 99397 PER PM REEVAL EST PAT 65+ YR: CPT | Mod: S$GLB,,, | Performed by: PHYSICIAN ASSISTANT

## 2024-08-09 NOTE — PROGRESS NOTES
Subjective:       Patient ID: Conner Lombardi Jr. is a 74 y.o. male.    Chief Complaint: Annual Exam      Patient presents to clinic today for annual physical exam.        Review of Systems   Constitutional:  Negative for chills, fatigue, fever and unexpected weight change.   HENT:  Negative for congestion, dental problem, ear pain, hearing loss, rhinorrhea and trouble swallowing.    Eyes:  Negative for pain and visual disturbance.   Respiratory:  Negative for cough and shortness of breath.    Cardiovascular:  Negative for chest pain, palpitations and leg swelling.   Gastrointestinal:  Negative for abdominal distention, abdominal pain, blood in stool, constipation, diarrhea, nausea and vomiting.   Genitourinary:  Negative for difficulty urinating, scrotal swelling and testicular pain.   Musculoskeletal:  Positive for arthralgias (chronic, left hip, seeing pain mgmt and ortho) and myalgias (chronic).   Skin:  Negative for rash.   Neurological:  Negative for dizziness, weakness, numbness and headaches.   Hematological:  Negative for adenopathy. Bruises/bleeds easily (chronic and stable).   Psychiatric/Behavioral:  Positive for sleep disturbance (ongoing). Negative for dysphoric mood. The patient is not nervous/anxious.        Objective:      Physical Exam  Vitals and nursing note reviewed.   Constitutional:       General: He is not in acute distress.     Appearance: He is well-developed.   HENT:      Head: Normocephalic and atraumatic.      Right Ear: Tympanic membrane, ear canal and external ear normal.      Left Ear: Tympanic membrane, ear canal and external ear normal.      Nose: Nose normal.      Mouth/Throat:      Lips: Pink.      Mouth: Mucous membranes are moist.      Pharynx: Oropharynx is clear. Uvula midline.   Eyes:      General: Lids are normal. No scleral icterus.     Conjunctiva/sclera: Conjunctivae normal.      Pupils: Pupils are equal, round, and reactive to light.   Neck:      Thyroid: No  thyromegaly.   Cardiovascular:      Rate and Rhythm: Normal rate and regular rhythm.      Pulses: Normal pulses.   Pulmonary:      Effort: Pulmonary effort is normal.      Breath sounds: Normal breath sounds. No wheezing or rales.   Abdominal:      General: Bowel sounds are normal. There is no distension.      Palpations: Abdomen is soft. There is no mass.      Tenderness: There is no abdominal tenderness.   Musculoskeletal:         General: No tenderness. Normal range of motion.      Cervical back: Normal range of motion and neck supple.      Right lower leg: No edema.      Left lower leg: No edema.   Lymphadenopathy:      Cervical: No cervical adenopathy.   Skin:     General: Skin is warm and dry.      Findings: No rash.   Neurological:      Mental Status: He is alert.      Cranial Nerves: No cranial nerve deficit.   Psychiatric:         Mood and Affect: Mood and affect normal.         Assessment:       1. Routine general medical examination at a health care facility    2. Temporal lobe epilepsy    3. Essential hypertension    4. Mixed hyperlipidemia    5. Anemia, unspecified type    6. Prediabetes    7. Vitamin D deficiency        Plan:   1. Routine general medical examination at a health care facility    2. Temporal lobe epilepsy  Overview:  Followed by Neurology, continue current treatment plan       3. Essential hypertension  Assessment & Plan:  /70, controlled, continue lisinopril-HCTZ  Lab Results   Component Value Date     08/02/2024    K 4.5 08/02/2024    BUN 20 08/02/2024    CREATININE 0.9 08/02/2024    EGFRNORACEVR >60.0 08/02/2024          4. Mixed hyperlipidemia  Assessment & Plan:  Controlled, continue atorvastatin  Lab Results   Component Value Date    CHOL 133 08/02/2024    LDLCALC 71.6 08/02/2024    TRIG 87 08/02/2024    HDL 44 08/02/2024    ALT 23 08/02/2024    AST 20 08/02/2024    ALKPHOS 99 08/02/2024        Orders:  -     Comprehensive Metabolic Panel; Future; Expected date:  02/09/2025  -     Lipid Panel; Future; Expected date: 02/09/2025    5. Anemia, unspecified type  Assessment & Plan:  Stable  Lab Results   Component Value Date    RBC 3.47 (L) 08/02/2024    HGB 12.1 (L) 08/02/2024    HCT 35.3 (L) 08/02/2024    IRON 148 08/29/2022    FOLATE 8.3 08/29/2022     (H) 08/02/2024          6. Prediabetes  Assessment & Plan:  Stable, diet controlled  Lab Results   Component Value Date    HGBA1C 5.8 (H) 08/02/2024    HGBA1C 5.9 (H) 01/31/2024    LDLCALC 71.6 08/02/2024        Orders:  -     Hemoglobin A1C; Future; Expected date: 02/09/2025    7. Vitamin D deficiency  Overview:  Continue supplement     Orders:  -     Vitamin D; Future; Expected date: 02/09/2025        Recent labs reviewed with patient.     6 month f/u with Dr. Lopez scheduled with fasting labs PTA   Health Maintenance reviewed/updated.

## 2024-08-11 NOTE — ASSESSMENT & PLAN NOTE
Stable, diet controlled  Lab Results   Component Value Date    HGBA1C 5.8 (H) 08/02/2024    HGBA1C 5.9 (H) 01/31/2024    LDLCALC 71.6 08/02/2024

## 2024-08-11 NOTE — ASSESSMENT & PLAN NOTE
Stable  Lab Results   Component Value Date    RBC 3.47 (L) 08/02/2024    HGB 12.1 (L) 08/02/2024    HCT 35.3 (L) 08/02/2024    IRON 148 08/29/2022    FOLATE 8.3 08/29/2022     (H) 08/02/2024

## 2024-08-11 NOTE — ASSESSMENT & PLAN NOTE
/70, controlled, continue lisinopril-HCTZ  Lab Results   Component Value Date     08/02/2024    K 4.5 08/02/2024    BUN 20 08/02/2024    CREATININE 0.9 08/02/2024    EGFRNORACEVR >60.0 08/02/2024

## 2024-08-11 NOTE — ASSESSMENT & PLAN NOTE
Controlled, continue atorvastatin  Lab Results   Component Value Date    CHOL 133 08/02/2024    LDLCALC 71.6 08/02/2024    TRIG 87 08/02/2024    HDL 44 08/02/2024    ALT 23 08/02/2024    AST 20 08/02/2024    ALKPHOS 99 08/02/2024

## 2024-08-16 ENCOUNTER — PATIENT MESSAGE (OUTPATIENT)
Dept: PAIN MEDICINE | Facility: HOSPITAL | Age: 74
End: 2024-08-16
Payer: COMMERCIAL

## 2024-08-16 NOTE — PRE-PROCEDURE INSTRUCTIONS
Unable to reach patient regarding procedure scheduled on 8.19. PAT instructions and arrival time sent via [employer]    Arrival time 1130     Has patient been sick with fever or on antibiotics within the last 7 days? No     Does the patient have any open wounds, sores or rashes? No     Does the patient have any recent fractures? no     Has patient received a vaccination within the last 7 days? No     Received the COVID vaccination? yes     Has the patient stopped all medications as directed? na     Does patient have a pacemaker, defibrillator, or implantable stimulator? No     Does the patient have a ride to and from procedure and someone reliable to remain with patient?       Is the patient diabetic? no     Does the patient have sleep apnea? Or use O2 at home? no     Is the patient receiving sedation? Yes      Is the patient instructed to remain NPO beginning at midnight the night before their procedure? yes     Procedure location confirmed with patient? Yes     Covid- Denies signs/symptoms. Instructed to notify PAT/MD if any changes.

## 2024-08-19 ENCOUNTER — HOSPITAL ENCOUNTER (OUTPATIENT)
Facility: HOSPITAL | Age: 74
Discharge: HOME | End: 2024-08-19
Attending: ANESTHESIOLOGY | Admitting: ANESTHESIOLOGY
Payer: COMMERCIAL

## 2024-08-19 VITALS
BODY MASS INDEX: 28.95 KG/M2 | RESPIRATION RATE: 17 BRPM | SYSTOLIC BLOOD PRESSURE: 155 MMHG | DIASTOLIC BLOOD PRESSURE: 86 MMHG | HEART RATE: 80 BPM | HEIGHT: 66 IN | OXYGEN SATURATION: 97 % | WEIGHT: 180.13 LBS | TEMPERATURE: 97 F

## 2024-08-19 DIAGNOSIS — M16.12 LOCALIZED OSTEOARTHROSIS OF LEFT HIP: Primary | ICD-10-CM

## 2024-08-19 DIAGNOSIS — M16.12 OSTEOARTHRITIS OF LEFT HIP: ICD-10-CM

## 2024-08-19 PROCEDURE — 63600175 PHARM REV CODE 636 W HCPCS: Performed by: ANESTHESIOLOGY

## 2024-08-19 PROCEDURE — 64450 NJX AA&/STRD OTHER PN/BRANCH: CPT | Mod: LT | Performed by: ANESTHESIOLOGY

## 2024-08-19 PROCEDURE — 77002 NEEDLE LOCALIZATION BY XRAY: CPT | Mod: 26,,, | Performed by: ANESTHESIOLOGY

## 2024-08-19 PROCEDURE — 64450 NJX AA&/STRD OTHER PN/BRANCH: CPT | Mod: LT,,, | Performed by: ANESTHESIOLOGY

## 2024-08-19 RX ORDER — MIDAZOLAM HYDROCHLORIDE 1 MG/ML
INJECTION INTRAMUSCULAR; INTRAVENOUS
Status: DISCONTINUED | OUTPATIENT
Start: 2024-08-19 | End: 2024-08-19 | Stop reason: HOSPADM

## 2024-08-19 RX ORDER — ONDANSETRON HYDROCHLORIDE 2 MG/ML
4 INJECTION, SOLUTION INTRAVENOUS ONCE AS NEEDED
Status: DISCONTINUED | OUTPATIENT
Start: 2024-08-19 | End: 2024-08-19 | Stop reason: HOSPADM

## 2024-08-19 RX ORDER — BUPIVACAINE HYDROCHLORIDE 5 MG/ML
INJECTION, SOLUTION EPIDURAL; INTRACAUDAL
Status: DISCONTINUED | OUTPATIENT
Start: 2024-08-19 | End: 2024-08-19 | Stop reason: HOSPADM

## 2024-08-19 RX ORDER — METHYLPREDNISOLONE ACETATE 40 MG/ML
INJECTION, SUSPENSION INTRA-ARTICULAR; INTRALESIONAL; INTRAMUSCULAR; SOFT TISSUE
Status: DISCONTINUED | OUTPATIENT
Start: 2024-08-19 | End: 2024-08-19 | Stop reason: HOSPADM

## 2024-08-19 RX ORDER — FENTANYL CITRATE 50 UG/ML
INJECTION, SOLUTION INTRAMUSCULAR; INTRAVENOUS
Status: DISCONTINUED | OUTPATIENT
Start: 2024-08-19 | End: 2024-08-19 | Stop reason: HOSPADM

## 2024-08-19 NOTE — PLAN OF CARE
Pt and spouse verbalized understanding of discharge instructions. Bandaids x 2 to left hip  c/d/i. Patient voiced no complaints, with no further questions at this time. VSS on RA. Recovery care complete.

## 2024-08-19 NOTE — OP NOTE
Femoral and obturator Nerve Block     INFORMED CONSENT: The procedure, risks, benefits and options were discussed with patient. There are no contraindications to the procedure. The patient expressed understanding and agreed to proceed. The personnel performing the procedure was discussed.    Date of procedure 08/19/2024    Time-out taken to identify patient and procedure side prior to starting the procedure.                     PROCEDURE:   1) Left Femoral and Obturator Nerve Block     Pre Procedure diagnosis:    Localized osteoarthrosis of left hip [M16.12]  1. Localized osteoarthrosis of left hip    2. Osteoarthritis of left hip        Post-Procedure diagnosis:   same       PHYSICIAN: Helder Taylor          MEDICATIONS INJECTED: 3mL Bupivacaine 0.5% and 1ml of 40mg/ml Depomedrol, split evenly between each site     LOCAL ANESTHETIC USED: Xylocaine 1% 5mL      Sedation: Conscious sedation provided by M.D    SEDATION MEDICATIONS: local/IV sedation: Versed 2 mg and fentanyl 50 mcg IV.  Conscious sedation ordered by MD.  Patient reevaluated and sedation administered by MD and monitored by RN.  Total sedation time was less than 10 min.       ESTIMATED BLOOD LOSS: None.     COMPLICATIONS: None.     TECHNIQUE: Laying in the supine position, the patient was prepped and draped in the usual sterile fashion using ChloraPrep and fenestrated drape. The area was determined under fluoroscopy. Local Xylocaine was injected by raising a wheel and going down to the periosteum using a 27-gauge hypodermic needle. The 25G 5 inch spinal needle needle was introduced into the approximate areas of the sensory branch of the femoral nerve to the hip superior medially to the femoral head and the sensory branch of the obturator nerves to the hip at the incisura. The incisura needle was approached from the medial aspect of the thigh. Once os was contacted and the final needle tip position confirmed on fluoroscopy, negative pressure was  applied to confirm no intravascular placement. After negative aspiration, the medication mixture was injected split evenly between the two sites. A dressing was then placed over the two insertion sites.      The patient was monitored for approximately 30 minutes after the procedure.  Patient was given post procedure and discharge instructions to follow at home.  The patient was discharged in a stable condition

## 2024-08-19 NOTE — DISCHARGE INSTRUCTIONS

## 2024-08-19 NOTE — H&P
"HPI  Patient presenting for Procedure(s) (LRB):  Left femoral and obturator nerve block (Left)     Patient on Anti-coagulation Yes, ASA, may continue    No health changes since previous encounter    Past Medical History:   Diagnosis Date    Epilepsy     Last seizure 2010.     Hypertension     Tubular adenoma of colon 04/02/2012     Past Surgical History:   Procedure Laterality Date    COLONOSCOPY N/A 07/12/2021    Procedure: COLONOSCOPY;  Surgeon: Subhash Martin MD;  Location: [facility];  Service: Endoscopy;  Laterality: N/A;    TONSILLECTOMY      VASECTOMY  1984/1985     Review of patient's allergies indicates:  No Known Allergies     No current facility-administered medications on file prior to encounter.     Current Outpatient Medications on File Prior to Encounter   Medication Sig Dispense Refill    aspirin (ECOTRIN) 81 MG EC tablet Take 81 mg by mouth once daily.      atorvastatin (LIPITOR) 40 MG tablet Take 1 tablet (40 mg total) by mouth once daily. 90 tablet 3    cholecalciferol, vitamin D3, (VITAMIN D3) 25 mcg (1,000 unit) capsule Take 1 capsule (1,000 Units total) by mouth once daily.  0    gabapentin (NEURONTIN) 300 MG capsule TAKE 1 CAPSULE(300 MG) BY MOUTH EVERY EVENING 30 capsule 2    KRILL OIL ORAL Take by mouth.      lisinopriL-hydrochlorothiazide (PRINZIDE,ZESTORETIC) 20-25 mg Tab TAKE 1 TABLET BY MOUTH EVERY DAY 90 tablet 3    phenytoin (DILANTIN) 100 MG ER capsule Take 4 capsules (400 mg total) by mouth every evening. 360 capsule 3        PMHx, PSHx, Allergies, Medications reviewed in epic    ROS negative except pain complaints in HPI    OBJECTIVE:    BP (!) 160/74 (BP Location: Right arm, Patient Position: Sitting)   Pulse 88   Temp 97.3 °F (36.3 °C) (Temporal)   Resp 18   Ht 5' 6" (1.676 m)   Wt 81.7 kg (180 lb 1.9 oz)   SpO2 97%   BMI 29.07 kg/m²     PHYSICAL EXAMINATION:    GENERAL: Well appearing, in no acute distress, alert and oriented x3.  PSYCH:  Mood and affect " appropriate.  SKIN: Skin color, texture, turgor normal, no rashes or lesions which will impact the procedure.  CV: RRR with palpation of the radial artery.  PULM: No evidence of respiratory difficulty, symmetric chest rise. Clear to auscultation.  NEURO: Cranial nerves grossly intact.    Plan:    Proceed with procedure as planned Procedure(s) (LRB):  Left femoral and obturator nerve block (Left)    Helder Taylor MD  08/19/2024

## 2024-08-19 NOTE — DISCHARGE SUMMARY
Discharge Note  Short Stay      SUMMARY     Admit Date: 8/19/2024    Attending Physician: Helder Taylor MD        Discharge Physician: Helder Taylor MD        Discharge Date: 8/19/2024 11:32 AM    Procedure(s) (LRB):  Left femoral and obturator nerve block (Left)    Final Diagnosis: Localized osteoarthrosis of left hip [M16.12]    Disposition: Home or self care    Patient Instructions:   Current Discharge Medication List        CONTINUE these medications which have NOT CHANGED    Details   aspirin (ECOTRIN) 81 MG EC tablet Take 81 mg by mouth once daily.      atorvastatin (LIPITOR) 40 MG tablet Take 1 tablet (40 mg total) by mouth once daily.  Qty: 90 tablet, Refills: 3    Associated Diagnoses: Mixed hyperlipidemia      cholecalciferol, vitamin D3, (VITAMIN D3) 25 mcg (1,000 unit) capsule Take 1 capsule (1,000 Units total) by mouth once daily.  Refills: 0    Associated Diagnoses: Vitamin D deficiency      gabapentin (NEURONTIN) 300 MG capsule TAKE 1 CAPSULE(300 MG) BY MOUTH EVERY EVENING  Qty: 30 capsule, Refills: 2    Associated Diagnoses: Lumbar radiculopathy; Lumbar spondylosis; DDD (degenerative disc disease), lumbar; Spinal stenosis, lumbosacral region; Low back pain with bilateral sciatica, unspecified back pain laterality, unspecified chronicity; Dorsalgia, unspecified      KRILL OIL ORAL Take by mouth.      lisinopriL-hydrochlorothiazide (PRINZIDE,ZESTORETIC) 20-25 mg Tab TAKE 1 TABLET BY MOUTH EVERY DAY  Qty: 90 tablet, Refills: 3    Comments: .  Associated Diagnoses: Essential hypertension      phenytoin (DILANTIN) 100 MG ER capsule Take 4 capsules (400 mg total) by mouth every evening.  Qty: 360 capsule, Refills: 3    Associated Diagnoses: Nonintractable epilepsy without status epilepticus, unspecified epilepsy type      tiZANidine (ZANAFLEX) 2 MG tablet TAKE 1 TO 2 TABLETS(2 TO 4 MG) BY MOUTH EVERY 8 HOURS AS NEEDED FOR LEG PAIN OR BACK PAIN  Qty: 30 tablet, Refills: 0                 Discharge  Diagnosis: Localized osteoarthrosis of left hip [M16.12]  Condition on Discharge: Stable with no complications to procedure   Diet on Discharge: Same as before.  Activity: as per instruction sheet.  Discharge to: Home with a responsible adult.  Follow up: 2-4 weeks       Please call the office at [phone] if you experience any weakness or loss of sensation, fever > 101.5, pain uncontrolled with oral medications, persistent nausea/vomiting/or diarrhea, redness or drainage from the incisions, or any other worrisome concerns. If physician on call was not reached or could not communicate with our office for any reason please go to the nearest emergency department

## 2024-08-22 RX ORDER — TIZANIDINE 2 MG/1
TABLET ORAL
Qty: 30 TABLET | Refills: 0 | Status: SHIPPED | OUTPATIENT
Start: 2024-08-22

## 2024-08-23 DIAGNOSIS — M48.07 SPINAL STENOSIS, LUMBOSACRAL REGION: ICD-10-CM

## 2024-08-23 DIAGNOSIS — M47.816 LUMBAR SPONDYLOSIS: ICD-10-CM

## 2024-08-23 DIAGNOSIS — M54.16 LUMBAR RADICULOPATHY: ICD-10-CM

## 2024-08-23 DIAGNOSIS — M54.9 DORSALGIA, UNSPECIFIED: ICD-10-CM

## 2024-08-23 DIAGNOSIS — M54.41 LOW BACK PAIN WITH BILATERAL SCIATICA, UNSPECIFIED BACK PAIN LATERALITY, UNSPECIFIED CHRONICITY: ICD-10-CM

## 2024-08-23 DIAGNOSIS — M54.42 LOW BACK PAIN WITH BILATERAL SCIATICA, UNSPECIFIED BACK PAIN LATERALITY, UNSPECIFIED CHRONICITY: ICD-10-CM

## 2024-08-23 DIAGNOSIS — M51.36 DDD (DEGENERATIVE DISC DISEASE), LUMBAR: ICD-10-CM

## 2024-08-23 RX ORDER — GABAPENTIN 300 MG/1
300 CAPSULE ORAL NIGHTLY
Qty: 30 CAPSULE | Refills: 2 | Status: SHIPPED | OUTPATIENT
Start: 2024-08-23

## 2024-08-29 ENCOUNTER — OFFICE VISIT (OUTPATIENT)
Dept: ORTHOPEDICS | Facility: CLINIC | Age: 74
End: 2024-08-29
Payer: COMMERCIAL

## 2024-08-29 ENCOUNTER — TELEPHONE (OUTPATIENT)
Dept: ORTHOPEDICS | Facility: CLINIC | Age: 74
End: 2024-08-29

## 2024-08-29 ENCOUNTER — HOSPITAL ENCOUNTER (OUTPATIENT)
Dept: RADIOLOGY | Facility: HOSPITAL | Age: 74
Discharge: HOME | End: 2024-08-29
Attending: ORTHOPAEDIC SURGERY
Payer: COMMERCIAL

## 2024-08-29 VITALS — BODY MASS INDEX: 29.41 KG/M2 | HEIGHT: 66 IN | WEIGHT: 183 LBS

## 2024-08-29 DIAGNOSIS — M48.061 SPINAL STENOSIS OF LUMBAR REGION AT MULTIPLE LEVELS: ICD-10-CM

## 2024-08-29 DIAGNOSIS — M25.562 PAIN IN BOTH KNEES, UNSPECIFIED CHRONICITY: ICD-10-CM

## 2024-08-29 DIAGNOSIS — M47.816 LUMBAR FACET ARTHROPATHY: ICD-10-CM

## 2024-08-29 DIAGNOSIS — M16.0 PRIMARY OSTEOARTHRITIS OF BOTH HIPS: ICD-10-CM

## 2024-08-29 DIAGNOSIS — M16.11 ARTHRITIS OF RIGHT HIP: ICD-10-CM

## 2024-08-29 DIAGNOSIS — M16.12 ARTHRITIS OF LEFT HIP: Primary | ICD-10-CM

## 2024-08-29 DIAGNOSIS — M94.262 CHONDROMALACIA, LEFT KNEE: ICD-10-CM

## 2024-08-29 DIAGNOSIS — M25.561 PAIN IN BOTH KNEES, UNSPECIFIED CHRONICITY: ICD-10-CM

## 2024-08-29 DIAGNOSIS — M94.261 CHONDROMALACIA, RIGHT KNEE: ICD-10-CM

## 2024-08-29 PROCEDURE — 73564 X-RAY EXAM KNEE 4 OR MORE: CPT | Mod: TC,50

## 2024-08-29 PROCEDURE — 73564 X-RAY EXAM KNEE 4 OR MORE: CPT | Mod: 26,,, | Performed by: RADIOLOGY

## 2024-08-29 PROCEDURE — 99999 PR PBB SHADOW E&M-EST. PATIENT-LVL III: CPT | Mod: PBBFAC,,, | Performed by: ORTHOPAEDIC SURGERY

## 2024-08-29 RX ORDER — MELOXICAM 15 MG/1
15 TABLET ORAL DAILY
Qty: 30 TABLET | Refills: 3 | Status: SHIPPED | OUTPATIENT
Start: 2024-08-29

## 2024-08-29 NOTE — PATIENT INSTRUCTIONS
Your x-ray show severe arthritis in both of her hips   The left hip is the 1 that bothers her the most   Right now we have severely limitation of motion  The medication like gabapentin and the muscle relaxants help a little bit   Recently you received femoral nerve block and obturator nerve block   You using a cane to ambulate  Your wife in with you and she convinced you to have your hip replacement done  I showed you x-rays about total hip replacement how it looks like.  Total hip replacement is considered outpatient surgery by the government that means you will have you surgery go home the same day.  Surgery itself is roughly an hour and hour and a half done in the hospital.  After you wake up in recovery room we get you up and walking with the physical therapist.  You will be needing a walker which we will arrange for you to have.  After that you go home we will arrange for home physical therapy for 2 weeks and home health nurse to come and check on you and change the dressing for you.  Will take roughly 3 months to heal.  You you will see improvement up to a year and a half after surgery.  Total hip replacement is considered 90% successful in decreasing pain and increasing function so it is not perfect  In order to have surgery and be safe to go home we need you to see the cardiologist before surgery  There is a mandatory class that you have to attend before surgery    Procedure, common risks and benefits,alternatives discussed in details.All questions answered.Patient expressed understanding.Patient instructed to call for any questions that could arise in the future.    Most common Risks:  Infection/less than 2% chance/  Leg-length discrepancy  Dislocation/2-3% chance  Neuro-vascular injury ( resulting in loss of motor and sensory functions)  Pain  Blood clot    Loss of motion/we heal with scar tissue your job is to do physical therapy and move so you do not form excessive scar tissue preventing you from  moving  Fracture of bone/we work with the bones so there is a very slight chance that a break what happened  Failure of procedure to achieve its intended purpose  Failure of hardware/total hip replacement made of the metal and plastic they should last on the average 15 years  Non-union or mal-union of bone  Malalignment  Death      Patient instructions for joint replacement    Before surgery  1.  Shower with Hibiclens soap/antibacterial for 3 days prior to surgery to decrease risk of infection  2..  Stop all blood thinners/aspirin, Coumadin, warfarin, Plavix, Eliquis, Xarelto etc 5 days prior to surgery  3.  No eating or drinking after midnight the night before surgery.  We would like you to drink a bottle of Gatorade or Powerade or Pedialyte the night before surgery prior to midnight so you do not get dehydrated waiting to have surgery the next day  4.  Take Tylenol 650 mg the night prior to surgery        After surgery at home  1.  Take Tylenol 650 mg 3 times a day for 14 days then as needed for mild pain  2.  Take gabapentin 300 mg nightly for 6 weeks/you may resume your regular dosage  3.  Take Celebrex 200 mg or meloxicam 15 mg daily for 6 weeks unless having cardiac issues to take for 2 weeks only  4.  Must take aspirin 81 mg twice a day for 6 weeks unless you are on other blood thinners/Plavix, Eliquis, Xarelto, Coumadin etc  5.  Must wear compressive stockings for 6 weeks minimum to decrease the risk of blood clot and swelling  6.  Hydrocodone/Norco or oxycodone/Percocet will be prescribed to take every 6 hr as needed for breakthrough pain  7.  May apply ice on the knee/or hip to help with decreasing pain  8.  Keep wound dry for 2 weeks until stitches/staples removed than you will be allowed to shower in 24 hr and get the wound wet.  No soaking of the wound in the tub or swimming for 4 weeks after surgery  9.  No driving for 4 weeks unless specified by physician  10.  Avoid touching the wound or surrounding  area /at least 2 inches on each side of the surgical incision until staples are removed/stitches   11.  May change the surgical dressing if extremely bloody or has drainage on it. May clean the wound with peroxide or Betadine and apply sterile dressing and Ace wrap over it  12.  Leave hospital dressing on for 3 days then may change by applying sterile 4 x 4 and Ace wrap after cleaning with Betadine or peroxide.  May leave this dressing change to home health nurses  13. We usually placed people with a what we call a knee immobilizer brace that you have to wear when you sleeping for 3-4 weeks so you do not turned the wrong way and dislocate her hip out of the socket during sleep.  You do not have to wear the knee immobilizer when you are awake and walking           The mobility limitation can not be sufficiently resolved by the use of a cane.  Patient's functional mobility deficit can be sufficiently resolved with the use of a rolling walker.  Patient has mobility limitation significantly impairs their ability to participate in 1 or more activities of daily living.  The use of a rolling walker we will significantly improve the patient's ability to participate in  [facility] and it is to be used on a regular basis in the home.

## 2024-08-29 NOTE — PROGRESS NOTES
Subjective:     Patient ID: Conner Lombardi Jr. is a 74 y.o. male.    Chief Complaint: Pain of the Right Knee, Pain of the Left Knee, Pain of the Left Hip, and Pain of the Right Hip    HPI:  03/21/2024   Bilateral hip pain   Patient stated his pain is markedly better after he went to physical therapy it is 1/10.  He was prescribed gabapentin to take at night and tizanidine which also helps.  He has not been prescribed any and stories.  He goes to physical therapy at [facility] on [address].  Still working but desk job.  Said the drive home sometimes is very painful.  When he ambulates distances he gets some calf pain and that goes away.  I did go over the MRI showed it to him of his lumbar spine as well as x-rays that showed spinal stenosis at multiple levels from L2 down to L5.  There was no injection given to his spine.  He does see pain management Dr. Taylor.  Patient does not take over-the-counter medications like a leave or Advil or Motrin or ibuprofen or Tylenol.  He does state the physical therapy helped significantly  He has not interested in having surgery at this time.  His job is a desk job.  No evidence of loss of bowel or bladder control blurry vision double vision loss sense smell or taste    Discussed with the patient after reviewing the MRI with him and the amount of stenosis that the only emergency he has is when he loses control of bowel or bladder at that point we really need to go to the emergency room immediately/cauda equina syndrome    08/29/2024   Bilateral hip severe arthritis as well as spinal stenosis multiple levels.  Last time seen he was taking gabapentin and Zanaflex as needed.  He was doing fine.  He did go through physical therapy.  Recently 08/19/2024 pain management performed left obturator and femoral nerve blocks.  Says does blocks did not seem to help.  He is extremely limited with what he can do in his pain is around 2-3/10 when he sits only gets severe when he ambulates.   He is using a quad cane to get around.  He is here with his wife who stated he is always complain in he can not even put his socks and shoes on that she has help him with that.  He still working.  He is at a point that he wants to have his hip replacement.  His wife is encouraging him to proceed she knows how much in discomfort than problem that he has.  He needs her assistance in doing activities of daily living also has some pain in his knees and we will obtain new x-rays on both of his knees today and went over them he has mild arthritic changes on the right side very minimal on the left.  Some of the pains down to the left knee is referred pain from his hips since he has severely limited motion.  We spent long time discussing total hip replacement showed pictures of total hip replacement given brochure went over the pros and cons and postop and preop instructions and when he has to go through     He has couple trips to attend in his grandson's getting  so he needs at least 6-8 weeks after surgery to recuperate so he opted that we need to wait till January 20, 2025 to undergo left total hip replacement  Past Medical History:   Diagnosis Date    Epilepsy     Last seizure 2010.     Hypertension     Tubular adenoma of colon 04/02/2012     Past Surgical History:   Procedure Laterality Date    BLOCK, NERVE, PERIPHERAL Left 8/19/2024    Procedure: Left femoral and obturator nerve block;  Surgeon: Helder Taylor MD;  Location: [facility];  Service: Pain Management;  Laterality: Left;    COLONOSCOPY N/A 07/12/2021    Procedure: COLONOSCOPY;  Surgeon: Subhash Martin MD;  Location: [facility];  Service: Endoscopy;  Laterality: N/A;    TONSILLECTOMY      VASECTOMY  1984/1985     Family History   Problem Relation Name Age of Onset    Hypertension Other      Cancer Mother Batool bhandariAmira Lombardi         Brain    Cancer Father Conner Lombardi, Sr.         Liver and lung    Hypertension Father Conner Lombardi,  Sr.     Diabetes Maternal Grandfather Aguilar Wilkerson      Social History     Socioeconomic History    Marital status:    Tobacco Use    Smoking status: Never    Smokeless tobacco: Never   Substance and Sexual Activity    Alcohol use: No    Drug use: Never    Sexual activity: Not Currently     Partners: Female     Birth control/protection: None     Social Determinants of Health     Financial Resource Strain: Low Risk  (2/4/2024)    Overall Financial Resource Strain (CARDIA)     Difficulty of Paying Living Expenses: Not hard at all   Food Insecurity: No Food Insecurity (2/4/2024)    Hunger Vital Sign     Worried About Running Out of Food in the Last Year: Never true     Ran Out of Food in the Last Year: Never true   Transportation Needs: No Transportation Needs (2/4/2024)    PRAPARE - Transportation     Lack of Transportation (Medical): No     Lack of Transportation (Non-Medical): No   Physical Activity: Insufficiently Active (2/4/2024)    Exercise Vital Sign     Days of Exercise per Week: 1 day     Minutes of Exercise per Session: 30 min   Stress: No Stress Concern Present (2/4/2024)    [employer] [place] [employer] - Occupational Stress Questionnaire     Feeling of Stress : Only a little   Housing Stability: Low Risk  (2/4/2024)    Housing Stability Vital Sign     Unable to Pay for Housing in the Last Year: No     Number of Places Lived in the Last Year: 1     Unstable Housing in the Last Year: No     Medication List with Changes/Refills   New Medications    MELOXICAM (MOBIC) 15 MG TABLET    Take 1 tablet (15 mg total) by mouth once daily. Take with food   Current Medications    ASPIRIN (ECOTRIN) 81 MG EC TABLET    Take 81 mg by mouth once daily.    ATORVASTATIN (LIPITOR) 40 MG TABLET    Take 1 tablet (40 mg total) by mouth once daily.    CHOLECALCIFEROL, VITAMIN D3, (VITAMIN D3) 25 MCG (1,000 UNIT) CAPSULE    Take 1 capsule (1,000 Units total) by mouth once daily.    GABAPENTIN (NEURONTIN) 300  MG CAPSULE    TAKE 1 CAPSULE(300 MG) BY MOUTH EVERY EVENING    KRILL OIL ORAL    Take by mouth.    LISINOPRIL-HYDROCHLOROTHIAZIDE (PRINZIDE,ZESTORETIC) 20-25 MG TAB    TAKE 1 TABLET BY MOUTH EVERY DAY    PHENYTOIN (DILANTIN) 100 MG ER CAPSULE    Take 4 capsules (400 mg total) by mouth every evening.    TIZANIDINE (ZANAFLEX) 2 MG TABLET    TAKE 1 TO 2 TABLETS(2 TO 4 MG) BY MOUTH EVERY 8 HOURS AS NEEDED FOR LEG PAIN OR BACK PAIN     Review of patient's allergies indicates:  No Known Allergies  Review of Systems   Constitutional: Negative for decreased appetite.   HENT:  Negative for tinnitus.    Eyes:  Negative for double vision.   Cardiovascular:  Negative for chest pain.   Respiratory:  Negative for wheezing.    Hematologic/Lymphatic: Negative for bleeding problem.   Skin:  Negative for dry skin.   Musculoskeletal:  Positive for arthritis, back pain and joint pain. Negative for gout, neck pain and stiffness.   Gastrointestinal:  Negative for abdominal pain.   Genitourinary:  Negative for bladder incontinence.   Neurological:  Negative for numbness, paresthesias and sensory change.   Psychiatric/Behavioral:  Negative for altered mental status.        Objective:   Body mass index is 29.54 kg/m².  There were no vitals filed for this visit.       General    Constitutional: He is oriented to person, place, and time. He appears well-developed.   HENT:   Head: Atraumatic.   Eyes: EOM are normal.   Pulmonary/Chest: Effort normal.   Neurological: He is alert and oriented to person, place, and time.   Psychiatric: Judgment normal.             Ambulating without assistive devices taking small steps   Pelvis is level   In the sitting position passive internal rotation of 5° and external rotation 30° in both hips.  There is severe pelvis tilting to internal rotation bilaterally with the left worse than the right.  There is pain in the groin to motion.  Abduction to 30°.  Flexion contracture 10- degrees.    .  Hip flexors and  abductors and adductors seems to be very strong at 5/5. .    Right knee with mild varus deformity and mild crepitus to range of motion on compression on the patella and medially.  Collaterals and cruciates are stable.  Very minimal discomfort on the medial joint.  No defect in the patella or quadriceps tendon.  Very minimal swelling.  Range of motion 5-130.  Left knee with normal alignment.  No crepitus to motion.  No defect in the patella or quadriceps tendon.  Collaterals and cruciates stable.  No swelling no effusion.  Full range of motion   Calves are soft nontender  Ankle motion intact  Relevant imaging results reviewed and interpreted by me, discussed with the patient and / or family today       X-ray 08/29/2024 bilateral knees showing right knee with very mild to moderate medial joint narrowing and small marginal osteophyte no fracture seen.  As far as the left knee is concerned with minimal medial joint narrowing but no osteophytes seen  X-ray 12/07/2023 with bilateral hips severe arthritis  bone-on-bone on both sides.  No evidence of fracture.  Bone quality is good   MRI lumbar spine with/15/23 showing multilevel central spinal stenosis starting at L2-3 down to L5 with some facet arthropathy  No x-rays of the knees taken  Assessment:     Encounter Diagnoses   Name Primary?    Arthritis of left hip Yes    Arthritis of right hip     Spinal stenosis of lumbar region at multiple levels     Pain in both knees, unspecified chronicity         Plan:   Arthritis of left hip  -     meloxicam (MOBIC) 15 MG tablet; Take 1 tablet (15 mg total) by mouth once daily. Take with food  Dispense: 30 tablet; Refill: 3    Arthritis of right hip  -     meloxicam (MOBIC) 15 MG tablet; Take 1 tablet (15 mg total) by mouth once daily. Take with food  Dispense: 30 tablet; Refill: 3    Spinal stenosis of lumbar region at multiple levels    Pain in both knees, unspecified chronicity         Patient Instructions   Your x-ray show severe  arthritis in both of her hips   The left hip is the 1 that bothers her the most   Right now we have severely limitation of motion  The medication like gabapentin and the muscle relaxants help a little bit   Recently you received femoral nerve block and obturator nerve block   You using a cane to ambulate  Your wife in with you and she convinced you to have your hip replacement done  I showed you x-rays about total hip replacement how it looks like.  Total hip replacement is considered outpatient surgery by the government that means you will have you surgery go home the same day.  Surgery itself is roughly an hour and hour and a half done in the hospital.  After you wake up in recovery room we get you up and walking with the physical therapist.  You will be needing a walker which we will arrange for you to have.  After that you go home we will arrange for home physical therapy for 2 weeks and home health nurse to come and check on you and change the dressing for you.  Will take roughly 3 months to heal.  You you will see improvement up to a year and a half after surgery.  Total hip replacement is considered 90% successful in decreasing pain and increasing function so it is not perfect  In order to have surgery and be safe to go home we need you to see the cardiologist before surgery  There is a mandatory class that you have to attend before surgery    Procedure, common risks and benefits,alternatives discussed in details.All questions answered.Patient expressed understanding.Patient instructed to call for any questions that could arise in the future.    Most common Risks:  Infection/less than 2% chance/  Leg-length discrepancy  Dislocation/2-3% chance  Neuro-vascular injury ( resulting in loss of motor and sensory functions)  Pain  Blood clot    Loss of motion/we heal with scar tissue your job is to do physical therapy and move so you do not form excessive scar tissue preventing you from moving  Fracture of bone/we  work with the bones so there is a very slight chance that a break what happened  Failure of procedure to achieve its intended purpose  Failure of hardware/total hip replacement made of the metal and plastic they should last on the average 15 years  Non-union or mal-union of bone  Malalignment  Death      Patient instructions for joint replacement    Before surgery  1.  Shower with Hibiclens soap/antibacterial for 3 days prior to surgery to decrease risk of infection  2..  Stop all blood thinners/aspirin, Coumadin, warfarin, Plavix, Eliquis, Xarelto etc 5 days prior to surgery  3.  No eating or drinking after midnight the night before surgery.  We would like you to drink a bottle of Gatorade or Powerade or Pedialyte the night before surgery prior to midnight so you do not get dehydrated waiting to have surgery the next day  4.  Take Tylenol 650 mg the night prior to surgery        After surgery at home  1.  Take Tylenol 650 mg 3 times a day for 14 days then as needed for mild pain  2.  Take gabapentin 300 mg nightly for 6 weeks/you may resume your regular dosage  3.  Take Celebrex 200 mg or meloxicam 15 mg daily for 6 weeks unless having cardiac issues to take for 2 weeks only  4.  Must take aspirin 81 mg twice a day for 6 weeks unless you are on other blood thinners/Plavix, Eliquis, Xarelto, Coumadin etc  5.  Must wear compressive stockings for 6 weeks minimum to decrease the risk of blood clot and swelling  6.  Hydrocodone/Norco or oxycodone/Percocet will be prescribed to take every 6 hr as needed for breakthrough pain  7.  May apply ice on the knee/or hip to help with decreasing pain  8.  Keep wound dry for 2 weeks until stitches/staples removed than you will be allowed to shower in 24 hr and get the wound wet.  No soaking of the wound in the tub or swimming for 4 weeks after surgery  9.  No driving for 4 weeks unless specified by physician  10.  Avoid touching the wound or surrounding area /at least 2 inches on  each side of the surgical incision until staples are removed/stitches   11.  May change the surgical dressing if extremely bloody or has drainage on it. May clean the wound with peroxide or Betadine and apply sterile dressing and Ace wrap over it  12.  Leave hospital dressing on for 3 days then may change by applying sterile 4 x 4 and Ace wrap after cleaning with Betadine or peroxide.  May leave this dressing change to home health nurses  13. We usually placed people with a what we call a knee immobilizer brace that you have to wear when you sleeping for 3-4 weeks so you do not turned the wrong way and dislocate her hip out of the socket during sleep.  You do not have to wear the knee immobilizer when you are awake and walking           The mobility limitation can not be sufficiently resolved by the use of a cane.  Patient's functional mobility deficit can be sufficiently resolved with the use of a rolling walker.  Patient has mobility limitation significantly impairs their ability to participate in 1 or more activities of daily living.  The use of a rolling walker we will significantly improve the patient's ability to participate in  MRADLS and it is to be used on a regular basis in the home.             Proceed with left total hip arthroplasty  Has help at home   Will start meloxicam instead of aleve and Advil Motrin    Disclaimer: This note was prepared using a voice recognition system and is likely to have sound alike errors within the text.

## 2024-09-20 ENCOUNTER — PATIENT MESSAGE (OUTPATIENT)
Dept: PAIN MEDICINE | Facility: CLINIC | Age: 74
End: 2024-09-20
Payer: COMMERCIAL

## 2024-09-23 ENCOUNTER — OFFICE VISIT (OUTPATIENT)
Dept: PAIN MEDICINE | Facility: CLINIC | Age: 74
End: 2024-09-23
Payer: COMMERCIAL

## 2024-09-23 ENCOUNTER — PATIENT MESSAGE (OUTPATIENT)
Dept: PAIN MEDICINE | Facility: CLINIC | Age: 74
End: 2024-09-23

## 2024-09-23 VITALS
RESPIRATION RATE: 17 BRPM | DIASTOLIC BLOOD PRESSURE: 89 MMHG | WEIGHT: 184.06 LBS | HEART RATE: 94 BPM | SYSTOLIC BLOOD PRESSURE: 169 MMHG | HEIGHT: 66 IN | BODY MASS INDEX: 29.58 KG/M2

## 2024-09-23 DIAGNOSIS — M16.0 BILATERAL PRIMARY OSTEOARTHRITIS OF HIP: ICD-10-CM

## 2024-09-23 DIAGNOSIS — M54.16 LUMBAR RADICULOPATHY: ICD-10-CM

## 2024-09-23 DIAGNOSIS — M48.07 SPINAL STENOSIS, LUMBOSACRAL REGION: ICD-10-CM

## 2024-09-23 DIAGNOSIS — M16.12 LOCALIZED OSTEOARTHROSIS OF LEFT HIP: Primary | ICD-10-CM

## 2024-09-23 DIAGNOSIS — M51.36 DDD (DEGENERATIVE DISC DISEASE), LUMBAR: ICD-10-CM

## 2024-09-23 PROCEDURE — 3077F SYST BP >= 140 MM HG: CPT | Mod: CPTII,S$GLB,, | Performed by: PHYSICIAN ASSISTANT

## 2024-09-23 PROCEDURE — 99214 OFFICE O/P EST MOD 30 MIN: CPT | Mod: S$GLB,,, | Performed by: PHYSICIAN ASSISTANT

## 2024-09-23 PROCEDURE — 1160F RVW MEDS BY RX/DR IN RCRD: CPT | Mod: CPTII,S$GLB,, | Performed by: PHYSICIAN ASSISTANT

## 2024-09-23 PROCEDURE — 1126F AMNT PAIN NOTED NONE PRSNT: CPT | Mod: CPTII,S$GLB,, | Performed by: PHYSICIAN ASSISTANT

## 2024-09-23 PROCEDURE — 1159F MED LIST DOCD IN RCRD: CPT | Mod: CPTII,S$GLB,, | Performed by: PHYSICIAN ASSISTANT

## 2024-09-23 PROCEDURE — 3288F FALL RISK ASSESSMENT DOCD: CPT | Mod: CPTII,S$GLB,, | Performed by: PHYSICIAN ASSISTANT

## 2024-09-23 PROCEDURE — 3044F HG A1C LEVEL LT 7.0%: CPT | Mod: CPTII,S$GLB,, | Performed by: PHYSICIAN ASSISTANT

## 2024-09-23 PROCEDURE — 3008F BODY MASS INDEX DOCD: CPT | Mod: CPTII,S$GLB,, | Performed by: PHYSICIAN ASSISTANT

## 2024-09-23 PROCEDURE — 1101F PT FALLS ASSESS-DOCD LE1/YR: CPT | Mod: CPTII,S$GLB,, | Performed by: PHYSICIAN ASSISTANT

## 2024-09-23 PROCEDURE — 3079F DIAST BP 80-89 MM HG: CPT | Mod: CPTII,S$GLB,, | Performed by: PHYSICIAN ASSISTANT

## 2024-09-23 PROCEDURE — 99999 PR PBB SHADOW E&M-EST. PATIENT-LVL IV: CPT | Mod: PBBFAC,,, | Performed by: PHYSICIAN ASSISTANT

## 2024-09-23 PROCEDURE — 4010F ACE/ARB THERAPY RXD/TAKEN: CPT | Mod: CPTII,S$GLB,, | Performed by: PHYSICIAN ASSISTANT

## 2024-09-23 RX ORDER — GABAPENTIN 300 MG/1
300 CAPSULE ORAL NIGHTLY
Qty: 30 CAPSULE | Refills: 2 | Status: SHIPPED | OUTPATIENT
Start: 2024-09-23

## 2024-09-23 NOTE — PROGRESS NOTES
"Chronic Pain -- Established Patient (Follow-up visit)    Referring Physician: Tia Lopez MD    PCP: Tia Lopez MD    Chief Complaint:  Left hip pain   Lower Back Pain    Chief Complaint   Patient presents with    Follow-up     4 week follow up from injection          SUBJECTIVE:    Interval History (9/23/2024): Conner Lombardi Jr. presents today for follow-up visit.  he underwent Left femoral & obturator nerve block on 8/19/24.  The patient reports that he is/was better following the procedure.  he reports 85% pain relief.  The changes lasted >4 weeks so far.  The changes have continued through this visit.  Patient reports pain as "0/10 today.  He does feel much better since the procedure.  Since then, he has seen Dr. Lee who is planning for total left hip arthroplasty. He has this scheduled for January 2025.  He has several trips planned before so can not commit to doing this sooner.    Interval History (07/31/2024):   patient returns to clinic for evaluation and hip pain.  Since last being seen, patient reports that lower back pain has greatly improved.  Primarily pain described as throbbing aching pain over the lateral aspect of the left hip.  Patient reports occasional pain in lateral aspect of the left thigh as well.  Patient denies any numbness or tingling in his bilateral feet.  Patient denies any right lower extremity pain.  Pain is worse with prolonged standing and crossing his legs, better with rest and anti-inflammatories.  Pain is currently rated a 7/10. Denies any fevers, chills, changes in gait, saddle anesthesia, or bowel and bladder incontinence    Interval History (01/25/2024):  Patient presents today for follow-up visit.  Patient was last seen on 12/15/2023. At that visit, the plan was to complete imaging of lumbar spine. Patient reports pain as 1/10 today.  His condition has improved. States he usually has increased lower back pain with driving for an hour or more but " this pain has lessened. Whenever he has pain, it is either in the lower back or L thigh. It does not radiate from lower back down his leg. He also has Left knee pain at times.    Initial HPI (12/15/2023):  Conner Lombardi Jr. is a 73 y.o. male who presents to the clinic for the evaluation of lower back pain.     Patient reports 4 month history of lower back pain.  Patient denies any significant inciting traumas to his lower back pain.  Patient denies any previous surgical intervention in his lumbar spine.  Lower back pain described as an aching throbbing pain that starts in the midline of lower back.  This pain will then radiate to his bilateral hips and into his bilateral anterior thighs to just above the knee.  Patient denies any significant radiation beyond this point.  Pain is worse with flexion and sitting for prolonged periods of time, better with stretching and anti-inflammatories.  Pain is currently rated a 2/10, but can increase to a 7/10 with exacerbating activities. Denies any fevers, chills, saddle anesthesia, or bowel and bladder incontinence      Non-Pharmacologic Treatments:  Physical Therapy/Home Exercise: yes  Ice/Heat:yes  TENS: no  Acupuncture: no  Massage: yes  Chiropractic: no        Previous Pain Medications:  Tylenol, ibuprofen, tizanidine, topicals      Pain Procedures:   -8/19/24: Left femoral & obturator nerve block with 85% pain relief            Pain Disability Index (PDI) Score Review:      9/23/2024     8:31 AM 7/31/2024     2:27 PM 1/25/2024     1:48 PM   Last 3 PDI Scores   Pain Disability Index (PDI) 12 35 7           Imaging/ Diagnostic Studies/ Labs (Reviewed on 9/23/2024):    12/18/23 MRI Lumbar Spine Without Contrast  COMPARISON:  Lumbar radiograph 12/07/2023    FINDINGS:  Alignment: Within normal limits.    Vertebrae: Lumbar vertebral body heights are maintained.  Mild multilevel endplate degenerative changes throughout the lumbar spine.  Minor L4-L5 endplate edema.  No  evidence of acute fracture.  Marrow signal is otherwise within normal limits.    Discs: Disc desiccation and height loss throughout the lumbar spine.  Disc height loss is most pronounced at L5-S1.    Cord: Within normal limits.  Conus terminates at L1-L2.    Degenerative findings:    T12-L1: Tiny central disc bulge.  Minor facet arthropathy.  No significant spinal canal stenosis or neural foraminal stenosis.    L1-L2: Minor broad-based posterior disc bulge and mild bilateral facet arthropathy with ligamentum flavum hypertrophy.  Mild spinal canal stenosis.  No significant neural foraminal stenosis.    L2-L3: Mild broad-based posterior disc bulge and bilateral facet arthropathy with ligamentum flavum hypertrophy contribute to moderate spinal canal stenosis and mild bilateral neural foraminal stenosis.    L3-L4: Broad-based posterior disc bulge and bilateral facet arthropathy with ligamentum flavum hypertrophy contribute to moderate to severe spinal canal stenosis and mild bilateral neural foraminal stenosis.    L4-L5: Broad-based posterior disc bulge.  Bilateral facet arthropathy with ligamentum flavum hypertrophy and likely a tiny left anteriorly projecting facet synovial cyst.  Findings contribute to severe spinal canal stenosis and mild bilateral neural foraminal stenosis.    L5-S1: Broad-based posterior disc bulge and bilateral facet arthropathy ligamentum flavum hypertrophy contribute to mild spinal canal stenosis.  No significant neural foraminal stenosis.    Paraspinal muscles & soft tissues: There appear to be left parapelvic renal cysts.  Paraspinal soft tissues appear within normal limits.    Impression  1. Multilevel degenerative changes of the lumbar spine are most pronounced at L4-L5 with severe spinal canal stenosis and mild bilateral neural foraminal stenosis.  2. Moderate to severe L3-L4 spinal canal stenosis with mild bilateral neural foraminal stenosis.  3. Moderate L2-L3 spinal canal stenosis with  mild bilateral neural foraminal stenosis.         12/07/23 X-Ray Lumbar Spine AP And Lateral  Moderate to severe multilevel discogenic degenerative change most notable at L4-5 and L5-S1.  Moderate lower lumbar facet arthropathy.  Congenitally narrow spinal canal.  Lumbar vertebral body height and alignment are normal.  No evidence of acute fracture.        12/07/23 X-Ray Hips Bilateral 2 View Inc AP Pelvis  Bone-on-bone bilateral hip joint space narrowing with articular surface sclerosis and osteophyte formation.  Mild remodeling of the superior aspect bilateral femoral heads.  No fracture, suspicious bone marrow lesion or other acute disease is seen in the pelvis or either hip.  Joint alignment is anatomic.  There is no radiographic evidence of femoral head osteonecrosis.    Impression  Bone-on-bone bilateral hip joint space narrowing.                              Review of Systems:   Constitutional:  Negative for activity change, appetite change and fever.   HENT:  Negative for facial swelling, rhinorrhea and sore throat.    Respiratory:  Negative for cough, chest tightness, shortness of breath, wheezing and stridor.    Cardiovascular:  Negative for chest pain and palpitations.   Gastrointestinal:  Negative for abdominal pain, blood in stool, constipation, diarrhea, nausea and vomiting.   Endocrine: Negative for polydipsia, polyphagia and polyuria.   Genitourinary:  Negative for dysuria and hematuria.   Musculoskeletal:  Positive for arthralgias, back pain and myalgias. Negative for joint swelling, neck pain and neck stiffness.   Skin:  Negative for rash.   Allergic/Immunologic: Negative for food allergies.   Neurological:  Negative for dizziness, tremors, seizures, syncope, facial asymmetry, speech difficulty, light-headedness and headaches.   Psychiatric/Behavioral:  Negative for agitation, hallucinations, self-injury and suicidal ideas. The patient is not nervous/anxious and is not hyperactive.    "        OBJECTIVE:    Physical Exam  Vitals:    09/23/24 0830   BP: (!) 169/89   Pulse: 94   Resp: 17   Weight: 83.5 kg (184 lb 1.4 oz)   Height: 5' 6" (1.676 m)   PainSc: 0-No pain    Body mass index is 29.71 kg/m².   (reviewed on 9/23/2024)    Constitutional:       General: He is not in acute distress.     Appearance: Normal appearance. He is not ill-appearing.   HENT:      Head: Normocephalic and atraumatic.      Nose: No congestion or rhinorrhea.   Eyes:      Extraocular Movements: Extraocular movements intact.   Pulmonary:      Effort: Pulmonary effort is normal.   Musculoskeletal:      Lumbar back: No tenderness or bony tenderness.      Right hip: No deformity, lacerations or bony tenderness. Normal range of motion. Normal strength.      Left hip: Tenderness, bony tenderness and crepitus present. No deformity or lacerations. Decreased range of motion. Decreased strength.   Skin:     General: Skin is warm and dry.   Neurological:      General: No focal deficit present.      Mental Status: He is alert and oriented to person, place, and time.      Sensory: No sensory deficit.      Motor: No abnormal muscle tone.      Gait: Gait normal.      Deep Tendon Reflexes:      Reflex Scores:       Patellar reflexes are 2+ on the right side and 2+ on the left side.       Achilles reflexes are 1+ on the right side.  Psychiatric:         Mood and Affect: Mood normal.         Behavior: Behavior normal.         Thought Content: Thought content normal.     Musculoskeletal:  Lumbar Exam  Incision: no  Pain with Flexion: no  Pain with Extension: no  ROM:   Improved compared to prior exam  Paraspinous TTP:  negative bilaterally  Facet TTP:  negative bilaterally  Facet Loading:  negative bilaterally  SLR:  negative bilaterally  SIJ TTP:  positive on left  JANA:  positive on left        ASSESSMENT:     74 y.o. year old male with lower back pain, consistent with underlying hip pathology.  Less likely to be overlapping lumbar " radiculopathy.    1. Localized osteoarthrosis of left hip        2. Bilateral primary osteoarthritis of hip        3. Spinal stenosis, lumbosacral region        4. DDD (degenerative disc disease), lumbar        5. Lumbar radiculopathy  gabapentin (NEURONTIN) 300 MG capsule              PLAN:   - Interventions:   - S/p Left femoral & obturator nerve block on 8/19/24 with 85% pain relief.    - Anticoagulation use:   No no anticoagulation    - Medications:  -  Refill gabapentin 300 mg QHS.  - Continue Mobic 15mg QD PRN - recently started by Dr. Lee (Orthopedics).  -  Continue tizanidine 2 mg BID PRN - from PCP.    - LA  reviewed and appropriate.       - Therapy:    -  Patient has completed formal physical therapy.  Continue home physical therapy exercises.     - Diagnostic/ Imaging: No new imaging ordered. Previous imaging reviewed.   - Lumbar X-ray: Consistent for advanced loss of disc height at L5-S1 and congenital spinal narrowing.  - Lumbar MRI: Multilevel degenerative changes of the lumbar spine are most pronounced at L4-L5 with severe spinal canal stenosis   - Hip x-ray: advance arthrosis     - Consults:   - Continue follow-up with Dr. Lee (Orthopedics) with plans to proceed with left total hip arthroplasty.      - Follow up visit: 2-3 months follow-up - in clinic (per pt request) - to discuss repeat injection if needed      Future Appointments   Date Time Provider Department Center   11/25/2024  9:20 AM Sonia Martínez PA-C [facility] [place]   12/3/2024  1:00 PM Sky Tse MD ON [facility]   2/6/2025  8:30 AM LABORATORY, [facility] ON LAB [facility]   2/13/2025  3:20 PM Tia Lopez MD ON IM [facility]       - Patient Questions: Answered all of the patient's questions regarding diagnosis, therapy, and treatment.    - This condition does not require this patient to take time off of work, and the primary goal of our Pain Management services is to improve the  patient's functional capacity.   - I discussed the risks, benefits, and alternatives to potential treatment options. All questions and concerns were fully addressed today in clinic.         Sonia Martínez PA-C  Interventional Pain Management - [facility]    Disclaimer:  This note was prepared using voice recognition system and is likely to have sound alike errors that may have been overlooked even after proof reading.  Please call me with any questions.

## 2024-09-27 RX ORDER — TIZANIDINE 2 MG/1
TABLET ORAL
Qty: 30 TABLET | Refills: 5 | Status: SHIPPED | OUTPATIENT
Start: 2024-09-27

## 2024-10-28 ENCOUNTER — PATIENT MESSAGE (OUTPATIENT)
Dept: PAIN MEDICINE | Facility: CLINIC | Age: 74
End: 2024-10-28
Payer: COMMERCIAL

## 2024-11-25 ENCOUNTER — OFFICE VISIT (OUTPATIENT)
Dept: PAIN MEDICINE | Facility: CLINIC | Age: 74
End: 2024-11-25
Payer: COMMERCIAL

## 2024-11-25 VITALS
WEIGHT: 184.75 LBS | BODY MASS INDEX: 29.69 KG/M2 | HEIGHT: 66 IN | SYSTOLIC BLOOD PRESSURE: 155 MMHG | RESPIRATION RATE: 18 BRPM | DIASTOLIC BLOOD PRESSURE: 87 MMHG | HEART RATE: 96 BPM

## 2024-11-25 DIAGNOSIS — M48.07 SPINAL STENOSIS, LUMBOSACRAL REGION: ICD-10-CM

## 2024-11-25 DIAGNOSIS — M16.12 LOCALIZED OSTEOARTHROSIS OF LEFT HIP: Primary | ICD-10-CM

## 2024-11-25 DIAGNOSIS — M47.816 FACET ARTHRITIS OF LUMBAR REGION: ICD-10-CM

## 2024-11-25 DIAGNOSIS — M54.16 LUMBAR RADICULOPATHY: ICD-10-CM

## 2024-11-25 PROCEDURE — 1101F PT FALLS ASSESS-DOCD LE1/YR: CPT | Mod: CPTII,S$GLB,, | Performed by: PHYSICIAN ASSISTANT

## 2024-11-25 PROCEDURE — 3077F SYST BP >= 140 MM HG: CPT | Mod: CPTII,S$GLB,, | Performed by: PHYSICIAN ASSISTANT

## 2024-11-25 PROCEDURE — 3288F FALL RISK ASSESSMENT DOCD: CPT | Mod: CPTII,S$GLB,, | Performed by: PHYSICIAN ASSISTANT

## 2024-11-25 PROCEDURE — 3044F HG A1C LEVEL LT 7.0%: CPT | Mod: CPTII,S$GLB,, | Performed by: PHYSICIAN ASSISTANT

## 2024-11-25 PROCEDURE — 3008F BODY MASS INDEX DOCD: CPT | Mod: CPTII,S$GLB,, | Performed by: PHYSICIAN ASSISTANT

## 2024-11-25 PROCEDURE — 4010F ACE/ARB THERAPY RXD/TAKEN: CPT | Mod: CPTII,S$GLB,, | Performed by: PHYSICIAN ASSISTANT

## 2024-11-25 PROCEDURE — 1159F MED LIST DOCD IN RCRD: CPT | Mod: CPTII,S$GLB,, | Performed by: PHYSICIAN ASSISTANT

## 2024-11-25 PROCEDURE — 1125F AMNT PAIN NOTED PAIN PRSNT: CPT | Mod: CPTII,S$GLB,, | Performed by: PHYSICIAN ASSISTANT

## 2024-11-25 PROCEDURE — 99999 PR PBB SHADOW E&M-EST. PATIENT-LVL III: CPT | Mod: PBBFAC,,, | Performed by: PHYSICIAN ASSISTANT

## 2024-11-25 PROCEDURE — 1160F RVW MEDS BY RX/DR IN RCRD: CPT | Mod: CPTII,S$GLB,, | Performed by: PHYSICIAN ASSISTANT

## 2024-11-25 PROCEDURE — 99214 OFFICE O/P EST MOD 30 MIN: CPT | Mod: S$GLB,,, | Performed by: PHYSICIAN ASSISTANT

## 2024-11-25 PROCEDURE — 3079F DIAST BP 80-89 MM HG: CPT | Mod: CPTII,S$GLB,, | Performed by: PHYSICIAN ASSISTANT

## 2024-11-25 RX ORDER — GABAPENTIN 300 MG/1
300 CAPSULE ORAL NIGHTLY
Qty: 30 CAPSULE | Refills: 2 | Status: SHIPPED | OUTPATIENT
Start: 2024-11-25

## 2024-11-25 NOTE — PROGRESS NOTES
"Chronic Pain -- Established Patient (Follow-up visit)    Referring Physician: Tia Lopez MD    PCP: Tia Lopez MD      Chief complaint:  Follow-up (Patient in for 2-3 month follow-up to discuss repeat injection.  Patient has pain in left hip joint.  Pain level 2/10)     Left hip pain   Lower Back Pain          SUBJECTIVE:    Interval History (11/25/2024):  Patient presents today for follow-up visit.  Patient was last seen on 9/23/2024, about 2-3 months ago. Pain is still stable from last injection.   Patient reports pain as 2/10 today.  He is seeing Cardiology next month for clearance for surgery, which isn't scheduled.     Interval History (9/23/2024): Conner Lombardi Jr. presents today for follow-up visit.  he underwent Left femoral & obturator nerve block on 8/19/24.  The patient reports that he is/was better following the procedure.  he reports 85% pain relief.  The changes lasted >4 weeks so far.  The changes have continued through this visit.  Patient reports pain as "0/10 today.  He does feel much better since the procedure.  Since then, he has seen Dr. Lee who is planning for total left hip arthroplasty. He has this scheduled for January 2025.  He has several trips planned before so can not commit to doing this sooner.    Interval History (07/31/2024):   patient returns to clinic for evaluation and hip pain.  Since last being seen, patient reports that lower back pain has greatly improved.  Primarily pain described as throbbing aching pain over the lateral aspect of the left hip.  Patient reports occasional pain in lateral aspect of the left thigh as well.  Patient denies any numbness or tingling in his bilateral feet.  Patient denies any right lower extremity pain.  Pain is worse with prolonged standing and crossing his legs, better with rest and anti-inflammatories.  Pain is currently rated a 7/10. Denies any fevers, chills, changes in gait, saddle anesthesia, or bowel and " bladder incontinence    Interval History (01/25/2024):  Patient presents today for follow-up visit.  Patient was last seen on 12/15/2023. At that visit, the plan was to complete imaging of lumbar spine. Patient reports pain as 1/10 today.  His condition has improved. States he usually has increased lower back pain with driving for an hour or more but this pain has lessened. Whenever he has pain, it is either in the lower back or L thigh. It does not radiate from lower back down his leg. He also has Left knee pain at times.    Initial HPI (12/15/2023):  Conner Lombardi Jr. is a 73 y.o. male who presents to the clinic for the evaluation of lower back pain.     Patient reports 4 month history of lower back pain.  Patient denies any significant inciting traumas to his lower back pain.  Patient denies any previous surgical intervention in his lumbar spine.  Lower back pain described as an aching throbbing pain that starts in the midline of lower back.  This pain will then radiate to his bilateral hips and into his bilateral anterior thighs to just above the knee.  Patient denies any significant radiation beyond this point.  Pain is worse with flexion and sitting for prolonged periods of time, better with stretching and anti-inflammatories.  Pain is currently rated a 2/10, but can increase to a 7/10 with exacerbating activities. Denies any fevers, chills, saddle anesthesia, or bowel and bladder incontinence      Non-Pharmacologic Treatments:  Physical Therapy/Home Exercise: yes  Ice/Heat:yes  TENS: no  Acupuncture: no  Massage: yes  Chiropractic: no        Previous Pain Medications:  Tylenol, ibuprofen, tizanidine, topicals      Pain Procedures:   -8/19/24: Left femoral & obturator nerve block with 85% pain relief            Pain Disability Index (PDI) Score Review:      9/23/2024     8:31 AM 7/31/2024     2:27 PM 1/25/2024     1:48 PM   Last 3 PDI Scores   Pain Disability Index (PDI) 12 35 7           Imaging/  Diagnostic Studies/ Labs (Reviewed on 11/25/2024):    12/18/23 MRI Lumbar Spine Without Contrast  COMPARISON:  Lumbar radiograph 12/07/2023    FINDINGS:  Alignment: Within normal limits.    Vertebrae: Lumbar vertebral body heights are maintained.  Mild multilevel endplate degenerative changes throughout the lumbar spine.  Minor L4-L5 endplate edema.  No evidence of acute fracture.  Marrow signal is otherwise within normal limits.    Discs: Disc desiccation and height loss throughout the lumbar spine.  Disc height loss is most pronounced at L5-S1.    Cord: Within normal limits.  Conus terminates at L1-L2.    Degenerative findings:    T12-L1: Tiny central disc bulge.  Minor facet arthropathy.  No significant spinal canal stenosis or neural foraminal stenosis.    L1-L2: Minor broad-based posterior disc bulge and mild bilateral facet arthropathy with ligamentum flavum hypertrophy.  Mild spinal canal stenosis.  No significant neural foraminal stenosis.    L2-L3: Mild broad-based posterior disc bulge and bilateral facet arthropathy with ligamentum flavum hypertrophy contribute to moderate spinal canal stenosis and mild bilateral neural foraminal stenosis.    L3-L4: Broad-based posterior disc bulge and bilateral facet arthropathy with ligamentum flavum hypertrophy contribute to moderate to severe spinal canal stenosis and mild bilateral neural foraminal stenosis.    L4-L5: Broad-based posterior disc bulge.  Bilateral facet arthropathy with ligamentum flavum hypertrophy and likely a tiny left anteriorly projecting facet synovial cyst.  Findings contribute to severe spinal canal stenosis and mild bilateral neural foraminal stenosis.    L5-S1: Broad-based posterior disc bulge and bilateral facet arthropathy ligamentum flavum hypertrophy contribute to mild spinal canal stenosis.  No significant neural foraminal stenosis.    Paraspinal muscles & soft tissues: There appear to be left parapelvic renal cysts.  Paraspinal soft  tissues appear within normal limits.    Impression  1. Multilevel degenerative changes of the lumbar spine are most pronounced at L4-L5 with severe spinal canal stenosis and mild bilateral neural foraminal stenosis.  2. Moderate to severe L3-L4 spinal canal stenosis with mild bilateral neural foraminal stenosis.  3. Moderate L2-L3 spinal canal stenosis with mild bilateral neural foraminal stenosis.         12/07/23 X-Ray Lumbar Spine AP And Lateral  Moderate to severe multilevel discogenic degenerative change most notable at L4-5 and L5-S1.  Moderate lower lumbar facet arthropathy.  Congenitally narrow spinal canal.  Lumbar vertebral body height and alignment are normal.  No evidence of acute fracture.        12/07/23 X-Ray Hips Bilateral 2 View Inc AP Pelvis  Bone-on-bone bilateral hip joint space narrowing with articular surface sclerosis and osteophyte formation.  Mild remodeling of the superior aspect bilateral femoral heads.  No fracture, suspicious bone marrow lesion or other acute disease is seen in the pelvis or either hip.  Joint alignment is anatomic.  There is no radiographic evidence of femoral head osteonecrosis.    Impression  Bone-on-bone bilateral hip joint space narrowing.                              Review of Systems:   Constitutional:  Negative for activity change, appetite change and fever.   HENT:  Negative for facial swelling, rhinorrhea and sore throat.    Respiratory:  Negative for cough, chest tightness, shortness of breath, wheezing and stridor.    Cardiovascular:  Negative for chest pain and palpitations.   Gastrointestinal:  Negative for abdominal pain, blood in stool, constipation, diarrhea, nausea and vomiting.   Endocrine: Negative for polydipsia, polyphagia and polyuria.   Genitourinary:  Negative for dysuria and hematuria.   Musculoskeletal:  Positive for arthralgias, back pain and myalgias. Negative for joint swelling, neck pain and neck stiffness.   Skin:  Negative for rash.  "  Allergic/Immunologic: Negative for food allergies.   Neurological:  Negative for dizziness, tremors, seizures, syncope, facial asymmetry, speech difficulty, light-headedness and headaches.   Psychiatric/Behavioral:  Negative for agitation, hallucinations, self-injury and suicidal ideas. The patient is not nervous/anxious and is not hyperactive.           OBJECTIVE:    Physical Exam  Vitals:    11/25/24 0936   BP: (!) 155/87   Pulse: 96   Resp: 18   Weight: 83.8 kg (184 lb 11.9 oz)   Height: 5' 6" (1.676 m)   PainSc:   2   Body mass index is 29.82 kg/m².   (reviewed on 11/25/2024)    Constitutional:       General: He is not in acute distress.     Appearance: Normal appearance. He is not ill-appearing.   HENT:      Head: Normocephalic and atraumatic.   Eyes:      Extraocular Movements: Extraocular movements intact.   Pulmonary:      Effort: Pulmonary effort is normal.   Musculoskeletal:      Lumbar back: No tenderness or bony tenderness.      Right hip: No deformity, lacerations or bony tenderness. Normal range of motion. Normal strength.      Left hip: Tenderness, bony tenderness and crepitus present. No deformity or lacerations. Decreased range of motion. Decreased strength.   Skin:     General: Skin is warm and dry.   Neurological:      General: No focal deficit present.      Mental Status: He is alert and oriented to person, place, and time.      Sensory: No sensory deficit.      Motor: No abnormal muscle tone.      Gait: using rollator walker.     Deep Tendon Reflexes:      Reflex Scores:       Patellar reflexes are 2+ on the right side and 2+ on the left side.       Achilles reflexes are 1+ on the right side.  Psychiatric:         Mood and Affect: Mood normal.         Behavior: Behavior normal.         Thought Content: Thought content normal.     Musculoskeletal: Lumbar Exam  Incision: no  Pain with Flexion: no  Pain with Extension: no  ROM:   Improved compared to prior exam  Paraspinous TTP:  negative " bilaterally  Facet TTP:  negative bilaterally  Facet Loading:  negative bilaterally  SLR:  negative bilaterally  SIJ TTP:  positive on left  JANA:  positive on left        ASSESSMENT:     74 y.o. year old male with lower back pain, consistent with underlying hip pathology.  Less likely to be overlapping lumbar radiculopathy.    1. Localized osteoarthrosis of left hip        2. Spinal stenosis, lumbosacral region        3. Facet arthritis of lumbar region        4. Lumbar radiculopathy  gabapentin (NEURONTIN) 300 MG capsule              PLAN:   - Interventions:   - Patient can call to schedule repeat left femoral & obturator nerve block. Patient is not taking prescription blood thinners or ASA.    - He is ultimately planning for left hip replacement with Dr. Lee.   - S/p Left femoral & obturator nerve block on 8/19/24 with 85% pain relief.    - Anticoagulation use:   No no anticoagulation    - Medications:  - Refill gabapentin 300mg QHS.  - Continue Mobic 15mg QD PRN - recently started by Dr. Lee (Orthopedics).  - Continue tizanidine 2mg BID PRN - from PCP.    - LA  reviewed and appropriate.       - Therapy:    -  Patient has completed formal physical therapy.  Continue home physical therapy exercises.     - Diagnostic/ Imaging: No new imaging ordered. Previous imaging reviewed.   - Lumbar X-ray: Consistent for advanced loss of disc height at L5-S1 and congenital spinal narrowing.  - Lumbar MRI: Multilevel degenerative changes of the lumbar spine are most pronounced at L4-L5 with severe spinal canal stenosis   - Hip x-ray: advance arthrosis     - Consults:   - Continue follow-up with Dr. Lee (Orthopedics) with plans to proceed with left total hip arthroplasty.      - Follow up visit: 3 months follow-up - in clinic (per pt request) - to discuss repeat injection if needed      Future Appointments   Date Time Provider Department Center   12/3/2024  1:00 PM Sky Tse MD ON CARDIO  Medical [facility]    2/6/2025  8:30 AM LAB[facility]AT[facility], [facility] ON [facility]   [facility]13[facility]2025  [facility]   3/17/2025  9:40 AM Sonia Martínez PA-C [facility]       - Patient Questions: Answered all of the patient's questions regarding diagnosis, therapy, and treatment.    - This condition does not require this patient to take time off of work, and the primary goal of our Pain Management services is to improve the patient's functional capacity.   - I discussed the risks, benefits, and alternatives to potential treatment options. All questions and concerns were fully addressed today in clinic.         Sonia Martínez PA-C  Interventional Pain Management - [facility]    Disclaimer:  This note was prepared using voice recognition system and is likely to have sound alike errors that may have been overlooked even after proof reading.  Please call me with any questions.

## 2024-12-02 DIAGNOSIS — I10 ESSENTIAL HYPERTENSION: Primary | ICD-10-CM

## 2024-12-03 ENCOUNTER — HOSPITAL ENCOUNTER (OUTPATIENT)
Dept: CARDIOLOGY | Facility: HOSPITAL | Age: 74
Discharge: HOME | End: 2024-12-03
Attending: INTERNAL MEDICINE
Payer: COMMERCIAL

## 2024-12-03 ENCOUNTER — OFFICE VISIT (OUTPATIENT)
Dept: CARDIOLOGY | Facility: CLINIC | Age: 74
End: 2024-12-03
Payer: COMMERCIAL

## 2024-12-03 VITALS
DIASTOLIC BLOOD PRESSURE: 84 MMHG | BODY MASS INDEX: 29.41 KG/M2 | WEIGHT: 183 LBS | RESPIRATION RATE: 16 BRPM | OXYGEN SATURATION: 98 % | SYSTOLIC BLOOD PRESSURE: 159 MMHG | HEIGHT: 66 IN | HEART RATE: 88 BPM

## 2024-12-03 DIAGNOSIS — Z01.810 PREOP CARDIOVASCULAR EXAM: Primary | ICD-10-CM

## 2024-12-03 DIAGNOSIS — I10 ESSENTIAL HYPERTENSION: ICD-10-CM

## 2024-12-03 DIAGNOSIS — Z82.49 FAMILY HISTORY OF CARDIAC DISORDER: ICD-10-CM

## 2024-12-03 DIAGNOSIS — E78.2 MIXED HYPERLIPIDEMIA: ICD-10-CM

## 2024-12-03 DIAGNOSIS — R73.03 PREDIABETES: ICD-10-CM

## 2024-12-03 LAB
OHS QRS DURATION: 94 MS
OHS QTC CALCULATION: 450 MS

## 2024-12-03 PROCEDURE — 3079F DIAST BP 80-89 MM HG: CPT | Mod: CPTII,S$GLB,, | Performed by: INTERNAL MEDICINE

## 2024-12-03 PROCEDURE — 3077F SYST BP >= 140 MM HG: CPT | Mod: CPTII,S$GLB,, | Performed by: INTERNAL MEDICINE

## 2024-12-03 PROCEDURE — 1159F MED LIST DOCD IN RCRD: CPT | Mod: CPTII,S$GLB,, | Performed by: INTERNAL MEDICINE

## 2024-12-03 PROCEDURE — 93010 ELECTROCARDIOGRAM REPORT: CPT | Mod: ,,, | Performed by: INTERNAL MEDICINE

## 2024-12-03 PROCEDURE — 3008F BODY MASS INDEX DOCD: CPT | Mod: CPTII,S$GLB,, | Performed by: INTERNAL MEDICINE

## 2024-12-03 PROCEDURE — 3044F HG A1C LEVEL LT 7.0%: CPT | Mod: CPTII,S$GLB,, | Performed by: INTERNAL MEDICINE

## 2024-12-03 PROCEDURE — 99999 PR PBB SHADOW E&M-EST. PATIENT-LVL III: CPT | Mod: PBBFAC,,, | Performed by: INTERNAL MEDICINE

## 2024-12-03 PROCEDURE — 4010F ACE/ARB THERAPY RXD/TAKEN: CPT | Mod: CPTII,S$GLB,, | Performed by: INTERNAL MEDICINE

## 2024-12-03 PROCEDURE — 99204 OFFICE O/P NEW MOD 45 MIN: CPT | Mod: S$GLB,,, | Performed by: INTERNAL MEDICINE

## 2024-12-03 PROCEDURE — 93005 ELECTROCARDIOGRAM TRACING: CPT

## 2024-12-03 PROCEDURE — 1160F RVW MEDS BY RX/DR IN RCRD: CPT | Mod: CPTII,S$GLB,, | Performed by: INTERNAL MEDICINE

## 2024-12-03 RX ORDER — LISINOPRIL AND HYDROCHLOROTHIAZIDE 20; 25 MG/1; MG/1
2 TABLET ORAL DAILY
Qty: 90 TABLET | Refills: 3 | Status: SHIPPED | OUTPATIENT
Start: 2024-12-03

## 2024-12-03 NOTE — PROGRESS NOTES
Subjective:   Patient ID:  Conner Lombardi Jr. is a 74 y.o. male who presents for cardiac consult of No chief complaint on file.      Referral by: Javier Lee Md  [id number] [facility] [place] [address]     Reason for consult: preop CV eval      HPI  The patient came in today for cardiac consult of No chief complaint on file.    12/3/24  Conner Lombardi Jr. is a 74 y.o. male pt with strong FH early CAD, HTN, HLD, overweight, chronic back pin, PreDM, Vit D def, anemia presents for preop CV eval.     Per Dr. Lee     - Bilateral hip severe arthritis as well as spinal stenosis multiple levels.  Last time seen he was taking gabapentin and Zanaflex as needed.  He was doing fine.  He did go through physical therapy.  Recently 08/19/2024 pain management performed left obturator and femoral nerve blocks.  Says does blocks did not seem to help.  He is extremely limited with what he can do in his pain is around 2-3/10 when he sits only gets severe when he ambulates.  He is using a quad cane to get around.  He is here with his wife who stated he is always complain in he can not even put his socks and shoes on that she has help him with that.  He still working.  He is at a point that he wants to have his hip replacement.      He has couple trips to attend in his grandson's getting  so he needs at least 6-8 weeks after surgery to recuperate so he opted that we need to wait till January 20, 2025 to undergo left total hip replacement     Pt here for preop eval for L hip replacement in Jan.    He has been having hip pain since Oct 2023  BP elevated 159/85. HR 88. BMI 29 - 183 lbs   Works as  for [employer].     FH - father - had CABG in 50s, son had CABG in 40s    No results found for this or any previous visit.      No results found for this or any previous visit.      No results found for this or any previous visit.      No cardiac monitor results found for  the past 12 months         Past Medical History:   Diagnosis Date    Epilepsy     Last seizure 2010.     Hypertension     Tubular adenoma of colon 04/02/2012       Past Surgical History:   Procedure Laterality Date    BLOCK, NERVE, PERIPHERAL Left 8/19/2024    Procedure: Left femoral and obturator nerve block;  Surgeon: Helder Taylor MD;  Location: [facility] PAIN T;  Service: Pain Management;  Laterality: Left;    COLONOSCOPY N/A 07/12/2021    Procedure: COLONOSCOPY;  Surgeon: Subhash Martin MD;  Location: [facility] ENDO;  Service: Endoscopy;  Laterality: N/A;    TONSILLECTOMY      VASECTOMY  1984/1985       Social History     Tobacco Use    Smoking status: Never    Smokeless tobacco: Never   Substance Use Topics    Alcohol use: No    Drug use: Never       Family History   Problem Relation Name Age of Onset    Hypertension Other      Cancer Mother Batool brown Maria C         Brain    Cancer Father Conner Lombardi, Sr.         Liver and lung    Hypertension Father Conner Lombardi, Sr.     Diabetes Maternal Grandfather Aguilar Pepitone        Patient's Medications   New Prescriptions    No medications on file   Previous Medications    ASPIRIN (ECOTRIN) 81 MG EC TABLET    Take 81 mg by mouth once daily.    ATORVASTATIN (LIPITOR) 40 MG TABLET    Take 1 tablet (40 mg total) by mouth once daily.    CHOLECALCIFEROL, VITAMIN D3, (VITAMIN D3) 25 MCG (1,000 UNIT) CAPSULE    Take 1 capsule (1,000 Units total) by mouth once daily.    GABAPENTIN (NEURONTIN) 300 MG CAPSULE    Take 1 capsule (300 mg total) by mouth every evening.    KRILL OIL ORAL    Take by mouth.    MELOXICAM (MOBIC) 15 MG TABLET    Take 1 tablet (15 mg total) by mouth once daily. Take with food    PHENYTOIN (DILANTIN) 100 MG ER CAPSULE    Take 4 capsules (400 mg total) by mouth every evening.    TIZANIDINE (ZANAFLEX) 2 MG TABLET    TAKE 1 TO 2 TABLETS(2 TO 4 MG) BY MOUTH EVERY 8 HOURS AS NEEDED FOR LEG PAIN OR BACK PAIN   Modified Medications    Modified  "Medication Previous Medication    LISINOPRIL-HYDROCHLOROTHIAZIDE (PRINZIDE,ZESTORETIC) 20-25 MG TAB lisinopriL-hydrochlorothiazide (PRINZIDE,ZESTORETIC) 20-25 mg Tab       Take 2 tablets by mouth once daily. If BP is below 110/70 - decrease to 1 tablet daily    TAKE 1 TABLET BY MOUTH EVERY DAY   Discontinued Medications    No medications on file       Review of Systems   Constitutional: Negative.    HENT: Negative.     Eyes: Negative.    Respiratory: Negative.     Cardiovascular: Negative.    Gastrointestinal: Negative.    Genitourinary: Negative.    Musculoskeletal:  Positive for back pain and joint pain.   Skin: Negative.    Neurological: Negative.    Endo/Heme/Allergies: Negative.    Psychiatric/Behavioral: Negative.     All 12 systems otherwise negative.      Wt Readings from Last 3 Encounters:   12/03/24 83 kg (183 lb)   11/25/24 83.8 kg (184 lb 11.9 oz)   09/23/24 83.5 kg (184 lb 1.4 oz)     Temp Readings from Last 3 Encounters:   08/19/24 97.3 °F (36.3 °C) (Temporal)   08/09/24 98 °F (36.7 °C)   02/07/24 97.6 °F (36.4 °C) (Tympanic)     BP Readings from Last 3 Encounters:   12/03/24 (!) 159/84   11/25/24 (!) 155/87   09/23/24 (!) 169/89     Pulse Readings from Last 3 Encounters:   12/03/24 88   11/25/24 96   09/23/24 94       BP (!) 159/84   Pulse 88   Resp 16   Ht 5' 6" (1.676 m)   Wt 83 kg (183 lb)   SpO2 98%   BMI 29.54 kg/m²     Objective:   Physical Exam  Vitals and nursing note reviewed.   Constitutional:       General: He is not in acute distress.     Appearance: He is well-developed. He is not diaphoretic.   HENT:      Head: Normocephalic and atraumatic.      Nose: Nose normal.   Eyes:      General: No scleral icterus.     Conjunctiva/sclera: Conjunctivae normal.   Neck:      Thyroid: No thyromegaly.      Vascular: No JVD.   Cardiovascular:      Rate and Rhythm: Normal rate and regular rhythm.      Heart sounds: S1 normal and S2 normal. Murmur heard.      No friction rub. No gallop. No S3 or S4 " "sounds.   Pulmonary:      Effort: Pulmonary effort is normal. No respiratory distress.      Breath sounds: Normal breath sounds. No stridor. No wheezing or rales.   Chest:      Chest wall: No tenderness.   Abdominal:      General: Bowel sounds are normal. There is no distension.      Palpations: Abdomen is soft. There is no mass.      Tenderness: There is no abdominal tenderness. There is no rebound.   Genitourinary:     Comments: Deferred  Musculoskeletal:         General: No tenderness or deformity. Normal range of motion.      Cervical back: Normal range of motion and neck supple.   Lymphadenopathy:      Cervical: No cervical adenopathy.   Skin:     General: Skin is warm and dry.      Coloration: Skin is not pale.      Findings: No erythema or rash.   Neurological:      Mental Status: He is alert and oriented to person, place, and time.      Motor: No abnormal muscle tone.      Coordination: Coordination normal.   Psychiatric:         Behavior: Behavior normal.         Thought Content: Thought content normal.         Judgment: Judgment normal.         Lab Results   Component Value Date     08/02/2024    K 4.5 08/02/2024     08/02/2024    CO2 27 08/02/2024    BUN 20 08/02/2024    CREATININE 0.9 08/02/2024     (H) 08/02/2024    HGBA1C 5.8 (H) 08/02/2024    AST 20 08/02/2024    ALT 23 08/02/2024    ALBUMIN 3.7 08/02/2024    PROT 6.8 08/02/2024    BILITOT 0.3 08/02/2024    WBC 5.53 08/02/2024    HGB 12.1 (L) 08/02/2024    HCT 35.3 (L) 08/02/2024     (H) 08/02/2024     08/02/2024    TSH 1.240 08/02/2024    CHOL 133 08/02/2024    HDL 44 08/02/2024    LDLCALC 71.6 08/02/2024    TRIG 87 08/02/2024         No results found for: "BNP", "INR"       Assessment:      1. Preop cardiovascular exam    2. Essential hypertension    3. Mixed hyperlipidemia    4. Prediabetes    5. Family history of cardiac disorder        Plan:     Preop CV eval - L hip replacement surgery with Dr. Lee - [place]  - " strong FH early CAD - father and son had CABG in 40s/50s   - order pharm nuclear stress test, pt cannot walk on treadmill  - order ECHO  - if neg can proceed for surgery as scheduled     2. HTN   - titrate meds - double Lisinorpil/HCTZ dose   - elevated due to pain    3. Back/joint pain  - cont tx per pain management and ortho    4. HLD  - cont statin    5. PreDM, Overweight  - BMI 29; A1c 5.8  - cont weight loss      Thank you for allowing me to participate in this patient's care. Please do not hesitate to contact me with any questions or concerns. Consult note has been forwarded to the referral physician.

## 2024-12-15 DIAGNOSIS — M16.12 ARTHRITIS OF LEFT HIP: ICD-10-CM

## 2024-12-15 DIAGNOSIS — M16.11 ARTHRITIS OF RIGHT HIP: ICD-10-CM

## 2024-12-16 RX ORDER — MELOXICAM 15 MG/1
TABLET ORAL
Qty: 30 TABLET | Refills: 3 | Status: SHIPPED | OUTPATIENT
Start: 2024-12-16

## 2024-12-18 ENCOUNTER — HOSPITAL ENCOUNTER (OUTPATIENT)
Dept: RADIOLOGY | Facility: HOSPITAL | Age: 74
Discharge: HOME | End: 2024-12-18
Attending: INTERNAL MEDICINE
Payer: COMMERCIAL

## 2024-12-18 ENCOUNTER — HOSPITAL ENCOUNTER (OUTPATIENT)
Dept: CARDIOLOGY | Facility: HOSPITAL | Age: 74
Discharge: HOME | End: 2024-12-18
Attending: INTERNAL MEDICINE
Payer: COMMERCIAL

## 2024-12-18 ENCOUNTER — HOSPITAL ENCOUNTER (OUTPATIENT)
Dept: PULMONOLOGY | Facility: HOSPITAL | Age: 74
Discharge: HOME | End: 2024-12-18
Attending: INTERNAL MEDICINE
Payer: COMMERCIAL

## 2024-12-18 VITALS — BODY MASS INDEX: 29.41 KG/M2 | WEIGHT: 183 LBS | HEIGHT: 66 IN

## 2024-12-18 VITALS
BODY MASS INDEX: 29.41 KG/M2 | WEIGHT: 183 LBS | HEART RATE: 71 BPM | DIASTOLIC BLOOD PRESSURE: 73 MMHG | SYSTOLIC BLOOD PRESSURE: 143 MMHG | HEIGHT: 66 IN

## 2024-12-18 DIAGNOSIS — R73.03 PREDIABETES: ICD-10-CM

## 2024-12-18 DIAGNOSIS — Z01.810 PREOP CARDIOVASCULAR EXAM: ICD-10-CM

## 2024-12-18 DIAGNOSIS — I10 ESSENTIAL HYPERTENSION: ICD-10-CM

## 2024-12-18 DIAGNOSIS — E78.2 MIXED HYPERLIPIDEMIA: ICD-10-CM

## 2024-12-18 DIAGNOSIS — Z82.49 FAMILY HISTORY OF CARDIAC DISORDER: ICD-10-CM

## 2024-12-18 LAB
AORTIC ROOT ANNULUS: 3.46 CM
ASCENDING AORTA: 3.38 CM
AV INDEX (PROSTH): 0.85
AV MEAN GRADIENT: 3.4 MMHG
AV PEAK GRADIENT: 5.8 MMHG
AV REGURGITATION PRESSURE HALF TIME: 648.7 MS
AV VALVE AREA BY VELOCITY RATIO: 2.6 CM²
AV VALVE AREA: 2.7 CM²
AV VELOCITY RATIO: 0.83
BSA FOR ECHO PROCEDURE: 1.97 M2
CV ECHO LV RWT: 0.5 CM
CV STRESS BASE HR: 70 BPM
DIASTOLIC BLOOD PRESSURE: 73 MMHG
DOP CALC AO PEAK VEL: 1.2 M/S
DOP CALC AO VTI: 23.6 CM
DOP CALC LVOT AREA: 3.1 CM2
DOP CALC LVOT DIAMETER: 2 CM
DOP CALC LVOT PEAK VEL: 1 M/S
DOP CALC LVOT STROKE VOLUME: 62.8 CM3
DOP CALC RVOT PEAK VEL: 0.7 M/S
DOP CALC RVOT VTI: 13.2 CM
DOP CALCLVOT PEAK VEL VTI: 20 CM
E WAVE DECELERATION TIME: 161.1 MSEC
E/A RATIO: 0.73
E/E' RATIO: 7.73 M/S
ECHO LV POSTERIOR WALL: 1.1 CM (ref 0.6–1.1)
EJECTION FRACTION- HIGH: 73 %
END DIASTOLIC INDEX-HIGH: 165 ML/M2
END DIASTOLIC INDEX-LOW: 101 ML/M2
END SYSTOLIC INDEX-HIGH: 64 ML/M2
END SYSTOLIC INDEX-LOW: 28 ML/M2
FRACTIONAL SHORTENING: 27.3 % (ref 28–44)
INTERVENTRICULAR SEPTUM: 1.2 CM (ref 0.6–1.1)
IVC DIAMETER: 1.57 CM
IVRT: 72.31 MSEC
LA MAJOR: 4.98 CM
LA MINOR: 4.91 CM
LA WIDTH: 3.1 CM
LEFT ATRIUM SIZE: 3.24 CM
LEFT ATRIUM VOLUME INDEX: 21.9 ML/M2
LEFT ATRIUM VOLUME: 42.22 CM3
LEFT INTERNAL DIMENSION IN SYSTOLE: 3.2 CM (ref 2.1–4)
LEFT VENTRICLE DIASTOLIC VOLUME INDEX: 45.65 ML/M2
LEFT VENTRICLE DIASTOLIC VOLUME: 88.1 ML
LEFT VENTRICLE MASS INDEX: 93.2 G/M2
LEFT VENTRICLE SYSTOLIC VOLUME INDEX: 21.5 ML/M2
LEFT VENTRICLE SYSTOLIC VOLUME: 41.43 ML
LEFT VENTRICULAR INTERNAL DIMENSION IN DIASTOLE: 4.4 CM (ref 3.5–6)
LEFT VENTRICULAR MASS: 180 G
LV LATERAL E/E' RATIO: 7.25 M/S
LV SEPTAL E/E' RATIO: 8.29 M/S
LVED V (TEICH): 88.1 ML
LVES V (TEICH): 41.43 ML
LVOT MG: 2.32 MMHG
LVOT MV: 0.74 CM/S
MV PEAK A VEL: 0.8 M/S
MV PEAK E VEL: 0.58 M/S
MV STENOSIS PRESSURE HALF TIME: 46.72 MS
MV VALVE AREA P 1/2 METHOD: 4.71 CM2
NUC REST EJECTION FRACTION: 68
NUC STRESS EJECTION FRACTION: 67 %
OHS CV CPX 85 PERCENT MAX PREDICTED HEART RATE MALE: 125
OHS CV CPX MAX PREDICTED HEART RATE: 147
OHS CV CPX PATIENT IS FEMALE: 0
OHS CV CPX PATIENT IS MALE: 1
OHS CV CPX PEAK DIASTOLIC BLOOD PRESSURE: 73 MMHG
OHS CV CPX PEAK HEAR RATE: 96 BPM
OHS CV CPX PEAK RATE PRESSURE PRODUCT: NORMAL
OHS CV CPX PEAK SYSTOLIC BLOOD PRESSURE: 143 MMHG
OHS CV CPX PERCENT MAX PREDICTED HEART RATE ACHIEVED: 65
OHS CV CPX RATE PRESSURE PRODUCT PRESENTING: NORMAL
OHS CV INITIAL DOSE: 10 MCG/KG/MIN
OHS CV PEAK DOSE: 32.4 MCG/KG/MIN
PISA AR MAX VEL: 3.19 M/S
PISA MRMAX VEL: 4.94 M/S
PISA TR MAX VEL: 1.86 M/S
PV MEAN GRADIENT: 1 MMHG
RA MAJOR: 3.86 CM
RA PRESSURE ESTIMATED: 3 MMHG
RA WIDTH: 3 CM
RETIRED EF AND QEF - SEE NOTES: 59 %
RV TB RVSP: 5 MMHG
STJ: 3.51 CM
SYSTOLIC BLOOD PRESSURE: 143 MMHG
TDI LATERAL: 0.08 M/S
TDI SEPTAL: 0.07 M/S
TDI: 0.08 M/S
TR MAX PG: 14 MMHG
TRICUSPID ANNULAR PLANE SYSTOLIC EXCURSION: 1.87 CM
TV REST PULMONARY ARTERY PRESSURE: 17 MMHG
Z-SCORE OF LEFT VENTRICULAR DIMENSION IN END DIASTOLE: -2.17
Z-SCORE OF LEFT VENTRICULAR DIMENSION IN END SYSTOLE: -0.39

## 2024-12-18 PROCEDURE — 93016 CV STRESS TEST SUPVJ ONLY: CPT | Mod: ,,, | Performed by: INTERNAL MEDICINE

## 2024-12-18 PROCEDURE — 78452 HT MUSCLE IMAGE SPECT MULT: CPT | Mod: 26,,, | Performed by: INTERNAL MEDICINE

## 2024-12-18 PROCEDURE — 93018 CV STRESS TEST I&R ONLY: CPT | Mod: ,,, | Performed by: INTERNAL MEDICINE

## 2024-12-18 PROCEDURE — A9502 TC99M TETROFOSMIN: HCPCS | Performed by: INTERNAL MEDICINE

## 2024-12-18 PROCEDURE — 93306 TTE W/DOPPLER COMPLETE: CPT

## 2024-12-18 PROCEDURE — 78452 HT MUSCLE IMAGE SPECT MULT: CPT

## 2024-12-18 PROCEDURE — 63600175 PHARM REV CODE 636 W HCPCS: Performed by: INTERNAL MEDICINE

## 2024-12-18 PROCEDURE — 93306 TTE W/DOPPLER COMPLETE: CPT | Mod: 26,,, | Performed by: INTERNAL MEDICINE

## 2024-12-18 RX ORDER — REGADENOSON 0.08 MG/ML
0.4 INJECTION, SOLUTION INTRAVENOUS ONCE
Status: COMPLETED | OUTPATIENT
Start: 2024-12-18 | End: 2024-12-18

## 2024-12-18 RX ADMIN — TETROFOSMIN 32.4 MILLICURIE: 1.38 INJECTION, POWDER, LYOPHILIZED, FOR SOLUTION INTRAVENOUS at 09:12

## 2024-12-18 RX ADMIN — TETROFOSMIN 10 MILLICURIE: 1.38 INJECTION, POWDER, LYOPHILIZED, FOR SOLUTION INTRAVENOUS at 07:12

## 2024-12-18 RX ADMIN — REGADENOSON 0.4 MG: 0.08 INJECTION, SOLUTION INTRAVENOUS at 09:12

## 2024-12-20 ENCOUNTER — PATIENT MESSAGE (OUTPATIENT)
Dept: ORTHOPEDICS | Facility: CLINIC | Age: 74
End: 2024-12-20
Payer: COMMERCIAL

## 2024-12-24 ENCOUNTER — TELEPHONE (OUTPATIENT)
Dept: ORTHOPEDICS | Facility: CLINIC | Age: 74
End: 2024-12-24
Payer: COMMERCIAL

## 2024-12-24 NOTE — TELEPHONE ENCOUNTER
----- Message from Nurse Cary sent at 12/20/2024  8:08 AM CST -----  Cleared for surgery - can you call to pick a date for surgery  ----- Message -----  From: Serenity Salgado PA  Sent: 12/20/2024   7:57 AM CST  To: Cary Cortes LPN      ----- Message -----  From: Sky Tse MD  Sent: 12/19/2024   8:05 AM CST  To: Javier Lee MD    ADDENDUM 12/19/24    Pre-OP CV evaluation prior to L hip replacement surgery with Dr. Lee  Moderately elevated periop risk of CV events for moderate risk procedure.  Ok to proceed to the scheduled surgery without further cardiac study. Neg stress and echo 12/2024. Fixed defect without ischemia noted.   OK to hold Aspirin 7 days before the procedure and resume ASAP postop.  Continue Statin periop.

## 2024-12-24 NOTE — TELEPHONE ENCOUNTER
Returned the patient's phone call in regards to getting them schedule for surgery. No answer left a message for the patient to give the office a call back.

## 2024-12-27 ENCOUNTER — TELEPHONE (OUTPATIENT)
Dept: ORTHOPEDICS | Facility: CLINIC | Age: 74
End: 2024-12-27
Payer: COMMERCIAL

## 2024-12-27 DIAGNOSIS — M25.552 LEFT HIP PAIN: Primary | ICD-10-CM

## 2024-12-27 NOTE — TELEPHONE ENCOUNTER
Returned the patient's phone call in regards to their message. Patient is schedule for surgery on 01/28/25. All pre-op, x-rays and post-op appointment's are scheduled. Patient verbalized understanding.

## 2024-12-27 NOTE — TELEPHONE ENCOUNTER
----- Message from Kimberly sent at 12/27/2024 12:53 PM CST -----  Contact: Conner  Type:  Patient Returning Call    Who Called:Conner  Who Left Message for Patient:Tanisha  Does the patient know what this is regarding?: schedule surgery  Would the patient rather a call back or a response via [facility]?  Call back  Best Call Back Number: [phone]  Additional Information:     Thanks   Am

## 2024-12-31 ENCOUNTER — TELEPHONE (OUTPATIENT)
Dept: ORTHOPEDICS | Facility: CLINIC | Age: 74
End: 2024-12-31
Payer: COMMERCIAL

## 2024-12-31 DIAGNOSIS — M16.12 ARTHRITIS OF LEFT HIP: Primary | ICD-10-CM

## 2024-12-31 DIAGNOSIS — Z01.818 PREOPERATIVE EXAMINATION: ICD-10-CM

## 2024-12-31 DIAGNOSIS — Z01.818 PREOP TESTING: ICD-10-CM

## 2024-12-31 NOTE — TELEPHONE ENCOUNTER
----- Message from Derek Garay sent at 12/27/2024  2:09 PM CST -----  Contact: Conner  Patient is schedule for surgery on 1/28/25. All pre-op, post-op and x-rays are scheduled.  ----- Message -----  From: Kimberly Coleman  Sent: 12/27/2024  12:54 PM CST  To: Jesus Herrera Staff    Type:  Patient Returning Call    Who Called:Conner  Who Left Message for Patient:Tanisha  Does the patient know what this is regarding?: schedule surgery  Would the patient rather a call back or a response via [facility]?  Call back  Best Call Back Number: [phone]  Additional Information:     Thanks   Am

## 2025-01-14 ENCOUNTER — APPOINTMENT (OUTPATIENT)
Dept: ORTHOPEDICS | Facility: HOSPITAL | Age: 75
End: 2025-01-14
Attending: FAMILY MEDICINE
Payer: COMMERCIAL

## 2025-01-14 ENCOUNTER — OFFICE VISIT (OUTPATIENT)
Dept: INTERNAL MEDICINE | Facility: CLINIC | Age: 75
End: 2025-01-14
Payer: COMMERCIAL

## 2025-01-14 VITALS
SYSTOLIC BLOOD PRESSURE: 161 MMHG | OXYGEN SATURATION: 99 % | DIASTOLIC BLOOD PRESSURE: 73 MMHG | TEMPERATURE: 98 F | HEART RATE: 103 BPM

## 2025-01-14 DIAGNOSIS — D64.9 ANEMIA, UNSPECIFIED TYPE: ICD-10-CM

## 2025-01-14 DIAGNOSIS — M16.12 ARTHRITIS OF LEFT HIP: Primary | ICD-10-CM

## 2025-01-14 DIAGNOSIS — G89.29 CHRONIC BILATERAL LOW BACK PAIN WITHOUT SCIATICA: ICD-10-CM

## 2025-01-14 DIAGNOSIS — E78.2 MIXED HYPERLIPIDEMIA: ICD-10-CM

## 2025-01-14 DIAGNOSIS — R56.9 SEIZURES: ICD-10-CM

## 2025-01-14 DIAGNOSIS — R73.03 PREDIABETES: ICD-10-CM

## 2025-01-14 DIAGNOSIS — Z01.818 PREOPERATIVE EXAMINATION: ICD-10-CM

## 2025-01-14 DIAGNOSIS — M54.50 CHRONIC BILATERAL LOW BACK PAIN WITHOUT SCIATICA: ICD-10-CM

## 2025-01-14 DIAGNOSIS — E55.9 VITAMIN D DEFICIENCY: ICD-10-CM

## 2025-01-14 DIAGNOSIS — I10 ESSENTIAL HYPERTENSION: ICD-10-CM

## 2025-01-14 PROCEDURE — 99999 PR PBB SHADOW E&M-EST. PATIENT-LVL III: CPT | Mod: PBBFAC,,,

## 2025-01-14 NOTE — ASSESSMENT & PLAN NOTE
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

## 2025-01-14 NOTE — ASSESSMENT & PLAN NOTE
-Vitamin D and Krill oil supplement prescribed   -patient advised to hold all vitamins and supplements for 7 days prior to procedure

## 2025-01-14 NOTE — PRE ADMISSION SCREENING
Pre op instructions reviewed with patient face to face during Clinic Visit with Provider, verbalized understanding.    To confirm, Surgery is scheduled on 1/28/25. We will call you after 2pm the day before Surgery with your arrival time.    *Please report to the [facility] (1st Floor) located off of [facility] (2nd Entrance/Building on the left, in front of the flag pole).  Address: [address] [place] [address] [place] [address]    Your Type & Screen appointment is scheduled on 1/27/25 @ [facility] Please DO NOT remove the red arm band applied.      !!!INSTRUCTIONS IMPORTANT!!!  !!!NO FOOD after midnight! You may have clear liquids up to 3 hrs before your arrival to the Hospital!!!  Clear liquids include Gatorade, water, soda, black coffee or tea (no milk or creamer), and clear juices.  Clear liquids do NOT include anything with pulp or food particles (Chicken broth, ice cream, yogurt, Jello, etc.)  NOTHING RED OR PURPLE!!!!      MORNING OF SURGERY, drink small sip of water with the following medications instructed by Pre-Admit Provider:  none    *Additional Surgeon Instructions: Take Tylenol 650 mg and drink a bottle of Gatorade the night prior to surgery! BRING WALKER to Hospital if received prior to surgery!    *Blood Thinners: Hold ASPIRIN  7 days prior to Surgery!    If your are taking Ozempic/ Mounjaro/ Wegovy/ Trulicity/ Semaglutide injections or weight loss medication, such of Phentermine, please notify us immediately as they must be stopped 7 days prior to surgery.    !!!Patients should HOLD all vitamins, herbal supplements,aspirins, NSAIDS & weight loss medications 7 days prior to surgery!!! Ok to take Tylenol:)    ____  Avoid Alcoholic beverages 3 days prior to surgery, as it can thin the blood.  ____  NO Acrylic/fake nails or nail polish worn day of surgery (specifically hand/arm & foot surgeries).  ____  NO powder, lotions, deodorants, oils or cream on  body.  ____  Remove all jewelry, piercings, & foreign objects prior to arrival and leave at home.  ____  Remove Dentures, Hearing Aids & Contact Lens prior to surgery.  ____  Bring photo ID and insurance information to hospital (Leave Valuables at Home).  ____  If going home the same day, arrange for a ride home. You will not be able to drive for 24 hrs if Anesthesia was used.   ____  Females (ages 11-60): may need to give a urine sample the morning of surgery; please see Pre op Nurse prior to using the restroom.  ____  Males: Stop ED medications (Viagra, Cialis) 24 hrs prior to surgery.  ____  Wear clean, loose fitting clothing to allow for dressings/ bandages.      Bathing Instructions:       -Do not shave your body 3 days before the day of surgery.              -Shower with Dial / Hibiclens soap/antibacterial for 3 days prior to surgery to decrease risk of infection             -Shower & Rinse your body as usual with anti-bacterial Soap, (Dial), for three days before surgery                - on the evening prior to surgery and the morning of surgery, apply one packet of Hibiclens soap to the surgical site.               -Wash the site gently for 5 full minutes. Do Not scrub your skin too hard.               -Rinse your body thoroughly.                -Pat yourself day with a clean, soft towel.               -Do not use lotion, cream, powder or deodorant               -Dress in clean clothes      [facility] Policy:  Only 2 adults allowed in pre op/recovery area during your procedure. You MUST HAVE A RIDE HOME from a responsible adult that you know and trust. Medical Transport, Uber or Lyft can ONLY be used if patient has a responsible adult to accompany them during ride home.        *Signs and symptoms of Infection Before or After Surgery:               !!!If you experience any fever, chills, nausea/ vomiting, foul odor/ excessive drainage from surgical site, flu-like symptoms, new wounds or cuts, PLEASE  CALL THE SURGEON OFFICE at [phone] or SEND MESSAGE THROUGH [facility] PORTAL!!!       *If you are running late day of surgery, please call the Surgery Dept @ [phone].    *Billing question, please call:  [phone] or [phone]       Thank you,  -[facility] Pre Admit Dept.  [phone] - [place]   or [phone]  M-F 7:30 am-4:00 pm (Closed Major Holidays)

## 2025-01-14 NOTE — DISCHARGE INSTRUCTIONS
Pre op instructions reviewed with patient face to face during Clinic Visit with Provider, verbalized understanding.    To confirm, Surgery is scheduled on 1/28/25. We will call you after 2pm the day before Surgery with your arrival time.    *Please report to the [facility] (1st Floor) located off of [facility] (2nd Entrance/Building on the left, in front of the flag pole).  Address: [address] [place] [address] [place] [address]    Your Type & Screen appointment is scheduled on 1/27/25 @ [facility] Please DO NOT remove the red arm band applied.      !!!INSTRUCTIONS IMPORTANT!!!  !!!NO FOOD after midnight! You may have clear liquids up to 3 hrs before your arrival to the Hospital!!!  Clear liquids include Gatorade, water, soda, black coffee or tea (no milk or creamer), and clear juices.  Clear liquids do NOT include anything with pulp or food particles (Chicken broth, ice cream, yogurt, Jello, etc.)  NOTHING RED OR PURPLE!!!!      MORNING OF SURGERY, drink small sip of water with the following medications instructed by Pre-Admit Provider:  none    *Additional Surgeon Instructions: Take Tylenol 650 mg and drink a bottle of Gatorade the night prior to surgery! BRING WALKER to Hospital if received prior to surgery!    *Blood Thinners: Hold ASPIRIN  7 days prior to Surgery!    If your are taking Ozempic/ Mounjaro/ Wegovy/ Trulicity/ Semaglutide injections or weight loss medication, such of Phentermine, please notify us immediately as they must be stopped 7 days prior to surgery.    !!!Patients should HOLD all vitamins, herbal supplements,aspirins, NSAIDS & weight loss medications 7 days prior to surgery!!! Ok to take Tylenol:)    ____  Avoid Alcoholic beverages 3 days prior to surgery, as it can thin the blood.  ____  NO Acrylic/fake nails or nail polish worn day of surgery (specifically hand/arm & foot surgeries).  ____  NO powder, lotions, deodorants, oils or cream on  body.  ____  Remove all jewelry, piercings, & foreign objects prior to arrival and leave at home.  ____  Remove Dentures, Hearing Aids & Contact Lens prior to surgery.  ____  Bring photo ID and insurance information to hospital (Leave Valuables at Home).  ____  If going home the same day, arrange for a ride home. You will not be able to drive for 24 hrs if Anesthesia was used.   ____  Females (ages 11-60): may need to give a urine sample the morning of surgery; please see Pre op Nurse prior to using the restroom.  ____  Males: Stop ED medications (Viagra, Cialis) 24 hrs prior to surgery.  ____  Wear clean, loose fitting clothing to allow for dressings/ bandages.      Bathing Instructions:       -Do not shave your body 3 days before the day of surgery.              -Shower with Dial / Hibiclens soap/antibacterial for 3 days prior to surgery to decrease risk of infection             -Shower & Rinse your body as usual with anti-bacterial Soap, (Dial), for three days before surgery                - on the evening prior to surgery and the morning of surgery, apply one packet of Hibiclens soap to the surgical site.               -Wash the site gently for 5 full minutes. Do Not scrub your skin too hard.               -Rinse your body thoroughly.                -Pat yourself day with a clean, soft towel.               -Do not use lotion, cream, powder or deodorant               -Dress in clean clothes      [facility] Policy:  Only 2 adults allowed in pre op/recovery area during your procedure. You MUST HAVE A RIDE HOME from a responsible adult that you know and trust. Medical Transport, Uber or Lyft can ONLY be used if patient has a responsible adult to accompany them during ride home.        *Signs and symptoms of Infection Before or After Surgery:               !!!If you experience any fever, chills, nausea/ vomiting, foul odor/ excessive drainage from surgical site, flu-like symptoms, new wounds or cuts, PLEASE  CALL THE SURGEON OFFICE at [phone] or SEND MESSAGE THROUGH [place] PORTAL!!!       *If you are running late day of surgery, please call the Surgery Dept @ [phone].    *Billing question, please call:  [phone] or [phone]       Thank you,  -[facility] Pre [facility].  [phone] - [place]   or [phone]  M-F 7:30 am-4:00 pm (Closed Major Holidays)

## 2025-01-14 NOTE — PROGRESS NOTES
Preoperative History and Physical  Pre-Admit Testing                                                                    Chief Complaint: Preoperative evaluation     History of Present Illness:      Conner Lombardi Jr. is a 74 y.o. male who presents to the office today for a preoperative consultation at the request of Dr. Lee who plans on performing a ARTHROPLASTY, HIP (LEFT) on January 28.     Functional Status:      The patient is not able to climb a flight of stairs. The patient is able to ambulate with walker required for steady gait. The patient's functional status is affected by the surgical problem due to pain and decreased mobility. The patient's functional status is not affected by shortness of breath, chest pain, dyspnea on exertion and fatigue.    MET score greater than 4    Patient Anesthesia History:      History of Malignant Hyperthermia: no  History of Pseudocholinesterase Deficiency: no  History of PONV: no  History of difficult intubation: no  History of delayed emergence: no  History of high fever after anesthesia: no    Family Anesthesia History:      History of Malignant Hyperthermia: no  History of Pseudocholinesterase Deficiency: no    Past Medical History:      Past Medical History:   Diagnosis Date    Anemia     Epilepsy     Last seizure 2010.     Hyperlipidemia     Hypertension     Prediabetes     Tubular adenoma of colon 04/02/2012    Vitamin D deficiency         Past Surgical History:      Past Surgical History:   Procedure Laterality Date    BLOCK, NERVE, PERIPHERAL Left 8/19/2024    Procedure: Left femoral and obturator nerve block;  Surgeon: Helder Taylor MD;  Location: [facility];  Service: Pain Management;  Laterality: Left;    COLONOSCOPY N/A 07/12/2021    Procedure: COLONOSCOPY;  Surgeon: Subhash Martin MD;  Location: [facility];  Service: Endoscopy;  Laterality: N/A;    TONSILLECTOMY      VASECTOMY  1984/1985         Social History:      Social History     Socioeconomic History    Marital status:    Tobacco Use    Smoking status: Never    Smokeless tobacco: Never   Substance and Sexual Activity    Alcohol use: No    Drug use: Never    Sexual activity: Not Currently     Partners: Female     Birth control/protection: None     Social Drivers of Health     Financial Resource Strain: Low Risk  (2/4/2024)    Overall Financial Resource Strain (CARDIA)     Difficulty of Paying Living Expenses: Not hard at all   Food Insecurity: No Food Insecurity (2/4/2024)    Hunger Vital Sign     Worried About Running Out of Food in the Last Year: Never true     Ran Out of Food in the Last Year: Never true   Transportation Needs: No Transportation Needs (2/4/2024)    PRAPARE - Transportation     Lack of Transportation (Medical): No     Lack of Transportation (Non-Medical): No   Physical Activity: Insufficiently Active (2/4/2024)    Exercise Vital Sign     Days of Exercise per Week: 1 day     Minutes of Exercise per Session: 30 min   Stress: No Stress Concern Present (2/4/2024)    [employer] - Occupational Stress Questionnaire     Feeling of Stress : Only a little   Housing Stability: Low Risk  (2/4/2024)    Housing Stability Vital Sign     Unable to Pay for Housing in the Last Year: No     Number of Places Lived in the Last Year: 1     Unstable Housing in the Last Year: No        Family History:      Family History   Problem Relation Name Age of Onset    Cancer Mother Batool bhandariAmira Lombardi         Brain    Heart disease Father Conner Lombardi, Sr.     Cancer Father Conner Lombardi, Sr.         Liver and lung    Hypertension Father Conner Lombardi, Sr.     Heart disease Son          CABG in 2018    Diabetes Maternal Grandfather Aguilar Pepitone     Hypertension Other         Allergies:      Review of patient's allergies indicates:  No Known Allergies    Medications:      Current Outpatient Medications    Medication Sig    aspirin (ECOTRIN) 81 MG EC tablet Take 81 mg by mouth once daily.    atorvastatin (LIPITOR) 40 MG tablet Take 1 tablet (40 mg total) by mouth once daily.    cholecalciferol, vitamin D3, (VITAMIN D3) 25 mcg (1,000 unit) capsule Take 1 capsule (1,000 Units total) by mouth once daily.    gabapentin (NEURONTIN) 300 MG capsule Take 1 capsule (300 mg total) by mouth every evening.    KRILL OIL ORAL Take by mouth.    lisinopriL-hydrochlorothiazide (PRINZIDE,ZESTORETIC) 20-25 mg Tab Take 2 tablets by mouth once daily. If BP is below 110/70 - decrease to 1 tablet daily    meloxicam (MOBIC) 15 MG tablet TAKE 1 TABLET(15 MG) BY MOUTH DAILY WITH FOOD    phenytoin (DILANTIN) 100 MG ER capsule Take 4 capsules (400 mg total) by mouth every evening.    tiZANidine (ZANAFLEX) 2 MG tablet TAKE 1 TO 2 TABLETS(2 TO 4 MG) BY MOUTH EVERY 8 HOURS AS NEEDED FOR LEG PAIN OR BACK PAIN     No current facility-administered medications for this visit.       Vitals:      Vitals:    01/14/25 1349   BP: (!) 161/73   Pulse: 103   Temp: 97.7 °F (36.5 °C)       Review of Systems:        Constitutional: Negative for fever, chills, weight loss, malaise/fatigue and diaphoresis.   HENT: Negative for hearing loss, ear pain, nosebleeds, congestion, sore throat, neck pain, tinnitus and ear discharge.    Eyes: Negative for blurred vision, double vision, photophobia, pain, discharge and redness.   Respiratory: Negative for cough, hemoptysis, sputum production, shortness of breath, wheezing and stridor.    Cardiovascular: Negative for chest pain, palpitations, orthopnea, claudication, and PND. + pedal edema   Gastrointestinal: Negative for heartburn, nausea, vomiting, abdominal pain, diarrhea, constipation, blood in stool and melena.   Genitourinary: Negative for dysuria, urgency, frequency, hematuria and flank pain.   Musculoskeletal: Negative for myalgias, joint pain and falls. + back pain and + hip pain   Skin: Negative for itching and  rash.   Neurological: Negative for dizziness, tingling, tremors, sensory change, speech change, focal weakness, seizures, loss of consciousness, weakness and headaches.   Endo/Heme/Allergies: Negative for environmental allergies and polydipsia. Does not bruise/bleed easily.   Psychiatric/Behavioral: Negative for depression, suicidal ideas, hallucinations, memory loss and substance abuse. The patient is not nervous/anxious and does not have insomnia.    All 14 systems reviewed and negative except as noted above.    Physical Exam:      Constitutional: Appears well-developed, well-nourished and in no acute distress.  Patient is oriented to person, place, and time.   Head: Normocephalic and atraumatic. Mucous membranes moist.  Neck: Neck supple no mass.   Cardiovascular: Normal rate and regular rhythm.  S1 S2 appreciated by ascultation.  Pulmonary/Chest: Effort normal and clear to auscultation bilaterally. No respiratory distress.   Abdomen: Soft. Non-tender and non-distended. Bowel sounds are normal.   Neurological: Patient is alert and oriented to person, place and time. Moves all extremities.  Skin: Warm and dry. No lesions.  Extremities: No clubbing, cyanosis or edema. Limited ROM to LLE due to pain     Laboratory data:      Reviewed and noted in plan where applicable. Please see chart for full laboratory data.    Lab Results   Component Value Date    WBC 7.97 01/14/2025    HGB 11.7 (L) 01/14/2025    HCT 33.8 (L) 01/14/2025     (H) 01/14/2025     01/14/2025     Comprehensive Metabolic Panel  Order: [id number]  Status: Final result  Dx: Preoperative examination               Component Ref Range & Units 3 d ago  (1/14/25) 5 mo ago  (8/2/24) 8 mo ago  (5/7/24) 11 mo ago  (1/31/24) 1 yr ago  (7/24/23) 1 yr ago  (1/23/23) 2 yr ago  (8/8/22)   Sodium 136 - 145 mmol/L 143 142 138 140 140 141 141   Potassium 3.5 - 5.1 mmol/L 4.1 4.5 4.6 4.2 4.3 4.7 4.7   Chloride 95 - 110 mmol/L 106 107 101 105 103 104 105    CO2 23 - 29 mmol/L 26 27 29 25 25 28 26   Glucose 70 - 110 mg/dL 102 122 High  124 High  117 High  115 High  105 127 High    BUN 8 - 23 mg/dL 38 High  20 21 24 High  19 22 20   Creatinine 0.5 - 1.4 mg/dL 1.1 0.9 1.2 1.0 1.0 1.0 0.9   Calcium 8.7 - 10.5 mg/dL 9.7 9.5 10.3 10.3 10.2 9.9 9.1   Total Protein 6.0 - 8.4 g/dL 7.3 6.8 7.1 7.5 7.3 7.4 6.9   Albumin 3.5 - 5.2 g/dL 3.8 3.7 3.8 4.0 4.0 4.1 3.9   Total Bilirubin 0.1 - 1.0 mg/dL 0.2 0.3 CM 0.3 CM 0.3 CM 0.4 CM 0.4 CM 0.4 CM   Comment: For infants and newborns, interpretation of results should be based  on gestational age, weight and in agreement with clinical  observations.    Premature Infant recommended reference ranges:  Up to 24 hours.............<8.0 mg/dL  Up to 48 hours............<12.0 mg/dL  3-5 days..................<15.0 mg/dL  6-29 days.................<15.0 mg/dL   Alkaline Phosphatase 40 - 150 U/L 131 99 R 108 R 88 R 90 R 87 R 105 R   AST 10 - 40 U/L 22 20 25 23 20 19 24   ALT 10 - 44 U/L 22 23 32 22 21 24 27   eGFR >60 mL/min/1.73 m^2 >60 >60.0 >60.0 >60.0 >60.0 >60.0 >60.0   Anion Gap 8 - 16 mmol/L 11 8 8 10 12 9 10   Resulting Agency  [facility]              Narrative  Performed by: [facility]  Send normal result to authorizing provider's In Basket if  patient is active on MyChart:->Yes   Specimen Collected: 01/14/25 13:54 CST Last Resulted: 01/14/25 16:41 CST       Predictors of intubation difficulty:       Morbid obesity? no   Anatomically abnormal facies? no   Prominent incisors? no   Receding mandible? no   Short, thick neck? no   Neck range of motion: normal   Dentition: No chipped, loose, or missing teeth.  Based on the Modified Mallampati, patient is a mallampati score: III (soft and hard palate and base of uvula visible)    Cardiographics:      ECG: normal sinus rhythm  Echocardiogram on 12/18/2024:   Left Ventricle: The left ventricle is normal in size. Ventricular mass is normal. Normal wall thickness. There is  concentric remodeling. There is normal systolic function with a visually estimated ejection fraction of 55 - 60%. There is normal diastolic function.    Right Ventricle: Normal right ventricular cavity size. Wall thickness is normal. Systolic function is normal.    Aortic Valve: There is mild aortic regurgitation.    Mitral Valve: There is mild regurgitation.    Tricuspid Valve: There is mild regurgitation.    Pulmonary Artery: The estimated pulmonary artery systolic pressure is 17 mmHg.    IVC/SVC: Normal venous pressure at 3 mmHg.    Imaging:      Chest x-ray: No evidence of consolidation, nodule, mass, or TB. No effusion or pneumothorax. Cardiomediastinum: Normal in size. Bones: No gross acute abnormality. Soft tissues: Unremarkable per imaging on 6/26/2024     Assessment and Plan:      1. Arthritis of left hip  Assessment & Plan:  -ARTHROPLASTY, HIP (LEFT) planned per Dr. Lee on 1/28/2025     Known risk factors for perioperative complications: Anemia   Difficulty with intubation is not anticipated.    Cardiac Risk Estimation: Based on the Revised Cardiac Risk index, patient is a Class I risk with a 3.9 % risk of a major cardiac event in a low risk procedure.    Cardiac evaluation: Pre-OP CV evaluation prior to L hip replacement surgery with Dr. Lee  Moderately elevated periop risk of CV events for moderate risk procedure.  Ok to proceed to the scheduled surgery without further cardiac study. Neg stress and echo 12/2024. Fixed defect without ischemia noted. OK to hold Aspirin 7 days before the procedure and resume ASAP postop.Continue Statin periop per encounter with cardiology on 12/3/2024     1.) Preoperative workup as follows: chest x-ray, ECG, hemoglobin, hematocrit, electrolytes, creatinine, glucose, urinalysis (urinary tract instrumentation planned).  2.) Change in medication regimen before surgery: discontinue ASA 7 days before surgery, discontinue all NSAIDs (Meloxicam) 5 days before surgery,  hold Metformin 24 hours prior to surgery. Hold all vitamins/supplements for 7 days prior to surgery, with the exception of Potassium and Iron supplementation. Hold semaglutide/tirzepatide for 7 days prior to surgery.   3.) Prophylaxis for cardiac events with perioperative beta-blockers: not indicated.  4.) Invasive hemodynamic monitoring perioperatively: at the discretion of anesthesiologist.  5.) Deep vein thrombosis prophylaxis postoperatively: regimen to be chosen by surgical team.  6.) Surveillance for postoperative MI with ECG immediately postoperatively and on postoperati ve days 1 and 2 AND troponin levels 24 hours postoperatively and on day 4 or hospital discharge (whichever comes first): at the discretion of anesthesiologist.  7.) Current medications which may produce withdrawal symptoms if withheld perioperatively: None  8.) Other measures: Postoperative hypertension management with IV hydralazine until able to take oral medications.  Postoperative incentive spirometry to prevent pneumonia.   Urinalysis reviewed with no evidence of infectious process noted     2. Preoperative examination  -     Ambulatory referral/consult to Pre-Admit  -     CBC Auto Differential; Future; Expected date: 01/14/2025  -     Comprehensive Metabolic Panel; Future; Expected date: 01/14/2025  -     Hemoglobin A1C; Future; Expected date: 01/14/2025    3. Essential hypertension  Assessment & Plan:  -uncontrolled   -BP elevated at 161/73 upon assessment in PAT   -patient confirms medication compliance and sites situational anxiety due to upcoming procedure as cause for elevation   -Lisinopril/HCTZ prescribed- hold morning of surgery   -patient advised to monitor BP at home and record findings for review with PCP   -followed outpatient by PCP       4. Prediabetes  Assessment & Plan:  -Hemoglobin A!C pending       5. Mixed hyperlipidemia  Assessment & Plan:  -Atorvastatin continued nightly       6. Chronic bilateral low back pain  without sciatica  Assessment & Plan:  -improved on prescribed regime   -Gabapentin continued nightly   -Tizanidine continued as needed- hold morning of surgery   -careful attention to positioning during procedure       7. Vitamin D deficiency  Assessment & Plan:  -Vitamin D and Krill oil supplement prescribed   -patient advised to hold all vitamins and supplements for 7 days prior to procedure       8. 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 Anemia, unspecified type  Assessment & Plan:  -H/H 11.7/33.8 -- per CBC on 1/14/2025                  Electronically signed by Cynthia Lara NP on 1/17/2025 at 1:28 PM.

## 2025-01-14 NOTE — ASSESSMENT & PLAN NOTE
-uncontrolled   -BP elevated at 161/73 upon assessment in PAT   -patient confirms medication compliance and sites situational anxiety due to upcoming procedure as cause for elevation   -Lisinopril/HCTZ prescribed- hold morning of surgery   -patient advised to monitor BP at home and record findings for review with PCP   -followed outpatient by PCP

## 2025-01-14 NOTE — ASSESSMENT & PLAN NOTE
-improved on prescribed regime   -Gabapentin continued nightly   -Tizanidine continued as needed- hold morning of surgery   -careful attention to positioning during procedure

## 2025-01-14 NOTE — ASSESSMENT & PLAN NOTE
-ARTHROPLASTY, HIP (LEFT) planned per Dr. Lee on 1/28/2025     Known risk factors for perioperative complications: Anemia   Difficulty with intubation is not anticipated.    Cardiac Risk Estimation: Based on the Revised Cardiac Risk index, patient is a Class I risk with a 3.9 % risk of a major cardiac event in a low risk procedure.    Cardiac evaluation: Pre-OP CV evaluation prior to L hip replacement surgery with Dr. Lee  Moderately elevated periop risk of CV events for moderate risk procedure.  Ok to proceed to the scheduled surgery without further cardiac study. Neg stress and echo 12/2024. Fixed defect without ischemia noted. OK to hold Aspirin 7 days before the procedure and resume ASAP postop.Continue Statin periop per encounter with cardiology on 12/3/2024     1.) Preoperative workup as follows: chest x-ray, ECG, hemoglobin, hematocrit, electrolytes, creatinine, glucose, urinalysis (urinary tract instrumentation planned).  2.) Change in medication regimen before surgery: discontinue ASA 7 days before surgery, discontinue all NSAIDs (Meloxicam) 5 days before surgery, hold Metformin 24 hours prior to surgery. Hold all vitamins/supplements for 7 days prior to surgery, with the exception of Potassium and Iron supplementation. Hold semaglutide/tirzepatide for 7 days prior to surgery.   3.) Prophylaxis for cardiac events with perioperative beta-blockers: not indicated.  4.) Invasive hemodynamic monitoring perioperatively: at the discretion of anesthesiologist.  5.) Deep vein thrombosis prophylaxis postoperatively: regimen to be chosen by surgical team.  6.) Surveillance for postoperative MI with ECG immediately postoperatively and on postoperati ve days 1 and 2 AND troponin levels 24 hours postoperatively and on day 4 or hospital discharge (whichever comes first): at the discretion of anesthesiologist.  7.) Current medications which may produce withdrawal symptoms if withheld perioperatively: None  8.)  Other measures: Postoperative hypertension management with IV hydralazine until able to take oral medications.  Postoperative incentive spirometry to prevent pneumonia.   -Urinalysis reviewed with no evidence of infectious process noted

## 2025-01-17 PROBLEM — M16.11 ARTHRITIS OF RIGHT HIP: Status: ACTIVE | Noted: 2025-01-17

## 2025-01-17 PROBLEM — M16.11 ARTHRITIS OF RIGHT HIP: Status: RESOLVED | Noted: 2025-01-17 | Resolved: 2025-01-17

## 2025-01-22 DIAGNOSIS — E78.2 MIXED HYPERLIPIDEMIA: ICD-10-CM

## 2025-01-22 RX ORDER — ATORVASTATIN CALCIUM 40 MG/1
40 TABLET, FILM COATED ORAL
Qty: 90 TABLET | Refills: 1 | Status: SHIPPED | OUTPATIENT
Start: 2025-01-22

## 2025-01-22 NOTE — TELEPHONE ENCOUNTER
Refill Decision Note   Conner Maria C  is requesting a refill authorization.  Brief Assessment and Rationale for Refill:  Approve     Medication Therapy Plan:         Comments:     Note composed:9:15 AM 01/22/2025

## 2025-01-22 NOTE — TELEPHONE ENCOUNTER
No care due was identified.  [facility] Embedded Care Due Messages. Reference number: [id number].   1/22/2025 5:45:18 AM CST

## 2025-01-27 ENCOUNTER — LAB VISIT (OUTPATIENT)
Dept: LAB | Facility: HOSPITAL | Age: 75
End: 2025-01-27
Attending: ORTHOPAEDIC SURGERY
Payer: COMMERCIAL

## 2025-01-27 ENCOUNTER — PATIENT MESSAGE (OUTPATIENT)
Dept: PREADMISSION TESTING | Facility: HOSPITAL | Age: 75
End: 2025-01-27
Payer: COMMERCIAL

## 2025-01-27 ENCOUNTER — ANESTHESIA EVENT (OUTPATIENT)
Dept: SURGERY | Facility: HOSPITAL | Age: 75
End: 2025-01-27
Payer: COMMERCIAL

## 2025-01-27 DIAGNOSIS — Z01.818 PREOP TESTING: ICD-10-CM

## 2025-01-27 LAB
ABO + RH BLD: NORMAL
BLD GP AB SCN CELLS X3 SERPL QL: NORMAL
SPECIMEN OUTDATE: NORMAL

## 2025-01-27 PROCEDURE — 36415 COLL VENOUS BLD VENIPUNCTURE: CPT | Performed by: ORTHOPAEDIC SURGERY

## 2025-01-27 PROCEDURE — 86900 BLOOD TYPING SEROLOGIC ABO: CPT | Performed by: ORTHOPAEDIC SURGERY

## 2025-01-28 ENCOUNTER — ANESTHESIA (OUTPATIENT)
Dept: SURGERY | Facility: HOSPITAL | Age: 75
End: 2025-01-28
Payer: COMMERCIAL

## 2025-01-28 ENCOUNTER — HOSPITAL ENCOUNTER (OUTPATIENT)
Facility: HOSPITAL | Age: 75
Discharge: HOME | End: 2025-01-28
Attending: ORTHOPAEDIC SURGERY | Admitting: ORTHOPAEDIC SURGERY
Payer: COMMERCIAL

## 2025-01-28 VITALS
HEART RATE: 97 BPM | BODY MASS INDEX: 29.18 KG/M2 | SYSTOLIC BLOOD PRESSURE: 145 MMHG | TEMPERATURE: 98 F | HEIGHT: 66 IN | RESPIRATION RATE: 20 BRPM | OXYGEN SATURATION: 93 % | DIASTOLIC BLOOD PRESSURE: 71 MMHG | WEIGHT: 181.56 LBS

## 2025-01-28 DIAGNOSIS — M16.12 ARTHRITIS OF LEFT HIP: Primary | ICD-10-CM

## 2025-01-28 DIAGNOSIS — Z01.818 PREOP TESTING: ICD-10-CM

## 2025-01-28 LAB
HCT VFR BLD AUTO: 33.9 % (ref 40–54)
HGB BLD-MCNC: 11.6 G/DL (ref 14–18)

## 2025-01-28 PROCEDURE — 25000003 PHARM REV CODE 250: Performed by: NURSE ANESTHETIST, CERTIFIED REGISTERED

## 2025-01-28 PROCEDURE — 27201423 OPTIME MED/SURG SUP & DEVICES STERILE SUPPLY: Performed by: ORTHOPAEDIC SURGERY

## 2025-01-28 PROCEDURE — 27130 TOTAL HIP ARTHROPLASTY: CPT | Mod: LT,,, | Performed by: ORTHOPAEDIC SURGERY

## 2025-01-28 PROCEDURE — 27130 TOTAL HIP ARTHROPLASTY: CPT | Mod: AS,LT,, | Performed by: PHYSICIAN ASSISTANT

## 2025-01-28 PROCEDURE — 97162 PT EVAL MOD COMPLEX 30 MIN: CPT

## 2025-01-28 PROCEDURE — C1713 ANCHOR/SCREW BN/BN,TIS/BN: HCPCS | Performed by: ORTHOPAEDIC SURGERY

## 2025-01-28 PROCEDURE — 36000711: Performed by: ORTHOPAEDIC SURGERY

## 2025-01-28 PROCEDURE — 71000033 HC RECOVERY, INTIAL HOUR: Performed by: ORTHOPAEDIC SURGERY

## 2025-01-28 PROCEDURE — 25000003 PHARM REV CODE 250: Performed by: STUDENT IN AN ORGANIZED HEALTH CARE EDUCATION/TRAINING PROGRAM

## 2025-01-28 PROCEDURE — 85014 HEMATOCRIT: CPT | Performed by: ORTHOPAEDIC SURGERY

## 2025-01-28 PROCEDURE — C1776 JOINT DEVICE (IMPLANTABLE): HCPCS | Performed by: ORTHOPAEDIC SURGERY

## 2025-01-28 PROCEDURE — 36000710: Performed by: ORTHOPAEDIC SURGERY

## 2025-01-28 PROCEDURE — 97530 THERAPEUTIC ACTIVITIES: CPT

## 2025-01-28 PROCEDURE — 71000016 HC POSTOP RECOV ADDL HR: Performed by: ORTHOPAEDIC SURGERY

## 2025-01-28 PROCEDURE — 85018 HEMOGLOBIN: CPT | Performed by: ORTHOPAEDIC SURGERY

## 2025-01-28 PROCEDURE — 37000009 HC ANESTHESIA EA ADD 15 MINS: Performed by: ORTHOPAEDIC SURGERY

## 2025-01-28 PROCEDURE — 97166 OT EVAL MOD COMPLEX 45 MIN: CPT

## 2025-01-28 PROCEDURE — 25000003 PHARM REV CODE 250: Performed by: ORTHOPAEDIC SURGERY

## 2025-01-28 PROCEDURE — 63600175 PHARM REV CODE 636 W HCPCS: Performed by: NURSE ANESTHETIST, CERTIFIED REGISTERED

## 2025-01-28 PROCEDURE — 71000015 HC POSTOP RECOV 1ST HR: Performed by: ORTHOPAEDIC SURGERY

## 2025-01-28 PROCEDURE — 37000008 HC ANESTHESIA 1ST 15 MINUTES: Performed by: ORTHOPAEDIC SURGERY

## 2025-01-28 DEVICE — PINNACLE GRIPTION ACETABULAR SHELL SECTOR 54MM OD
Type: IMPLANTABLE DEVICE | Site: HIP | Status: FUNCTIONAL
Brand: PINNACLE GRIPTION

## 2025-01-28 DEVICE — SCREW PINNACLE 6.5 X 20: Type: IMPLANTABLE DEVICE | Site: HIP | Status: FUNCTIONAL

## 2025-01-28 DEVICE — SCREW PINNACLE 6.5 X 25: Type: IMPLANTABLE DEVICE | Site: HIP | Status: FUNCTIONAL

## 2025-01-28 DEVICE — IMPLANTABLE DEVICE: Type: IMPLANTABLE DEVICE | Site: HIP | Status: FUNCTIONAL

## 2025-01-28 RX ORDER — ONDANSETRON HYDROCHLORIDE 2 MG/ML
4 INJECTION, SOLUTION INTRAVENOUS DAILY PRN
Status: DISCONTINUED | OUTPATIENT
Start: 2025-01-28 | End: 2025-01-28 | Stop reason: HOSPADM

## 2025-01-28 RX ORDER — PROPOFOL 10 MG/ML
VIAL (ML) INTRAVENOUS
Status: DISCONTINUED | OUTPATIENT
Start: 2025-01-28 | End: 2025-01-28

## 2025-01-28 RX ORDER — ONDANSETRON 4 MG/1
4 TABLET, FILM COATED ORAL EVERY 6 HOURS PRN
Qty: 21 TABLET | Refills: 0 | Status: SHIPPED | OUTPATIENT
Start: 2025-01-28

## 2025-01-28 RX ORDER — GLUCAGON 1 MG
1 KIT INJECTION
Status: DISCONTINUED | OUTPATIENT
Start: 2025-01-28 | End: 2025-01-28 | Stop reason: HOSPADM

## 2025-01-28 RX ORDER — CELECOXIB 100 MG/1
200 CAPSULE ORAL 2 TIMES DAILY
Status: CANCELLED | OUTPATIENT
Start: 2025-01-29

## 2025-01-28 RX ORDER — PHENYLEPHRINE HYDROCHLORIDE 10 MG/ML
INJECTION INTRAVENOUS
Status: DISCONTINUED | OUTPATIENT
Start: 2025-01-28 | End: 2025-01-28

## 2025-01-28 RX ORDER — GABAPENTIN 300 MG/1
300 CAPSULE ORAL DAILY
Status: DISCONTINUED | OUTPATIENT
Start: 2025-01-28 | End: 2025-01-28

## 2025-01-28 RX ORDER — CELECOXIB 100 MG/1
400 CAPSULE ORAL ONCE
Status: CANCELLED | OUTPATIENT
Start: 2025-01-28 | End: 2025-01-28

## 2025-01-28 RX ORDER — SODIUM CHLORIDE 9 MG/ML
INJECTION, SOLUTION INTRAVENOUS CONTINUOUS
Status: DISCONTINUED | OUTPATIENT
Start: 2025-01-28 | End: 2025-01-28 | Stop reason: HOSPADM

## 2025-01-28 RX ORDER — MORPHINE SULFATE 4 MG/ML
2 INJECTION, SOLUTION INTRAMUSCULAR; INTRAVENOUS EVERY 4 HOURS PRN
Status: CANCELLED | OUTPATIENT
Start: 2025-01-28

## 2025-01-28 RX ORDER — ACETAMINOPHEN 325 MG/1
650 TABLET ORAL EVERY 8 HOURS PRN
Status: DISCONTINUED | OUTPATIENT
Start: 2025-01-28 | End: 2025-01-28 | Stop reason: HOSPADM

## 2025-01-28 RX ORDER — CEFAZOLIN SODIUM 1 G/3ML
2 INJECTION, POWDER, FOR SOLUTION INTRAMUSCULAR; INTRAVENOUS
Status: CANCELLED | OUTPATIENT
Start: 2025-01-28 | End: 2025-01-29

## 2025-01-28 RX ORDER — OXYCODONE AND ACETAMINOPHEN 5; 325 MG/1; MG/1
1 TABLET ORAL
Status: DISCONTINUED | OUTPATIENT
Start: 2025-01-28 | End: 2025-01-28 | Stop reason: HOSPADM

## 2025-01-28 RX ORDER — CHLORHEXIDINE GLUCONATE ORAL RINSE 1.2 MG/ML
10 SOLUTION DENTAL 2 TIMES DAILY
Status: CANCELLED | OUTPATIENT
Start: 2025-01-28 | End: 2025-02-02

## 2025-01-28 RX ORDER — OXYCODONE HYDROCHLORIDE 5 MG/1
10 TABLET ORAL
Status: CANCELLED | OUTPATIENT
Start: 2025-01-28

## 2025-01-28 RX ORDER — ONDANSETRON 8 MG/1
8 TABLET, ORALLY DISINTEGRATING ORAL EVERY 8 HOURS PRN
Status: CANCELLED | OUTPATIENT
Start: 2025-01-28

## 2025-01-28 RX ORDER — LIDOCAINE HYDROCHLORIDE 20 MG/ML
INJECTION INTRAVENOUS
Status: DISCONTINUED | OUTPATIENT
Start: 2025-01-28 | End: 2025-01-28

## 2025-01-28 RX ORDER — BISACODYL 10 MG/1
10 SUPPOSITORY RECTAL DAILY PRN
Status: CANCELLED | OUTPATIENT
Start: 2025-01-28

## 2025-01-28 RX ORDER — GABAPENTIN 300 MG/1
300 CAPSULE ORAL DAILY
Status: DISCONTINUED | OUTPATIENT
Start: 2025-01-28 | End: 2025-01-28 | Stop reason: HOSPADM

## 2025-01-28 RX ORDER — TRANEXAMIC ACID 10 MG/ML
1000 INJECTION, SOLUTION INTRAVENOUS
Status: COMPLETED | OUTPATIENT
Start: 2025-01-28 | End: 2025-01-28

## 2025-01-28 RX ORDER — CEFAZOLIN SODIUM 1 G/3ML
INJECTION, POWDER, FOR SOLUTION INTRAMUSCULAR; INTRAVENOUS
Status: DISCONTINUED | OUTPATIENT
Start: 2025-01-28 | End: 2025-01-28

## 2025-01-28 RX ORDER — SODIUM CHLORIDE 9 MG/ML
INJECTION, SOLUTION INTRAVENOUS CONTINUOUS
Status: CANCELLED | OUTPATIENT
Start: 2025-01-28

## 2025-01-28 RX ORDER — ONDANSETRON HYDROCHLORIDE 2 MG/ML
INJECTION, SOLUTION INTRAVENOUS
Status: DISCONTINUED | OUTPATIENT
Start: 2025-01-28 | End: 2025-01-28

## 2025-01-28 RX ORDER — CHLORHEXIDINE GLUCONATE ORAL RINSE 1.2 MG/ML
10 SOLUTION DENTAL
Status: DISCONTINUED | OUTPATIENT
Start: 2025-01-28 | End: 2025-01-28 | Stop reason: HOSPADM

## 2025-01-28 RX ORDER — ONDANSETRON HYDROCHLORIDE 2 MG/ML
4 INJECTION, SOLUTION INTRAVENOUS EVERY 12 HOURS PRN
Status: CANCELLED | OUTPATIENT
Start: 2025-01-28

## 2025-01-28 RX ORDER — HYDROMORPHONE HYDROCHLORIDE 1 MG/ML
0.2 INJECTION, SOLUTION INTRAMUSCULAR; INTRAVENOUS; SUBCUTANEOUS EVERY 5 MIN PRN
Status: DISCONTINUED | OUTPATIENT
Start: 2025-01-28 | End: 2025-01-28 | Stop reason: HOSPADM

## 2025-01-28 RX ORDER — DOCUSATE SODIUM 100 MG/1
100 CAPSULE, LIQUID FILLED ORAL 2 TIMES DAILY
Status: CANCELLED | OUTPATIENT
Start: 2025-01-28

## 2025-01-28 RX ORDER — ROPIVACAINE/EPI/CLONIDINE/KET 2.46-0.005
SYRINGE (ML) INJECTION
Status: DISCONTINUED | OUTPATIENT
Start: 2025-01-28 | End: 2025-01-28 | Stop reason: HOSPADM

## 2025-01-28 RX ORDER — OXYCODONE HYDROCHLORIDE 5 MG/1
5 TABLET ORAL
Status: CANCELLED | OUTPATIENT
Start: 2025-01-28

## 2025-01-28 RX ORDER — ROCURONIUM BROMIDE 10 MG/ML
INJECTION, SOLUTION INTRAVENOUS
Status: DISCONTINUED | OUTPATIENT
Start: 2025-01-28 | End: 2025-01-28

## 2025-01-28 RX ORDER — FENTANYL CITRATE 50 UG/ML
INJECTION, SOLUTION INTRAMUSCULAR; INTRAVENOUS
Status: DISCONTINUED | OUTPATIENT
Start: 2025-01-28 | End: 2025-01-28

## 2025-01-28 RX ORDER — ACETAMINOPHEN 325 MG/1
650 TABLET ORAL EVERY 8 HOURS PRN
Status: CANCELLED | OUTPATIENT
Start: 2025-01-28

## 2025-01-28 RX ORDER — CEFAZOLIN 2 G/1
2 INJECTION, POWDER, FOR SOLUTION INTRAMUSCULAR; INTRAVENOUS
Status: DISCONTINUED | OUTPATIENT
Start: 2025-01-28 | End: 2025-01-28 | Stop reason: HOSPADM

## 2025-01-28 RX ORDER — SUCCINYLCHOLINE CHLORIDE 20 MG/ML
INJECTION INTRAMUSCULAR; INTRAVENOUS
Status: DISCONTINUED | OUTPATIENT
Start: 2025-01-28 | End: 2025-01-28

## 2025-01-28 RX ORDER — DEXAMETHASONE SODIUM PHOSPHATE 4 MG/ML
INJECTION, SOLUTION INTRA-ARTICULAR; INTRALESIONAL; INTRAMUSCULAR; INTRAVENOUS; SOFT TISSUE
Status: DISCONTINUED | OUTPATIENT
Start: 2025-01-28 | End: 2025-01-28

## 2025-01-28 RX ORDER — OXYCODONE AND ACETAMINOPHEN 10; 325 MG/1; MG/1
1 TABLET ORAL EVERY 6 HOURS PRN
Qty: 30 TABLET | Refills: 0 | Status: SHIPPED | OUTPATIENT
Start: 2025-01-28

## 2025-01-28 RX ORDER — DEXAMETHASONE SODIUM PHOSPHATE 4 MG/ML
8 INJECTION, SOLUTION INTRA-ARTICULAR; INTRALESIONAL; INTRAMUSCULAR; INTRAVENOUS; SOFT TISSUE
Status: DISCONTINUED | OUTPATIENT
Start: 2025-01-28 | End: 2025-01-28 | Stop reason: HOSPADM

## 2025-01-28 RX ADMIN — FENTANYL CITRATE 50 MCG: 50 INJECTION, SOLUTION INTRAMUSCULAR; INTRAVENOUS at 08:01

## 2025-01-28 RX ADMIN — ROCURONIUM BROMIDE 45 MG: 10 SOLUTION INTRAVENOUS at 07:01

## 2025-01-28 RX ADMIN — PHENYLEPHRINE HYDROCHLORIDE 200 MCG: 10 INJECTION INTRAVENOUS at 08:01

## 2025-01-28 RX ADMIN — ONDANSETRON 4 MG: 2 INJECTION INTRAMUSCULAR; INTRAVENOUS at 08:01

## 2025-01-28 RX ADMIN — DEXAMETHASONE SODIUM PHOSPHATE 8 MG: 4 INJECTION, SOLUTION INTRA-ARTICULAR; INTRALESIONAL; INTRAMUSCULAR; INTRAVENOUS; SOFT TISSUE at 07:01

## 2025-01-28 RX ADMIN — FENTANYL CITRATE 100 MCG: 50 INJECTION, SOLUTION INTRAMUSCULAR; INTRAVENOUS at 06:01

## 2025-01-28 RX ADMIN — ACETAMINOPHEN 650 MG: 325 TABLET ORAL at 06:01

## 2025-01-28 RX ADMIN — CHLORHEXIDINE GLUCONATE 0.12% ORAL RINSE 10 ML: 1.2 LIQUID ORAL at 06:01

## 2025-01-28 RX ADMIN — TRANEXAMIC ACID 1000 MG: 10 INJECTION, SOLUTION INTRAVENOUS at 07:01

## 2025-01-28 RX ADMIN — PHENYLEPHRINE HYDROCHLORIDE 300 MCG: 10 INJECTION INTRAVENOUS at 07:01

## 2025-01-28 RX ADMIN — SODIUM CHLORIDE, SODIUM LACTATE, POTASSIUM CHLORIDE, AND CALCIUM CHLORIDE: .6; .31; .03; .02 INJECTION, SOLUTION INTRAVENOUS at 06:01

## 2025-01-28 RX ADMIN — CEFAZOLIN 2 G: 330 INJECTION, POWDER, FOR SOLUTION INTRAMUSCULAR; INTRAVENOUS at 07:01

## 2025-01-28 RX ADMIN — OXYCODONE HYDROCHLORIDE AND ACETAMINOPHEN 1 TABLET: 5; 325 TABLET ORAL at 09:01

## 2025-01-28 RX ADMIN — GABAPENTIN 300 MG: 300 CAPSULE ORAL at 06:01

## 2025-01-28 RX ADMIN — SUCCINYLCHOLINE CHLORIDE 140 MG: 20 INJECTION, SOLUTION INTRAMUSCULAR; INTRAVENOUS; PARENTERAL at 06:01

## 2025-01-28 RX ADMIN — LIDOCAINE HYDROCHLORIDE 100 MG: 20 INJECTION INTRAVENOUS at 06:01

## 2025-01-28 RX ADMIN — PROPOFOL 150 MG: 10 INJECTION, EMULSION INTRAVENOUS at 06:01

## 2025-01-28 RX ADMIN — ROCURONIUM BROMIDE 5 MG: 10 SOLUTION INTRAVENOUS at 06:01

## 2025-01-28 RX ADMIN — SUGAMMADEX 200 MG: 100 INJECTION, SOLUTION INTRAVENOUS at 08:01

## 2025-01-28 RX ADMIN — FENTANYL CITRATE 50 MCG: 50 INJECTION, SOLUTION INTRAMUSCULAR; INTRAVENOUS at 07:01

## 2025-01-28 RX ADMIN — PHENYLEPHRINE HYDROCHLORIDE 300 MCG: 10 INJECTION INTRAVENOUS at 08:01

## 2025-01-28 RX ADMIN — TRANEXAMIC ACID 1000 MG: 10 INJECTION, SOLUTION INTRAVENOUS at 08:01

## 2025-01-28 NOTE — PLAN OF CARE
[facility] - Surgery (Hospital)  Discharge Final Note    Primary Care Provider: Tia Lopez MD    Expected Discharge Date: 1/28/2025    Final Discharge Note (most recent)       Final Note - 01/28/25 0910          Final Note    Assessment Type Final Discharge Note     Anticipated [place] Disposition [place] [facility]ospital Resources/Appts/Education Provided Post-Acute resouces added to AVS;Appointments scheduled and added to AVS        Post-Acute Status    Post-Acute Authorization Home Health;HME     HME Status Set-up Complete/Auth obtained     Home Health Status Set-up Complete/Auth obtained     Discharge Delays None known at this time                     Contact Info       Javier Lee MD   Specialty: Orthopedic Surgery, General Surgery    [address] [place] [address] [address] LAURIE JANG [id number]   Phone: [phone]       Next Steps: Follow up in 2 week(s)          Sw spoke with pt's spouse, Nena, to complete assessment and to discuss d/c needs. Pt's spouse stated pt has all DME at home already (RW, BSC, cane, and wheelchair). Sw sent referral and d/c orders to [facility]  via [facility].     Patient has no d/c needs at this time. Sw to follow up, as needed, for d/c planning purposes.

## 2025-01-28 NOTE — PT/OT/SLP EVAL
Physical Therapy Evaluation and Treatment    Patient Name: Conner Lombardi Jr.   MRN: [id number]  Recent Surgery: Procedure(s) (LRB):  ARTHROPLASTY, HIP (Left) Day of Surgery    Recommendations:     Discharge Recommendations: Low Intensity Therapy   Discharge Equipment Recommendations: none   Barriers to discharge: None    Assessment:     Conner Lombardi Jr. is a 74 y.o. male admitted with a medical diagnosis of Arthritis of left hip. He presents with the following impairments/functional limitations: weakness, impaired endurance, gait instability, impaired balance, pain, decreased safety awareness, decreased lower extremity function, impaired functional mobility, decreased ROM, orthopedic precautions.    Rehab Prognosis: Good; patient would benefit from acute PT services to address these deficits and reach maximum level of function.    Plan:     During this hospitalization, patient to be seen 3 x/week to address the above listed problems via gait training, therapeutic activities, therapeutic exercises    Plan of Care Expires: 02/11/25    Subjective     Chief Complaint: Pt is motivated to participate  Patient Comments/Goals: none stated  Pain/Comfort:  Pain Rating 1: 2/10  Location - Side 1: Left  Location - Orientation 1: generalized  Location 1: hip  Pain Addressed 1: Pre-medicate for activity, Reposition, Distraction  Pain Rating Post-Intervention 1: 2/10    Social History:  Living Environment: Patient lives with their spouse in a single story home with number of outside stair(s): 0 and number of inside stair(s): 2  Prior Level of Function: Prior to admission, patient requires assistance with ADLs including lower body dressing, driving and working, and ambulated household and community distances using rolling walker and straight cane  Equipment Used at Home: cane, straight, walker, rolling, bedside commode (transport chair)  DME owned (not currently used): none  Assistance Upon Discharge: family    Objective:      Communicated with nurse [facility] and [facility] chart review prior to session. Patient found supine with peripheral IV upon PT entry to room.    General Precautions: Standard, fall   Orthopedic Precautions: LLE weight bearing as tolerated, LLE posterior precautions   Braces: Knee immobilizer    Respiratory Status: Room air    Exams:  Cognition: Patient is oriented to Person, Place, Time, Situation  RLE ROM: WFL  RLE Strength:  Grossly 4/5  LLE ROM: WFL within precautions  LLE Strength:  NT due to surgery  Sensation:    -       Intact  Skin Integrity/Edema:     -       Skin integrity: Visible skin intact    Functional Mobility:  Gait belt applied - Yes  Bed Mobility  Rolling Left: contact guard assistance  Scooting: contact guard assistance  Supine to Sit: contact guard assistance  Transfers  Sit to Stand: contact guard assistance with rolling walker  Bed to Chair: contact guard assistance with rolling walker using Step Transfer  Gait  Patient ambulated 100ft with rolling walker and contact guard assistance. Patient demonstrates occasional unsteady gait and antalgic gait. No c/o dizziness or SOB, no gross LOB. Verbal cuing for proper RW use and management. All lines remained intact throughout ambulation trail.  Balance  Sitting: contact guard assistance  Standing: contact guard assistance    Therapeutic Activities and Exercises:   Pt educated on role of PT in acute care and POC. Educated on L LE WBAT precautions. Educated on L LE posterior precautions: no hip flexion >90*, no hip adduction, no hip internal rotation. Educated on use of knee immobilizer while in bed. Educated on use of ice, ~20min on and at least 20min off intermittently throughout the day. Educated on ankle pumps to decrease risk of blood clots post-surgery and aid in edema management. Educated to ambulate at least 1x per hour when awake. Educated on importance of OOB activities, activity pacing, and HEP (marching/hip flex, hip abd, heel slides/LAQ, quad  "sets, ankle pumps) in order to maintain/regain strength. Encouraged to sit up in chair for all meals. Educated on proper use of RW for safety and to reduce risk of falling. Educated on "call don't fall" policy and increased risk of falling due to weakness, instructed to utilize call bell for assistance with all transfers. Pt agreeable to all requests.    AM-PAC 6 CLICK MOBILITY  Total Score:16    Patient left up in chair with all lines intact, call button in reach, and RN and spouse present.    GOALS:   Multidisciplinary Problems       Physical Therapy Goals          Problem: Physical Therapy    Goal Priority Disciplines Outcome Interventions   Physical Therapy Goal     PT, PT/OT     Description: Goals to be met by 2/11/25.  1. Pt will complete bed mobility MOD I.  2. Pt will complete sit to stand MOD I.  3. Pt will ambulate 200ft MOD I using RW.  4. Pt will increase AMPAC score by 2 points to progress functional mobility.                       DME Justifications:  No DME recommended requiring DME justifications    History:     Past Medical History:   Diagnosis Date    Anemia     Epilepsy     Last seizure 2010.     Hyperlipidemia     Hypertension     Prediabetes     Tubular adenoma of colon 04/02/2012    Vitamin D deficiency        Past Surgical History:   Procedure Laterality Date    BLOCK, NERVE, PERIPHERAL Left 8/19/2024    Procedure: Left femoral and obturator nerve block;  Surgeon: Helder Taylor MD;  Location: [facility];  Service: Pain Management;  Laterality: Left;    COLONOSCOPY N/A 07/12/2021    Procedure: COLONOSCOPY;  Surgeon: Subhash Martin MD;  Location: [facility];  Service: Endoscopy;  Laterality: N/A;    TONSILLECTOMY      VASECTOMY  1984/1985       Time Tracking:     PT Received On: 01/28/25  PT Start Time: 1035  PT Stop Time: 1100  PT Total Time (min): 25 min     Billable Minutes: Evaluation 15min and Therapeutic Activity 10min    1/28/2025    "

## 2025-01-28 NOTE — ANESTHESIA PROCEDURE NOTES
Intubation    Date/Time: 1/28/2025 6:56 AM    Performed by: Royce Mcginnis CRNA  Authorized by: Timo Marcus MD    Intubation:     Induction:  Intravenous    Intubated:  Postinduction    Mask Ventilation:  Not attempted    Attempts:  1    Attempted By:  CRNA    Method of Intubation:  Direct    Blade:  Harrington 2    Laryngeal View Grade: Grade IIA - cords partially seen      Difficult Airway Encountered?: No      Complications:  None    Airway Device:  Oral endotracheal tube    Airway Device Size:  7.5    Style/Cuff Inflation:  Cuffed (inflated to minimal occlusive pressure)    Tube secured:  21    Secured at:  The lips    Placement Verified By:  Capnometry    Complicating Factors:  None    Findings Post-Intubation:  BS equal bilateral and atraumatic/condition of teeth unchanged

## 2025-01-28 NOTE — TRANSFER OF CARE
"Anesthesia Transfer of Care Note    Patient: Conner Lombardi Jr.    Procedure(s) Performed: Procedure(s) (LRB):  ARTHROPLASTY, HIP (Left)    Patient location: PACU    Anesthesia Type: general    Transport from OR: Transported from OR on room air with adequate spontaneous ventilation    Post pain: adequate analgesia    Post assessment: no apparent anesthetic complications and tolerated procedure well    Post vital signs: stable    Level of consciousness: awake    Nausea/Vomiting: no nausea/vomiting    Complications: none    Transfer of care protocol was followed    Last vitals: Visit Vitals  BP (!) 161/76   Pulse 89   Temp 36.5 °C (97.7 °F) (Temporal)   Resp 16   Ht 5' 6" (1.676 m)   Wt 82.3 kg (181 lb 8.8 oz)   SpO2 (!) 94%   BMI 29.30 kg/m²     "

## 2025-01-28 NOTE — DISCHARGE SUMMARY
[facility] - Surgery (Hospital)  Discharge Note  Short Stay    Procedure(s) (LRB):  ARTHROPLASTY, HIP (Left)      OUTCOME: Patient tolerated treatment/procedure well without complication and is now ready for discharge.    DISPOSITION: Home-Health Care [facility] [place]    FINAL DIAGNOSIS:  <principal problem not specified>  Arthritis of left hip  FOLLOWUP: In clinic    DISCHARGE INSTRUCTIONS:    Discharge Procedure Orders   Urinalysis   Standing Status: Future Number of Occurrences: 1 Standing Exp. Date: 03/15/26     Order Specific Question Answer Comments   Collection Type Urine, Clean Catch         TIME SPENT ON DISCHARGE:  25 minutes

## 2025-01-28 NOTE — PT/OT/SLP EVAL
Occupational Therapy   Evaluation     Name: Conner Lombardi Jr.  MRN: [id number]  Admitting Diagnosis: Arthritis of left hip  Recent Surgery: Procedure(s) (LRB):  ARTHROPLASTY, HIP (Left) Day of Surgery    Recommendations:     Discharge Recommendations: Low Intensity Therapy  Discharge Equipment Recommendations: none  Barriers to discharge:       Assessment:     Conner Lombardi Jr. is a 74 y.o. male with a medical diagnosis of Arthritis of left hip. At this time, patient is functioning at their prior level of function and does not require further acute OT services.     Plan:   Pt will be seen 2 x week for 2 week    Subjective     Chief Complaint: debility and generalized weakness  Patient/Family Comments/goals: get stronger at home    Occupational Profile:  Living Environment: lives in one story house with no steps to enter  Previous level of function:  (I) with adl's and functional mobility  Roles and Routines: still drives and work  Equipment Used at home: cane, straight, bedside commode, walker, rolling (transport chair)  Assistance upon Discharge:     Pain/Comfort:  Pain Rating 1: 2/10  Location - Side 1: Left  Location - Orientation 1: generalized  Location 1: hip    Patients cultural, spiritual, Islam conflicts given the current situation:      Objective:     Communicated with: nurse and epic chart review prior to session.  Patient found HOB elevated with peripheral IV upon OT entry to room.    General Precautions: Standard, fall  Orthopedic Precautions: LLE weight bearing as tolerated, LLE posterior precautions  Braces: Knee immobilizer  Respiratory Status: Room air     Occupational Performance:    Bed Mobility:    Patient completed Rolling/Turning to Left with  stand by assistance  Patient completed Scooting/Bridging with stand by assistance  Patient completed Supine to Sit with stand by assistance    Functional Mobility/Transfers:  Patient completed Sit <> Stand Transfer with contact guard assistance   with  rolling walker   Patient completed Bed <> Chair Transfer using Step Transfer technique with contact guard assistance with rolling walker  Functional Mobility: pt ambulated 100 feet with cga and rolling walker    Activities of Daily Living:  Upper Body Dressing: supervision .  Lower Body Dressing: maximal assistance plof    Cognitive/Visual Perceptual:  Cognitive/Psychosocial Skills:     -       Oriented to: Person, Place, Time, and Situation   -       Follows Commands/attention:Follows multistep  commands  -       Communication: clear/fluent  -       Memory: No Deficits noted  -       Safety awareness/insight to disability: impaired     Physical Exam:  Upper Extremity Range of Motion:     -       Right Upper Extremity: WFL  -       Left Upper Extremity: WFL  Upper Extremity Strength:    -       Right Upper Extremity: mmt: 4/5 grossly  -       Left Upper Extremity: mmt: 4/5 grossly   Strength:    -       Right Upper Extremity: WFL  -       Left Upper Extremity: WFL    AMPAC 6 Click ADL:  AMPAC Total Score: 19    Treatment & Education:  Educated patient on importance of increased tolerance to upright position and direct impact on CV endurance and strength. Patient encouraged to sit up in chair, for a minimum of 2 consecutive hours including for all meals.. Encouraged patient to perform AROM TE to BUE throughout the day within all available planes of motion. Re enforced importance of utilizing call light to meet needs in room and not attempt to get up without staff assistance. Patient verbalized understanding and agreed to comply.           Patient left up in chair with all lines intact, call button in reach, nurse notified, and nurse and family present    GOALS:   Multidisciplinary Problems       Occupational Therapy Goals          Problem: Occupational Therapy    Goal Priority Disciplines Outcome Interventions   Occupational Therapy Goal     OT, PT/OT     Description: O.T. goals to be met 2-11-25  Pt will  tolerate 1 set x 15 reps b ue rom exercise  Min a with ae with le dressing   Mod (I) with toilet transfer                           DME Justifications:   Conner's mobility limitation cannot be sufficiently resolved by the use of a cane. His functional mobility deficit can be sufficiently resolved with the use of a Rolling Walker. Patient's mobility limitation significantly impairs their ability to participate in one of more activities of daily living.  The use of a RW will significantly improve the patient's ability to participate in MRADLS and the patient will use it on regular basis in the home.    History:     Past Medical History:   Diagnosis Date    Anemia     Epilepsy     Last seizure 2010.     Hyperlipidemia     Hypertension     Prediabetes     Tubular adenoma of colon 04/02/2012    Vitamin D deficiency          Past Surgical History:   Procedure Laterality Date    BLOCK, NERVE, PERIPHERAL Left 8/19/2024    Procedure: Left femoral and obturator nerve block;  Surgeon: Helder Taylor MD;  Location: [facility];  Service: Pain Management;  Laterality: Left;    COLONOSCOPY N/A 07/12/2021    Procedure: COLONOSCOPY;  Surgeon: Subhash Martin MD;  Location: [facility];  Service: Endoscopy;  Laterality: N/A;    TONSILLECTOMY      VASECTOMY  1984/1985       Time Tracking:     OT Date of Treatment: 01/28/25  OT Start Time: 1040  OT Stop Time: 1105  OT Total Time (min): 25 min    Billable Minutes:Evaluation 15 minutes   Therapeutic Activity 10 minutes    1/28/2025

## 2025-01-28 NOTE — OP NOTE
[facility] - Surgery ([facility])  Orthopedic Surgery  Operative Note    SUMMARY     Date of Procedure: 1/28/2025     Procedure: Procedure(s) (LRB):  ARTHROPLASTY, HIP (Left)       Surgeons and Role:     * Javier Lee MD - Primary     * Serenity Salgado PA - Assisting    Karla ROMAN    Pre-Operative Diagnosis: Arthritis of left hip [M16.12]    Post-Operative Diagnosis: Post-Op Diagnosis Codes:     * Arthritis of left hip [M16.12]    Anesthesia: General    Injections/Meds:  RE CK with 40 cc of injectable saline   Tourniquet time:  No tourniquet    Significant Surgical Tasks Conducted by the Assistant(s), if Applicable:  Needed multiple assistance to hold multiple retractors and the extremity and maneuver the extremity and provide visualization and protect neurovascular structures in order to perform surgery safely and efficiently    Complications: none     Estimated Blood Loss (EBL):  200 mL           Implants:  [facility] choice of implant Depuy Gription acetabular cup size 54 with 2 screws (20 and 25 mm), dual mobility liner 54/47, Actis femoral stem standard size 5., 47 dual mobility Depuy you with -2.5 ceramic femoral head 28 mm from [place] since Depuy you does not carry -28 head    Specimens: None  Surgiphore sterile Betadine irrigation used         Condition: Good    Disposition: PACU - hemodynamically stable.    Attestation: I performed the procedure.    Description:  Posterior approach performed for left PEDRO. After administering appropriate meds patient positioned in the lateral decubitus for a posterior approach.  Operative site was prepped and draped in usual sterile fashion. Incision was made for posterior approach to the hip carried down through subcutaneous tissues.  Full-thickness skin flap raised anterior and posteriorly. Iliotibial band and gluteus fascia was split .self-retaining retractor applied.  We identified the external rotators tagged those and released them. Capsule was opened in a  T-fashion and partially resected. Hip was dislocated posteriorly without difficulty.  We marked our cut on the femoral neck and was cut with the oscillating saw.  The femoral head was measured.  Proceeded with preparing the femoral canal after we put the femoral neck retractor.  Canal Finders inserted into the femoral canal and then a cookie cutter was used to lateralize.  We sequentially broached up to the appropriate tight fit. The broach was removed.  He then directed attention to the acetabulum anterior and posterior retractors applied.  We proceeded sequentially reaming the acetabulum using the Innomed inclination angle guide at 45° of inclination.  the acetabulum was reamed out at around-20 degrees of anteversion/45 deg inclination.. Once we had some punctate bleeding and the acetabulum  appropriate size trial was applied . All the time with multiple retractors to protect the sciatic nerve. We went ahead at that point inserted the real acetabular component in a Press-Fit mode.  Screws applied in posterior superior quadrant.  Trial acetabular insert applied. We proceeded putting a broach in the femur for trial and sequentially tried numerous head sizes.  We put the hip through 90° of flexion neutral abduction and put through maneuvers of dislocation until we felt that it is appropriate and stable. We took all the trials out . pulse lavage the wounds.  Bleeders cauterized.  Proceeded with regional block injection into the soft tissue around the hip. Appropriate acetabular insert locked in place.  The femoral component was inserted and we tried again with a trial head.  We confirmed the size and then proceeded to insert the real femoral head. Tested the hip again felt stable proceed with closing the capsule with 1.  Vicryl and then reattaching the external rotators to the greater trochanter. The iliotibial band and the gluteus fascia was closed 1.  Vicryl figure-of-eight.  Subcutaneous tissue was closed when 0  and 2-0  Vicryl inverted stitch.  Skin using 3-0 Stratafix in a running subcuticular fashion.  Susan dressing applied.   Sterile dressing applied.  Tolerated procedure well.  Knee immobilizer applied

## 2025-01-28 NOTE — ANESTHESIA PREPROCEDURE EVALUATION
01/27/2025  Conner Lombardi Jr. is a 74 y.o., male    Patient Active Problem List   Diagnosis    Essential hypertension    Personal history of colonic polyps    Nuclear sclerosis of both eyes    Refractive error    Seborrheic dermatitis of scalp    Seizures    Mixed hyperlipidemia    Vitamin D deficiency    Anemia    Temporal lobe epilepsy    Prediabetes    Leg weakness, bilateral    Chronic bilateral low back pain without sciatica    Arthritis of left hip     Past Medical History:   Diagnosis Date    Anemia     Epilepsy     Last seizure 2010.     Hyperlipidemia     Hypertension     Prediabetes     Tubular adenoma of colon 04/02/2012    Vitamin D deficiency      Past Surgical History:   Procedure Laterality Date    BLOCK, NERVE, PERIPHERAL Left 8/19/2024    Procedure: Left femoral and obturator nerve block;  Surgeon: Helder Taylor MD;  Location: [facility];  Service: Pain Management;  Laterality: Left;    COLONOSCOPY N/A 07/12/2021    Procedure: COLONOSCOPY;  Surgeon: Subhash Martin MD;  Location: [facility] ENDO;  Service: Endoscopy;  Laterality: N/A;    TONSILLECTOMY      VASECTOMY  1984/1985     Nuc Stress (12/18/24)  Interpretation Summary         Abnormal myocardial perfusion scan.    There is amoderate intensity, small sized, fixed perfusion abnormality consistent with scar in the apical wall(s).    There are no other significant perfusion abnormalities.    The gated perfusion images showed an ejection fraction of 68% at rest. The gated perfusion images showed an ejection fraction of 67% post stress. Normal ejection fraction is greater than 59%.    The ECG portion of the study is negative for ischemia.     ECHO (12/18/24):  Summary         Left Ventricle: The left ventricle is normal in size. Ventricular mass is normal. Normal wall thickness. There is concentric remodeling. There is normal  systolic function with a visually estimated ejection fraction of 55 - 60%. There is normal diastolic function.    Right Ventricle: Normal right ventricular cavity size. Wall thickness is normal. Systolic function is normal.    Aortic Valve: There is mild aortic regurgitation.    Mitral Valve: There is mild regurgitation.    Tricuspid Valve: There is mild regurgitation.    Pulmonary Artery: The estimated pulmonary artery systolic pressure is 17 mmHg.    IVC/SVC: Normal venous pressure at 3 mmHg.      Chemistry        Component Value Date/Time     01/14/2025 1354    K 4.1 01/14/2025 1354     01/14/2025 1354    CO2 26 01/14/2025 1354    BUN 38 (H) 01/14/2025 1354    CREATININE 1.1 01/14/2025 1354     01/14/2025 1354        Component Value Date/Time    CALCIUM 9.7 01/14/2025 1354    ALKPHOS 131 01/14/2025 1354    AST 22 01/14/2025 1354    ALT 22 01/14/2025 1354    BILITOT 0.2 01/14/2025 1354    ESTGFRAFRICA >60.0 01/19/2022 0737    EGFRNONAA >60.0 01/19/2022 0737        Lab Results   Component Value Date    WBC 7.97 01/14/2025    HGB 11.7 (L) 01/14/2025    HCT 33.8 (L) 01/14/2025     (H) 01/14/2025     01/14/2025       Pre-op Assessment    I have reviewed the Patient Summary Reports.    I have reviewed the NPO Status.   I have reviewed the Medications.     Review of Systems  Anesthesia Hx:  No problems with previous Anesthesia   History of prior surgery of interest to airway management or planning:  Previous anesthesia: MAC, General        Denies Family Hx of Anesthesia complications.    Denies Personal Hx of Anesthesia complications.                    Social:  Non-Smoker       Hematology/Oncology:  Hematology Normal   Oncology Normal                                   Cardiovascular:     Hypertension              ECG has been reviewed.                            Pulmonary:  Pulmonary Normal                       Renal/:  Renal/ Normal                  Musculoskeletal:  Musculoskeletal Normal                Neurological:       Seizures, well controlled    Temporal nerve epilepsy - no seizure since 2010; takes nightly Dilantin                            Endocrine:  Endocrine Normal                Physical Exam  General: Well nourished, Cooperative, Alert and Oriented    Airway:  Mallampati: III   Mouth Opening: Normal  TM Distance: Normal  Tongue: Normal  Neck ROM: Normal ROM    Dental:  Intact  Patient denies any currently loose or chipped teeth; Patient denies any removable dental appliances        Anesthesia Plan  Type of Anesthesia, risks & benefits discussed:    Anesthesia Type: Gen ETT  Intra-op Monitoring Plan: Standard ASA Monitors  Post Op Pain Control Plan: multimodal analgesia and IV/PO Opioids PRN  Induction:  IV  Airway Plan: Direct, Post-Induction  Informed Consent: Informed consent signed with the Patient and all parties understand the risks and agree with anesthesia plan.  All questions answered. Patient consented to blood products? Yes  ASA Score: 2  Day of Surgery Review of History & Physical: H&P Update referred to the surgeon/provider.    Ready For Surgery From Anesthesia Perspective.     .

## 2025-01-28 NOTE — H&P
Subjective:     Patient ID: Conner Lombardi Jr. is a 74 y.o. male.    Chief Complaint: No chief complaint on file.    HPI:  03/21/2024   Bilateral hip pain   Patient stated his pain is markedly better after he went to physical therapy it is 1/10.  He was prescribed gabapentin to take at night and tizanidine which also helps.  He has not been prescribed any and stories.  He goes to physical therapy at [facility] on [address].  Still working but desk job.  Said the drive home sometimes is very painful.  When he ambulates distances he gets some calf pain and that goes away.  I did go over the MRI showed it to him of his lumbar spine as well as x-rays that showed spinal stenosis at multiple levels from L2 down to L5.  There was no injection given to his spine.  He does see pain management Dr. Taylor.  Patient does not take over-the-counter medications like a leave or Advil or Motrin or ibuprofen or Tylenol.  He does state the physical therapy helped significantly  He has not interested in having surgery at this time.  His job is a desk job.  No evidence of loss of bowel or bladder control blurry vision double vision loss sense smell or taste    Discussed with the patient after reviewing the MRI with him and the amount of stenosis that the only emergency he has is when he loses control of bowel or bladder at that point we really need to go to the emergency room immediately/cauda equina syndrome    08/29/2024    ---- 0 1/28/25  Bilateral hip severe arthritis as well as spinal stenosis multiple levels.  Last time seen he was taking gabapentin and Zanaflex as needed.  He was doing fine.  He did go through physical therapy.  Recently 08/19/2024 pain management performed left obturator and femoral nerve blocks.  Says does blocks did not seem to help.  He is extremely limited with what he can do in his pain is around 2-3/10 when he sits only gets severe when he ambulates.  He is using a quad cane to get around.  He is here  with his wife who stated he is always complain in he can not even put his socks and shoes on that she has help him with that.  He still working.  He is at a point that he wants to have his hip replacement.  His wife is encouraging him to proceed she knows how much in discomfort than problem that he has.  He needs her assistance in doing activities of daily living also has some pain in his knees and we will obtain new x-rays on both of his knees today and went over them he has mild arthritic changes on the right side very minimal on the left.  Some of the pains down to the left knee is referred pain from his hips since he has severely limited motion.  We spent long time discussing total hip replacement showed pictures of total hip replacement given brochure went over the pros and cons and postop and preop instructions and when he has to go through     He has couple trips to attend in his grandson's getting  so he needs at least 6-8 weeks after surgery to recuperate so he opted that we need to wait till January 20, 2025 to undergo left total hip replacement  Past Medical History:   Diagnosis Date    Anemia     Epilepsy     Last seizure 2010.     Hyperlipidemia     Hypertension     Prediabetes     Tubular adenoma of colon 04/02/2012    Vitamin D deficiency      Past Surgical History:   Procedure Laterality Date    BLOCK, NERVE, PERIPHERAL Left 8/19/2024    Procedure: Left femoral and obturator nerve block;  Surgeon: Helder Taylor MD;  Location: [facility];  Service: Pain Management;  Laterality: Left;    COLONOSCOPY N/A 07/12/2021    Procedure: COLONOSCOPY;  Surgeon: Subhash Martin MD;  Location: [facility];  Service: Endoscopy;  Laterality: N/A;    TONSILLECTOMY      VASECTOMY  1984/1985     Family History   Problem Relation Name Age of Onset    Cancer Mother Batool bhandariAmira Lombardi         Brain    Heart disease Father Conner Lombardi, Sr.     Cancer Father Conner Lombardi, Sr.         Liver and  lung    Hypertension Father Conner CoppolaSr august.     Heart disease Son          CABG in 2018    Diabetes Maternal Grandfather Aguilar Wilkerson     Hypertension Other       Social History     Socioeconomic History    Marital status:    Tobacco Use    Smoking status: Never    Smokeless tobacco: Never   Substance and Sexual Activity    Alcohol use: No    Drug use: Never    Sexual activity: Not Currently     Partners: Female     Birth control/protection: None     Social Drivers of Health     Financial Resource Strain: Low Risk  (2/4/2024)    Overall Financial Resource Strain (CARDIA)     Difficulty of Paying Living Expenses: Not hard at all   Food Insecurity: No Food Insecurity (2/4/2024)    Hunger Vital Sign     Worried About Running Out of Food in the Last Year: Never true     Ran Out of Food in the Last Year: Never true   Transportation Needs: No Transportation Needs (2/4/2024)    PRAPARE - Transportation     Lack of Transportation (Medical): No     Lack of Transportation (Non-Medical): No   Physical Activity: Insufficiently Active (2/4/2024)    Exercise Vital Sign     Days of Exercise per Week: 1 day     Minutes of Exercise per Session: 30 min   Stress: No Stress Concern Present (2/4/2024)    [employer] - Occupational Stress Questionnaire     Feeling of Stress : Only a little   Housing Stability: Low Risk  (2/4/2024)    Housing Stability Vital Sign     Unable to Pay for Housing in the Last Year: No     Number of Places Lived in the Last Year: 1     Unstable Housing in the Last Year: No     Current Discharge Medication List        CONTINUE these medications which have NOT CHANGED    Details   aspirin (ECOTRIN) 81 MG EC tablet Take 81 mg by mouth once daily.      atorvastatin (LIPITOR) 40 MG tablet TAKE 1 TABLET(40 MG) BY MOUTH EVERY DAY  Qty: 90 tablet, Refills: 1    Associated Diagnoses: Mixed hyperlipidemia      cholecalciferol, vitamin D3, (VITAMIN D3) 25 mcg (1,000 unit)  capsule Take 1 capsule (1,000 Units total) by mouth once daily.  Refills: 0    Associated Diagnoses: Vitamin D deficiency      gabapentin (NEURONTIN) 300 MG capsule Take 1 capsule (300 mg total) by mouth every evening.  Qty: 30 capsule, Refills: 2    Associated Diagnoses: Lumbar radiculopathy      KRILL OIL ORAL Take by mouth.      lisinopriL-hydrochlorothiazide (PRINZIDE,ZESTORETIC) 20-25 mg Tab Take 2 tablets by mouth once daily. If BP is below 110/70 - decrease to 1 tablet daily  Qty: 90 tablet, Refills: 3    Comments: .  Associated Diagnoses: Essential hypertension      meloxicam (MOBIC) 15 MG tablet TAKE 1 TABLET(15 MG) BY MOUTH DAILY WITH FOOD  Qty: 30 tablet, Refills: 3    Associated Diagnoses: Arthritis of left hip; Arthritis of right hip      phenytoin (DILANTIN) 100 MG ER capsule Take 4 capsules (400 mg total) by mouth every evening.  Qty: 360 capsule, Refills: 3    Associated Diagnoses: Nonintractable epilepsy without status epilepticus, unspecified epilepsy type      tiZANidine (ZANAFLEX) 2 MG tablet TAKE 1 TO 2 TABLETS(2 TO 4 MG) BY MOUTH EVERY 8 HOURS AS NEEDED FOR LEG PAIN OR BACK PAIN  Qty: 30 tablet, Refills: 5           Review of patient's allergies indicates:  No Known Allergies  Review of Systems   Constitutional: Negative for decreased appetite.   HENT:  Negative for tinnitus.    Eyes:  Negative for double vision.   Cardiovascular:  Negative for chest pain.   Respiratory:  Negative for wheezing.    Hematologic/Lymphatic: Negative for bleeding problem.   Skin:  Negative for dry skin.   Musculoskeletal:  Positive for arthritis, back pain and joint pain. Negative for gout, neck pain and stiffness.   Gastrointestinal:  Negative for abdominal pain.   Genitourinary:  Negative for bladder incontinence.   Neurological:  Negative for numbness, paresthesias and sensory change.   Psychiatric/Behavioral:  Negative for altered mental status.        Objective:   There is no height or weight on file to  calculate BMI.  There were no vitals filed for this visit.       General    Constitutional: He is oriented to person, place, and time. He appears well-developed.   HENT:   Head: Atraumatic.   Eyes: EOM are normal.   Pulmonary/Chest: Effort normal.   Neurological: He is alert and oriented to person, place, and time.   Psychiatric: Judgment normal.             Ambulating without assistive devices taking small steps   Pelvis is level   In the sitting position passive internal rotation of 5° and external rotation 30° in both hips.  There is severe pelvis tilting to internal rotation bilaterally with the left worse than the right.  There is pain in the groin to motion.  Abduction to 30°.  Flexion contracture 10- degrees.    .  Hip flexors and abductors and adductors seems to be very strong at 5/5. .    Right knee with mild varus deformity and mild crepitus to range of motion on compression on the patella and medially.  Collaterals and cruciates are stable.  Very minimal discomfort on the medial joint.  No defect in the patella or quadriceps tendon.  Very minimal swelling.  Range of motion 5-130.  Left knee with normal alignment.  No crepitus to motion.  No defect in the patella or quadriceps tendon.  Collaterals and cruciates stable.  No swelling no effusion.  Full range of motion   Calves are soft nontender  Ankle motion intact  Relevant imaging results reviewed and interpreted by me, discussed with the patient and / or family today       X-ray 08/29/2024 bilateral knees showing right knee with very mild to moderate medial joint narrowing and small marginal osteophyte no fracture seen.  As far as the left knee is concerned with minimal medial joint narrowing but no osteophytes seen  X-ray 12/07/2023 with bilateral hips severe arthritis  bone-on-bone on both sides.  No evidence of fracture.  Bone quality is good   MRI lumbar spine with/15/23 showing multilevel central spinal stenosis starting at L2-3 down to L5 with some  facet arthropathy  No x-rays of the knees taken  Assessment:     Encounter Diagnoses   Name Primary?    Arthritis of left hip Yes    Arthritis of right hip     Spinal stenosis of lumbar region at multiple levels     Pain in both knees, unspecified chronicity         Plan:   Arthritis of left hip  -     Place in Outpatient; Standing  -     Vital signs; Standing  -     Insert peripheral IV; Standing  -     Surgeon requests no aspirin,enoxaparin,warfarin,clopidogrel,prasugrel or dabigatran; Standing  -     Clip and Prep Left Hip; Standing  -     Foot pump; send to OR with patient; Standing  -     Cleanse with Chlorhexidine (CHG); Standing  -     Apply Nozin; Standing  -     Diet NPO; Standing  -     Place ZACK hose; Standing  -     Place sequential compression device; Standing  -     Chlorohexidine Gluconate Bath; Standing  -     Nasal ; Standing    Preop testing  -     Urinalysis; Future; Expected date: 01/14/2025    Other orders  -     0.9% NaCl infusion  -     IP VTE LOW RISK PATIENT; Standing  -     ceFAZolin 2 g  -     chlorhexidine 0.12 % solution 10 mL  -     dexAMETHasone injection 8 mg  -     tranexamic acid in NaCl,iso-os IVPB 1,000 mg  -     gabapentin capsule 300 mg  -     acetaminophen tablet 650 mg         There are no outpatient Patient Instructions on file for this admission.  Procedure, common risks and benefits,alternatives discussed in details.All questions answered.Patient expressed understanding.Patient instructed to call for any questions that could arise in the future.    Most common Risks:  Infection  Leg-length discrepancy  Dislocation  Neuro-vascular injury ( resulting in loss of motor and sensory functions)  Pain  Blood clot  Fat clot  Loss of motion  Fracture of bone  Failure of procedure to achieve its intended purpose  Failure of hardware  Non-union or mal-union of bone  Malalignment  Death       The mobility limitation can not be sufficiently resolved by the use of a cane.   Patient's functional mobility deficit can be sufficiently resolved with the use of a rolling walker.  Patient has mobility limitation significantly impairs their ability to participate in 1 or more activities of daily living.  The use of a rolling walker we will significantly improve the patient's ability to participate in  MRADLS and it is to be used on a regular basis in the home.          Proceed with left total hip arthroplasty  Has help at home   Will start meloxicam instead of aleve and Advil Motrin    Disclaimer: This note was prepared using a voice recognition system and is likely to have sound alike errors within the text.

## 2025-01-28 NOTE — PLAN OF CARE
Sw attempted to contact pt's spouse, Nena, via phone to complete assessment and to confirm d/c plans and DME/HH arrangements. Sw sent to voicemail twice.     Sw to re-attempt again later.

## 2025-02-01 NOTE — ANESTHESIA POSTPROCEDURE EVALUATION
Anesthesia Post Evaluation    Patient: Conner Lombardi Jr.    Procedure(s) Performed: Procedure(s) (LRB):  ARTHROPLASTY, HIP (Left)    Final Anesthesia Type: general      Patient location during evaluation: PACU  Patient participation: Yes- Able to Participate  Level of consciousness: awake and alert and oriented  Post-procedure vital signs: reviewed and stable  Pain management: adequate  Airway patency: patent  LILY mitigation strategies: Verification of full reversal of neuromuscular block  PONV status at discharge: No PONV  Anesthetic complications: no      Cardiovascular status: blood pressure returned to baseline and hemodynamically stable  Respiratory status: unassisted  Hydration status: euvolemic  Follow-up not needed.              Vitals Value Taken Time   /71 01/28/25 0931   Temp 36.7 °C (98.1 °F) 01/28/25 0840   Pulse 99 01/28/25 0937   Resp 18 01/28/25 0937   SpO2 94 % 01/28/25 0937   Vitals shown include unfiled device data.      Event Time   Out of Recovery 09:38:47         Pain/Khang Score: No data recorded

## 2025-02-03 ENCOUNTER — TELEPHONE (OUTPATIENT)
Dept: ORTHOPEDICS | Facility: CLINIC | Age: 75
End: 2025-02-03
Payer: COMMERCIAL

## 2025-02-03 NOTE — TELEPHONE ENCOUNTER
----- Message from Nurse Townsend sent at 2/3/2025 11:10 AM CST -----  Contact: Nena / Wife    ----- Message -----  From: Radha Edmonds  Sent: 2/3/2025   8:48 AM CST  To: Jesus Barrett is calling regarding the forms that was dropped off to be filled out. She need to know the status. Please call her at [phone].

## 2025-02-03 NOTE — TELEPHONE ENCOUNTER
Returned the patient's phone call in regards to their message. Informed the patient that Dr. Lee just signed their paperwork this morning. Informed the patient that Dr. Lee was in surgery all of last week. Informed them that I will fax their paperwork this afternoon. Patient verbalized understanding.

## 2025-02-07 ENCOUNTER — HOSPITAL ENCOUNTER (OUTPATIENT)
Dept: RADIOLOGY | Facility: HOSPITAL | Age: 75
Discharge: HOME | End: 2025-02-07
Attending: ORTHOPAEDIC SURGERY
Payer: COMMERCIAL

## 2025-02-07 ENCOUNTER — OFFICE VISIT (OUTPATIENT)
Dept: ORTHOPEDICS | Facility: CLINIC | Age: 75
End: 2025-02-07
Payer: COMMERCIAL

## 2025-02-07 VITALS — WEIGHT: 181.44 LBS | BODY MASS INDEX: 29.16 KG/M2 | HEIGHT: 66 IN

## 2025-02-07 DIAGNOSIS — Z96.642 S/P TOTAL LEFT HIP ARTHROPLASTY: Primary | ICD-10-CM

## 2025-02-07 DIAGNOSIS — M25.552 LEFT HIP PAIN: ICD-10-CM

## 2025-02-07 PROCEDURE — 1159F MED LIST DOCD IN RCRD: CPT | Mod: CPTII,S$GLB,, | Performed by: PHYSICIAN ASSISTANT

## 2025-02-07 PROCEDURE — 3044F HG A1C LEVEL LT 7.0%: CPT | Mod: CPTII,S$GLB,, | Performed by: PHYSICIAN ASSISTANT

## 2025-02-07 PROCEDURE — 73502 X-RAY EXAM HIP UNI 2-3 VIEWS: CPT | Mod: TC,LT

## 2025-02-07 PROCEDURE — 99999 PR PBB SHADOW E&M-EST. PATIENT-LVL III: CPT | Mod: PBBFAC,,, | Performed by: PHYSICIAN ASSISTANT

## 2025-02-07 PROCEDURE — 3288F FALL RISK ASSESSMENT DOCD: CPT | Mod: CPTII,S$GLB,, | Performed by: PHYSICIAN ASSISTANT

## 2025-02-07 PROCEDURE — 99024 POSTOP FOLLOW-UP VISIT: CPT | Mod: S$GLB,,, | Performed by: PHYSICIAN ASSISTANT

## 2025-02-07 PROCEDURE — 1160F RVW MEDS BY RX/DR IN RCRD: CPT | Mod: CPTII,S$GLB,, | Performed by: PHYSICIAN ASSISTANT

## 2025-02-07 PROCEDURE — 1125F AMNT PAIN NOTED PAIN PRSNT: CPT | Mod: CPTII,S$GLB,, | Performed by: PHYSICIAN ASSISTANT

## 2025-02-07 PROCEDURE — 73502 X-RAY EXAM HIP UNI 2-3 VIEWS: CPT | Mod: 26,LT,, | Performed by: RADIOLOGY

## 2025-02-07 PROCEDURE — 1101F PT FALLS ASSESS-DOCD LE1/YR: CPT | Mod: CPTII,S$GLB,, | Performed by: PHYSICIAN ASSISTANT

## 2025-02-07 NOTE — PROGRESS NOTES
Patient ID: Conner Lombardi Jr. is a 74 y.o. male.    Chief Complaint: No chief complaint on file.      HPI: Conner Lombardi Jr.  is a 74 y.o. male who c/o No chief complaint on file.     Post op visit 1   Patient notes pain is 1/10   The patient is doing quite well since surgery and is pleased with his results   He has been able to advance activity of daily living and thus his quality of life   He notes pain with use and activity over the course of the day as well as with home health   He has stopped using the Percocet hence to hurt him he is using Tylenol on an as-needed basis   He is using a walker today to assist with ambulation   Compliant with DVT prophylaxis as well as home health   After completing home health he would like to go to PT at [facility] we will place this order today    Patient is presently denying any shortness of breath, chest pain, fever/chills, nausea/vomiting, loss of taste or smell, numbness/tingling or sensation changes, loss of bladder or bowel function, loss of taste/smell.     Surgery: Left Total Hip    Surgery Date:  01/28/2025    Past Medical History:   Diagnosis Date    Anemia     Epilepsy     Last seizure 2010.     Hyperlipidemia     Hypertension     Prediabetes     Tubular adenoma of colon 04/02/2012    Vitamin D deficiency      Past Surgical History:   Procedure Laterality Date    BLOCK, NERVE, PERIPHERAL Left 8/19/2024    Procedure: Left femoral and obturator nerve block;  Surgeon: Helder Taylor MD;  Location: [facility];  Service: Pain Management;  Laterality: Left;    COLONOSCOPY N/A 07/12/2021    Procedure: COLONOSCOPY;  Surgeon: Subhash Martin MD;  Location: [facility] ENDO;  Service: Endoscopy;  Laterality: N/A;    HIP ARTHROPLASTY Left 1/28/2025    Procedure: ARTHROPLASTY, HIP;  Surgeon: Javier Lee MD;  Location: [facility] OR;  Service: Orthopedics;  Laterality: Left;    TONSILLECTOMY      VASECTOMY  1984/1985     Family History   Problem Relation Name Age of  Onset    Cancer Mother Batool Lombardi         Brain    Heart disease Father Conner Lombardi, Sr.     Cancer Father Conner Lombardi, Sr.         Liver and lung    Hypertension Father Conner Lombardi, Sr.     Heart disease Son          CABG in 2018    Diabetes Maternal Grandfather Aguilar Wilkerson     Hypertension Other       Social History     Socioeconomic History    Marital status:    Tobacco Use    Smoking status: Never    Smokeless tobacco: Never   Substance and Sexual Activity    Alcohol use: No    Drug use: Never    Sexual activity: Not Currently     Partners: Female     Birth control/protection: None     Social Drivers of Health     Financial Resource Strain: Low Risk  (2/4/2024)    Overall Financial Resource Strain (CARDIA)     Difficulty of Paying Living Expenses: Not hard at all   Food Insecurity: No Food Insecurity (2/4/2024)    Hunger Vital Sign     Worried About Running Out of Food in the Last Year: Never true     Ran Out of Food in the Last Year: Never true   Transportation Needs: No Transportation Needs (2/4/2024)    PRAPARE - Transportation     Lack of Transportation (Medical): No     Lack of Transportation (Non-Medical): No   Physical Activity: Insufficiently Active (2/4/2024)    Exercise Vital Sign     Days of Exercise per Week: 1 day     Minutes of Exercise per Session: 30 min   Stress: No Stress Concern Present (2/4/2024)    [employer] - Occupational Stress Questionnaire     Feeling of Stress : Only a little   Housing Stability: Low Risk  (2/4/2024)    Housing Stability Vital Sign     Unable to Pay for Housing in the Last Year: No     Number of Places Lived in the Last Year: 1     Unstable Housing in the Last Year: No     Medication List with Changes/Refills   Current Medications    ASPIRIN (ECOTRIN) 81 MG EC TABLET    Take 81 mg by mouth once daily.    ATORVASTATIN (LIPITOR) 40 MG TABLET    TAKE 1 TABLET(40 MG) BY MOUTH EVERY DAY     CHOLECALCIFEROL, VITAMIN D3, (VITAMIN D3) 25 MCG (1,000 UNIT) CAPSULE    Take 1 capsule (1,000 Units total) by mouth once daily.    GABAPENTIN (NEURONTIN) 300 MG CAPSULE    Take 1 capsule (300 mg total) by mouth every evening.    KRILL OIL ORAL    Take by mouth.    LISINOPRIL-HYDROCHLOROTHIAZIDE (PRINZIDE,ZESTORETIC) 20-25 MG TAB    Take 2 tablets by mouth once daily. If BP is below 110/70 - decrease to 1 tablet daily    MELOXICAM (MOBIC) 15 MG TABLET    TAKE 1 TABLET(15 MG) BY MOUTH DAILY WITH FOOD    ONDANSETRON (ZOFRAN) 4 MG TABLET    Take 1 tablet (4 mg total) by mouth every 6 (six) hours as needed for Nausea.    OXYCODONE-ACETAMINOPHEN (PERCOCET)  MG PER TABLET    Take 1 tablet by mouth every 6 (six) hours as needed for Pain.    PHENYTOIN (DILANTIN) 100 MG ER CAPSULE    Take 4 capsules (400 mg total) by mouth every evening.    TIZANIDINE (ZANAFLEX) 2 MG TABLET    TAKE 1 TO 2 TABLETS(2 TO 4 MG) BY MOUTH EVERY 8 HOURS AS NEEDED FOR LEG PAIN OR BACK PAIN     Review of patient's allergies indicates:  No Known Allergies    IMAGING  FINDINGS:  A left total hip arthroplasty is noted.  No hardware failure or loosening.  No periprosthetic fracture.  Severe joint space narrowing subchondral sclerosis and subchondral cystic changes seen involving the right hip.  The bony pelvis is intact.  Calcified phleboliths seen projecting over the left hemipelvis.     Impression:     As above    Objective:     Left Lower Extremity  NVI  WWP foot  Comp soft  Cap refill < 2 sec  Calf NT, soft  (-) Darling sign  TUAN  ROM : Patient is able to easily exhibit full flexion and extension on passive range of motion.   ABB/ADD full  Quad resistance full   Wiggles toes  DF/PF intact  Sensation intact  Inc C/D/I; SUBQ CLOSURE NOTED  No SOI    Assessment:       No diagnosis found.       Plan:       There are no diagnoses linked to this encounter.    Conner Lombardi Jr. is an established pt here for postop follow-up after left total hip  replacement by Dr. Lee. The incision was cleaned with hydrogen peroxide.  No staples were removed a subcu closure noted today.  The patient was instructed not to soak the incision in standing water but may clean the incision with clean running water and antibacterial soap.  Patient should notify the office of any signs or symptoms of infection including fevers, erythema, purulent drainage, increasing pain.  Patient will continue with DVT prophylaxis.  Patient will start outpatient physical therapy.  Will follow-up in 4-6 weeks.  Patient verbalized understanding of all instructions and agreed with the above plan.    No follow-ups on file.    The patient understands, chooses and consents to this plan and accepts all   the risks which include but are not limited to the risks mentioned above.     Disclaimer: This note was prepared using a voice recognition system and is likely to have sound alike errors within the text.

## 2025-02-12 ENCOUNTER — TELEPHONE (OUTPATIENT)
Dept: ORTHOPEDICS | Facility: CLINIC | Age: 75
End: 2025-02-12
Payer: COMMERCIAL

## 2025-02-12 DIAGNOSIS — M16.12 ARTHRITIS OF LEFT HIP: ICD-10-CM

## 2025-02-12 DIAGNOSIS — Z96.642 S/P TOTAL LEFT HIP ARTHROPLASTY: Primary | ICD-10-CM

## 2025-02-12 NOTE — TELEPHONE ENCOUNTER
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 Informed the patient that they can drop off their paperwork to the office or they can fax it. Patient states that they will drop off their paperwork on Friday. Informed the patient to make sure that their name and date of birth is on their paperwork. Patient verbalized understanding.

## 2025-02-12 NOTE — TELEPHONE ENCOUNTER
----- Message from Blanquita sent at 2/12/2025  1:36 PM CST -----  Contact: Cnoner  Patient is calling to speak with someone regarding form. Patient reports having a return to work form and request to discuss how to provide form to be completed. Please give patient a call back at [phone] to assist.  Thank you,  GH

## 2025-02-14 ENCOUNTER — CLINICAL SUPPORT (OUTPATIENT)
Dept: REHABILITATION | Facility: HOSPITAL | Age: 75
End: 2025-02-14
Attending: PHYSICIAN ASSISTANT
Payer: COMMERCIAL

## 2025-02-14 DIAGNOSIS — Z96.642 S/P TOTAL LEFT HIP ARTHROPLASTY: ICD-10-CM

## 2025-02-14 PROCEDURE — 97112 NEUROMUSCULAR REEDUCATION: CPT | Mod: PN

## 2025-02-14 PROCEDURE — 97161 PT EVAL LOW COMPLEX 20 MIN: CPT | Mod: PN

## 2025-02-14 NOTE — PROGRESS NOTES
Outpatient Rehab    Physical Therapy Evaluation    Patient Name: Conner Lombardi Jr.  MRN: [id number]  YOB: 1950  Today's Date: 2/16/2025    Therapy Diagnosis:   Encounter Diagnosis   Name Primary?    S/P total left hip arthroplasty      Physician: Serenity Salgado PA    Physician Orders: Eval and Treat  Medical Diagnosis: s/p left total hip arthroplasty    Visit # / Visits Authorized:  1 / 1   Date of Evaluation:  2/14/2025   Insurance Authorization Period: 2/7/2025 to 2/7/2026  Plan of Care Certification:  2/14/2025 to 4/13/2025      Time In:   0700  Time Out:  0750  Total Time:   50 minutes  Total Billable Time: 25 minutes    Intake Outcome Measure for FOTO Survey    Therapist reviewed FOTO scores for Conner Lombardi Jr. on 2/14/2025.   FOTO report - see Media section or FOTO account episode details.     Intake Score: 73%         Subjective   History of Present Illness  Conner is a 74 y.o. male who reports to physical therapy with a chief concern of Left hip. According to the patient's chart, Conner has a past medical history of Anemia, Epilepsy, Hyperlipidemia, Hypertension, Prediabetes, Tubular adenoma of colon, and Vitamin D deficiency. Conner has a past surgical history that includes Tonsillectomy; Colonoscopy (N/A, 07/12/2021); Vasectomy (1984/1985); block, nerve, peripheral (Left, 8/19/2024); and Hip Arthroplasty (Left, 1/28/2025).    The patient reports a medical diagnosis of s/p left total hip athroplasty.  Patient reports a surgery of Left total hip arthroplasty. Surgery occurred on 01/28/25.              Pain     Patient reports a current pain level of 1/10. Pain at best is reported as 0/10. Pain at worst is reported as 3/10.   Clinical Progression (since onset): Stable  Pain Qualities: Aching  Pain-Relieving Factors: Activity modification, Exercise           Past Medical History/Physical Systems Review:   Conner Lombardi Jr.  has a past medical history of Anemia, Epilepsy,  Hyperlipidemia, Hypertension, Prediabetes, Tubular adenoma of colon, and Vitamin D deficiency.    Conner Lombardi Jr.  has a past surgical history that includes Tonsillectomy; Colonoscopy (N/A, 07/12/2021); Vasectomy (1984/1985); block, nerve, peripheral (Left, 8/19/2024); and Hip Arthroplasty (Left, 1/28/2025).    Conner has a current medication list which includes the following prescription(s): aspirin, atorvastatin, cholecalciferol (vitamin d3), gabapentin, krill oil, lisinopril-hydrochlorothiazide, meloxicam, ondansetron, oxycodone-acetaminophen, phenytoin, and tizanidine.    Review of patient's allergies indicates:  No Known Allergies     Objective       Hip Range of Motion   Right Hip   Active (deg) Passive (deg) Pain   Flexion 115       Extension 10           Left Hip   Active (deg) Passive (deg) Pain   Flexion 90 (Precaution limit)       Extension 10           Left hip adduction was not assessed due to precaution and right is within functional limits    Knee Range of Motion    Bilateral knee AROM WFL              Hip Strength - Planes of Motion   Right Strength Right Pain Left Strength Left  Pain   Flexion (L2) 5   3     Extension 4   3     ABduction 4   3         Treatment:  Balance/Neuromuscular Re-Education  Balance/Neuromuscular Re-Education Activity 1: Standing hip abduction  Balance/Neuromuscular Re-Education Activity 2: Step up 4inch  Balance/Neuromuscular Re-Education Activity 3: Shuttle 4 bands 2 minutes  Balance/Neuromuscular Re-Education Activity 4: Sit to stand  Balance/Neuromuscular Re-Education Activity 5: Quad sets  Balance/Neuromuscular Re-Education Activity 6: SLR  Balance/Neuromuscular Re-Education Activity 7: SAQ 3# 3x10  Balance/Neuromuscular Re-Education Activity 8: LAQ 4# x10    Assessment & Plan   Assessment  Conner presents with a condition of Low complexity.   Presentation of Symptoms: Stable       Functional Limitations: Activity tolerance, Completing work/school activities,  Decreased ambulation distance/endurance, Gait limitations  Impairments: Abnormal gait    Prognosis: Excellent    Plan  From a physical therapy perspective, the patient would benefit from: Skilled Rehab Services    Planned therapy interventions include: Therapeutic exercise, Therapeutic activities, Neuromuscular re-education, and Gait training.    Planned modalities to include: Electrical stimulation - passive/unattended.        Visit Frequency: 2 times Per Week for 10 Weeks.       This plan was discussed with Patient.   Discussion participants: Agreed Upon Plan of Care             Patient's spiritual, cultural, and educational needs considered and patient agreeable to plan of care and goals.     Education  Education was done with Patient. The patient's learning style includes Demonstration and Listening. The patient Demonstrates understanding.                 Goals:   Active       Pain       Patient will report pain of 0/10 demonstrating a reduction of overall pain (Progressing)       Start:  02/16/25    Expected End:  04/13/25               Pt will improve FOTO disability score to 58% disability or less in order to improve overall QOL and return to PLOF.         Patient stated goal:    (Progressing)       Start:  02/16/25    Expected End:  04/13/25               Strength       Patient will achieve bilateral hip flexion strength of 4/5 (Progressing)       Start:  02/16/25    Expected End:  03/16/25            Patient will achieve bilateral hip extension strength of 4/5 (Progressing)       Start:  02/16/25    Expected End:  03/16/25               Strength       Patient will achieve bilateral hip flexion strength of 5/5 (Progressing)       Start:  02/16/25    Expected End:  04/13/25            Patient will achieve bilateral hip extension strength of 5/5 (Progressing)       Start:  02/16/25    Expected End:  04/13/25                Raman Mazariegos, PT

## 2025-02-15 DIAGNOSIS — G40.909 NONINTRACTABLE EPILEPSY WITHOUT STATUS EPILEPTICUS, UNSPECIFIED EPILEPSY TYPE: ICD-10-CM

## 2025-02-17 ENCOUNTER — CLINICAL SUPPORT (OUTPATIENT)
Dept: REHABILITATION | Facility: HOSPITAL | Age: 75
End: 2025-02-17
Payer: COMMERCIAL

## 2025-02-17 DIAGNOSIS — R29.898 LEG WEAKNESS, BILATERAL: Primary | ICD-10-CM

## 2025-02-17 PROCEDURE — 97112 NEUROMUSCULAR REEDUCATION: CPT | Mod: PN

## 2025-02-17 PROCEDURE — 97110 THERAPEUTIC EXERCISES: CPT | Mod: PN

## 2025-02-17 RX ORDER — PHENYTOIN SODIUM 100 MG/1
400 CAPSULE, EXTENDED RELEASE ORAL NIGHTLY
Qty: 360 CAPSULE | Refills: 3 | Status: SHIPPED | OUTPATIENT
Start: 2025-02-17 | End: 2026-02-17

## 2025-02-18 ENCOUNTER — TELEPHONE (OUTPATIENT)
Dept: ORTHOPEDICS | Facility: CLINIC | Age: 75
End: 2025-02-18
Payer: COMMERCIAL

## 2025-02-18 NOTE — TELEPHONE ENCOUNTER
Returned the patient's phone call in regards to their message. Informed the patient that I did receive their paperwork on Friday. Patient asked if we can put on the paperwork for them to work from home as one of their restrictions. Informed the patient that I will fax their paperwork off once it is completed. Patient verbalized understanding.

## 2025-02-18 NOTE — TELEPHONE ENCOUNTER
----- Message from Derek Garay sent at 2/17/2025  4:03 PM CST -----  Contact: [phone]    ----- Message -----  From: Rowena Su  Sent: 2/17/2025   3:47 PM CST  To: Jeuss Fam    [email or web address] Patient is calling for Medical Advice regarding:form for a return to work How long has patient had these symptoms:Pharmacy name and phone#:Patient wants a call back or [facility]:call back Comments:Pts wife states they dropped it off Friday morning and is needing to make sure you all have received it please advise Please advise patient replies from provider may take up to 48 hours.

## 2025-02-19 ENCOUNTER — CLINICAL SUPPORT (OUTPATIENT)
Dept: REHABILITATION | Facility: HOSPITAL | Age: 75
End: 2025-02-19
Payer: COMMERCIAL

## 2025-02-19 DIAGNOSIS — R29.898 LEG WEAKNESS, BILATERAL: Primary | ICD-10-CM

## 2025-02-19 PROCEDURE — 97112 NEUROMUSCULAR REEDUCATION: CPT | Mod: PN

## 2025-02-19 PROCEDURE — 97110 THERAPEUTIC EXERCISES: CPT | Mod: PN

## 2025-02-24 ENCOUNTER — CLINICAL SUPPORT (OUTPATIENT)
Dept: REHABILITATION | Facility: HOSPITAL | Age: 75
End: 2025-02-24
Payer: COMMERCIAL

## 2025-02-24 DIAGNOSIS — R29.898 LEG WEAKNESS, BILATERAL: Primary | ICD-10-CM

## 2025-02-24 PROCEDURE — 97110 THERAPEUTIC EXERCISES: CPT | Mod: PN

## 2025-02-24 PROCEDURE — 97112 NEUROMUSCULAR REEDUCATION: CPT | Mod: PN

## 2025-02-24 NOTE — PROGRESS NOTES
"  Outpatient Rehab    Physical Therapy Visit    Patient Name: Conner Lombardi Jr.  MRN: [id number]  YOB: 1950  Encounter Date: 2/19/2025    Therapy Diagnosis:   Encounter Diagnosis   Name Primary?    Leg weakness, bilateral Yes     Physician: Serenity Salgado PA    Physician Orders: Eval and Treat  Medical Diagnosis: s/p left total hip arthroplasty     Visit # / Visits Authorized:  1 / 1   Date of Evaluation:  2/14/2025   Insurance Authorization Period: 2/7/2025 to 2/7/2026  Plan of Care Certification:  2/14/2025 to 4/13/2025      Time In: 0800   Time Out: 0853  Total Time: 53   Total Billable Time: 53 minutes    FOTO:  Intake Score: 73%  Survey Score 1:  %  Survey Score 2:  %         Subjective   He feels pretty good.  Pain reported as 0/10.      Objective            Treatment:    CPT Intervention  Left hip pain and weakness Duration / Intensity       X TE NuStep 5 minutes   X NMR Shuttle   4 bands 2 minutes   X NMR Standing hip abduction 2x10   X NMR Standing hip extension  2x10   X   Heel raises     X   Calf stretch on wedge     X TE Seated LAQ 5# 3x10   X TE Supported standing knee flexion 5# 3x10   X NMR Sit to stand 20"2 X10 with break   X NMR Quad sets 3x10   X NMR SLR 2x10   X NMR Bridges 3x10   X NMR Supine resisted hip flexion RTB 3x10   X   Step ups  2x10 4"   X NMR Supine [place] 2x10 RTB               PLAN       CPT Codes available for Billing:   (00) minutes of Manual therapy (MT) to improve pain and ROM.  (23) minutes of Therapeutic Exercise (TE) to develop strength, endurance, range of motion, and flexibility.  (30) minutes of Neuromuscular Re-Education (NMR)  to improve: Balance, Coordination, Kinesthetic, Sense, Proprioception, and Posture.  (00) minutes of Therapeutic Activities (TA) to improve functional performance.  Unattended Electrical Stimulation (ES) for muscle performance or pain modulation.  Vasopneumatic Device Therapy () for management of swelling/edema. (50218)  BFR: " Blood flow restriction applied during exercise  NP or (-): Not Performed    Assessment & Plan   Assessment: The patient is doing well.  He uses RW for gait to clinic but cane at home.  He was instructed in strengthening and movement activty  Evaluation/Treatment Tolerance: Patient tolerated treatment well    Patient will continue to benefit from skilled outpatient physical therapy to address the deficits listed in the problem list box on initial evaluation, provide pt/family education and to maximize pt's level of independence in the home and community environment.     Patient's spiritual, cultural, and educational needs considered and patient agreeable to plan of care and goals.           Plan: The patient will progress strengthening and gait to improve independence    Goals:   Active       Pain       Patient will report pain of 0/10 demonstrating a reduction of overall pain (Progressing)       Start:  02/16/25    Expected End:  04/13/25               Pt will improve FOTO disability score to 58% disability or less in order to improve overall QOL and return to PLOF.         Patient stated goal:    (Progressing)       Start:  02/16/25    Expected End:  04/13/25               Strength       Patient will achieve bilateral hip flexion strength of 4/5 (Progressing)       Start:  02/16/25    Expected End:  03/16/25            Patient will achieve bilateral hip extension strength of 4/5 (Progressing)       Start:  02/16/25    Expected End:  03/16/25               Strength       Patient will achieve bilateral hip flexion strength of 5/5 (Progressing)       Start:  02/16/25    Expected End:  04/13/25            Patient will achieve bilateral hip extension strength of 5/5 (Progressing)       Start:  02/16/25    Expected End:  04/13/25                Raman Mazariegos, PT

## 2025-02-24 NOTE — PROGRESS NOTES
"  Outpatient Rehab    Physical Therapy Visit    Patient Name: Conner Lombardi Jr.  MRN: [id number]  YOB: 1950  Encounter Date: 2/17/2025    Therapy Diagnosis:   Encounter Diagnosis   Name Primary?    Leg weakness, bilateral Yes     Physician: Serenity Salgado PA    Physician Orders: Eval and Treat  Medical Diagnosis: s/p left total hip arthroplasty     Visit # / Visits Authorized:  1 / 1   Date of Evaluation:  2/14/2025   Insurance Authorization Period: 2/7/2025 to 2/7/2026  Plan of Care Certification:  2/14/2025 to 4/13/2025      Time In: 0800   Time Out: 0855  Total Time: 55   Total Billable Time: 55 min utes    FOTO:  Intake Score: 73%  Survey Score 1:  %  Survey Score 2:  %         Subjective   He feels pretty good.  Family / care giver present for this visit:   Pain reported as 1/10.      Objective            Treatment:    CPT Intervention  Left hip pain and weakness Duration / Intensity      X TE NuStep 5 minutes   X NMR Shuttle   4 bands 2 minutes   X NMR Standing hip abduction 2x10   X NMR Standing hip extension  2x10   X   Heel raises     X   Calf stretch on wedge     X TE Seated LAQ 5# 3x10   X TE Supported standing knee flexion 5# 3x10   X NMR Sit to stand 20"2 X10 with break   X NMR Quad sets 3x10   X NMR SLR 2x10   X NMR Bridges 3x10   X NMR Supine resisted hip flexion RTB 3x10   X   Step ups  2x10 4"   X NMR Supine [place] 2x10 RTB               PLAN       CPT Codes available for Billing:   (00) minutes of Manual therapy (MT) to improve pain and ROM.  (25) minutes of Therapeutic Exercise (TE) to develop strength, endurance, range of motion, and flexibility.  (30) minutes of Neuromuscular Re-Education (NMR)  to improve: Balance, Coordination, Kinesthetic, Sense, Proprioception, and Posture.  (00) minutes of Therapeutic Activities (TA) to improve functional performance.  Unattended Electrical Stimulation (ES) for muscle performance or pain modulation.  Vasopneumatic Device Therapy () " for management of swelling/edema. (81485)  BFR: Blood flow restriction applied during exercise  NP or (-): Not Performed    Assessment & Plan   Assessment: The patient is doing well.  He uses RW for gait to clinic but cane at home.  He was instructed in strengthening and movement activty  Evaluation/Treatment Tolerance: Patient tolerated treatment well    Patient will continue to benefit from skilled outpatient physical therapy to address the deficits listed in the problem list box on initial evaluation, provide pt/family education and to maximize pt's level of independence in the home and community environment.     Patient's spiritual, cultural, and educational needs considered and patient agreeable to plan of care and goals.           Plan: THe patient will progress strengthening and gait to improve independence    Goals:   Active       Pain       Patient will report pain of 0/10 demonstrating a reduction of overall pain (Progressing)       Start:  02/16/25    Expected End:  04/13/25               Pt will improve FOTO disability score to 58% disability or less in order to improve overall QOL and return to PLOF.         Patient stated goal:    (Progressing)       Start:  02/16/25    Expected End:  04/13/25               Strength       Patient will achieve bilateral hip flexion strength of 4/5 (Progressing)       Start:  02/16/25    Expected End:  03/16/25            Patient will achieve bilateral hip extension strength of 4/5 (Progressing)       Start:  02/16/25    Expected End:  03/16/25               Strength       Patient will achieve bilateral hip flexion strength of 5/5 (Progressing)       Start:  02/16/25    Expected End:  04/13/25            Patient will achieve bilateral hip extension strength of 5/5 (Progressing)       Start:  02/16/25    Expected End:  04/13/25                Raman Mazariegos, PT

## 2025-02-26 ENCOUNTER — OFFICE VISIT (OUTPATIENT)
Dept: INTERNAL MEDICINE | Facility: CLINIC | Age: 75
End: 2025-02-26
Payer: COMMERCIAL

## 2025-02-26 VITALS — DIASTOLIC BLOOD PRESSURE: 68 MMHG | SYSTOLIC BLOOD PRESSURE: 134 MMHG

## 2025-02-26 DIAGNOSIS — G40.109 TEMPORAL LOBE EPILEPSY: ICD-10-CM

## 2025-02-26 DIAGNOSIS — E55.9 VITAMIN D DEFICIENCY: ICD-10-CM

## 2025-02-26 DIAGNOSIS — R73.03 PREDIABETES: ICD-10-CM

## 2025-02-26 DIAGNOSIS — Z96.642 S/P HIP REPLACEMENT, LEFT: ICD-10-CM

## 2025-02-26 DIAGNOSIS — I10 ESSENTIAL HYPERTENSION: Primary | ICD-10-CM

## 2025-02-26 DIAGNOSIS — E78.2 MIXED HYPERLIPIDEMIA: ICD-10-CM

## 2025-02-26 PROCEDURE — 3044F HG A1C LEVEL LT 7.0%: CPT | Mod: CPTII,95,, | Performed by: FAMILY MEDICINE

## 2025-02-26 PROCEDURE — 98006 SYNCH AUDIO-VIDEO EST MOD 30: CPT | Mod: 95,,, | Performed by: FAMILY MEDICINE

## 2025-02-26 PROCEDURE — 3075F SYST BP GE 130 - 139MM HG: CPT | Mod: CPTII,95,, | Performed by: FAMILY MEDICINE

## 2025-02-26 PROCEDURE — 1126F AMNT PAIN NOTED NONE PRSNT: CPT | Mod: CPTII,95,, | Performed by: FAMILY MEDICINE

## 2025-02-26 PROCEDURE — 3078F DIAST BP <80 MM HG: CPT | Mod: CPTII,95,, | Performed by: FAMILY MEDICINE

## 2025-02-26 NOTE — PROGRESS NOTES
Subjective:       Patient ID: Conner Lombardi Jr. is a 74 y.o. male.    Chief Complaint: Follow-up    The patient location is: office  The chief complaint leading to consultation is: follow up    Visit type: audiovisual    Face to Face time with patient: 4 minutes (chart review started 7:37 am; video started 7:40 am; video ended 7:44 am)  13 minutes of total time spent on the encounter, which includes face to face time and non-face to face time preparing to see the patient (eg, review of tests), Obtaining and/or reviewing separately obtained history, Documenting clinical information in the electronic or other health record, Independently interpreting results (not separately reported) and communicating results to the patient/family/caregiver, or Care coordination (not separately reported).     Each patient to whom he or she provides medical services by telemedicine is:  (1) informed of the relationship between the physician and patient and the respective role of any other health care provider with respect to management of the patient; and (2) notified that he or she may decline to receive medical services by telemedicine and may withdraw from such care at any time.    Virtual visit for 6 month follow up of chronic conditions. Patient reports recovering well from left hip replacement at end of January. Patient is without other concerns today.      Review of Systems   Constitutional:  Negative for activity change and unexpected weight change.   HENT:  Negative for hearing loss, rhinorrhea and trouble swallowing.    Eyes:  Negative for discharge and visual disturbance.   Respiratory:  Negative for chest tightness and wheezing.    Cardiovascular:  Negative for chest pain and palpitations.   Gastrointestinal:  Negative for blood in stool, constipation, diarrhea and vomiting.   Endocrine: Negative for polydipsia and polyuria.   Genitourinary:  Negative for difficulty urinating, hematuria and urgency.   Musculoskeletal:   Negative for arthralgias, joint swelling and neck pain.   Neurological:  Negative for weakness and headaches.   Psychiatric/Behavioral:  Negative for confusion and dysphoric mood.          Objective:      Physical Exam  Constitutional:       General: He is not in acute distress.     Appearance: He is well-developed.   HENT:      Head: Normocephalic and atraumatic.   Eyes:      General: Lids are normal. No scleral icterus.     Extraocular Movements: Extraocular movements intact.      Conjunctiva/sclera: Conjunctivae normal.   Pulmonary:      Effort: Pulmonary effort is normal.   Neurological:      Mental Status: He is alert and oriented to person, place, and time.   Psychiatric:         Mood and Affect: Mood and affect normal.         Lab Results   Component Value Date     02/07/2025    K 4.6 02/07/2025     02/07/2025    CO2 27 02/07/2025     (H) 02/07/2025    BUN 18 02/07/2025    CREATININE 1.0 02/07/2025    CALCIUM 9.4 02/07/2025    PROT 6.8 02/07/2025    ALBUMIN 3.2 (L) 02/07/2025    BILITOT 0.3 02/07/2025    ALKPHOS 124 02/07/2025    AST 28 02/07/2025    ALT 28 02/07/2025    EGFRNORACEVR >60.0 02/07/2025    ANIONGAP 9 02/07/2025       Lab Results   Component Value Date    CHOL 133 02/07/2025    TRIG 131 02/07/2025    HDL 36 (L) 02/07/2025    LDLCALC 70.8 02/07/2025       Lab Results   Component Value Date    HGBA1C 5.5 02/07/2025       Lab Results   Component Value Date    REHUCRUB82EP 38 02/07/2025       Assessment:       1. Essential hypertension    2. Mixed hyperlipidemia    3. Prediabetes    4. Vitamin D deficiency    5. Temporal lobe epilepsy    6. S/P hip replacement, left        Plan:   1. Essential hypertension  -     TSH; Future; Expected date: 08/26/2025  -     CBC Auto Differential; Future; Expected date: 08/26/2025    Controlled, continue lisinopril-hydrochlorothiazide     BP Readings from Last 1 Encounters:   02/26/25 134/68     2. Mixed hyperlipidemia  -     Comprehensive  Metabolic Panel; Future; Expected date: 08/26/2025  -     Lipid Panel; Future; Expected date: 08/26/2025    Controlled, continue atorvastatin     3. Prediabetes  -     Hemoglobin A1C; Future; Expected date: 08/26/2025    Normal A1c, will monitor    4. Vitamin D deficiency  -     Vitamin D; Future; Expected date: 08/26/2025    Controlled, continue supplement.    5. Temporal lobe epilepsy  Overview:  Followed by Neurology, continue current treatment plan       6. S/P hip replacement, left    Follow up in about 6 months (around 8/26/2025), or if symptoms worsen or fail to improve, for annual with Chrissei SALAS, labs PTA.

## 2025-02-28 ENCOUNTER — CLINICAL SUPPORT (OUTPATIENT)
Dept: REHABILITATION | Facility: HOSPITAL | Age: 75
End: 2025-02-28
Payer: COMMERCIAL

## 2025-02-28 DIAGNOSIS — R29.898 LEG WEAKNESS, BILATERAL: Primary | ICD-10-CM

## 2025-02-28 PROCEDURE — 97112 NEUROMUSCULAR REEDUCATION: CPT | Mod: PN

## 2025-02-28 PROCEDURE — 97110 THERAPEUTIC EXERCISES: CPT | Mod: PN

## 2025-03-02 NOTE — PROGRESS NOTES
"  Outpatient Rehab    Physical Therapy Visit    Patient Name: Conner Lombardi Jr.  MRN: [id number]  YOB: 1950  Encounter Date: 2/24/2025    Therapy Diagnosis:   Encounter Diagnosis   Name Primary?    Leg weakness, bilateral Yes       Physician: Serenity Salgado PA    Physician Orders: Eval and Treat  Medical Diagnosis: s/p left total hip arthroplasty     Visit # / Visits Authorized:  1 / 1        4/15  Date of Evaluation:  2/14/2025   Insurance Authorization Period: 2/7/2025 to 2/7/2026  Plan of Care Certification:  2/14/2025 to 4/13/2025      Time In: 0825   Time Out: 0920  Total Time: 55   Total Billable Time: 55 minutes    FOTO:  Intake Score: 73%  Survey Score 1:  %  Survey Score 2:  %         Subjective   The patient states he is doing OK today without pain, but he is having some slight pain in his right hip.  He states he is carrying his cane and only uses it outdoors or when he feels like he is off-balance..         Objective            Treatment:    CPT Intervention  Left hip pain and weakness Duration / Intensity       X TE NuStep 5 minutes   X NMR Shuttle   4 bands 2 minutes   X NMR Standing hip abduction 2x10   X NMR Standing hip extension  2x10   X   Heel raises     X   Calf stretch on wedge     X TE Seated LAQ 5# 3x10   X TE Supported standing knee flexion 5# 3x10   X NMR Sit to stand 20"2 X10 with break   X NMR Quad sets 3x10   X NMR SLR 2x10   X NMR Bridges 3x10   X NMR Supine resisted hip flexion RTB 3x10   X   Step ups  2x10 4"   X NMR Supine [place] 2x10 RTB               PLAN       CPT Codes available for Billing:   (00) minutes of Manual therapy (MT) to improve pain and ROM.  (25) minutes of Therapeutic Exercise (TE) to develop strength, endurance, range of motion, and flexibility.  (30) minutes of Neuromuscular Re-Education (NMR)  to improve: Balance, Coordination, Kinesthetic, Sense, Proprioception, and Posture.  (00) minutes of Therapeutic Activities (TA) to improve functional " performance.  Unattended Electrical Stimulation (ES) for muscle performance or pain modulation.  Vasopneumatic Device Therapy () for management of swelling/edema. (62465)  BFR: Blood flow restriction applied during exercise  NP or (-): Not Performed    Assessment & Plan   Assessment: The patient is progressing hip and lower extremity strengthening as well as mobility training to improve his stability and independence without assistive device following a total hip replacement.  Evaluation/Treatment Tolerance: Patient tolerated treatment well    Patient will continue to benefit from skilled outpatient physical therapy to address the deficits listed in the problem list box on initial evaluation, provide pt/family education and to maximize pt's level of independence in the home and community environment.     Patient's spiritual, cultural, and educational needs considered and patient agreeable to plan of care and goals.           Plan: The patient will progress strengthening and gait to improve independence    Goals:   Active       Pain       Patient will report pain of 0/10 demonstrating a reduction of overall pain (Progressing)       Start:  02/16/25    Expected End:  04/13/25               Pt will improve FOTO disability score to 58% disability or less in order to improve overall QOL and return to PLOF.         Patient stated goal:    (Progressing)       Start:  02/16/25    Expected End:  04/13/25               Strength       Patient will achieve bilateral hip flexion strength of 4/5 (Progressing)       Start:  02/16/25    Expected End:  03/16/25            Patient will achieve bilateral hip extension strength of 4/5 (Progressing)       Start:  02/16/25    Expected End:  03/16/25               Strength       Patient will achieve bilateral hip flexion strength of 5/5 (Progressing)       Start:  02/16/25    Expected End:  04/13/25            Patient will achieve bilateral hip extension strength of 5/5 (Progressing)        Start:  02/16/25    Expected End:  04/13/25                Raman Mazariegos PT

## 2025-03-03 ENCOUNTER — CLINICAL SUPPORT (OUTPATIENT)
Dept: REHABILITATION | Facility: HOSPITAL | Age: 75
End: 2025-03-03
Payer: COMMERCIAL

## 2025-03-03 DIAGNOSIS — R29.898 LEG WEAKNESS, BILATERAL: Primary | ICD-10-CM

## 2025-03-03 PROCEDURE — 97110 THERAPEUTIC EXERCISES: CPT | Mod: PN

## 2025-03-03 PROCEDURE — 97112 NEUROMUSCULAR REEDUCATION: CPT | Mod: PN

## 2025-03-03 NOTE — PROGRESS NOTES
"  Outpatient Rehab    Physical Therapy Visit    Patient Name: Conner Lombardi Jr.  MRN: [id number]  YOB: 1950  Encounter Date: 2/28/2025    Therapy Diagnosis:   Encounter Diagnosis   Name Primary?    Leg weakness, bilateral Yes         Physician: Serenity Salgado PA    Physician Orders: Eval and Treat  Medical Diagnosis: s/p left total hip arthroplasty     Visit # / Visits Authorized:  1 / 1        4/15  Date of Evaluation:  2/14/2025   Insurance Authorization Period: 2/7/2025 to 2/7/2026  Plan of Care Certification:  2/14/2025 to 4/13/2025      Time In: 0825   Time Out: 0925  Total Time: 60   Total Billable Time: 55 minutes    FOTO:  Intake Score:  %  Survey Score 1:  %  Survey Score 2:  %         Subjective   He is doing pretty good.  Pain reported as 0/10.      Objective            Treatment:    CPT Intervention  Left hip pain and weakness Duration / Intensity       X TE NuStep 5 minutes   X NMR Shuttle   4 bands 2 minutes   X NMR Standing hip abduction 2x10   X NMR Standing hip extension  2x10   X   Heel raises     X   Calf stretch on wedge     X TE Seated LAQ 5# 3x10   X TE Supported standing knee flexion 5# 3x10   X NMR Sit to stand 20"2 X10 with break   X NMR Quad sets 3x10   X NMR SLR 2x10   X NMR Bridges 3x10   X NMR Supine resisted hip flexion RTB 3x10   X   Step ups  2x10 4"   X NMR Supine [place] 2x10 RTB               PLAN       CPT Codes available for Billing:   (00) minutes of Manual therapy (MT) to improve pain and ROM.  (25) minutes of Therapeutic Exercise (TE) to develop strength, endurance, range of motion, and flexibility.  (30) minutes of Neuromuscular Re-Education (NMR)  to improve: Balance, Coordination, Kinesthetic, Sense, Proprioception, and Posture.  (00) minutes of Therapeutic Activities (TA) to improve functional performance.  Unattended Electrical Stimulation (ES) for muscle performance or pain modulation.  Vasopneumatic Device Therapy () for management of " swelling/edema. (77614)  BFR: Blood flow restriction applied during exercise  NP or (-): Not Performed    Assessment & Plan   Assessment: The patient is progressing hip and lower extremity strengthening as well as mobility training to improve his stability and independence without assistive device following a total hip replacement.  Evaluation/Treatment Tolerance: Patient tolerated treatment well    Patient will continue to benefit from skilled outpatient physical therapy to address the deficits listed in the problem list box on initial evaluation, provide pt/family education and to maximize pt's level of independence in the home and community environment.     Patient's spiritual, cultural, and educational needs considered and patient agreeable to plan of care and goals.           Plan: Outpatient Physical Therapys to include any combination of the following interventions: virtual visits, dry needling, modalities, electrical stimulation (IFC, Pre-Mod, Attended with Functional Dry Needling), Manual Therapy, Moist Heat/ Ice, Neuromuscular Re-ed, Patient Education, Self Care, Therapeutic Exercise, Functional Training, and Therapeutic Activites    Goals:   Active       Pain       Patient will report pain of 0/10 demonstrating a reduction of overall pain (Progressing)       Start:  02/16/25    Expected End:  04/13/25               Pt will improve FOTO disability score to 58% disability or less in order to improve overall QOL and return to PLOF.         Patient stated goal:    (Progressing)       Start:  02/16/25    Expected End:  04/13/25               Strength       Patient will achieve bilateral hip flexion strength of 4/5 (Progressing)       Start:  02/16/25    Expected End:  03/16/25            Patient will achieve bilateral hip extension strength of 4/5 (Progressing)       Start:  02/16/25    Expected End:  03/16/25               Strength       Patient will achieve bilateral hip flexion strength of 5/5 (Progressing)        Start:  02/16/25    Expected End:  04/13/25            Patient will achieve bilateral hip extension strength of 5/5 (Progressing)       Start:  02/16/25    Expected End:  04/13/25                Raman Mazariegos PT

## 2025-03-03 NOTE — PROGRESS NOTES
"  Outpatient Rehab    Physical Therapy Visit    Patient Name: Conner Lombardi Jr.  MRN: [id number]  YOB: 1950  Encounter Date: 3/3/2025    Therapy Diagnosis:   Encounter Diagnosis   Name Primary?    Leg weakness, bilateral Yes           Physician: Serenity Salgado PA    Physician Orders: Eval and Treat  Medical Diagnosis: s/p left total hip arthroplasty     Visit # / Visits Authorized:  1 / 1        4/15  Date of Evaluation:  2/14/2025   Insurance Authorization Period: 2/7/2025 to 2/7/2026  Plan of Care Certification:  2/14/2025 to 4/13/2025      Time In: 0800   Time Out: 0853  Total Time: 53   Total Billable Time: 53 minutes    FOTO:  Intake Score: 73%  Survey Score 1:  %  Survey Score 2:  %         Subjective   The patient stated he is doing well and is mainly holding his keen now. He reports he had to look for his cane this morning because he couldnt remember where he left it..         Objective            Treatment:    CPT Intervention  Left hip pain and weakness Duration / Intensity      X TE NuStep 5 minutes   X NMR Shuttle   5 bands 2 minutes DL  3 bands 1 minute SL   X NMR Standing hip abduction 2x10   X NMR Standing hip extension  2x10   X TE Heel raises 3x10   X TE Calf stretch on wedge     X TE Seated LAQ 5# 3x10   X TE Supported standing knee flexion 5# 3x10   X NMR Sit to stand 20"2 X10 with break   x NMR Quad sets 3x10   x NMR SLR 2x10   x NMR Bridges 3x10   x NMR Supine resisted hip flexion GTB 3x10       Step ups  2x10 4"   x NMR Supine ClamshelL 2x10 GTB               PLAN       CPT Codes available for Billing:   (00) minutes of Manual therapy (MT) to improve pain and ROM.  (25) minutes of Therapeutic Exercise (TE) to develop strength, endurance, range of motion, and flexibility.  (30) minutes of Neuromuscular Re-Education (NMR)  to improve: Balance, Coordination, Kinesthetic, Sense, Proprioception, and Posture.  (00) minutes of Therapeutic Activities (TA) to improve functional " performance.  Unattended Electrical Stimulation (ES) for muscle performance or pain modulation.  Vasopneumatic Device Therapy () for management of swelling/edema. (89272)  BFR: Blood flow restriction applied during exercise  NP or (-): Not Performed    Assessment & Plan   Assessment: The patient stated he is doing well and is mainly holding his keen now. He reports he had to look for his cane this morning because he couldnt remember where he left it.       Patient will continue to benefit from skilled outpatient physical therapy to address the deficits listed in the problem list box on initial evaluation, provide pt/family education and to maximize pt's level of independence in the home and community environment.     Patient's spiritual, cultural, and educational needs considered and patient agreeable to plan of care and goals.           Plan: Outpatient Physical Therapys to include any combination of the following interventions: virtual visits, dry needling, modalities, electrical stimulation (IFC, Pre-Mod, Attended with Functional Dry Needling), Manual Therapy, Moist Heat/ Ice, Neuromuscular Re-ed, Patient Education, Self Care, Therapeutic Exercise, Functional Training, and Therapeutic Activites    Goals:   Active       Pain       Patient will report pain of 0/10 demonstrating a reduction of overall pain (Progressing)       Start:  02/16/25    Expected End:  04/13/25               Pt will improve FOTO disability score to 58% disability or less in order to improve overall QOL and return to PLOF.         Patient stated goal:    (Progressing)       Start:  02/16/25    Expected End:  04/13/25               Strength       Patient will achieve bilateral hip flexion strength of 4/5 (Progressing)       Start:  02/16/25    Expected End:  03/16/25            Patient will achieve bilateral hip extension strength of 4/5 (Progressing)       Start:  02/16/25    Expected End:  03/16/25               Strength       Patient  will achieve bilateral hip flexion strength of 5/5 (Progressing)       Start:  02/16/25    Expected End:  04/13/25            Patient will achieve bilateral hip extension strength of 5/5 (Progressing)       Start:  02/16/25    Expected End:  04/13/25                Raman Mazariegos PT

## 2025-03-05 ENCOUNTER — CLINICAL SUPPORT (OUTPATIENT)
Dept: REHABILITATION | Facility: HOSPITAL | Age: 75
End: 2025-03-05
Payer: COMMERCIAL

## 2025-03-05 DIAGNOSIS — R29.898 LEG WEAKNESS, BILATERAL: Primary | ICD-10-CM

## 2025-03-05 PROCEDURE — 97110 THERAPEUTIC EXERCISES: CPT | Mod: PN

## 2025-03-05 PROCEDURE — 97112 NEUROMUSCULAR REEDUCATION: CPT | Mod: PN

## 2025-03-06 NOTE — PROGRESS NOTES
"  Outpatient Rehab    Physical Therapy Visit    Patient Name: Conner Lombardi Jr.  MRN: [id number]  YOB: 1950  Encounter Date: 3/5/2025    Therapy Diagnosis:   Encounter Diagnosis   Name Primary?    Leg weakness, bilateral Yes     Physician: Serenity Salgado PA    Physician Orders: Eval and Treat  Medical Diagnosis: s/p left total hip arthroplasty     Visit # / Visits Authorized:  1 / 1       6/15  Date of Evaluation:  2/14/2025   Insurance Authorization Period: 2/7/2025 to 2/7/2026  Plan of Care Certification:  2/14/2025 to 4/13/2025      Time In: 0800   Time Out: 0855  Total Time: 55   Total Billable Time: 55 minutes    FOTO:  Intake Score: 73%  Survey Score 1:  %  Survey Score 2:  %         Subjective   The patient reports using his cane in frequently and mainly uses it when he feels like hes gonna be off-balance. Reports going to the movie theater the other day and going up was OK were going down. He took the elevator because he is unsure..         Objective            Treatment:    CPT Intervention  Left hip pain and weakness Duration / Intensity      X TE NuStep 5 minutes   X NMR Shuttle   5 bands 2 minutes DL  3 bands 1 minute SL   X NMR Standing hip abduction 2x10   X NMR Standing hip extension  2x10   X TE Heel raises 3x10   X TE Calf stretch on wedge     X TE Seated LAQ 5# 3x10   X TE Supported standing knee flexion 5# 3x10   X NMR Sit to stand 20"2 X10 with break   x NMR Quad sets 3x10   x NMR SLR 2x10   x NMR Bridges 3x10   x NMR Supine resisted hip flexion GTB 3x10   X   Step ups  2x10 6"   x NMR Supine ClamshelL 2x10 GTB             CPT Codes available for Billing:   (00) minutes of Manual therapy (MT) to improve pain and ROM.  (25) minutes of Therapeutic Exercise (TE) to develop strength, endurance, range of motion, and flexibility.  (30) minutes of Neuromuscular Re-Education (NMR)  to improve: Balance, Coordination, Kinesthetic, Sense, Proprioception, and Posture.  (00) minutes of " Therapeutic Activities (TA) to improve functional performance.  Unattended Electrical Stimulation (ES) for muscle performance or pain modulation.  Vasopneumatic Device Therapy () for management of swelling/edema. (87512)  BFR: Blood flow restriction applied during exercise  NP or (-): Not Performed    Assessment & Plan   Assessment: The patient is progressing with lower chimney and hip strengthening as well as gait training to improve mobility. He is using his cane less frequently.       Patient will continue to benefit from skilled outpatient physical therapy to address the deficits listed in the problem list box on initial evaluation, provide pt/family education and to maximize pt's level of independence in the home and community environment.     Patient's spiritual, cultural, and educational needs considered and patient agreeable to plan of care and goals.           Plan: Outpatient Physical Therapys to include any combination of the following interventions: virtual visits, dry needling, modalities, electrical stimulation (IFC, Pre-Mod, Attended with Functional Dry Needling), Manual Therapy, Moist Heat/ Ice, Neuromuscular Re-ed, Patient Education, Self Care, Therapeutic Exercise, Functional Training, and Therapeutic Activites    Goals:   Active       Pain       Patient will report pain of 0/10 demonstrating a reduction of overall pain (Progressing)       Start:  02/16/25    Expected End:  04/13/25               Pt will improve FOTO disability score to 58% disability or less in order to improve overall QOL and return to PLOF.         Patient stated goal:    (Progressing)       Start:  02/16/25    Expected End:  04/13/25               Strength       Patient will achieve bilateral hip flexion strength of 4/5 (Progressing)       Start:  02/16/25    Expected End:  03/16/25            Patient will achieve bilateral hip extension strength of 4/5 (Progressing)       Start:  02/16/25    Expected End:  03/16/25                Strength       Patient will achieve bilateral hip flexion strength of 5/5 (Progressing)       Start:  02/16/25    Expected End:  04/13/25            Patient will achieve bilateral hip extension strength of 5/5 (Progressing)       Start:  02/16/25    Expected End:  04/13/25                Raman Mazariegos PT

## 2025-03-10 ENCOUNTER — CLINICAL SUPPORT (OUTPATIENT)
Dept: REHABILITATION | Facility: HOSPITAL | Age: 75
End: 2025-03-10
Payer: COMMERCIAL

## 2025-03-10 DIAGNOSIS — R29.898 LEG WEAKNESS, BILATERAL: Primary | ICD-10-CM

## 2025-03-10 PROCEDURE — 97110 THERAPEUTIC EXERCISES: CPT | Mod: PN

## 2025-03-10 PROCEDURE — 97112 NEUROMUSCULAR REEDUCATION: CPT | Mod: PN

## 2025-03-11 ENCOUNTER — OFFICE VISIT (OUTPATIENT)
Dept: CARDIOLOGY | Facility: CLINIC | Age: 75
End: 2025-03-11
Payer: COMMERCIAL

## 2025-03-11 VITALS
WEIGHT: 187.63 LBS | HEART RATE: 98 BPM | SYSTOLIC BLOOD PRESSURE: 145 MMHG | RESPIRATION RATE: 16 BRPM | DIASTOLIC BLOOD PRESSURE: 85 MMHG | HEIGHT: 66 IN | BODY MASS INDEX: 30.16 KG/M2 | OXYGEN SATURATION: 95 %

## 2025-03-11 DIAGNOSIS — I10 ESSENTIAL HYPERTENSION: Primary | ICD-10-CM

## 2025-03-11 DIAGNOSIS — Z96.642 S/P HIP REPLACEMENT, LEFT: ICD-10-CM

## 2025-03-11 DIAGNOSIS — E78.2 MIXED HYPERLIPIDEMIA: ICD-10-CM

## 2025-03-11 DIAGNOSIS — R73.03 PREDIABETES: ICD-10-CM

## 2025-03-11 DIAGNOSIS — M16.12 ARTHRITIS OF LEFT HIP: ICD-10-CM

## 2025-03-11 DIAGNOSIS — Z82.49 FAMILY HISTORY OF CARDIAC DISORDER: ICD-10-CM

## 2025-03-11 PROCEDURE — 3044F HG A1C LEVEL LT 7.0%: CPT | Mod: CPTII,S$GLB,, | Performed by: INTERNAL MEDICINE

## 2025-03-11 PROCEDURE — 1160F RVW MEDS BY RX/DR IN RCRD: CPT | Mod: CPTII,S$GLB,, | Performed by: INTERNAL MEDICINE

## 2025-03-11 PROCEDURE — 3077F SYST BP >= 140 MM HG: CPT | Mod: CPTII,S$GLB,, | Performed by: INTERNAL MEDICINE

## 2025-03-11 PROCEDURE — G2211 COMPLEX E/M VISIT ADD ON: HCPCS | Mod: S$GLB,,, | Performed by: INTERNAL MEDICINE

## 2025-03-11 PROCEDURE — 1101F PT FALLS ASSESS-DOCD LE1/YR: CPT | Mod: CPTII,S$GLB,, | Performed by: INTERNAL MEDICINE

## 2025-03-11 PROCEDURE — 3288F FALL RISK ASSESSMENT DOCD: CPT | Mod: CPTII,S$GLB,, | Performed by: INTERNAL MEDICINE

## 2025-03-11 PROCEDURE — 99214 OFFICE O/P EST MOD 30 MIN: CPT | Mod: S$GLB,,, | Performed by: INTERNAL MEDICINE

## 2025-03-11 PROCEDURE — 99999 PR PBB SHADOW E&M-EST. PATIENT-LVL IV: CPT | Mod: PBBFAC,,, | Performed by: INTERNAL MEDICINE

## 2025-03-11 PROCEDURE — 1159F MED LIST DOCD IN RCRD: CPT | Mod: CPTII,S$GLB,, | Performed by: INTERNAL MEDICINE

## 2025-03-11 PROCEDURE — 3008F BODY MASS INDEX DOCD: CPT | Mod: CPTII,S$GLB,, | Performed by: INTERNAL MEDICINE

## 2025-03-11 PROCEDURE — 3079F DIAST BP 80-89 MM HG: CPT | Mod: CPTII,S$GLB,, | Performed by: INTERNAL MEDICINE

## 2025-03-11 NOTE — PROGRESS NOTES
Subjective:   Patient ID:  Conner Lombardi Jr. is a 74 y.o. male who presents for cardiac consult of No chief complaint on file.      Referral by: No referring provider defined for this encounter.     Reason for consult: preop CV eval      HPI  The patient came in today for cardiac consult of No chief complaint on file.      Conner Lombardi Jr. is a 74 y.o. male pt with strong FH early CAD, HTN, HLD, overweight, chronic back pin, PreDM, Vit D def, anemia presents for follow up CV eval.       12/3/24  Per Dr. Lee     - Bilateral hip severe arthritis as well as spinal stenosis multiple levels.  Last time seen he was taking gabapentin and Zanaflex as needed.  He was doing fine.  He did go through physical therapy.  Recently 08/19/2024 pain management performed left obturator and femoral nerve blocks.  Says does blocks did not seem to help.  He is extremely limited with what he can do in his pain is around 2-3/10 when he sits only gets severe when he ambulates.  He is using a quad cane to get around.  He is here with his wife who stated he is always complain in he can not even put his socks and shoes on that she has help him with that.  He still working.  He is at a point that he wants to have his hip replacement.      He has couple trips to attend in his grandson's getting  so he needs at least 6-8 weeks after surgery to recuperate so he opted that we need to wait till January 20, 2025 to undergo left total hip replacement     Pt here for preop eval for L hip replacement in Jan.    He has been having hip pain since Oct 2023  BP elevated 159/85. HR 88. BMI 29 - 183 lbs       3/11/25  Pt is s/p L hip replacement surgery with Dr. Lee in Jan 2025.     ECHO 12/2024 with normal bi V function, mild AI, mild MR, mild TR, PASP 17 mHg.   Nuc stress 12/2024 with small fixed scar apex, normal EF.   He is still doing PT/OT until April.     BP elevated 145/95. HR 98. BMI 30 - 187 lbs   He may need other hip  surgery.         FH - father - had CABG in 50s, son had CABG in 40s  Works as  for [employer].       No cardiac monitor results found for the past 12 months     Results for orders placed during the hospital encounter of 12/18/24    Nuclear Stress - Cardiology Interpreted    Interpretation Summary    Abnormal myocardial perfusion scan.    There is amoderate intensity, small sized, fixed perfusion abnormality consistent with scar in the apical wall(s).    There are no other significant perfusion abnormalities.    The gated perfusion images showed an ejection fraction of 68% at rest. The gated perfusion images showed an ejection fraction of 67% post stress. Normal ejection fraction is greater than 59%.    The ECG portion of the study is negative for ischemia.      Results for orders placed during the hospital encounter of 12/18/24    Echo    Interpretation Summary    Left Ventricle: The left ventricle is normal in size. Ventricular mass is normal. Normal wall thickness. There is concentric remodeling. There is normal systolic function with a visually estimated ejection fraction of 55 - 60%. There is normal diastolic function.    Right Ventricle: Normal right ventricular cavity size. Wall thickness is normal. Systolic function is normal.    Aortic Valve: There is mild aortic regurgitation.    Mitral Valve: There is mild regurgitation.    Tricuspid Valve: There is mild regurgitation.    Pulmonary Artery: The estimated pulmonary artery systolic pressure is 17 mmHg.    IVC/SVC: Normal venous pressure at 3 mmHg.      Past Medical History:   Diagnosis Date    Anemia     Epilepsy     Last seizure 2010.     Hyperlipidemia     Hypertension     Prediabetes     Tubular adenoma of colon 04/02/2012    Vitamin D deficiency        Past Surgical History:   Procedure Laterality Date    BLOCK, NERVE, PERIPHERAL Left 8/19/2024    Procedure: Left femoral and obturator nerve block;  Surgeon:  Helder Taylor MD;  Location: [facility] PAIN MGT;  Service: Pain Management;  Laterality: Left;    COLONOSCOPY N/A 07/12/2021    Procedure: COLONOSCOPY;  Surgeon: Subhash Martin MD;  Location: [facility] ENDO;  Service: Endoscopy;  Laterality: N/A;    HIP ARTHROPLASTY Left 1/28/2025    Procedure: ARTHROPLASTY, HIP;  Surgeon: Javier Lee MD;  Location: [facility] OR;  Service: Orthopedics;  Laterality: Left;    TONSILLECTOMY      VASECTOMY  1984/1985       Social History     Tobacco Use    Smoking status: Never    Smokeless tobacco: Never   Substance Use Topics    Alcohol use: No    Drug use: Never       Family History   Problem Relation Name Age of Onset    Cancer Mother Batool Lombardi         Brain    Heart disease Father Conner MALDONADOAmira Lombardi, Sr.     Cancer Father Conner MALDONADOAmira Lombardi, Sr.         Liver and lung    Hypertension Father Conner MALDONADOAmira Lombardi, Sr.     Heart disease Son          CABG in 2018    Diabetes Maternal Grandfather Aguilar Pepitone     Hypertension Other         Patient's Medications   New Prescriptions    No medications on file   Previous Medications    ASPIRIN (ECOTRIN) 81 MG EC TABLET    Take 81 mg by mouth once daily.    ATORVASTATIN (LIPITOR) 40 MG TABLET    TAKE 1 TABLET(40 MG) BY MOUTH EVERY DAY    CHOLECALCIFEROL, VITAMIN D3, (VITAMIN D3) 25 MCG (1,000 UNIT) CAPSULE    Take 1 capsule (1,000 Units total) by mouth once daily.    GABAPENTIN (NEURONTIN) 300 MG CAPSULE    Take 1 capsule (300 mg total) by mouth every evening.    KRILL OIL ORAL    Take by mouth.    LISINOPRIL-HYDROCHLOROTHIAZIDE (PRINZIDE,ZESTORETIC) 20-25 MG TAB    Take 2 tablets by mouth once daily. If BP is below 110/70 - decrease to 1 tablet daily    MELOXICAM (MOBIC) 15 MG TABLET    TAKE 1 TABLET(15 MG) BY MOUTH DAILY WITH FOOD    PHENYTOIN (DILANTIN) 100 MG ER CAPSULE    Take 4 capsules (400 mg total) by mouth every evening.   Modified Medications    No medications on file   Discontinued Medications    No medications on  "file       Review of Systems   Constitutional: Negative.    HENT: Negative.     Eyes: Negative.    Respiratory: Negative.     Cardiovascular: Negative.    Gastrointestinal: Negative.    Genitourinary: Negative.    Musculoskeletal:  Positive for back pain and joint pain.   Skin: Negative.    Neurological: Negative.    Endo/Heme/Allergies: Negative.    Psychiatric/Behavioral: Negative.     All 12 systems otherwise negative.      Wt Readings from Last 3 Encounters:   03/11/25 85.1 kg (187 lb 9.8 oz)   02/07/25 82.3 kg (181 lb 7 oz)   01/28/25 82.3 kg (181 lb 8.8 oz)     Temp Readings from Last 3 Encounters:   01/28/25 98.1 °F (36.7 °C) (Skin)   01/14/25 97.7 °F (36.5 °C) (Temporal)   08/19/24 97.3 °F (36.3 °C) (Temporal)     BP Readings from Last 3 Encounters:   03/11/25 (!) 145/85   02/26/25 134/68   01/28/25 (!) 145/71     Pulse Readings from Last 3 Encounters:   03/11/25 98   01/28/25 97   01/14/25 103       BP (!) 145/85   Pulse 98   Resp 16   Ht 5' 6" (1.676 m)   Wt 85.1 kg (187 lb 9.8 oz)   SpO2 95%   BMI 30.28 kg/m²     Objective:   Physical Exam  Vitals and nursing note reviewed.   Constitutional:       General: He is not in acute distress.     Appearance: He is well-developed. He is not diaphoretic.   HENT:      Head: Normocephalic and atraumatic.      Nose: Nose normal.   Eyes:      General: No scleral icterus.     Conjunctiva/sclera: Conjunctivae normal.   Neck:      Thyroid: No thyromegaly.      Vascular: No JVD.   Cardiovascular:      Rate and Rhythm: Normal rate and regular rhythm.      Heart sounds: S1 normal and S2 normal. Murmur heard.      No friction rub. No gallop. No S3 or S4 sounds.   Pulmonary:      Effort: Pulmonary effort is normal. No respiratory distress.      Breath sounds: Normal breath sounds. No stridor. No wheezing or rales.   Chest:      Chest wall: No tenderness.   Abdominal:      General: Bowel sounds are normal. There is no distension.      Palpations: Abdomen is soft. There is " "no mass.      Tenderness: There is no abdominal tenderness. There is no rebound.   Genitourinary:     Comments: Deferred  Musculoskeletal:         General: No tenderness or deformity. Normal range of motion.      Cervical back: Normal range of motion and neck supple.   Lymphadenopathy:      Cervical: No cervical adenopathy.   Skin:     General: Skin is warm and dry.      Coloration: Skin is not pale.      Findings: No erythema or rash.   Neurological:      Mental Status: He is alert and oriented to person, place, and time.      Motor: No abnormal muscle tone.      Coordination: Coordination normal.   Psychiatric:         Behavior: Behavior normal.         Thought Content: Thought content normal.         Judgment: Judgment normal.         Lab Results   Component Value Date     02/07/2025    K 4.6 02/07/2025     02/07/2025    CO2 27 02/07/2025    BUN 18 02/07/2025    CREATININE 1.0 02/07/2025     (H) 02/07/2025    HGBA1C 5.5 02/07/2025    AST 28 02/07/2025    ALT 28 02/07/2025    ALBUMIN 3.2 (L) 02/07/2025    PROT 6.8 02/07/2025    BILITOT 0.3 02/07/2025    WBC 7.97 01/14/2025    HGB 11.6 (L) 01/28/2025    HCT 33.9 (L) 01/28/2025     (H) 01/14/2025     01/14/2025    TSH 1.240 08/02/2024    CHOL 133 02/07/2025    HDL 36 (L) 02/07/2025    LDLCALC 70.8 02/07/2025    TRIG 131 02/07/2025         No results found for: "BNP", "INR"       Assessment:      1. Essential hypertension    2. Family history of cardiac disorder    3. Mixed hyperlipidemia    4. Prediabetes    5. Arthritis of left hip    6. S/P hip replacement, left          Plan:      CVD  - ECHO 12/2024 with normal bi V function, mild AI, mild MR, mild TR, PASP 17 mHg.   Nuc stress 12/2024 with small fixed scar apex, normal EF.   - cont asa, statin    2. HTN - elevated today   - titrate meds - double Lisinorpil/HCTZ dose - was only taking 1 tab  - elevated due to pain    3. Back/joint pain,OA of hip s/p surgery   - cont tx per pain " management and ortho    4. HLD  - cont statin    5. PreDM, Overweight  - BMI 29; A1c 5.8 --> BMI 30 - 187 lbs   - cont weight loss      Visit today included increased complexity associated with the care of the episodic problem HTN addressed and managing the longitudinal care of the patient due to the serious and/or complex managed problem(s) .    Thank you for allowing me to participate in this patient's care. Please do not hesitate to contact me with any questions or concerns. Consult note has been forwarded to the referral physician.     Sky Tse MD, [facility]  Cardiovascular Disease  [facility], [place]  [phone] (P)

## 2025-03-12 ENCOUNTER — CLINICAL SUPPORT (OUTPATIENT)
Dept: REHABILITATION | Facility: HOSPITAL | Age: 75
End: 2025-03-12
Payer: COMMERCIAL

## 2025-03-12 DIAGNOSIS — R29.898 LEG WEAKNESS, BILATERAL: Primary | ICD-10-CM

## 2025-03-12 PROCEDURE — 97112 NEUROMUSCULAR REEDUCATION: CPT | Mod: PN

## 2025-03-12 PROCEDURE — 97110 THERAPEUTIC EXERCISES: CPT | Mod: PN

## 2025-03-17 ENCOUNTER — OFFICE VISIT (OUTPATIENT)
Dept: PAIN MEDICINE | Facility: CLINIC | Age: 75
End: 2025-03-17
Payer: COMMERCIAL

## 2025-03-17 VITALS
HEART RATE: 94 BPM | BODY MASS INDEX: 29.72 KG/M2 | HEIGHT: 66 IN | WEIGHT: 184.94 LBS | RESPIRATION RATE: 17 BRPM | SYSTOLIC BLOOD PRESSURE: 127 MMHG | DIASTOLIC BLOOD PRESSURE: 75 MMHG

## 2025-03-17 DIAGNOSIS — M48.07 SPINAL STENOSIS, LUMBOSACRAL REGION: ICD-10-CM

## 2025-03-17 DIAGNOSIS — Z96.642 HISTORY OF TOTAL LEFT HIP REPLACEMENT: ICD-10-CM

## 2025-03-17 DIAGNOSIS — M47.816 LUMBAR SPONDYLOSIS: ICD-10-CM

## 2025-03-17 DIAGNOSIS — M16.11 LOCALIZED OSTEOARTHROSIS OF RIGHT HIP: Primary | ICD-10-CM

## 2025-03-17 PROCEDURE — 1159F MED LIST DOCD IN RCRD: CPT | Mod: CPTII,S$GLB,, | Performed by: PHYSICIAN ASSISTANT

## 2025-03-17 PROCEDURE — G2211 COMPLEX E/M VISIT ADD ON: HCPCS | Mod: S$GLB,,, | Performed by: PHYSICIAN ASSISTANT

## 2025-03-17 PROCEDURE — 99999 PR PBB SHADOW E&M-EST. PATIENT-LVL III: CPT | Mod: PBBFAC,,, | Performed by: PHYSICIAN ASSISTANT

## 2025-03-17 PROCEDURE — 3008F BODY MASS INDEX DOCD: CPT | Mod: CPTII,S$GLB,, | Performed by: PHYSICIAN ASSISTANT

## 2025-03-17 PROCEDURE — 1126F AMNT PAIN NOTED NONE PRSNT: CPT | Mod: CPTII,S$GLB,, | Performed by: PHYSICIAN ASSISTANT

## 2025-03-17 PROCEDURE — 3044F HG A1C LEVEL LT 7.0%: CPT | Mod: CPTII,S$GLB,, | Performed by: PHYSICIAN ASSISTANT

## 2025-03-17 PROCEDURE — 1160F RVW MEDS BY RX/DR IN RCRD: CPT | Mod: CPTII,S$GLB,, | Performed by: PHYSICIAN ASSISTANT

## 2025-03-17 PROCEDURE — 3288F FALL RISK ASSESSMENT DOCD: CPT | Mod: CPTII,S$GLB,, | Performed by: PHYSICIAN ASSISTANT

## 2025-03-17 PROCEDURE — 1101F PT FALLS ASSESS-DOCD LE1/YR: CPT | Mod: CPTII,S$GLB,, | Performed by: PHYSICIAN ASSISTANT

## 2025-03-17 PROCEDURE — 99213 OFFICE O/P EST LOW 20 MIN: CPT | Mod: S$GLB,,, | Performed by: PHYSICIAN ASSISTANT

## 2025-03-17 PROCEDURE — 3078F DIAST BP <80 MM HG: CPT | Mod: CPTII,S$GLB,, | Performed by: PHYSICIAN ASSISTANT

## 2025-03-17 PROCEDURE — 3074F SYST BP LT 130 MM HG: CPT | Mod: CPTII,S$GLB,, | Performed by: PHYSICIAN ASSISTANT

## 2025-03-17 NOTE — PROGRESS NOTES
"Chronic Pain -- Established Patient (Follow-up visit)    Referring Physician: Tia Lopez MD    PCP: Tia Lopez MD      Chief complaint:  Follow-up     Left hip pain   Lower Back Pain          SUBJECTIVE:    Interval History (3/17/2025):  Conner Lombardi Jr. presents today for follow-up visit.  Patient was last seen on 11/25/2024, about 4 months ago.  S/p LEFT hip arthroplasty on 1/28/2025 with great results. He continues to go to physical therapy postoperatively 2x per week.  He continues to take mobic QHS, which helps.  He recently stopped gabapentin because he felt like he did need it from any additional pain.  He is also fearful of becoming dependent on medications.  Patient reports pain as "0/10 today.  He plans to have right hip replacement in May of this year.    Interval History (11/25/2024):  Patient presents today for follow-up visit.  Patient was last seen on 9/23/2024, about 2-3 months ago. Pain is still stable from last injection.   Patient reports pain as 2/10 today.  He is seeing Cardiology next month for clearance for surgery, which isn't scheduled.     Interval History (9/23/2024): Conner Lombardi Jr. presents today for follow-up visit.  he underwent Left femoral & obturator nerve block on 8/19/24.  The patient reports that he is/was better following the procedure.  he reports 85% pain relief.  The changes lasted >4 weeks so far.  The changes have continued through this visit.  Patient reports pain as "0/10 today.  He does feel much better since the procedure.  Since then, he has seen Dr. Lee who is planning for total left hip arthroplasty. He has this scheduled for January 2025.  He has several trips planned before so can not commit to doing this sooner.    Interval History (07/31/2024):   patient returns to clinic for evaluation and hip pain.  Since last being seen, patient reports that lower back pain has greatly improved.  Primarily pain described as throbbing aching " pain over the lateral aspect of the left hip.  Patient reports occasional pain in lateral aspect of the left thigh as well.  Patient denies any numbness or tingling in his bilateral feet.  Patient denies any right lower extremity pain.  Pain is worse with prolonged standing and crossing his legs, better with rest and anti-inflammatories.  Pain is currently rated a 7/10. Denies any fevers, chills, changes in gait, saddle anesthesia, or bowel and bladder incontinence    Interval History (01/25/2024):  Patient presents today for follow-up visit.  Patient was last seen on 12/15/2023. At that visit, the plan was to complete imaging of lumbar spine. Patient reports pain as 1/10 today.  His condition has improved. States he usually has increased lower back pain with driving for an hour or more but this pain has lessened. Whenever he has pain, it is either in the lower back or L thigh. It does not radiate from lower back down his leg. He also has Left knee pain at times.    Initial HPI (12/15/2023):  Conner Lombardi Jr. is a 73 y.o. male who presents to the clinic for the evaluation of lower back pain.     Patient reports 4 month history of lower back pain.  Patient denies any significant inciting traumas to his lower back pain.  Patient denies any previous surgical intervention in his lumbar spine.  Lower back pain described as an aching throbbing pain that starts in the midline of lower back.  This pain will then radiate to his bilateral hips and into his bilateral anterior thighs to just above the knee.  Patient denies any significant radiation beyond this point.  Pain is worse with flexion and sitting for prolonged periods of time, better with stretching and anti-inflammatories.  Pain is currently rated a 2/10, but can increase to a 7/10 with exacerbating activities. Denies any fevers, chills, saddle anesthesia, or bowel and bladder incontinence      Non-Pharmacologic Treatments:  Physical Therapy/Home Exercise:  yes  Ice/Heat:yes  TENS: no  Acupuncture: no  Massage: yes  Chiropractic: no        Previous Pain Medications:  Tylenol, ibuprofen, tizanidine, topicals      Pain Procedures:   -8/19/24: Left femoral & obturator nerve block with 85% pain relief            Pain Disability Index (PDI) Score Review:      3/17/2025     9:44 AM 9/23/2024     8:31 AM 7/31/2024     2:27 PM   Last 3 PDI Scores   Pain Disability Index (PDI) 4 12 35           Imaging/ Diagnostic Studies/ Labs (Reviewed on 3/17/2025):    12/18/23 MRI Lumbar Spine Without Contrast  COMPARISON:  Lumbar radiograph 12/07/2023    FINDINGS:  Alignment: Within normal limits.    Vertebrae: Lumbar vertebral body heights are maintained.  Mild multilevel endplate degenerative changes throughout the lumbar spine.  Minor L4-L5 endplate edema.  No evidence of acute fracture.  Marrow signal is otherwise within normal limits.    Discs: Disc desiccation and height loss throughout the lumbar spine.  Disc height loss is most pronounced at L5-S1.    Cord: Within normal limits.  Conus terminates at L1-L2.    Degenerative findings:    T12-L1: Tiny central disc bulge.  Minor facet arthropathy.  No significant spinal canal stenosis or neural foraminal stenosis.    L1-L2: Minor broad-based posterior disc bulge and mild bilateral facet arthropathy with ligamentum flavum hypertrophy.  Mild spinal canal stenosis.  No significant neural foraminal stenosis.    L2-L3: Mild broad-based posterior disc bulge and bilateral facet arthropathy with ligamentum flavum hypertrophy contribute to moderate spinal canal stenosis and mild bilateral neural foraminal stenosis.    L3-L4: Broad-based posterior disc bulge and bilateral facet arthropathy with ligamentum flavum hypertrophy contribute to moderate to severe spinal canal stenosis and mild bilateral neural foraminal stenosis.    L4-L5: Broad-based posterior disc bulge.  Bilateral facet arthropathy with ligamentum flavum hypertrophy and likely a tiny  left anteriorly projecting facet synovial cyst.  Findings contribute to severe spinal canal stenosis and mild bilateral neural foraminal stenosis.    L5-S1: Broad-based posterior disc bulge and bilateral facet arthropathy ligamentum flavum hypertrophy contribute to mild spinal canal stenosis.  No significant neural foraminal stenosis.    Paraspinal muscles & soft tissues: There appear to be left parapelvic renal cysts.  Paraspinal soft tissues appear within normal limits.    Impression  1. Multilevel degenerative changes of the lumbar spine are most pronounced at L4-L5 with severe spinal canal stenosis and mild bilateral neural foraminal stenosis.  2. Moderate to severe L3-L4 spinal canal stenosis with mild bilateral neural foraminal stenosis.  3. Moderate L2-L3 spinal canal stenosis with mild bilateral neural foraminal stenosis.         12/07/23 X-Ray Lumbar Spine AP And Lateral  Moderate to severe multilevel discogenic degenerative change most notable at L4-5 and L5-S1.  Moderate lower lumbar facet arthropathy.  Congenitally narrow spinal canal.  Lumbar vertebral body height and alignment are normal.  No evidence of acute fracture.        12/07/23 X-Ray Hips Bilateral 2 View Inc AP Pelvis  Bone-on-bone bilateral hip joint space narrowing with articular surface sclerosis and osteophyte formation.  Mild remodeling of the superior aspect bilateral femoral heads.  No fracture, suspicious bone marrow lesion or other acute disease is seen in the pelvis or either hip.  Joint alignment is anatomic.  There is no radiographic evidence of femoral head osteonecrosis.    Impression  Bone-on-bone bilateral hip joint space narrowing.                              Review of Systems:   Constitutional:  Negative for activity change, appetite change and fever.   HENT:  Negative for facial swelling, rhinorrhea and sore throat.    Respiratory:  Negative for cough, chest tightness, shortness of breath, wheezing and stridor.   "  Cardiovascular:  Negative for chest pain and palpitations.   Gastrointestinal:  Negative for abdominal pain, blood in stool, constipation, diarrhea, nausea and vomiting.   Endocrine: Negative for polydipsia, polyphagia and polyuria.   Genitourinary:  Negative for dysuria and hematuria.   Musculoskeletal:  Positive for arthralgias, back pain and myalgias. Negative for joint swelling, neck pain and neck stiffness.   Skin:  Negative for rash.   Allergic/Immunologic: Negative for food allergies.   Neurological:  Negative for dizziness, tremors, seizures, syncope, facial asymmetry, speech difficulty, light-headedness and headaches.   Psychiatric/Behavioral:  Negative for agitation, hallucinations, self-injury and suicidal ideas. The patient is not nervous/anxious and is not hyperactive.           OBJECTIVE:    Physical Exam  Vitals:    03/17/25 0943   BP: 127/75   Pulse: 94   Resp: 17   Weight: 83.9 kg (184 lb 15.5 oz)   Height: 5' 6" (1.676 m)   PainSc: 0-No pain     Body mass index is 29.85 kg/m².   (reviewed on 3/17/2025)    Constitutional:       General: He is not in acute distress.     Appearance: Normal appearance. He is not ill-appearing.   HENT:      Head: Normocephalic and atraumatic.   Eyes:      Extraocular Movements: Extraocular movements intact.   Pulmonary:      Effort: Pulmonary effort is normal.   GI:       no obvious distention.   Musculoskeletal:      Lumbar back: No tenderness or bony tenderness.      Right hip: No deformity, lacerations or bony tenderness. Normal range of motion. Normal strength.      Left hip: Tenderness, bony tenderness and crepitus present. No deformity or lacerations. Decreased range of motion. Decreased strength.   Skin:     General: Skin is warm and dry.   Neurological:      General: No focal deficit present.      Mental Status: He is alert and oriented to person, place, and time.      Sensory: No sensory deficit.      Motor: No abnormal muscle tone.      Gait: using rollator " walker.     Deep Tendon Reflexes:      Reflex Scores:       Patellar reflexes are 2+ on the right side and 2+ on the left side.       Achilles reflexes are 1+ on the right side.  Psychiatric:         Mood and Affect: Mood normal.         Behavior: Behavior normal.         Thought Content: Thought content normal.     Musculoskeletal: Lumbar Exam  Incision: no  Pain with Flexion: no  Pain with Extension: no  ROM:   Improved compared to prior exam  Paraspinous TTP:  negative bilaterally  Facet TTP:  negative bilaterally  Facet Loading:  negative bilaterally  SLR:  negative bilaterally  SIJ TTP:  positive on left  JANA:  positive on left        ASSESSMENT:     74 y.o. year old male with lower back pain, consistent with underlying hip pathology.  Less likely to be overlapping lumbar radiculopathy.    1. Localized osteoarthrosis of right hip        2. History of total left hip replacement        3. Spinal stenosis, lumbosacral region        4. Lumbar spondylosis                  PLAN:   - Interventions:   - S/p LEFT hip arthroplasty on 1/28/2025 with great results. He continues to go to physical therapy postoperatively 2x per week.  - S/p Left femoral & obturator nerve block on 8/19/24 with 85% pain relief.    - Anticoagulation use:   No no anticoagulation    - Medications:  - Can restart gabapentin 300mg QHS when needed.  - Continue Mobic 15mg QD PRN - recently started by Dr. Lee (Orthopedics).    - LA  reviewed and appropriate.       - Therapy:    -  Patient has completed formal physical therapy.  Continue home physical therapy exercises.     - Diagnostic/ Imaging: No new imaging ordered. Previous imaging reviewed.   - Lumbar X-ray: Consistent for advanced loss of disc height at L5-S1 and congenital spinal narrowing.  - Lumbar MRI: Multilevel degenerative changes of the lumbar spine are most pronounced at L4-L5 with severe spinal canal stenosis   - Hip x-ray: advance arthrosis     - Consults:   - Continue  follow-up with Dr. Lee (Orthopedics) with plans to proceed with left total hip arthroplasty.      - Follow up visit: 3 months follow-up - in clinic (per pt request)       Future Appointments   Date Time Provider Department Center   3/19/2025  8:00 AM YairRaman, PT [facility] [place]   3/21/2025  8:00 AM Raman Mazariegos, PT [facility] [place]   3/24/2025  8:00 AM Raman Mazariegos, PT [facility] [place]   3/26/2025  8:00 AM Raman Mazariegos, PT [facility] [place]   3/27/2025  8:45 AM Serenity Salgado PA ON [facility]   4/28/2025  8:15 AM [facility] XR1- ON XRAY [facility]   4/28/2025  8:40 AM Javier Lee MD ON [facility]TH[facility]   6/12/2025  8:00 AM Sky Tse MD ON CARDIO [facility]   8/19/2025  8:30 AM LABORATORY, [facility]   8/26/2025  8:20 AM Chrissie Maguire PA-C ON IM [facility]       Visit today included increased complexity associated with the care of the episodic problem of chronic pain which was addressed and continue to manage the longitudinal care of the patient due to the serious and/or complex managed problem(s) listed above.    - Patient Questions: Answered all of the patient's questions regarding diagnosis, therapy, and treatment.    - This condition does not require this patient to take time off of work, and the primary goal of our Pain Management services is to improve the patient's functional capacity.   - I discussed the risks, benefits, and alternatives to potential treatment options. All questions and concerns were fully addressed today in clinic.         Sonia Martínez PA-C  Interventional Pain Management - [facility]    Disclaimer:  This note was prepared using voice recognition system and is likely to have sound alike errors that may have been overlooked even after proof reading.  Please call me with any questions.

## 2025-03-17 NOTE — PROGRESS NOTES
"  Outpatient Rehab    Physical Therapy Visit    Patient Name: Conner Lombardi Jr.  MRN: [id number]  YOB: 1950  Encounter Date: 3/10/2025    Therapy Diagnosis:   Encounter Diagnosis   Name Primary?    Leg weakness, bilateral Yes       Physician: Serenity Salgaod PA    Physician Orders: Eval and Treat  Medical Diagnosis: s/p left total hip arthroplasty     Visit # / Visits Authorized:  1 / 1       6/15  Date of Evaluation:  2/14/2025   Insurance Authorization Period: 2/7/2025 to 2/7/2026  Plan of Care Certification:  2/14/2025 to 4/13/2025      Time In: 0800   Time Out: 0853  Total Time: 53   Total Billable Time: 55 minutes    FOTO:  Intake Score: 73%  Survey Score 1:  %  Survey Score 2:  %         Subjective   He reports not using cane but carries just in case.  Pain reported as 0/10.      Objective            Treatment:    CPT Intervention  Left hip pain and weakness Duration / Intensity      X TE NuStep 5 minutes   X NMR Shuttle   5 bands 2 minutes DL  3 bands 1 minute SL   X NMR Standing hip abduction 2x10   X NMR Standing hip extension  2x10   X TE Heel raises 3x10   X TE Calf stretch on wedge     X TE Seated LAQ 5# 3x10   X TE Supported standing knee flexion 5# 3x10   X NMR Sit to stand 20"2 X10 with break   x NMR Quad sets 3x10   x NMR SLR 2x10   x NMR Bridges 3x10   x NMR Supine resisted hip flexion GTB 3x10   X   Step ups  2x10 6"   x NMR Supine ClamshelL 2x10 GTB             CPT Codes available for Billing:   (00) minutes of Manual therapy (MT) to improve pain and ROM.  (25) minutes of Therapeutic Exercise (TE) to develop strength, endurance, range of motion, and flexibility.  (30) minutes of Neuromuscular Re-Education (NMR)  to improve: Balance, Coordination, Kinesthetic, Sense, Proprioception, and Posture.  (00) minutes of Therapeutic Activities (TA) to improve functional performance.  Unattended Electrical Stimulation (ES) for muscle performance or pain modulation.  Vasopneumatic Device " Therapy () for management of swelling/edema. (57064)  BFR: Blood flow restriction applied during exercise  NP or (-): Not Performed    Assessment & Plan   Assessment: The patient is progressing with lower extremity and hip strengthening as well as gait training to improve mobility. He is using his cane less frequently.  Evaluation/Treatment Tolerance: Patient tolerated treatment well    Patient will continue to benefit from skilled outpatient physical therapy to address the deficits listed in the problem list box on initial evaluation, provide pt/family education and to maximize pt's level of independence in the home and community environment.     Patient's spiritual, cultural, and educational needs considered and patient agreeable to plan of care and goals.           Plan: Outpatient Physical Therapys to include any combination of the following interventions: virtual visits, dry needling, modalities, electrical stimulation (IFC, Pre-Mod, Attended with Functional Dry Needling), Manual Therapy, Moist Heat/ Ice, Neuromuscular Re-ed, Patient Education, Self Care, Therapeutic Exercise, Functional Training, and Therapeutic Activites    Goals:   Active       Pain       Patient will report pain of 0/10 demonstrating a reduction of overall pain (Progressing)       Start:  02/16/25    Expected End:  04/13/25               Pt will improve FOTO disability score to 58% disability or less in order to improve overall QOL and return to PLOF.         Patient stated goal:    (Progressing)       Start:  02/16/25    Expected End:  04/13/25               Strength       Patient will achieve bilateral hip flexion strength of 4/5 (Progressing)       Start:  02/16/25    Expected End:  03/16/25            Patient will achieve bilateral hip extension strength of 4/5 (Progressing)       Start:  02/16/25    Expected End:  03/16/25               Strength       Patient will achieve bilateral hip flexion strength of 5/5 (Progressing)        Start:  02/16/25    Expected End:  04/13/25            Patient will achieve bilateral hip extension strength of 5/5 (Progressing)       Start:  02/16/25    Expected End:  04/13/25                Raman Mazariegos PT

## 2025-03-19 ENCOUNTER — EXTERNAL HOME HEALTH (OUTPATIENT)
Dept: HOME HEALTH SERVICES | Facility: HOSPITAL | Age: 75
End: 2025-03-19
Payer: COMMERCIAL

## 2025-03-19 ENCOUNTER — CLINICAL SUPPORT (OUTPATIENT)
Dept: REHABILITATION | Facility: HOSPITAL | Age: 75
End: 2025-03-19
Payer: COMMERCIAL

## 2025-03-19 DIAGNOSIS — R29.898 LEG WEAKNESS, BILATERAL: Primary | ICD-10-CM

## 2025-03-19 PROCEDURE — 97112 NEUROMUSCULAR REEDUCATION: CPT | Mod: PN

## 2025-03-19 PROCEDURE — 97110 THERAPEUTIC EXERCISES: CPT | Mod: PN

## 2025-03-21 ENCOUNTER — CLINICAL SUPPORT (OUTPATIENT)
Dept: REHABILITATION | Facility: HOSPITAL | Age: 75
End: 2025-03-21
Payer: COMMERCIAL

## 2025-03-21 DIAGNOSIS — R29.898 LEG WEAKNESS, BILATERAL: Primary | ICD-10-CM

## 2025-03-21 PROCEDURE — 97112 NEUROMUSCULAR REEDUCATION: CPT | Mod: PN

## 2025-03-21 PROCEDURE — 97110 THERAPEUTIC EXERCISES: CPT | Mod: PN

## 2025-03-23 NOTE — PROGRESS NOTES
"  Outpatient Rehab    Physical Therapy Visit    Patient Name: Conner Lombardi Jr.  MRN: [id number]  YOB: 1950  Encounter Date: 3/19/2025    Therapy Diagnosis:   Encounter Diagnosis   Name Primary?    Leg weakness, bilateral Yes           Physician: Serenity Salgado PA    Physician Orders: Eval and Treat  Medical Diagnosis: s/p left total hip arthroplasty     Visit # / Visits Authorized:  1 / 1       8/15  Date of Evaluation:  2/14/2025   Insurance Authorization Period: 2/7/2025 to 2/7/2026  Plan of Care Certification:  2/14/2025 to 4/13/2025      Time In: 0755   Time Out: 0848  Total Time: 53   Total Billable Time: 53 minutes    FOTO:  Intake Score: 73%  Survey Score 1:  %  Survey Score 2:  %         Subjective   States he is doing well without need of cane..  Pain reported as 0/10.      Objective            Treatment:    CPT Intervention  Left hip pain and weakness Duration / Intensity      X TE NuStep 5 minutes   X NMR Shuttle   5 bands 2 minutes DL  3 bands 1 minute SL   X NMR Standing hip abduction 2x10   X NMR Standing hip extension  2x10   X TE Heel raises 3x10   X TE Calf stretch on wedge     X TE Seated LAQ 5# 3x10   X TE Supported standing knee flexion 5# 3x10   X NMR Sit to stand 20"2 X10 with break   x NMR Quad sets 3x10   x NMR SLR 2x10   x NMR Bridges 3x10   x NMR Supine resisted hip flexion GTB 3x10   X   Step ups  2x10 6"   x NMR Supine ClamshelL 2x10 GTB             CPT Codes available for Billing:   (00) minutes of Manual therapy (MT) to improve pain and ROM.  (25) minutes of Therapeutic Exercise (TE) to develop strength, endurance, range of motion, and flexibility.  (30) minutes of Neuromuscular Re-Education (NMR)  to improve: Balance, Coordination, Kinesthetic, Sense, Proprioception, and Posture.  (00) minutes of Therapeutic Activities (TA) to improve functional performance.  Unattended Electrical Stimulation (ES) for muscle performance or pain modulation.  Vasopneumatic Device " Therapy () for management of swelling/edema. (49151)  BFR: Blood flow restriction applied during exercise  NP or (-): Not Performed    Assessment & Plan   Assessment: The patient is progressing with lower extremity and hip strengthening as well as gait training to improve mobility. He is using his cane less frequently.  Evaluation/Treatment Tolerance: Patient tolerated treatment well    Patient will continue to benefit from skilled outpatient physical therapy to address the deficits listed in the problem list box on initial evaluation, provide pt/family education and to maximize pt's level of independence in the home and community environment.     Patient's spiritual, cultural, and educational needs considered and patient agreeable to plan of care and goals.           Plan: Outpatient Physical Therapys to include any combination of the following interventions: virtual visits, dry needling, modalities, electrical stimulation (IFC, Pre-Mod, Attended with Functional Dry Needling), Manual Therapy, Moist Heat/ Ice, Neuromuscular Re-ed, Patient Education, Self Care, Therapeutic Exercise, Functional Training, and Therapeutic Activites    Goals:   Active       Pain       Patient will report pain of 0/10 demonstrating a reduction of overall pain (Progressing)       Start:  02/16/25    Expected End:  04/13/25               Pt will improve FOTO disability score to 58% disability or less in order to improve overall QOL and return to PLOF.         Patient stated goal:    (Progressing)       Start:  02/16/25    Expected End:  04/13/25               Strength       Patient will achieve bilateral hip flexion strength of 4/5 (Progressing)       Start:  02/16/25    Expected End:  03/16/25            Patient will achieve bilateral hip extension strength of 4/5 (Progressing)       Start:  02/16/25    Expected End:  03/16/25               Strength       Patient will achieve bilateral hip flexion strength of 5/5 (Progressing)        Start:  02/16/25    Expected End:  04/13/25            Patient will achieve bilateral hip extension strength of 5/5 (Progressing)       Start:  02/16/25    Expected End:  04/13/25                Raman Mazariegos PT

## 2025-03-24 ENCOUNTER — CLINICAL SUPPORT (OUTPATIENT)
Dept: REHABILITATION | Facility: HOSPITAL | Age: 75
End: 2025-03-24
Payer: COMMERCIAL

## 2025-03-24 DIAGNOSIS — R29.898 LEG WEAKNESS, BILATERAL: Primary | ICD-10-CM

## 2025-03-24 PROCEDURE — 97110 THERAPEUTIC EXERCISES: CPT | Mod: PN

## 2025-03-24 PROCEDURE — 97112 NEUROMUSCULAR REEDUCATION: CPT | Mod: PN

## 2025-03-26 ENCOUNTER — CLINICAL SUPPORT (OUTPATIENT)
Dept: REHABILITATION | Facility: HOSPITAL | Age: 75
End: 2025-03-26
Payer: COMMERCIAL

## 2025-03-26 DIAGNOSIS — R29.898 LEG WEAKNESS, BILATERAL: Primary | ICD-10-CM

## 2025-03-26 PROCEDURE — 97110 THERAPEUTIC EXERCISES: CPT | Mod: PN

## 2025-03-26 PROCEDURE — 97112 NEUROMUSCULAR REEDUCATION: CPT | Mod: PN

## 2025-03-28 ENCOUNTER — OFFICE VISIT (OUTPATIENT)
Dept: ORTHOPEDICS | Facility: CLINIC | Age: 75
End: 2025-03-28
Payer: COMMERCIAL

## 2025-03-28 VITALS — HEIGHT: 66 IN | BODY MASS INDEX: 29.57 KG/M2 | WEIGHT: 184 LBS

## 2025-03-28 DIAGNOSIS — Z96.642 S/P TOTAL LEFT HIP ARTHROPLASTY: Primary | ICD-10-CM

## 2025-03-28 PROCEDURE — 99999 PR PBB SHADOW E&M-EST. PATIENT-LVL III: CPT | Mod: PBBFAC,,, | Performed by: PHYSICIAN ASSISTANT

## 2025-03-28 NOTE — PROGRESS NOTES
"  Outpatient Rehab    Physical Therapy Visit    Patient Name: Conner Lombardi Jr.  MRN: [id number]  YOB: 1950  Encounter Date: 3/21/2025    Therapy Diagnosis:   Encounter Diagnosis   Name Primary?    Leg weakness, bilateral Yes           Physician: Serenity Salgado PA    Physician Orders: Eval and Treat  Medical Diagnosis: s/p left total hip arthroplasty     Visit # / Visits Authorized:  1 / 1      11/15  Date of Evaluation:  2/14/2025   Insurance Authorization Period: 2/7/2025 to 2/7/2026  Plan of Care Certification:  2/14/2025 to 4/13/2025      Time In: 0800   Time Out: 0853  Total Time: 53   Total Billable Time: 53 minutes    FOTO:  Intake Score: 73%  Survey Score 1: 69%  Survey Score 2:  %         Subjective   He forgot his cane.  Pain reported as 0/10.      Objective            Treatment:    CPT Intervention  Left hip pain and weakness Duration / Intensity      X TE NuStep 5 minutes   X NMR Shuttle   5 bands 2 minutes DL  3 bands 1 minute SL   X NMR Standing hip abduction 2x10   X NMR Standing hip extension  2x10   X TE Heel raises 3x10   X TE Calf stretch on wedge     X TE Seated LAQ 5# 3x10   X TE Supported standing knee flexion 5# 3x10   X NMR Sit to stand 20"2 X10 with break   x NMR Quad sets 3x10   x NMR SLR 2x10   x NMR Bridges 3x10   x NMR Supine resisted hip flexion GTB 3x10   X   Step ups  2x10 6"   x NMR Supine ClamshelL 2x10 GTB             CPT Codes available for Billing:   (00) minutes of Manual therapy (MT) to improve pain and ROM.  (25) minutes of Therapeutic Exercise (TE) to develop strength, endurance, range of motion, and flexibility.  (30) minutes of Neuromuscular Re-Education (NMR)  to improve: Balance, Coordination, Kinesthetic, Sense, Proprioception, and Posture.  (00) minutes of Therapeutic Activities (TA) to improve functional performance.  Unattended Electrical Stimulation (ES) for muscle performance or pain modulation.  Vasopneumatic Device Therapy () for management " of swelling/edema. (45636)  BFR: Blood flow restriction applied during exercise  NP or (-): Not Performed    Assessment & Plan   Assessment: The patient is progressing with lower extremity and hip strengthening as well as gait training to improve mobility. He is using his cane less frequently.  Evaluation/Treatment Tolerance: Patient tolerated treatment well    Patient will continue to benefit from skilled outpatient physical therapy to address the deficits listed in the problem list box on initial evaluation, provide pt/family education and to maximize pt's level of independence in the home and community environment.     Patient's spiritual, cultural, and educational needs considered and patient agreeable to plan of care and goals.           Plan: Outpatient Physical Therapys to include any combination of the following interventions: virtual visits, dry needling, modalities, electrical stimulation (IFC, Pre-Mod, Attended with Functional Dry Needling), Manual Therapy, Moist Heat/ Ice, Neuromuscular Re-ed, Patient Education, Self Care, Therapeutic Exercise, Functional Training, and Therapeutic Activites    Goals:   Active       Pain       Patient will report pain of 0/10 demonstrating a reduction of overall pain (Progressing)       Start:  02/16/25    Expected End:  04/13/25               Pt will improve FOTO disability score to 58% disability or less in order to improve overall QOL and return to PLOF.         Patient stated goal:    (Progressing)       Start:  02/16/25    Expected End:  04/13/25               Strength       Patient will achieve bilateral hip flexion strength of 4/5 (Progressing)       Start:  02/16/25    Expected End:  03/16/25            Patient will achieve bilateral hip extension strength of 4/5 (Progressing)       Start:  02/16/25    Expected End:  03/16/25               Strength       Patient will achieve bilateral hip flexion strength of 5/5 (Progressing)       Start:  02/16/25    Expected  End:  04/13/25            Patient will achieve bilateral hip extension strength of 5/5 (Progressing)       Start:  02/16/25    Expected End:  04/13/25                Raman Mazariegos PT

## 2025-03-28 NOTE — PROGRESS NOTES
Patient ID: Conner Lombardi Jr. is a 74 y.o. male.    Chief Complaint: Post-op Evaluation of the Left Hip      HPI: Conner Lombardi Jr.  is a 74 y.o. male who c/o Post-op Evaluation of the Left Hip     Post op visit 2  Patient notes pain is 0/10   The patient is doing quite well since surgery and is very pleased with his results   He has been able to advance activity of daily living and thus his quality of life  He is not using any devices to assist with ambulation  He is not using any narcotic pain medication we will still take Mobic on an as-needed basis   He has returned to driving   He has been discharged from therapy as he has met or surpassed all goals   He was fully compliant with DVT prophylaxis   He has returned to work virtually and is doing quite well with this    Patient is presently denying any shortness of breath, chest pain, fever/chills, nausea/vomiting, loss of taste or smell, numbness/tingling or sensation changes, loss of bladder or bowel function, loss of taste/smell.     Surgery: Left Total Hip    Surgery Date:  01/28/2025    Past Medical History:   Diagnosis Date    Anemia     Epilepsy     Last seizure 2010.     Hyperlipidemia     Hypertension     Prediabetes     Tubular adenoma of colon 04/02/2012    Vitamin D deficiency      Past Surgical History:   Procedure Laterality Date    BLOCK, NERVE, PERIPHERAL Left 8/19/2024    Procedure: Left femoral and obturator nerve block;  Surgeon: Helder Taylor MD;  [facility];  Service: Pain Management;  Laterality: Left;    COLONOSCOPY N/A 07/12/2021    Procedure: COLONOSCOPY;  Surgeon: Subhash Martin MD;  Location: [facility] ENDO;  Service: Endoscopy;  Laterality: N/A;    HIP ARTHROPLASTY Left 1/28/2025    Procedure: ARTHROPLASTY, HIP;  Surgeon: Javier Lee MD;  Location: [facility] OR;  Service: Orthopedics;  Laterality: Left;    TONSILLECTOMY      VASECTOMY  1984/1985     Family History   Problem Relation Name Age of Onset     Cancer Mother Batool Lombardi         Brain    Heart disease Father Conner Lombardi, Sr.     Cancer Father Conner Lombardi, Sr.         Liver and lung    Hypertension Father Conner Lombardi, Sr.     Heart disease Son          CABG in 2018    Diabetes Maternal Grandfather Aguilar Wilkerson     Hypertension Other       Social History[1]  Medication List with Changes/Refills   Current Medications    ASPIRIN (ECOTRIN) 81 MG EC TABLET    Take 81 mg by mouth once daily.    ATORVASTATIN (LIPITOR) 40 MG TABLET    TAKE 1 TABLET(40 MG) BY MOUTH EVERY DAY    CHOLECALCIFEROL, VITAMIN D3, (VITAMIN D3) 25 MCG (1,000 UNIT) CAPSULE    Take 1 capsule (1,000 Units total) by mouth once daily.    GABAPENTIN (NEURONTIN) 300 MG CAPSULE    Take 1 capsule (300 mg total) by mouth every evening.    KRILL OIL ORAL    Take by mouth.    LISINOPRIL-HYDROCHLOROTHIAZIDE (PRINZIDE,ZESTORETIC) 20-25 MG TAB    Take 2 tablets by mouth once daily. If BP is below 110/70 - decrease to 1 tablet daily    MELOXICAM (MOBIC) 15 MG TABLET    TAKE 1 TABLET(15 MG) BY MOUTH DAILY WITH FOOD    PHENYTOIN (DILANTIN) 100 MG ER CAPSULE    Take 4 capsules (400 mg total) by mouth every evening.     Review of patient's allergies indicates:  No Known Allergies    Objective:     Left Lower Extremity  NVI  WWP foot  Comp soft  Cap refill < 2 sec  Calf NT, soft   (-) Darling sign  TUAN  ROM : Patient is able to easily exhibit full flexion and extension on passive range of motion.   Abduction and adduction are full   Quad resistant full  Wiggles toes  DF/PF intact  Sensation intact  Inc C/D/I  No SOI    Imaging:    No imaging obtained today    Assessment:       Encounter Diagnosis   Name Primary?    S/P total left hip arthroplasty Yes          Plan:       Conner was seen today for post-op evaluation.    Diagnoses and all orders for this visit:    S/P total left hip arthroplasty        Conner MALDONADO Maria C Jr. is an established pt here for postop follow-up after left total  hip replacement by Dr. Lee.  The patient will continue with the current medication regimen and treatment plan. Patient should notify the office of any signs or symptoms of infection including fevers, erythema, purulent drainage, increasing pain.  Patient will continue with DVT prophylaxis until at least 6 weeks postop.  Patient will continue outpatient physical therapy.  Will follow-up as scheduled. Patient verbalized understanding of all instructions and agreed with the above plan.    No follow-ups on file.    The patient understands, chooses and consents to this plan and accepts all   the risks which include but are not limited to the risks mentioned above.     Disclaimer: This note was prepared using a voice recognition system and is likely to have sound alike errors within the text.            [1]   Social History  Socioeconomic History    Marital status:    Tobacco Use    Smoking status: Never    Smokeless tobacco: Never   Substance and Sexual Activity    Alcohol use: No    Drug use: Never    Sexual activity: Not Currently     Partners: Female     Birth control/protection: None     Social Drivers of Health     Financial Resource Strain: Low Risk  (2/26/2025)    Overall Financial Resource Strain (CARDIA)     Difficulty of Paying Living Expenses: Not hard at all   Food Insecurity: No Food Insecurity (2/26/2025)    Hunger Vital Sign     Worried About Running Out of Food in the Last Year: Never true     Ran Out of Food in the Last Year: Never true   Transportation Needs: No Transportation Needs (2/26/2025)    PRAPARE - Transportation     Lack of Transportation (Medical): No     Lack of Transportation (Non-Medical): No   Physical Activity: Inactive (2/26/2025)    Exercise Vital Sign     Days of Exercise per Week: 0 days     Minutes of Exercise per Session: 0 min   Stress: No Stress Concern Present (2/26/2025)    [employer] - Occupational Stress Questionnaire     Feeling of  Stress : Only a little   Housing Stability: Low Risk  (2/26/2025)    Housing Stability Vital Sign     Unable to Pay for Housing in the Last Year: No     Number of Times Moved in the Last Year: 0     Homeless in the Last Year: No

## 2025-03-29 NOTE — PROGRESS NOTES
"  Outpatient Rehab    Physical Therapy Visit    Patient Name: Conner Lombardi Jr.  MRN: [id number]  YOB: 1950  Encounter Date: 3/24/2025    Therapy Diagnosis:   Encounter Diagnosis   Name Primary?    Leg weakness, bilateral Yes     Physician: Serenity Salgado PA    Physician Orders: Eval and Treat  Medical Diagnosis: s/p left total hip arthroplasty     Visit # / Visits Authorized:  1 / 1      12/15  Date of Evaluation:  2/14/2025   Insurance Authorization Period: 2/7/2025 to 2/7/2026  Plan of Care Certification:  2/14/2025 to 4/13/2025      Time In: 0755   Time Out: 0850  Total Time: 55   Total Billable Time: 55 minutes    FOTO:  Intake Score: 73%  Survey Score 1: 69%  Survey Score 2:  %         Subjective   States he is doing pretty good.  Pain reported as 0/10.      Objective            Treatment:    CPT Intervention  Left hip pain and weakness Duration / Intensity      X TE NuStep 5 minutes   X NMR Shuttle   5 bands 2 minutes DL  3 bands 1 minute SL   X NMR Standing hip abduction 2x10   X NMR Standing hip extension  2x10   X TE Heel raises 3x10   X TE Calf stretch on wedge     X TE Seated LAQ 5# 3x10   X TE Supported standing knee flexion 5# 3x10   X NMR Sit to stand 20"2 X10 with break   x NMR Quad sets 3x10   x NMR SLR 2x10   x NMR Bridges 3x10   x NMR Supine resisted hip flexion GTB 3x10   X   Step ups  2x10 6"   x NMR Supine ClamshelL 2x10 GTB             CPT Codes available for Billing:   (00) minutes of Manual therapy (MT) to improve pain and ROM.  (25) minutes of Therapeutic Exercise (TE) to develop strength, endurance, range of motion, and flexibility.  (30) minutes of Neuromuscular Re-Education (NMR)  to improve: Balance, Coordination, Kinesthetic, Sense, Proprioception, and Posture.  (00) minutes of Therapeutic Activities (TA) to improve functional performance.  Unattended Electrical Stimulation (ES) for muscle performance or pain modulation.  Vasopneumatic Device Therapy () for " management of swelling/edema. (82316)  BFR: Blood flow restriction applied during exercise  NP or (-): Not Performed    Assessment & Plan   Assessment: The patient is progressing with lower extremity and hip strengthening as well as gait training to improve mobility. He is using his cane less frequently.  Evaluation/Treatment Tolerance: Patient tolerated treatment well    Patient will continue to benefit from skilled outpatient physical therapy to address the deficits listed in the problem list box on initial evaluation, provide pt/family education and to maximize pt's level of independence in the home and community environment.     Patient's spiritual, cultural, and educational needs considered and patient agreeable to plan of care and goals.           Plan: Outpatient Physical Therapys to include any combination of the following interventions: virtual visits, dry needling, modalities, electrical stimulation (IFC, Pre-Mod, Attended with Functional Dry Needling), Manual Therapy, Moist Heat/ Ice, Neuromuscular Re-ed, Patient Education, Self Care, Therapeutic Exercise, Functional Training, and Therapeutic Activites    Goals:   Active       Pain       Patient will report pain of 0/10 demonstrating a reduction of overall pain (Progressing)       Start:  02/16/25    Expected End:  04/13/25               Pt will improve FOTO disability score to 58% disability or less in order to improve overall QOL and return to PLOF.         Patient stated goal:    (Progressing)       Start:  02/16/25    Expected End:  04/13/25               Strength       Patient will achieve bilateral hip flexion strength of 4/5 (Progressing)       Start:  02/16/25    Expected End:  03/16/25            Patient will achieve bilateral hip extension strength of 4/5 (Progressing)       Start:  02/16/25    Expected End:  03/16/25               Strength       Patient will achieve bilateral hip flexion strength of 5/5 (Progressing)       Start:  02/16/25     Expected End:  04/13/25            Patient will achieve bilateral hip extension strength of 5/5 (Progressing)       Start:  02/16/25    Expected End:  04/13/25                Raman Mazariegos PT

## 2025-03-30 NOTE — PROGRESS NOTES
"  Outpatient Rehab    Physical Therapy Discharge    Patient Name: Conner Lombardi Jr.  MRN: [id number]  YOB: 1950  Encounter Date: 3/26/2025    Therapy Diagnosis:   Encounter Diagnosis   Name Primary?    Leg weakness, bilateral Yes     Physician: Serenity Salgdao PA    Physician Orders: Eval and Treat  Medical Diagnosis: s/p left total hip arthroplasty     Visit # / Visits Authorized:  1 / 1      12/15  Date of Evaluation:  2/14/2025   Insurance Authorization Period: 2/7/2025 to 2/7/2026  Plan of Care Certification:  2/14/2025 to 4/13/2025   PT/PTA:     Number of PTA visits since last PT visit:   Time In: 0800   Time Out: 0854  Total Time: 54   Total Billable Time:      FOTO:  Intake Score: 73%  Survey Score 1: 69%  Survey Score 2:  %         Subjective   States he is doing pretty good.         Objective            Treatment:       CPT Intervention  Left hip pain and weakness Duration / Intensity      X TE NuStep 5 minutes   X NMR Shuttle   5 bands 2 minutes DL  3 bands 1 minute SL   X NMR Standing hip abduction 2x10 YTB   X NMR Standing hip extension  2x10 YTB   X TE Heel raises 3x10   X TE Calf stretch on wedge 3x30"       SAQ  5# 3x10   X TE Seated LAQ 5# 3x10   X    TE Supported standing knee flexion 5# 3x10   X NMR Sit to stand 20"  2X10 with break   X NMR Quad sets 3x10   X NMR SLR 2x10   X NMR Bridges 3x10   X NMR Supine resisted hip flexion GTB 3x10       Step ups  2x10 6"   X NMR Supine ClamshelL 2x10 GTB               PLAN           Time Entry(in minutes):  Neuromuscular Re-Education Time Entry: 24  Therapeutic Exercise Time Entry: 30    Assessment & Plan   Assessment: The patient has performed well with PT in clinic and with home program.  He has met his goals with strengthen and gait.  Evaluation/Treatment Tolerance: Patient tolerated treatment well    Patient's spiritual, cultural, and educational needs considered and patient agreeable to plan of care and goals.     Education  Education " was done with Patient. The patient's learning style includes Demonstration and Listening. The patient Demonstrates understanding.         The patient is independent with home program.       Plan: Discharge bPhysical Therapy services    Goals:   Active       Strength       Patient will achieve bilateral hip flexion strength of 4/5 (Met)       Start:  02/16/25    Expected End:  03/16/25    Resolved:  03/30/25         Patient will achieve bilateral hip extension strength of 4/5 (Met)       Start:  02/16/25    Expected End:  03/16/25    Resolved:  03/30/25            Strength       Patient will achieve bilateral hip flexion strength of 5/5 (Met)       Start:  02/16/25    Expected End:  04/13/25    Resolved:  03/30/25         Patient will achieve bilateral hip extension strength of 5/5 (Met)       Start:  02/16/25    Expected End:  04/13/25    Resolved:  03/30/25           Resolved       Pain       Patient will report pain of 0/10 demonstrating a reduction of overall pain (Met)       Start:  02/16/25    Expected End:  04/13/25    Resolved:  03/30/25            Pt will improve FOTO disability score to 58% disability or less in order to improve overall QOL and return to PLOF.         Patient stated goal:    (Met)       Start:  02/16/25    Expected End:  04/13/25    Resolved:  03/30/25             Raman Mazariegos, PT

## 2025-04-01 ENCOUNTER — OFFICE VISIT (OUTPATIENT)
Dept: NEUROLOGY | Facility: CLINIC | Age: 75
End: 2025-04-01
Payer: COMMERCIAL

## 2025-04-01 ENCOUNTER — LAB VISIT (OUTPATIENT)
Dept: LAB | Facility: HOSPITAL | Age: 75
End: 2025-04-01
Attending: NURSE PRACTITIONER
Payer: COMMERCIAL

## 2025-04-01 VITALS
BODY MASS INDEX: 29.41 KG/M2 | WEIGHT: 183 LBS | HEART RATE: 85 BPM | HEIGHT: 66 IN | SYSTOLIC BLOOD PRESSURE: 143 MMHG | RESPIRATION RATE: 16 BRPM | DIASTOLIC BLOOD PRESSURE: 86 MMHG

## 2025-04-01 DIAGNOSIS — G40.909 NONINTRACTABLE EPILEPSY WITHOUT STATUS EPILEPTICUS, UNSPECIFIED EPILEPSY TYPE: ICD-10-CM

## 2025-04-01 DIAGNOSIS — E78.2 MIXED HYPERLIPIDEMIA: ICD-10-CM

## 2025-04-01 DIAGNOSIS — R56.9 SEIZURES: ICD-10-CM

## 2025-04-01 DIAGNOSIS — I10 ESSENTIAL HYPERTENSION: ICD-10-CM

## 2025-04-01 DIAGNOSIS — E55.9 VITAMIN D DEFICIENCY: ICD-10-CM

## 2025-04-01 DIAGNOSIS — G40.909 NONINTRACTABLE EPILEPSY WITHOUT STATUS EPILEPTICUS, UNSPECIFIED EPILEPSY TYPE: Primary | ICD-10-CM

## 2025-04-01 DIAGNOSIS — G40.109 TEMPORAL LOBE EPILEPSY: ICD-10-CM

## 2025-04-01 LAB — PHENYTOIN SERPL-MCNC: 11.1 UG/ML (ref 10–20)

## 2025-04-01 PROCEDURE — 36415 COLL VENOUS BLD VENIPUNCTURE: CPT

## 2025-04-01 PROCEDURE — 99999 PR PBB SHADOW E&M-EST. PATIENT-LVL IV: CPT | Mod: PBBFAC,,, | Performed by: NURSE PRACTITIONER

## 2025-04-01 PROCEDURE — 80185 ASSAY OF PHENYTOIN TOTAL: CPT

## 2025-04-01 RX ORDER — PHENYTOIN SODIUM 100 MG/1
400 CAPSULE, EXTENDED RELEASE ORAL NIGHTLY
Qty: 360 CAPSULE | Refills: 3 | Status: SHIPPED | OUTPATIENT
Start: 2025-04-01 | End: 2026-04-01

## 2025-04-01 NOTE — PROGRESS NOTES
Subjective:       Patient ID: Conner Lombardi Jr. is a 74 y.o. male.    Chief Complaint: Nonintractable epilepsy without status epilepticus, unspeci          HPI       The patient was referred by Dr. Lopez for seizure evaluation.     The patient is new to me. Used to see Dr. Ramirez and Dr. Rainey.    The patient started having seizures since he was an infant after forceps delivery. The seizures start with dizziness, could progress to inability to communicate and GTC (grand mal seizures). He was maintained on Primidone and PHT was added much later. The last seizure was in  and was attributed to rapid tapering of Primidone. He is currently doing very well on  mg QHS with no seizure and no side effects. On 11- PHT was 15.0. On 11- Brain MRI was unremarkable. On 03- EEG LT TLE. Patient present for follow up for Epilepsy manamgent. Patient doing quite well, no seizures. The last seizure was in . He is currently doing very well on  mg QHS no side effects. 05- PHT Level 13.6. No new neurological complaints.       INTERVAL HISTORY 04-: Patient present for follow up for annual follow up for Epilepsy management and medication refills. Patient doing very well, no seizures. The last seizure was in . He is currently doing very well on  mg QHS no side effects. 01- PHT Level 14.7. No new neurological complaints.       Review of Systems   Constitutional:  Negative for appetite change and fatigue.   HENT:  Negative for hearing loss and tinnitus.    Eyes:  Negative for photophobia and visual disturbance.   Respiratory:  Negative for apnea and shortness of breath.    Cardiovascular:  Negative for chest pain and palpitations.   Gastrointestinal:  Negative for nausea and vomiting.   Endocrine: Negative for cold intolerance and heat intolerance.   Genitourinary:  Negative for difficulty urinating and urgency.   Musculoskeletal:  Negative for arthralgias,  back pain, gait problem, joint swelling, myalgias, neck pain and neck stiffness.   Skin:  Negative for color change and rash.   Allergic/Immunologic: Negative for environmental allergies and immunocompromised state.   Neurological:  Negative for dizziness, tremors, seizures, syncope, facial asymmetry, speech difficulty, weakness, light-headedness, numbness and headaches.   Hematological:  Negative for adenopathy. Does not bruise/bleed easily.   Psychiatric/Behavioral:  Negative for agitation, behavioral problems, confusion, decreased concentration, dysphoric mood, hallucinations, self-injury, sleep disturbance and suicidal ideas. The patient is not hyperactive.                  Current Outpatient Medications:     aspirin (ECOTRIN) 81 MG EC tablet, Take 81 mg by mouth once daily., Disp: , Rfl:     atorvastatin (LIPITOR) 40 MG tablet, TAKE 1 TABLET(40 MG) BY MOUTH EVERY DAY, Disp: 90 tablet, Rfl: 1    cholecalciferol, vitamin D3, (VITAMIN D3) 25 mcg (1,000 unit) capsule, Take 1 capsule (1,000 Units total) by mouth once daily., Disp: , Rfl: 0    gabapentin (NEURONTIN) 300 MG capsule, Take 1 capsule (300 mg total) by mouth every evening., Disp: 30 capsule, Rfl: 2    KRILL OIL ORAL, Take by mouth., Disp: , Rfl:     lisinopriL-hydrochlorothiazide (PRINZIDE,ZESTORETIC) 20-25 mg Tab, Take 2 tablets by mouth once daily. If BP is below 110/70 - decrease to 1 tablet daily, Disp: 90 tablet, Rfl: 3    meloxicam (MOBIC) 15 MG tablet, TAKE 1 TABLET(15 MG) BY MOUTH DAILY WITH FOOD, Disp: 30 tablet, Rfl: 3    phenytoin (DILANTIN) 100 MG ER capsule, Take 4 capsules (400 mg total) by mouth every evening., Disp: 360 capsule, Rfl: 3  Past Medical History:   Diagnosis Date    Anemia     Epilepsy     Last seizure 2010.     Hyperlipidemia     Hypertension     Prediabetes     Tubular adenoma of colon 04/02/2012    Vitamin D deficiency      Past Surgical History:   Procedure Laterality Date    BLOCK, NERVE, PERIPHERAL Left 8/19/2024     Procedure: Left femoral and obturator nerve block;  Surgeon: Helder Taylor MD;  Location: [facility] PAIN MGT;  Service: Pain Management;  Laterality: Left;    COLONOSCOPY N/A 07/12/2021    Procedure: COLONOSCOPY;  Surgeon: Subhash Martin MD;  Location: [facility] ENDO;  Service: Endoscopy;  Laterality: N/A;    HIP ARTHROPLASTY Left 1/28/2025    Procedure: ARTHROPLASTY, HIP;  Surgeon: Javier Lee MD;  Location: [facility] OR;  Service: Orthopedics;  Laterality: Left;    TONSILLECTOMY      VASECTOMY  1984/1985     Social History     Socioeconomic History    Marital status:    Tobacco Use    Smoking status: Never    Smokeless tobacco: Never   Substance and Sexual Activity    Alcohol use: No    Drug use: Never    Sexual activity: Not Currently     Partners: Female     Birth control/protection: None     Social Drivers of Health     Financial Resource Strain: Low Risk  (2/26/2025)    Overall Financial Resource Strain (CARDIA)     Difficulty of Paying Living Expenses: Not hard at all   Food Insecurity: No Food Insecurity (2/26/2025)    Hunger Vital Sign     Worried About Running Out of Food in the Last Year: Never true     Ran Out of Food in the Last Year: Never true   Transportation Needs: No Transportation Needs (2/26/2025)    PRAPARE - Transportation     Lack of Transportation (Medical): No     Lack of Transportation (Non-Medical): No   Physical Activity: Inactive (2/26/2025)    Exercise Vital Sign     Days of Exercise per Week: 0 days     Minutes of Exercise per Session: 0 min   Stress: No Stress Concern Present (2/26/2025)    [employer] - Occupational Stress Questionnaire     Feeling of Stress : Only a little   Housing Stability: Low Risk  (2/26/2025)    Housing Stability Vital Sign     Unable to Pay for Housing in the Last Year: No     Number of Times Moved in the Last Year: 0     Homeless in the Last Year: No             Past/Current Medical/Surgical History, Past/Current  Social History, Past/Current Family History and Past/Current Medications were reviewed in detail.        Objective:           VITAL SIGNS WERE REVIEWED      GENERAL APPEARANCE:     The patient looks comfortable.    BMI 30.07 KG     No signs of respiratory distress.    Normal breathing pattern.    No dysmorphic features    Normal eye contact.     GENERAL MEDICAL EXAM:    HEENT:  Head is atraumatic normocephalic. Fundoscopic (Ophthalmoscopic) exam showed no disc edema.      Neck and Axillae: No JVD. No visible lesions.    Cardiopulmonary: No cyanosis. No tachypnea. Normal respiratory effort.    Gastrointestinal/Urogenital:  No jaundice. No stomas or lesions. No visible hernias. No catheters.     Skin, Hair and Nails: No pathognonomic skin rash. No neurofibromatosis. No visible lesions.No stigmata of autoimmune disease. No clubbing.    Limbs: No varicose veins. No visible swelling.    Muskoskeletal: No visible deformities.No visible lesions.           Neurologic Exam     Mental Status   Oriented to person, place, and time.   Registration: recalls 3 of 3 objects. Recall at 5 minutes: recalls 3 of 3 objects. Follows 3 step commands.   Attention: normal. Concentration: normal.   Speech: speech is normal   Level of consciousness: alert  Knowledge: good and consistent with education. Able to perform simple calculations.   Able to name object. Able to read. Able to repeat. Able to write. Normal comprehension.     Cranial Nerves   Cranial nerves II through XII intact.     CN II   Visual fields full to confrontation.   Visual acuity: normal with correction  Right visual field deficit: none  Left visual field deficit: none     CN III, IV, VI   Pupils are equal, round, and reactive to light.  Extraocular motions are normal.   Right pupil: Size: 2 mm. Shape: regular. Reactivity: brisk. Consensual response: intact. Accommodation: intact.   Left pupil: Size: 2 mm. Shape: regular. Reactivity: brisk. Consensual response: intact.  Accommodation: intact.   CN III: no CN III palsy  CN VI: no CN VI palsy  Nystagmus: none   Diplopia: none  Ophthalmoparesis: none  Upgaze: normal  Downgaze: normal  Conjugate gaze: present  Vestibulo-ocular reflex: present    CN V   Facial sensation intact.   Right facial sensation deficit: none  Left facial sensation deficit: none    CN VII   Facial expression full, symmetric.   Right facial weakness: none  Left facial weakness: none    CN VIII   CN VIII normal.   Hearing: intact    CN IX, X   CN IX normal.   CN X normal.   Palate: symmetric    CN XI   CN XI normal.   Right sternocleidomastoid strength: normal  Left sternocleidomastoid strength: normal  Right trapezius strength: normal  Left trapezius strength: normal    CN XII   CN XII normal.   Tongue: not atrophic  Fasciculations: absent  Tongue deviation: none    Motor Exam   Muscle bulk: normal  Overall muscle tone: normal  Right arm tone: normal  Left arm tone: normal  Right arm pronator drift: absent  Left arm pronator drift: absent  Right leg tone: normal  Left leg tone: normal    Strength   Strength 5/5 throughout.   Right neck flexion: 5/5  Left neck flexion: 5/5  Right neck extension: 5/5  Left neck extension: 5/5  Right deltoid: 5/5  Left deltoid: 5/5  Right biceps: 5/5  Left biceps: 5/5  Right triceps: 5/5  Left triceps: 5/5  Right wrist flexion: 5/5  Left wrist flexion: 5/5  Right wrist extension: 5/5  Left wrist extension: 5/5  Right interossei: 5/5  Left interossei: 5/5  Right iliopsoas: 5/5  Left iliopsoas: 5/5  Right quadriceps: 5/5  Left quadriceps: 5/5  Right hamstrin/5  Left hamstrin/5  Right glutei: 5/5  Left glutei: 5/5  Right anterior tibial: 5/5  Left anterior tibial: 5/5  Right posterior tibial: 5/5  Left posterior tibial: 5/5  Right peroneal: 5/5  Left peroneal: 5/5  Right gastroc: 5/5  Left gastroc: 5/5    Sensory Exam   Light touch normal.   Right arm light touch: normal  Left arm light touch: normal  Right leg light touch:  normal  Left leg light touch: normal  Vibration normal.   Right arm vibration: normal  Left arm vibration: normal  Right leg vibration: normal  Left leg vibration: normal  Proprioception normal.   Right arm proprioception: normal  Left arm proprioception: normal  Right leg proprioception: normal  Left leg proprioception: normal  Pinprick normal.   Right arm pinprick: normal  Left arm pinprick: normal  Right leg pinprick: normal  Left leg pinprick: normal  Graphesthesia: normal  Stereognosis: normal    Gait, Coordination, and Reflexes     Gait  Gait: normal    Coordination   Romberg: negative  Finger to nose coordination: normal  Heel to shin coordination: normal    Tremor   Resting tremor: absent  Intention tremor: absent  Action tremor: absent    Reflexes   Right brachioradialis: 2+  Left brachioradialis: 2+  Right biceps: 2+  Left biceps: 2+  Right triceps: 2+  Left triceps: 2+  Right patellar: 2+  Left patellar: 2+  Right achilles: 1+  Left achilles: 1+  Right plantar: normal  Left plantar: normal  Right Harris: absent  Left Harris: absent  Right ankle clonus: absent  Left ankle clonus: absent  Right pendular knee jerk: absent  Left pendular knee jerk: absent      Lab Results   Component Value Date    WBC 7.97 01/14/2025    HGB 11.6 (L) 01/28/2025    HCT 33.9 (L) 01/28/2025     (H) 01/14/2025     01/14/2025     Sodium   Date Value Ref Range Status   02/07/2025 141 136 - 145 mmol/L Final     Potassium   Date Value Ref Range Status   02/07/2025 4.6 3.5 - 5.1 mmol/L Final     Chloride   Date Value Ref Range Status   02/07/2025 105 95 - 110 mmol/L Final     CO2   Date Value Ref Range Status   02/07/2025 27 23 - 29 mmol/L Final     Glucose   Date Value Ref Range Status   02/07/2025 111 (H) 70 - 110 mg/dL Final     BUN   Date Value Ref Range Status   02/07/2025 18 8 - 23 mg/dL Final     Creatinine   Date Value Ref Range Status   02/07/2025 1.0 0.5 - 1.4 mg/dL Final     Calcium   Date Value Ref Range  Status   02/07/2025 9.4 8.7 - 10.5 mg/dL Final     Total Protein   Date Value Ref Range Status   02/07/2025 6.8 6.0 - 8.4 g/dL Final     Albumin   Date Value Ref Range Status   02/07/2025 3.2 (L) 3.5 - 5.2 g/dL Final     Total Bilirubin   Date Value Ref Range Status   02/07/2025 0.3 0.1 - 1.0 mg/dL Final     Comment:     For infants and newborns, interpretation of results should be based  on gestational age, weight and in agreement with clinical  observations.    Premature Infant recommended reference ranges:  Up to 24 hours.............<8.0 mg/dL  Up to 48 hours............<12.0 mg/dL  3-5 days..................<15.0 mg/dL  6-29 days.................<15.0 mg/dL       Alkaline Phosphatase   Date Value Ref Range Status   02/07/2025 124 40 - 150 U/L Final     AST   Date Value Ref Range Status   02/07/2025 28 10 - 40 U/L Final     ALT   Date Value Ref Range Status   02/07/2025 28 10 - 44 U/L Final     Anion Gap   Date Value Ref Range Status   02/07/2025 9 8 - 16 mmol/L Final     eGFR if    Date Value Ref Range Status   01/19/2022 >60.0 >60 mL/min/1.73 m^2 Final     eGFR if non    Date Value Ref Range Status   01/19/2022 >60.0 >60 mL/min/1.73 m^2 Final     Comment:     Calculation used to obtain the estimated glomerular filtration  rate (eGFR) is the CKD-EPI equation.        Lab Results   Component Value Date    FVCCAGOY30 1029 (H) 08/29/2022     Lab Results   Component Value Date    TSH 1.240 08/02/2024    S5TCKMD 110 06/23/2010    K3DBEKD 4.5 06/23/2010 11-     Brain MRI was unremarkable.       03-    EEG LT TLE       AED LEVELS    AED: PHT  NORMAL LEVEL RANGE: 10-20   DATE LEVEL   11- 15.0   05- 13.6   05- 12.8   01- 14.7    04- 11.1                Reviewed the neuroimaging independently       Assessment:       1. Nonintractable epilepsy without status epilepticus, unspecified epilepsy type    2. Temporal lobe epilepsy    3.  Nonintractable epilepsy without status epilepticus, unspecified epilepsy type    4. Essential hypertension    5. Seizures    6. Mixed hyperlipidemia    7. Vitamin D deficiency              EPILEPSY CLASSIFICATION    SEMIOLOGY: VERTIGINOUS AURA-->APHASIC SEIZURE-->GTC     EPILEPTOGENIC ZONE (S): LT TL     ETIOLOGY: UNKNOWN     PRIOR AEDS: PRM.    CURRENT AEDS: PHT    LAST SEIZURE DATE:       COMPREHENSIVE LIST OF AEDs:     Acetazolamide (AZM-Diamox)   Benzos: clonazepam (CZP Klonopin), lorazepam (LZP-Ativan), diazepam (DZP-Valium), clorazepate (CLZ- Tranxene)  Brivaracetam (BRV-Briviact)  Cannabidiol (CBD- Epidiolex)  Carbamazepine (CBZ-Tegretol)  Cenobamate (CNB-Xcopri)  Clobazam (CLB-Onfi)  Eslicarbazepine (ESL-Aptiom)  Ethosuximide (ESX-Zarontin)  Felbamate (FBM-Felbatol)  Fenfluramine (FFA-Fintepla)  Gabapentin (GBP-Neurontin)  Lacosamide (LCM-Vimpat)  Lamotrigine (LTG-Lamitcal)  Levetiracetam (LEV- Keppra)  Oxcarbazepine (OXC-Trileptal)  Perampanel (PML-Fycompa)  Phenobarbital (PB)  Phenytoin (PHT-Dilantin)  Pregabalin (PGB-Lyrica)  Primidone (PRM)   Retigabine (RTG- Potiga) Discontinued in 2017  Rufinamide (RFN-Benzil)  Stiripentol (STP-Diacomit)  Tiagabine (TGB-Gabitril)  Topiramate (TPM-Topamax)  Valproate (VPA-Depakote)  Vigabatrin (VGB-Sabril)  Zonisamide (ZNS-Zonegran)    Plan:       NON-LESIONAL LT TLE, WELL-CONTROLED ON PHT MONOTHERAPY       The patient was encouraged to maintain full traditional seizure precautions which include but not limited to being careful with driving, high altitudes, being close to fire or fire source, being close to a body of water or swimming alone,operating heavy machinery and avoid using sharp objects if possible. The patient was encouraged to shower (without accumulation of water) instead of taking a bath if unsupervised. The patient was made aware that these precautions are especially important during concurrent illness. Adequate sleep and avoidance of alcohol as  important measures to assure good seizure control were discussed with the patient. The patient was also advised to be careful with caring for children without company. The patient was advised to pad the side rails with pillows and blankets if applicable and sleep as close to the floor as possible. I strongly recommended lowering the bed to the floor level to decrease the risk of falls. I also instructed the patient to avoid safety sensitive duties. In general, any activity that requires full awareness and would result in serious injury to self and others if a seizure occurs should be approached carefully. The patient verbalized full understanding.    The patient has been seizure-free for >12 months, compliant with regimen with therapeutic AED level. The patient meets the legal criteria to resume driving. I explained to the patient that from a medical standpoint seizure-freedom is defined differently ( 1 year or 3 times the duration between the last 2 seizures). In addition, I explained to the patient that the risk of breakthrough seizures will ALWAYS be there. I instructed the patient to avoid alcohol, get enough sleep and be complaint with AED regimen. I instructed the patient to avoid driving if AED was skipped, if drank alcohol, sleep-deprived or not feeling well. The patient verbalized full understanding.     Had a very long discussion regarding PHT being effective but a risky AED with toxicity potential, narrow therapeutic window/index, interactions with many medications that are metabolized by the liver, interaction with radiation therapy and chemotherapy, numerous short term and long term detrimental SEs like gum problems, balance problems, neuropathy. Knowing all that, the patient does not want to make any changes.    PHT level    Continue  mg QHS. Refills sent       Continue AEDs INDEFINITELY, FOR GOOD AND NEVER SKIP A DOSE. The patient verbalized full understanding. Stressed the extreme medical,  personal safety, public safety and legal importance of compliance and seizure control. Will give as many refills as possible with or without face-to-face evaluation to make sure the patient and any patient with epilepsy will never run out of medications. Running out of seizure medications should never happen under our care. I explained to the patient that he should not, under any circumstances, adjust or stop taking his seizure medication without discussing with me. The patient verbalized full understanding.       AVOID any substance that could lower seizure threshold including but not limited to:      ALCOHOL AND WITHDRAWAL      TRAMADOL.     DEMEROL      ALL STIMULANTS-ALL ADHD MEDICATIONS.      CLOZAPINE.      BUPROPION.     CIPROFLOXACIN                 MEDICAL/SURGICAL COMORBIDITIES     All relevant medical comorbidities noted and managed by primary care physician and medical care team.          MISCELLANEOUS MEDICAL PROBLEMS       HEALTHY LIFESTYLE AND PREVENTATIVE CARE    The patient to adhere to the age-appropriate health maintenance guidelines including screening tests and vaccinations. The patient to adhere to  healthy lifestyle, optimal weight, exercise, healthy diet, good sleep hygiene and avoiding drugs including smoking, alcohol and recreational drugs.        RTC annually      Jamar Flaherty, MSN NP      Collaborating Provider: Michelle Pena MD, FAAN Neurologist/Epileptologist      F-F 30 M >50% C

## 2025-04-02 ENCOUNTER — DOCUMENT SCAN (OUTPATIENT)
Dept: HOME HEALTH SERVICES | Facility: HOSPITAL | Age: 75
End: 2025-04-02
Payer: COMMERCIAL

## 2025-04-02 ENCOUNTER — RESULTS FOLLOW-UP (OUTPATIENT)
Dept: NEUROLOGY | Facility: CLINIC | Age: 75
End: 2025-04-02

## 2025-04-28 ENCOUNTER — HOSPITAL ENCOUNTER (OUTPATIENT)
Dept: RADIOLOGY | Facility: HOSPITAL | Age: 75
Discharge: HOME | End: 2025-04-28
Attending: ORTHOPAEDIC SURGERY
Payer: COMMERCIAL

## 2025-04-28 ENCOUNTER — OFFICE VISIT (OUTPATIENT)
Dept: ORTHOPEDICS | Facility: CLINIC | Age: 75
End: 2025-04-28
Payer: COMMERCIAL

## 2025-04-28 VITALS — BODY MASS INDEX: 29.41 KG/M2 | HEIGHT: 66 IN | WEIGHT: 183 LBS

## 2025-04-28 DIAGNOSIS — M25.552 LEFT HIP PAIN: ICD-10-CM

## 2025-04-28 DIAGNOSIS — Z96.642 S/P TOTAL LEFT HIP ARTHROPLASTY: Primary | ICD-10-CM

## 2025-04-28 DIAGNOSIS — M25.562 PAIN IN BOTH KNEES, UNSPECIFIED CHRONICITY: ICD-10-CM

## 2025-04-28 DIAGNOSIS — Z01.818 PREOP TESTING: ICD-10-CM

## 2025-04-28 DIAGNOSIS — M48.061 SPINAL STENOSIS OF LUMBAR REGION AT MULTIPLE LEVELS: ICD-10-CM

## 2025-04-28 DIAGNOSIS — M25.561 PAIN IN BOTH KNEES, UNSPECIFIED CHRONICITY: ICD-10-CM

## 2025-04-28 DIAGNOSIS — M16.11 ARTHRITIS OF RIGHT HIP: Primary | ICD-10-CM

## 2025-04-28 DIAGNOSIS — Z01.818 PREOPERATIVE EXAMINATION: ICD-10-CM

## 2025-04-28 DIAGNOSIS — M16.11 ARTHRITIS OF RIGHT HIP: ICD-10-CM

## 2025-04-28 PROCEDURE — 3288F FALL RISK ASSESSMENT DOCD: CPT | Mod: CPTII,S$GLB,, | Performed by: ORTHOPAEDIC SURGERY

## 2025-04-28 PROCEDURE — 73502 X-RAY EXAM HIP UNI 2-3 VIEWS: CPT | Mod: TC,LT

## 2025-04-28 PROCEDURE — 3044F HG A1C LEVEL LT 7.0%: CPT | Mod: CPTII,S$GLB,, | Performed by: ORTHOPAEDIC SURGERY

## 2025-04-28 PROCEDURE — 1101F PT FALLS ASSESS-DOCD LE1/YR: CPT | Mod: CPTII,S$GLB,, | Performed by: ORTHOPAEDIC SURGERY

## 2025-04-28 PROCEDURE — 1159F MED LIST DOCD IN RCRD: CPT | Mod: CPTII,S$GLB,, | Performed by: ORTHOPAEDIC SURGERY

## 2025-04-28 PROCEDURE — 99999 PR PBB SHADOW E&M-EST. PATIENT-LVL III: CPT | Mod: PBBFAC,,, | Performed by: ORTHOPAEDIC SURGERY

## 2025-04-28 PROCEDURE — 73502 X-RAY EXAM HIP UNI 2-3 VIEWS: CPT | Mod: 26,LT,, | Performed by: RADIOLOGY

## 2025-04-28 PROCEDURE — 99024 POSTOP FOLLOW-UP VISIT: CPT | Mod: S$GLB,,, | Performed by: ORTHOPAEDIC SURGERY

## 2025-04-28 PROCEDURE — 1126F AMNT PAIN NOTED NONE PRSNT: CPT | Mod: CPTII,S$GLB,, | Performed by: ORTHOPAEDIC SURGERY

## 2025-04-28 NOTE — PATIENT INSTRUCTIONS
Your x-ray show excellent left total hip replacement in it is a dual mobility type of prosthesis that allows more motion and less chance of dislocation   You doing extremely well with that you not having any pain 0/10 on the left hip in your wound healed really well   The right hip is the 1 that is severely painful and limited   You would like to proceed with total hip the 1st week of June   No need for you to go to the class or to have cardiac clearance   You only need repeated blood work and we will have you see the preop nurse  You are able to ambulate without assistive devices at this time   You do have meloxicam to take as needed and that should stop like a week before surgery  Gabapentin you ran out we will prescribe it after surgery to help out with the pain      total knee or total hip replacement is considered outpatient surgery by the government right now.  You will have your surgery under spinal or general anesthetic.    It will take roughly an hour and half to 2 to perform.  You will be going home the same day after surgery.    We will get you up and walking an hour or so after surgery in recovery room and will arrange for home health and physical therapy to come to your house by the next day.    You will receive home health and home physical therapy for 2 weeks and then outpatient after that.   You would need to have family members to help you at home otherwise you can not have surgery because this is considered outpatient by the government   It will take roughly 3-6 months for complete healing to occur  Most joint replacements studies have shown improvement up to 18 months after surgery  There is a mandatory class that you have to attend prior to surgery where you will be given instructions by therapist, nurse, home health  Cardiac clearance prior to having surgery/the heart doctor will decide your risks involved having surgery and if you can withstand the operation      Procedure, common risks and  benefits,alternatives discussed in details.  All questions answered.  Patient expressed understanding.  Patient instructed to call for any questions that could arise in the future.     Most common Risks:  Infection/2%  Leg-length discrepancy/most common with total hip replacement  Dislocation/most common with total hip replacement/2% risk of the hip coming out of the socket.  If that happens you would need to have it pulled back under sedation.  If this continues to happen recurrent dislocation you may need repeated surgery  Neuro-vascular injury ( resulting in loss of motor and sensory functions)  you have a slight increase risk of having what we call peroneal nerve palsy which results in a footdrop.  70-80% of foot drops recuperate within 6 months.  Almost all people with total knee replacement are slightly numb on the outside of the knee.    Pain  Blood clot  Fat clot  Loss of motion.  Total knees have the tendency to lose motion if you do not exercise and do therapy.  You have to work through the pain to achieve motion  Fracture of bone  Failure of procedure to achieve its intended purpose.  Total knees are 80% successful in decreasing pain increasing function.  Total hips are 90% successful in decreasing pain and increasing function  Failure of hardware/they last approximately 15 years/prosthesis is made out of metal and plastic they could wear out with time  Non-union or mal-union of bone  Malalignment  Death/you go see cardiologist before surgery    Patient instructions for joint replacement    Before surgery  1.  Shower with Hibiclens soap/antibacterial for 3 days prior to surgery to decrease risk of infection  2.  Stop all blood thinners/aspirin, Coumadin, warfarin, Plavix, Eliquis, Xarelto etc 5 days prior to surgery  3.  No eating or drinking after midnight before surgery. Would like you to drink a bottle of Powerade, Gatorade, or Pedialyte the day before surgery prior to midnight, so that you do not get  dehydrated waiting for surgery the next day.  4.  Take Tylenol 650 mg the night prior to surgery.  5.  Stop all semaglutides (Monjauro, Ozempic, Trulicity, Wegovy, etc.). 10 days prior to surgery.  6. Stop all NSAIDs (Motrin, ibuprofen, Aleve, Mobic, Celebrex, Voltaren, nabumetone, Relafen, sulindac etc.), all natural products, and vitamins except vitamin D for 7-10 days prior to surgery.    Ask physicians for prescription of Celebrex or Mobic if needed    After surgery at home  1.  Take Tylenol 650 mg 3 times a day for 14 days then as needed for mild pain.  You may resume all your home medications the 1st day after surgery  2.  Take gabapentin 300 mg nightly for 6 weeks/if you are on pregabalin or Lyrica you can use that instead  3.  Take Celebrex 200 mg or meloxicam 15 mg daily-you may substitute with Aleve 2 tablets twice a day with food or ibuprofen 600 mg twice a day with food or any other anti-inflammatory if you were on prior to surgery for 6 weeks unless having cardiac issues to take for 2 weeks only  4.  Must take aspirin 81 mg twice a day for 6 weeks unless you are on other blood thinners/Plavix, Eliquis, Xarelto, Coumadin etc to start the next day after surgery  5.  Must wear compressive stockings for 6 weeks minimum to decrease the risk of blood clot and swelling  6.  Hydrocodone/Norco or oxycodone/Percocet will be prescribed to take every 6 hr as needed for breakthrough pain/you may cut pills in half.  You will also have something for nausea to take on as needed will be prescribed  7.  May apply ice on the knee to help with decreasing pain  8.  Keep wound dry for 2 weeks  than you will be allowed to shower in 24 hr and get the wound wet.  No soaking of the wound in the tub or swimming for 4 weeks after surgery  9.  No driving for 4 weeks unless specified by physician  10.  Avoid touching the wound or surrounding area /at least 2 inches on each side of the surgical incision until staples are  removed/stitches   11.  May change the surgical dressing if extremely bloody or has drainage on it. May clean the wound with peroxide or Betadine and apply sterile dressing and Ace wrap over it  12.  Leave hospital dressing on for 3 days then may change by applying sterile 4 x 4 and Ace wrap after cleaning with Betadine or peroxide.  May leave this dressing change to home health nurses  13. .  If you had total hip replacement may have a knee immobilizer brace that you need to keep on at night for the next 3-4 weeks.  No need to wear the brace during the day when you are up and walking only when you are asleep at night to prevent you from turning the wrong way and dislocating her hip out of the socket       Proceed with right total hip arthroplasty 1st week of June   We will use Depuy  dual mobility prosthesis to match the other side  Already has walker

## 2025-04-28 NOTE — PROGRESS NOTES
Subjective:     Patient ID: Conner Lombardi Jr. is a 74 y.o. male.    Chief Complaint: Pain and Post-op Evaluation of the Left Hip and Pain of the Right Hip    HPI:  03/21/2024   Bilateral hip pain   Patient stated his pain is markedly better after he went to physical therapy it is 1/10.  He was prescribed gabapentin to take at night and tizanidine which also helps.  He has not been prescribed any and stories.  He goes to physical therapy at [facility] on [address].  Still working but desk job.  Said the drive home sometimes is very painful.  When he ambulates distances he gets some calf pain and that goes away.  I did go over the MRI showed it to him of his lumbar spine as well as x-rays that showed spinal stenosis at multiple levels from L2 down to L5.  There was no injection given to his spine.  He does see pain management Dr. Taylor.  Patient does not take over-the-counter medications like a leave or Advil or Motrin or ibuprofen or Tylenol.  He does state the physical therapy helped significantly  He has not interested in having surgery at this time.  His job is a desk job.  No evidence of loss of bowel or bladder control blurry vision double vision loss sense smell or taste    Discussed with the patient after reviewing the MRI with him and the amount of stenosis that the only emergency he has is when he loses control of bowel or bladder at that point we really need to go to the emergency room immediately/cauda equina syndrome    08/29/2024   Bilateral hip severe arthritis as well as spinal stenosis multiple levels.  Last time seen he was taking gabapentin and Zanaflex as needed.  He was doing fine.  He did go through physical therapy.  Recently 08/19/2024 pain management performed left obturator and femoral nerve blocks.  Says does blocks did not seem to help.  He is extremely limited with what he can do in his pain is around 2-3/10 when he sits only gets severe when he ambulates.  He is using a quad cane to  get around.  He is here with his wife who stated he is always complain in he can not even put his socks and shoes on that she has help him with that.  He still working.  He is at a point that he wants to have his hip replacement.  His wife is encouraging him to proceed she knows how much in discomfort than problem that he has.  He needs her assistance in doing activities of daily living also has some pain in his knees and we will obtain new x-rays on both of his knees today and went over them he has mild arthritic changes on the right side very minimal on the left.  Some of the pains down to the left knee is referred pain from his hips since he has severely limited motion.  We spent long time discussing total hip replacement showed pictures of total hip replacement given brochure went over the pros and cons and postop and preop instructions and when he has to go through     He has couple trips to attend in his grandson's getting  so he needs at least 6-8 weeks after surgery to recuperate so he opted that we need to wait till January 20, 2025 to undergo left total hip replacement    04/28/2025   Left PEDRO 01/28/2025 pain is 0/10 he is extremely pleased with the results.  What is bothering him the most is the right hip at this time.  The right hip is severely arthritic with severely limited motion.  He does use the cane but sometimes he forgets it.  You would like to proceed with the right total hip replacement he has a couple trips assigned the next couple weeks.  We went over the medication list he is not taking gabapentin anymore.  He does have meloxicam.  We did discuss that we probably need to take gabapentin for nerve pain after surgery and we can resume it after surgery.  The meloxicam is anti-inflammatory that could help.  He does have spinal stenosis    No fever no chills no shortness breath or difficulty with chewing swallowing loss of bowel bladder control  Past Medical History:   Diagnosis Date     Anemia     Epilepsy     Last seizure 2010.     Hyperlipidemia     Hypertension     Prediabetes     Tubular adenoma of colon 04/02/2012    Vitamin D deficiency      Past Surgical History:   Procedure Laterality Date    BLOCK, NERVE, PERIPHERAL Left 8/19/2024    Procedure: Left femoral and obturator nerve block;  Surgeon: Helder Taylor MD;  Location: [facility] PAIN [facility]GT;  Service: Pain Management;  Laterality: Left;    COLONOSCOPY N/A 07/12/2021    Procedure: COLONOSCOPY;  Surgeon: Subhash Martin MD;  Location: [facility] ENDO;  Service: Endoscopy;  Laterality: N/A;    HIP ARTHROPLASTY Left 1/28/2025    Procedure: ARTHROPLASTY, HIP;  Surgeon: Javier Lee MD;  Location: [facility] OR;  Service: Orthopedics;  Laterality: Left;    TONSILLECTOMY      VASECTOMY  1984/1985     Family History   Problem Relation Name Age of Onset    Cancer Mother Batool bhandariAmira Lombardi         Brain    Heart disease Father Conner Lombardi, Sr.     Cancer Father Conner Lombardi Sr.         Liver and lung    Hypertension Father Conner Lombardi Sr.     Heart disease Son          CABG in 2018    Diabetes Maternal Grandfather Aguilar Pepitone     Hypertension Other       Social History     Socioeconomic History    Marital status:    Tobacco Use    Smoking status: Never    Smokeless tobacco: Never   Substance and Sexual Activity    Alcohol use: No    Drug use: Never    Sexual activity: Not Currently     Partners: Female     Birth control/protection: None     Social Drivers of Health     Financial Resource Strain: Low Risk  (2/26/2025)    Overall Financial Resource Strain (CARDIA)     Difficulty of Paying Living Expenses: Not hard at all   Food Insecurity: No Food Insecurity (2/26/2025)    Hunger Vital Sign     Worried About Running Out of Food in the Last Year: Never true     Ran Out of Food in the Last Year: Never true   Transportation Needs: No Transportation Needs (2/26/2025)    PRAPARE - Transportation     Lack of Transportation  (Medical): No     Lack of Transportation (Non-Medical): No   Physical Activity: Inactive (2/26/2025)    Exercise Vital Sign     Days of Exercise per Week: 0 days     Minutes of Exercise per Session: 0 min   Stress: No Stress Concern Present (2/26/2025)    [employer] - Occupational Stress Questionnaire     Feeling of Stress : Only a little   Housing Stability: Low Risk  (2/26/2025)    Housing Stability Vital Sign     Unable to Pay for Housing in the Last Year: No     Number of Times Moved in the Last Year: 0     Homeless in the Last Year: No     Medication List with Changes/Refills   Current Medications    ASPIRIN (ECOTRIN) 81 MG EC TABLET    Take 81 mg by mouth once daily.    ATORVASTATIN (LIPITOR) 40 MG TABLET    TAKE 1 TABLET(40 MG) BY MOUTH EVERY DAY    CHOLECALCIFEROL, VITAMIN D3, (VITAMIN D3) 25 MCG (1,000 UNIT) CAPSULE    Take 1 capsule (1,000 Units total) by mouth once daily.    GABAPENTIN (NEURONTIN) 300 MG CAPSULE    Take 1 capsule (300 mg total) by mouth every evening.    KRILL OIL ORAL    Take by mouth.    LISINOPRIL-HYDROCHLOROTHIAZIDE (PRINZIDE,ZESTORETIC) 20-25 MG TAB    Take 2 tablets by mouth once daily. If BP is below 110/70 - decrease to 1 tablet daily    MELOXICAM (MOBIC) 15 MG TABLET    TAKE 1 TABLET(15 MG) BY MOUTH DAILY WITH FOOD    PHENYTOIN (DILANTIN) 100 MG ER CAPSULE    Take 4 capsules (400 mg total) by mouth every evening.     Review of patient's allergies indicates:  No Known Allergies  Review of Systems   Constitutional: Negative for decreased appetite.   HENT:  Negative for tinnitus.    Eyes:  Negative for double vision.   Cardiovascular:  Negative for chest pain.   Respiratory:  Negative for wheezing.    Hematologic/Lymphatic: Negative for bleeding problem.   Skin:  Negative for dry skin.   Musculoskeletal:  Positive for arthritis, back pain and joint pain. Negative for gout, neck pain and stiffness.   Gastrointestinal:  Negative for abdominal pain.    Genitourinary:  Negative for bladder incontinence.   Neurological:  Negative for numbness, paresthesias and sensory change.   Psychiatric/Behavioral:  Negative for altered mental status.        Objective:   Body mass index is 29.53 kg/m².  There were no vitals filed for this visit.       General    Constitutional: He is oriented to person, place, and time. He appears well-developed.   HENT:   Head: Atraumatic.   Eyes: EOM are normal.   Pulmonary/Chest: Effort normal.   Neurological: He is alert and oriented to person, place, and time.   Psychiatric: Judgment normal.             Ambulating without assistive devices taking small steps   Pelvis is level   In the sitting position passive internal rotation of 5° and external rotation 30° in both hips.  There is severe pelvis tilting to internal rotation bilaterally with the left worse than the right.  There is pain in the groin to motion.  Abduction to 30°.  Flexion contracture 10- degrees.    .  Hip flexors and abductors and adductors seems to be very strong at 5/5. .  Left hip postop surgical incision for posterior approach healed very well no effusion no swelling no no fluctuance.  Passive hip internal external rotation with excellent motion no pain  Right knee with mild varus deformity and mild crepitus to range of motion on compression on the patella and medially.  Collaterals and cruciates are stable.  Very minimal discomfort on the medial joint.  No defect in the patella or quadriceps tendon.  Very minimal swelling.  Range of motion 5-130.  Left knee with normal alignment.  No crepitus to motion.  No defect in the patella or quadriceps tendon.  Collaterals and cruciates stable.  No swelling no effusion.  Full range of motion   Calves are soft nontender  Ankle motion intact  Relevant imaging results reviewed and interpreted by me, discussed with the patient and / or family today     X-ray 04/28/2025 showing left total hip dual mobility in excellent  alignment/Depuy you.  The right hip complete loss of joint space severe cystic changes and sclerosis and loss of joint space consistent with severe arthritis  X-ray 08/29/2024 bilateral knees showing right knee with very mild to moderate medial joint narrowing and small marginal osteophyte no fracture seen.  As far as the left knee is concerned with minimal medial joint narrowing but no osteophytes seen  X-ray 12/07/2023 with bilateral hips severe arthritis  bone-on-bone on both sides.  No evidence of fracture.  Bone quality is good   MRI lumbar spine with/15/23 showing multilevel central spinal stenosis starting at L2-3 down to L5 with some facet arthropathy  No x-rays of the knees taken  Assessment:     Encounter Diagnoses   Name Primary?    S/P total left hip arthroplasty Yes    Arthritis of right hip     Spinal stenosis of lumbar region at multiple levels     Pain in both knees, unspecified chronicity         Plan:   S/P total left hip arthroplasty    Arthritis of right hip    Spinal stenosis of lumbar region at multiple levels    Pain in both knees, unspecified chronicity         Patient Instructions   Your x-ray show excellent left total hip replacement in it is a dual mobility type of prosthesis that allows more motion and less chance of dislocation   You doing extremely well with that you not having any pain 0/10 on the left hip in your wound healed really well   The right hip is the 1 that is severely painful and limited   You would like to proceed with total hip the 1st week of June   No need for you to go to the class or to have cardiac clearance   You only need repeated blood work and we will have you see the preop nurse  You are able to ambulate without assistive devices at this time   You do have meloxicam to take as needed and that should stop like a week before surgery  Gabapentin you ran out we will prescribe it after surgery to help out with the pain      total knee or total hip replacement is  considered outpatient surgery by the government right now.  You will have your surgery under spinal or general anesthetic.    It will take roughly an hour and half to 2 to perform.  You will be going home the same day after surgery.    We will get you up and walking an hour or so after surgery in recovery room and will arrange for home health and physical therapy to come to your house by the next day.    You will receive home health and home physical therapy for 2 weeks and then outpatient after that.   You would need to have family members to help you at home otherwise you can not have surgery because this is considered outpatient by the government   It will take roughly 3-6 months for complete healing to occur  Most joint replacements studies have shown improvement up to 18 months after surgery  There is a mandatory class that you have to attend prior to surgery where you will be given instructions by therapist, nurse, home health  Cardiac clearance prior to having surgery/the heart doctor will decide your risks involved having surgery and if you can withstand the operation      Procedure, common risks and benefits,alternatives discussed in details.  All questions answered.  Patient expressed understanding.  Patient instructed to call for any questions that could arise in the future.     Most common Risks:  Infection/2%  Leg-length discrepancy/most common with total hip replacement  Dislocation/most common with total hip replacement/2% risk of the hip coming out of the socket.  If that happens you would need to have it pulled back under sedation.  If this continues to happen recurrent dislocation you may need repeated surgery  Neuro-vascular injury ( resulting in loss of motor and sensory functions)  you have a slight increase risk of having what we call peroneal nerve palsy which results in a footdrop.  70-80% of foot drops recuperate within 6 months.  Almost all people with total knee replacement are slightly  numb on the outside of the knee.    Pain  Blood clot  Fat clot  Loss of motion.  Total knees have the tendency to lose motion if you do not exercise and do therapy.  You have to work through the pain to achieve motion  Fracture of bone  Failure of procedure to achieve its intended purpose.  Total knees are 80% successful in decreasing pain increasing function.  Total hips are 90% successful in decreasing pain and increasing function  Failure of hardware/they last approximately 15 years/prosthesis is made out of metal and plastic they could wear out with time  Non-union or mal-union of bone  Malalignment  Death/you go see cardiologist before surgery    Patient instructions for joint replacement    Before surgery  1.  Shower with Hibiclens soap/antibacterial for 3 days prior to surgery to decrease risk of infection  2.  Stop all blood thinners/aspirin, Coumadin, warfarin, Plavix, Eliquis, Xarelto etc 5 days prior to surgery  3.  No eating or drinking after midnight before surgery. Would like you to drink a bottle of Powerade, Gatorade, or Pedialyte the day before surgery prior to midnight, so that you do not get dehydrated waiting for surgery the next day.  4.  Take Tylenol 650 mg the night prior to surgery.  5.  Stop all semaglutides (Monjauro, Ozempic, Trulicity, Wegovy, etc.). 10 days prior to surgery.  6. Stop all NSAIDs (Motrin, ibuprofen, Aleve, Mobic, Celebrex, Voltaren, nabumetone, Relafen, sulindac etc.), all natural products, and vitamins except vitamin D for 7-10 days prior to surgery.    Ask physicians for prescription of Celebrex or Mobic if needed    After surgery at home  1.  Take Tylenol 650 mg 3 times a day for 14 days then as needed for mild pain.  You may resume all your home medications the 1st day after surgery  2.  Take gabapentin 300 mg nightly for 6 weeks/if you are on pregabalin or Lyrica you can use that instead  3.  Take Celebrex 200 mg or meloxicam 15 mg daily-you may substitute with Aleve  2 tablets twice a day with food or ibuprofen 600 mg twice a day with food or any other anti-inflammatory if you were on prior to surgery for 6 weeks unless having cardiac issues to take for 2 weeks only  4.  Must take aspirin 81 mg twice a day for 6 weeks unless you are on other blood thinners/Plavix, Eliquis, Xarelto, Coumadin etc to start the next day after surgery  5.  Must wear compressive stockings for 6 weeks minimum to decrease the risk of blood clot and swelling  6.  Hydrocodone/Norco or oxycodone/Percocet will be prescribed to take every 6 hr as needed for breakthrough pain/you may cut pills in half.  You will also have something for nausea to take on as needed will be prescribed  7.  May apply ice on the knee to help with decreasing pain  8.  Keep wound dry for 2 weeks  than you will be allowed to shower in 24 hr and get the wound wet.  No soaking of the wound in the tub or swimming for 4 weeks after surgery  9.  No driving for 4 weeks unless specified by physician  10.  Avoid touching the wound or surrounding area /at least 2 inches on each side of the surgical incision until staples are removed/stitches   11.  May change the surgical dressing if extremely bloody or has drainage on it. May clean the wound with peroxide or Betadine and apply sterile dressing and Ace wrap over it  12.  Leave hospital dressing on for 3 days then may change by applying sterile 4 x 4 and Ace wrap after cleaning with Betadine or peroxide.  May leave this dressing change to home health nurses  13. .  If you had total hip replacement may have a knee immobilizer brace that you need to keep on at night for the next 3-4 weeks.  No need to wear the brace during the day when you are up and walking only when you are asleep at night to prevent you from turning the wrong way and dislocating her hip out of the socket       Proceed with right total hip arthroplasty 1st week of Ol[place]   We will use [employer]  dual mobility prosthesis to match the  other side  Already has walker    Disclaimer: This note was prepared using a voice recognition system and is likely to have sound alike errors within the text.

## 2025-05-04 DIAGNOSIS — M16.12 ARTHRITIS OF LEFT HIP: ICD-10-CM

## 2025-05-04 DIAGNOSIS — M16.11 ARTHRITIS OF RIGHT HIP: ICD-10-CM

## 2025-05-05 RX ORDER — MELOXICAM 15 MG/1
TABLET ORAL
Qty: 30 TABLET | Refills: 3 | Status: SHIPPED | OUTPATIENT
Start: 2025-05-05

## 2025-05-21 ENCOUNTER — OFFICE VISIT (OUTPATIENT)
Dept: INTERNAL MEDICINE | Facility: CLINIC | Age: 75
End: 2025-05-21
Payer: COMMERCIAL

## 2025-05-21 ENCOUNTER — HOSPITAL ENCOUNTER (OUTPATIENT)
Dept: RADIOLOGY | Facility: HOSPITAL | Age: 75
Discharge: HOME | End: 2025-05-21
Attending: NURSE PRACTITIONER
Payer: COMMERCIAL

## 2025-05-21 ENCOUNTER — HOSPITAL ENCOUNTER (OUTPATIENT)
Dept: CARDIOLOGY | Facility: HOSPITAL | Age: 75
Discharge: HOME | End: 2025-05-21
Attending: ANESTHESIOLOGY
Payer: COMMERCIAL

## 2025-05-21 VITALS
SYSTOLIC BLOOD PRESSURE: 147 MMHG | OXYGEN SATURATION: 97 % | TEMPERATURE: 97 F | HEART RATE: 99 BPM | DIASTOLIC BLOOD PRESSURE: 87 MMHG

## 2025-05-21 DIAGNOSIS — R73.03 PREDIABETES: ICD-10-CM

## 2025-05-21 DIAGNOSIS — Z01.818 PREOPERATIVE EXAMINATION: ICD-10-CM

## 2025-05-21 DIAGNOSIS — Z01.818 PREOP TESTING: ICD-10-CM

## 2025-05-21 DIAGNOSIS — Z01.818 PREOPERATIVE EXAMINATION: Primary | ICD-10-CM

## 2025-05-21 DIAGNOSIS — G89.29 CHRONIC BILATERAL LOW BACK PAIN WITHOUT SCIATICA: ICD-10-CM

## 2025-05-21 DIAGNOSIS — I10 ESSENTIAL HYPERTENSION: ICD-10-CM

## 2025-05-21 DIAGNOSIS — M54.50 CHRONIC BILATERAL LOW BACK PAIN WITHOUT SCIATICA: ICD-10-CM

## 2025-05-21 DIAGNOSIS — E55.9 VITAMIN D DEFICIENCY: ICD-10-CM

## 2025-05-21 DIAGNOSIS — E78.2 MIXED HYPERLIPIDEMIA: ICD-10-CM

## 2025-05-21 DIAGNOSIS — D64.9 ANEMIA, UNSPECIFIED TYPE: ICD-10-CM

## 2025-05-21 DIAGNOSIS — R56.9 SEIZURES: ICD-10-CM

## 2025-05-21 DIAGNOSIS — M16.11 ARTHRITIS OF RIGHT HIP: ICD-10-CM

## 2025-05-21 LAB
OHS QRS DURATION: 94 MS
OHS QTC CALCULATION: 464 MS

## 2025-05-21 PROCEDURE — 93005 ELECTROCARDIOGRAM TRACING: CPT

## 2025-05-21 PROCEDURE — 99999 PR PBB SHADOW E&M-EST. PATIENT-LVL III: CPT | Mod: PBBFAC,,,

## 2025-05-21 PROCEDURE — 93010 ELECTROCARDIOGRAM REPORT: CPT | Mod: ,,, | Performed by: INTERNAL MEDICINE

## 2025-05-21 PROCEDURE — 71046 X-RAY EXAM CHEST 2 VIEWS: CPT | Mod: TC

## 2025-05-21 PROCEDURE — 71046 X-RAY EXAM CHEST 2 VIEWS: CPT | Mod: 26,,, | Performed by: RADIOLOGY

## 2025-05-21 NOTE — ASSESSMENT & PLAN NOTE
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

## 2025-05-21 NOTE — ASSESSMENT & PLAN NOTE
-Vitamin D and Krill oil supplement prescribed   -patient reports he is not taking Krill oil  -patient advised to hold all vitamins and supplements for 7 days prior to procedure

## 2025-05-21 NOTE — PROGRESS NOTES
Preoperative History and Physical  '[facility]                                                                   Chief Complaint: Preoperative evaluation     History of Present Illness:      Conner Lombardi Jr. is a 74 y.o. male who presents to the office today for a preoperative consultation at the request of Dr. Lee who plans on performing  ARTHROPLASTY, HIP on May 30.     Functional Status:      The patient is not able to climb a flight of stairs. The patient is able to ambulate with use of cane without difficulty. The patient's functional status is not affected by the surgical problem. The patient's functional status is not affected by shortness of breath, chest pain, dyspnea on exertion and fatigue.    MET score greater than 4    Patient Anesthesia History:      History of Malignant Hyperthermia: no  History of Pseudocholinesterase Deficiency: no  History PONV: no  History of difficult intubation: no  History of delayed emergence: no  History of high fever after anesthesia: no    Family Anesthesia History:      History of Malignant Hyperthermia: no  History of Pseudocholinesterase Deficiency: no    Past Medical History:      Past Medical History:   Diagnosis Date    Anemia     Epilepsy     Last seizure 2010.     Hyperlipidemia     Hypertension     Prediabetes     Tubular adenoma of colon 04/02/2012    Vitamin D deficiency         Past Surgical History:      Past Surgical History:   Procedure Laterality Date    BLOCK, NERVE, PERIPHERAL Left 8/19/2024    Procedure: Left femoral and obturator nerve block;  Surgeon: Helder Taylor MD;  Location: [facility];  Service: Pain Management;  Laterality: Left;    COLONOSCOPY N/A 07/12/2021    Procedure: COLONOSCOPY;  Surgeon: Subhash Martin MD;  Location: [facility];  Service: Endoscopy;  Laterality: N/A;    HIP ARTHROPLASTY Left 1/28/2025    Procedure: ARTHROPLASTY, HIP;  Surgeon: Javier Lee,  MD;  Location: [facility];  Service: Orthopedics;  Laterality: Left;    TONSILLECTOMY      VASECTOMY  1984/1985        Social History:      Social History     Socioeconomic History    Marital status:    Tobacco Use    Smoking status: Never    Smokeless tobacco: Never   Substance and Sexual Activity    Alcohol use: No    Drug use: Never    Sexual activity: Not Currently     Partners: Female     Birth control/protection: None     Social Drivers of Health     Financial Resource Strain: Low Risk  (2/26/2025)    Overall Financial Resource Strain (CARDIA)     Difficulty of Paying Living Expenses: Not hard at all   Food Insecurity: No Food Insecurity (2/26/2025)    Hunger Vital Sign     Worried About Running Out of Food in the Last Year: Never true     Ran Out of Food in the Last Year: Never true   Transportation Needs: No Transportation Needs (2/26/2025)    PRAPARE - Transportation     Lack of Transportation (Medical): No     Lack of Transportation (Non-Medical): No   Physical Activity: Inactive (2/26/2025)    Exercise Vital Sign     Days of Exercise per Week: 0 days     Minutes of Exercise per Session: 0 min   Stress: No Stress Concern Present (2/26/2025)    [employer] - Occupational Stress Questionnaire     Feeling of Stress : Only a little   Housing Stability: Low Risk  (2/26/2025)    Housing Stability Vital Sign     Unable to Pay for Housing in the Last Year: No     Number of Times Moved in the Last Year: 0     Homeless in the Last Year: No        Family History:      Family History   Problem Relation Name Age of Onset    Cancer Mother Batool bhandariAmira Lombardi         Brain    Heart disease Father Conner Lombardi, Sr.     Cancer Father Conner Lombardi, Sr.         Liver and lung    Hypertension Father Conner Lombardi, Sr.     Heart disease Son          CABG in 2018    Diabetes Maternal Grandfather Aguilar Pepitone     Hypertension Other         Allergies:      Review of patient's  allergies indicates:  No Known Allergies    Medications:      Current Outpatient Medications   Medication Sig    aspirin (ECOTRIN) 81 MG EC tablet Take 81 mg by mouth once daily.    atorvastatin (LIPITOR) 40 MG tablet TAKE 1 TABLET(40 MG) BY MOUTH EVERY DAY    cholecalciferol, vitamin D3, (VITAMIN D3) 25 mcg (1,000 unit) capsule Take 1 capsule (1,000 Units total) by mouth once daily.    gabapentin (NEURONTIN) 300 MG capsule Take 1 capsule (300 mg total) by mouth every evening.    KRILL OIL ORAL Take by mouth.    lisinopriL-hydrochlorothiazide (PRINZIDE,ZESTORETIC) 20-25 mg Tab Take 2 tablets by mouth once daily. If BP is below 110/70 - decrease to 1 tablet daily    meloxicam (MOBIC) 15 MG tablet TAKE 1 TABLET(15 MG) BY MOUTH DAILY WITH FOOD    phenytoin (DILANTIN) 100 MG ER capsule Take 4 capsules (400 mg total) by mouth every evening.     No current facility-administered medications for this visit.       Vitals:      Vitals:    05/21/25 0803   BP: (!) 147/87   Pulse: 99   Temp: 97.3 °F (36.3 °C)       Review of Systems:        Constitutional: Negative for fever, chills, weight loss, malaise/fatigue and diaphoresis.   HENT: Negative for hearing loss, ear pain, nosebleeds, congestion, sore throat, neck pain, tinnitus and ear discharge.    Eyes: Negative for blurred vision, double vision, photophobia, pain, discharge and redness.   Respiratory: Negative for cough, hemoptysis, sputum production, shortness of breath, wheezing and stridor.    Cardiovascular: Negative for chest pain, palpitations, orthopnea, claudication, leg swelling and PND.   Gastrointestinal: Negative for heartburn, nausea, vomiting, abdominal pain, diarrhea, constipation, blood in stool and melena.   Genitourinary: Negative for dysuria, urgency, frequency, hematuria and flank pain.   Musculoskeletal: Negative for myalgias, back pain, and falls. + Right hip pain  Skin: Negative for itching and rash.   Neurological: Negative for dizziness, tingling,  tremors, sensory change, speech change, focal weakness, seizures, loss of consciousness, weakness and headaches.   Endo/Heme/Allergies: Negative for environmental allergies and polydipsia. Does not bruise/bleed easily.   Psychiatric/Behavioral: Negative for depression, suicidal ideas, hallucinations, memory loss and substance abuse. The patient is not nervous/anxious and does not have insomnia.    All 14 systems reviewed and negative except as noted above.    Physical Exam:      Constitutional: Appears well-developed, well-nourished and in no acute distress.  Patient is oriented to person, place, and time. Use of cane for gait stability  Head: Normocephalic and atraumatic. Mucous membranes moist.  Neck: Neck supple no mass.   Cardiovascular: Normal rate and regular rhythm.  S1 S2 appreciated by ascultation.  Pulmonary/Chest: Effort normal and clear to auscultation bilaterally. No respiratory distress.   Abdomen: Soft. Non-tender and non-distended. Bowel sounds are normal.   Neurological: Patient is alert and oriented to person, place and time. Moves all extremities.  Skin: Warm and dry. No lesions.  Extremities: No clubbing, cyanosis or edema. Limited ROM due to R hip pain     Laboratory data:      Reviewed and noted in plan where applicable. Please see chart for full laboratory data.      Lab Results   Component Value Date    WBC 6.24 05/21/2025    HGB 12.3 (L) 05/21/2025    HCT 35.3 (L) 05/21/2025    MCV 99 (H) 05/21/2025     05/21/2025     Comprehensive Metabolic Panel  Order: [id number]   Status: Final result  Dx: Preop testing                Component  Ref Range & Units (hover) 08:58  (5/21/25) 3 mo ago  (2/7/25) 4 mo ago  (1/14/25) 9 mo ago  (8/2/24) 1 yr ago  (5/7/24) 1 yr ago  (1/31/24) 1 yr ago  (7/24/23)   Sodium 141 141 143 142 138 140 140   Potassium 4.8 4.6 4.1 4.5 4.6 4.2 4.3   Chloride 106 105 106 107 101 105 103   CO2 25 27 26 27 29 25 25   Glucose 115 High  111 High  102 122 High  124 High   117 High  115 High    BUN 33 High  18 38 High  20 21 24 High  19   Creatinine 1.3 1.0 1.1 0.9 1.2 1.0 1.0   Calcium 9.5 9.4 9.7 9.5 10.3 10.3 10.2   Protein Total 7.3 6.8 R 7.3 R 6.8 R 7.1 R 7.5 R 7.3 R   Albumin 3.7 3.2 Low  3.8 3.7 3.8 4.0 4.0   Bilirubin Total 0.3 0.3 CM 0.2 CM 0.3 CM 0.3 CM 0.3 CM 0.4 CM   Comment: For infants and newborns, interpretation of results should be based  on gestational age, weight and in agreement with clinical  observations.    Premature Infant recommended reference ranges:  0-24 hours:  <8.0 mg/dL  24-48 hours: <12.0 mg/dL  3-5 days:    <15.0 mg/dL  6-29 days:   <15.0 mg/dL    124 R 131 R 99 R 108 R 88 R 90 R   AST 27 28 R 22 R 20 R 25 R 23 R 20 R   ALT 32 28 R 22 R 23 R 32 R 22 R 21 R   Anion Gap 10 9 11 8 8 10 12   eGFR 58 Low  >60.0 R >60 R >60.0 R >60.0 R >60.0 R >60.0 R   Comment: Estimated GFR calculated using the CKD-EPI creatinine (2021) equation.   Resulting Agency [facility]             Specimen Collected: 05/21/25 08:58 CDT Last Resulted: 05/21/25 10:00 CDT       Predictors of intubation difficulty:       Morbid obesity? no   Anatomically abnormal facies? no   Prominent incisors? no   Receding mandible? no   Short, thick neck? no   Neck range of motion: normal   Dentition: No chipped, loose, or missing teeth.  Based on the Modified Mallampati, patient is a mallampati score: I (soft palate, uvula, fauces, and tonsillar pillars visible)    Cardiographics:      ECG: normal sinus rhythm    Echocardiogram on 12/18/2024: Left Ventricle: The left ventricle is normal in size. Ventricular mass is normal. Normal wall thickness. There is concentric remodeling. There is normal systolic function with a visually estimated ejection fraction of 55 - 60%. There is normal diastolic function.    Right Ventricle: Normal right ventricular cavity size. Wall thickness is normal. Systolic function is normal.    Aortic Valve: There is mild aortic regurgitation.     Mitral Valve: There is mild regurgitation.    Tricuspid Valve: There is mild regurgitation.    Pulmonary Artery: The estimated pulmonary artery systolic pressure is 17 mmHg.    IVC/SVC: Normal venous pressure at 3 mmHg.       Imaging:      Chest x-ray on 5/21/2025: No evidence of acute disease.     Assessment and Plan:      1. Preoperative examination  -     Ambulatory referral/consult to Pre-Admit  -     Hemoglobin A1C; Future; Expected date: 05/21/2025  -     X-Ray Chest PA And Lateral; Future; Expected date: 05/21/2025  -     E-Consult to General Cardiology    2. Seizures  Assessment & Plan:  -hx of non intractable temporal lobe Epilepsy reported with last seizure in 2010   -Dilantin continued as prescribed- may take day of surgery   -Dilantin level pending- last level of 11.1 on 4/1/2025  -seizure precautions   -followed outpatient by Neurology     Orders:  -     Phenytoin Level, Free; Future; Expected date: 05/21/2025    3. Vitamin D deficiency  Assessment & Plan:  -Vitamin D and Krill oil supplement prescribed   -patient reports he is not taking Krill oil  -patient advised to hold all vitamins and supplements for 7 days prior to procedure       4. Prediabetes  Assessment & Plan:  -Hemoglobin A1C 5.9 on 5/21/2025  -previous A1c level of 5.5 on 2/7/2025      5. Mixed hyperlipidemia  Assessment & Plan:  -Atorvastatin continued nightly       6. Essential hypertension  Assessment & Plan:  -controlled   -BP elevated at 147/87 upon assessment in [facility]   -Lisinopril/HCTZ prescribed- hold morning of surgery   -followed outpatient by PCP       7. Arthritis of right hip  Assessment & Plan:  -ARTHROPLASTY, HIP (RIGHT) planned per Dr. Lee on 5/30/2025      Known risk factors for perioperative complications: Anemia   Difficulty with intubation is not anticipated.     Cardiac Risk Estimation: Based on the Revised Cardiac Risk index, patient has a RCRI score of I with a 3.9 % risk of a major cardiac event in a low risk  procedure.     Cardiac evaluation: Pre-OP CV evaluation prior to R hip replacement surgery requested- econsult sent to Dr. Tse (cardiology) on 5/21/2025     1.) Preoperative workup as follows: chest x-ray, ECG, hemoglobin, hematocrit, electrolytes, creatinine, glucose, urinalysis (urinary tract instrumentation planned).  2.) Change in medication regimen before surgery: discontinue ASA 7 days before surgery, discontinue all NSAIDs (Meloxicam) 5 days before surgery, hold Metformin 24 hours prior to surgery. Hold all vitamins/supplements for 7 days prior to surgery, with the exception of Potassium and Iron supplementation. Hold semaglutide/tirzepatide for 7 days prior to surgery.   3.) Prophylaxis for cardiac events with perioperative beta-blockers: not indicated.  4.) Invasive hemodynamic monitoring perioperatively: at the discretion of anesthesiologist.  5.) Deep vein thrombosis prophylaxis postoperatively: regimen to be chosen by surgical team.  6.) Surveillance for postoperative MI with ECG immediately postoperatively and on postoperati ve days 1 and 2 AND troponin levels 24 hours postoperatively and on day 4 or hospital discharge (whichever comes first): at the discretion of anesthesiologist.  7.) Current medications which may produce withdrawal symptoms if withheld perioperatively: None  8.) Other measures: Postoperative hypertension management with IV hydralazine until able to take oral medications.  Postoperative incentive spirometry to prevent pneumonia. Would recommend obtaining cardiac clearance prior to procedure.   -Urinalysis reviewed with no evidence of infectious process     --Morning of Procedure medications: Dilantin continued  --Hold all other medications morning of surgery   --Resume all medications post-operatively  --Hold ASA, NSAIDs and all vitamins/supplements 7 days prior to procedure  --Hold oral diabetes medications morning of surgery   --Hold metformin 24 hours prior to surgery   --Hold  Ozempic 7 days prior to surgery   --Hold Adderall 48 hours prior to surgery       8. Anemia, unspecified type  Assessment & Plan:  -H/H 12.3/35.3 on 5/21/2025      9. Chronic bilateral low back pain without sciatica  Assessment & Plan:  -stable   -pt is no longer taking Gabapentin  -careful attention to positioning during procedure                Electronically signed by Cynthia Lara NP on 5/21/2025 at 8:08 AM.

## 2025-05-21 NOTE — ASSESSMENT & PLAN NOTE
-ARTHROPLASTY, HIP (RIGHT) planned per Dr. Lee on 5/30/2025      Known risk factors for perioperative complications: Anemia   Difficulty with intubation is not anticipated.     Cardiac Risk Estimation: Based on the Revised Cardiac Risk index, patient is a Class I risk with a 3.9 % risk of a major cardiac event in a low risk procedure.     Cardiac evaluation: Pre-OP CV evaluation prior to R hip replacement surgery with Dr. Lee- econsult sent to Dr. Tse (cardiology) on 5/21/2025     1.) Preoperative workup as follows: chest x-ray, ECG, hemoglobin, hematocrit, electrolytes, creatinine, glucose, urinalysis (urinary tract instrumentation planned).  2.) Change in medication regimen before surgery: discontinue ASA 7 days before surgery, discontinue all NSAIDs (Meloxicam) 5 days before surgery, hold Metformin 24 hours prior to surgery. Hold all vitamins/supplements for 7 days prior to surgery, with the exception of Potassium and Iron supplementation. Hold semaglutide/tirzepatide for 7 days prior to surgery.   3.) Prophylaxis for cardiac events with perioperative beta-blockers: not indicated.  4.) Invasive hemodynamic monitoring perioperatively: at the discretion of anesthesiologist.  5.) Deep vein thrombosis prophylaxis postoperatively: regimen to be chosen by surgical team.  6.) Surveillance for postoperative MI with ECG immediately postoperatively and on postoperati ve days 1 and 2 AND troponin levels 24 hours postoperatively and on day 4 or hospital discharge (whichever comes first): at the discretion of anesthesiologist.  7.) Current medications which may produce withdrawal symptoms if withheld perioperatively: None  8.) Other measures: Postoperative hypertension management with IV hydralazine until able to take oral medications.  Postoperative incentive spirometry to prevent pneumonia. Would recommend cardiac clearance prior to procedure.   -Urinalysis results reviewed with no evidence of infectious  process     --Morning of Procedure medications: Dilantin continued  --Hold all other medications morning of surgery   --Resume all medications post-operatively  --Hold ASA, NSAIDs and all vitamins/supplements 7 days prior to procedure  --Hold oral diabetes medications morning of surgery   --Hold metformin 24 hours prior to surgery   --Hold Ozempic 7 days prior to surgery   --Hold Adderall 48 hours prior to surgery

## 2025-05-21 NOTE — ASSESSMENT & PLAN NOTE
-controlled   -BP elevated at 147/87 upon assessment in [facility]   -Lisinopril/HCTZ prescribed- hold morning of surgery   -followed outpatient by PCP

## 2025-05-28 ENCOUNTER — PATIENT MESSAGE (OUTPATIENT)
Dept: ORTHOPEDICS | Facility: CLINIC | Age: 75
End: 2025-05-28
Payer: COMMERCIAL

## 2025-05-29 ENCOUNTER — TELEPHONE (OUTPATIENT)
Dept: PAIN MEDICINE | Facility: CLINIC | Age: 75
End: 2025-05-29
Payer: COMMERCIAL

## 2025-05-29 ENCOUNTER — LAB VISIT (OUTPATIENT)
Dept: LAB | Facility: HOSPITAL | Age: 75
End: 2025-05-29
Attending: ORTHOPAEDIC SURGERY
Payer: COMMERCIAL

## 2025-05-29 DIAGNOSIS — Z01.818 PREOP TESTING: ICD-10-CM

## 2025-05-29 LAB
INDIRECT COOMBS: NORMAL
RH BLD: NORMAL
SPECIMEN OUTDATE: NORMAL

## 2025-05-29 PROCEDURE — 86850 RBC ANTIBODY SCREEN: CPT | Performed by: ORTHOPAEDIC SURGERY

## 2025-05-29 PROCEDURE — 36415 COLL VENOUS BLD VENIPUNCTURE: CPT

## 2025-05-29 NOTE — TELEPHONE ENCOUNTER
Reached out to patient to reschedule appointment because the provider will be out of clinic.  Apt has been made . All questions answered.     Jaswinder Rebollar  Medical

## 2025-05-30 ENCOUNTER — ANESTHESIA EVENT (OUTPATIENT)
Dept: SURGERY | Facility: HOSPITAL | Age: 75
End: 2025-05-30
Payer: COMMERCIAL

## 2025-05-30 ENCOUNTER — ANESTHESIA (OUTPATIENT)
Dept: SURGERY | Facility: HOSPITAL | Age: 75
End: 2025-05-30
Payer: COMMERCIAL

## 2025-05-30 ENCOUNTER — HOSPITAL ENCOUNTER (OUTPATIENT)
Facility: HOSPITAL | Age: 75
Discharge: HOME | End: 2025-05-30
Attending: ORTHOPAEDIC SURGERY | Admitting: ORTHOPAEDIC SURGERY
Payer: COMMERCIAL

## 2025-05-30 VITALS
HEART RATE: 99 BPM | HEIGHT: 66 IN | WEIGHT: 182.19 LBS | RESPIRATION RATE: 15 BRPM | DIASTOLIC BLOOD PRESSURE: 74 MMHG | BODY MASS INDEX: 29.28 KG/M2 | SYSTOLIC BLOOD PRESSURE: 130 MMHG | TEMPERATURE: 98 F | OXYGEN SATURATION: 94 %

## 2025-05-30 DIAGNOSIS — Z01.818 PREOP TESTING: ICD-10-CM

## 2025-05-30 DIAGNOSIS — M16.11 ARTHRITIS OF RIGHT HIP: Primary | ICD-10-CM

## 2025-05-30 LAB
HCT VFR BLD AUTO: 32.2 % (ref 40–54)
HGB BLD-MCNC: 11.3 GM/DL (ref 14–18)

## 2025-05-30 PROCEDURE — 71000039 HC RECOVERY, EACH ADD'L HOUR: Performed by: ORTHOPAEDIC SURGERY

## 2025-05-30 PROCEDURE — 36000711: Performed by: ORTHOPAEDIC SURGERY

## 2025-05-30 PROCEDURE — 25000003 PHARM REV CODE 250: Performed by: ORTHOPAEDIC SURGERY

## 2025-05-30 PROCEDURE — 63600175 PHARM REV CODE 636 W HCPCS: Performed by: ORTHOPAEDIC SURGERY

## 2025-05-30 PROCEDURE — 71000033 HC RECOVERY, INTIAL HOUR: Performed by: ORTHOPAEDIC SURGERY

## 2025-05-30 PROCEDURE — C1713 ANCHOR/SCREW BN/BN,TIS/BN: HCPCS | Performed by: ORTHOPAEDIC SURGERY

## 2025-05-30 PROCEDURE — 63600175 PHARM REV CODE 636 W HCPCS: Performed by: CLINIC/CENTER

## 2025-05-30 PROCEDURE — 36000710: Performed by: ORTHOPAEDIC SURGERY

## 2025-05-30 PROCEDURE — 37000008 HC ANESTHESIA 1ST 15 MINUTES: Performed by: ORTHOPAEDIC SURGERY

## 2025-05-30 PROCEDURE — 85018 HEMOGLOBIN: CPT | Performed by: ORTHOPAEDIC SURGERY

## 2025-05-30 PROCEDURE — 63600175 PHARM REV CODE 636 W HCPCS: Mod: JZ,TB | Performed by: ANESTHESIOLOGY

## 2025-05-30 PROCEDURE — 25000003 PHARM REV CODE 250: Performed by: CLINIC/CENTER

## 2025-05-30 PROCEDURE — 27201423 OPTIME MED/SURG SUP & DEVICES STERILE SUPPLY: Performed by: ORTHOPAEDIC SURGERY

## 2025-05-30 PROCEDURE — 37000009 HC ANESTHESIA EA ADD 15 MINS: Performed by: ORTHOPAEDIC SURGERY

## 2025-05-30 PROCEDURE — 25000003 PHARM REV CODE 250: Performed by: ANESTHESIOLOGY

## 2025-05-30 PROCEDURE — 71000015 HC POSTOP RECOV 1ST HR: Performed by: ORTHOPAEDIC SURGERY

## 2025-05-30 PROCEDURE — C1776 JOINT DEVICE (IMPLANTABLE): HCPCS | Performed by: ORTHOPAEDIC SURGERY

## 2025-05-30 PROCEDURE — 27130 TOTAL HIP ARTHROPLASTY: CPT | Mod: RT,,, | Performed by: ORTHOPAEDIC SURGERY

## 2025-05-30 PROCEDURE — 85014 HEMATOCRIT: CPT | Performed by: ORTHOPAEDIC SURGERY

## 2025-05-30 PROCEDURE — 97161 PT EVAL LOW COMPLEX 20 MIN: CPT

## 2025-05-30 DEVICE — BIOLOX DELTA TS CERAMIC FEMORAL HEAD 12/14 TAPER REVISION DIAMETER 28MM +1.5
Type: IMPLANTABLE DEVICE | Site: HIP | Status: FUNCTIONAL
Brand: BIOLOX DELTA

## 2025-05-30 DEVICE — PINNACLE CANCELLOUS BONE SCREW 6.5MM X 25MM
Type: IMPLANTABLE DEVICE | Site: HIP | Status: FUNCTIONAL
Brand: PINNACLE

## 2025-05-30 DEVICE — PINNACLE GRIPTION ACETABULAR SHELL SECTOR 54MM OD
Type: IMPLANTABLE DEVICE | Site: HIP | Status: FUNCTIONAL
Brand: PINNACLE GRIPTION

## 2025-05-30 DEVICE — PINNACLE CANCELLOUS BONE SCREW 6.5MM X 20MM
Type: IMPLANTABLE DEVICE | Site: HIP | Status: FUNCTIONAL
Brand: PINNACLE

## 2025-05-30 DEVICE — BI-MENTUM ALTRX LINER 47/28
Type: IMPLANTABLE DEVICE | Site: HIP | Status: FUNCTIONAL
Brand: BI-MENTUM ALTRX

## 2025-05-30 DEVICE — PINNACLE HIP SOLUTIONS DUAL MOBILITY LINER PINNACLE ACETABULAR SHELL BI-MENTUM PE LINER 54/47 54MM 47/28
Type: IMPLANTABLE DEVICE | Site: HIP | Status: FUNCTIONAL
Brand: PINNACLE HIP SOLUTIONS

## 2025-05-30 DEVICE — ACTIS TOTAL HIP SYSTEM ACTIS DUOFIX HIP PROSTHESIS FEMORAL STEM 12/14 TAPER CEMENTLESS STANDARD COLLARLESS SIZE 5
Type: IMPLANTABLE DEVICE | Site: HIP | Status: FUNCTIONAL
Brand: ACTIS

## 2025-05-30 RX ORDER — CEFAZOLIN 2 G/1
2 INJECTION, POWDER, FOR SOLUTION INTRAMUSCULAR; INTRAVENOUS
Status: COMPLETED | OUTPATIENT
Start: 2025-05-30 | End: 2025-05-30

## 2025-05-30 RX ORDER — MORPHINE SULFATE 4 MG/ML
2 INJECTION, SOLUTION INTRAMUSCULAR; INTRAVENOUS EVERY 4 HOURS PRN
Refills: 0 | Status: CANCELLED | OUTPATIENT
Start: 2025-05-30

## 2025-05-30 RX ORDER — TRANEXAMIC ACID 10 MG/ML
1000 INJECTION, SOLUTION INTRAVENOUS
Status: COMPLETED | OUTPATIENT
Start: 2025-05-30 | End: 2025-05-30

## 2025-05-30 RX ORDER — FENTANYL CITRATE 50 UG/ML
INJECTION, SOLUTION INTRAMUSCULAR; INTRAVENOUS
Status: DISCONTINUED | OUTPATIENT
Start: 2025-05-30 | End: 2025-05-30

## 2025-05-30 RX ORDER — LIDOCAINE HYDROCHLORIDE 20 MG/ML
INJECTION INTRAVENOUS
Status: DISCONTINUED | OUTPATIENT
Start: 2025-05-30 | End: 2025-05-30

## 2025-05-30 RX ORDER — ACETAMINOPHEN 325 MG/1
650 TABLET ORAL EVERY 8 HOURS PRN
Status: CANCELLED | OUTPATIENT
Start: 2025-05-30

## 2025-05-30 RX ORDER — ONDANSETRON 8 MG/1
8 TABLET, ORALLY DISINTEGRATING ORAL EVERY 8 HOURS PRN
Status: CANCELLED | OUTPATIENT
Start: 2025-05-30

## 2025-05-30 RX ORDER — ROCURONIUM BROMIDE 10 MG/ML
INJECTION, SOLUTION INTRAVENOUS
Status: DISCONTINUED | OUTPATIENT
Start: 2025-05-30 | End: 2025-05-30

## 2025-05-30 RX ORDER — ONDANSETRON HYDROCHLORIDE 2 MG/ML
4 INJECTION, SOLUTION INTRAVENOUS EVERY 12 HOURS PRN
Status: CANCELLED | OUTPATIENT
Start: 2025-05-30

## 2025-05-30 RX ORDER — CELECOXIB 100 MG/1
400 CAPSULE ORAL ONCE
Status: CANCELLED | OUTPATIENT
Start: 2025-05-30 | End: 2025-05-30

## 2025-05-30 RX ORDER — ACETAMINOPHEN 325 MG/1
650 TABLET ORAL EVERY 8 HOURS PRN
Status: DISCONTINUED | OUTPATIENT
Start: 2025-05-30 | End: 2025-05-30 | Stop reason: HOSPADM

## 2025-05-30 RX ORDER — OXYCODONE AND ACETAMINOPHEN 10; 325 MG/1; MG/1
1 TABLET ORAL EVERY 6 HOURS PRN
Qty: 27 TABLET | Refills: 0 | Status: SHIPPED | OUTPATIENT
Start: 2025-05-30

## 2025-05-30 RX ORDER — DOCUSATE SODIUM 100 MG/1
100 CAPSULE, LIQUID FILLED ORAL 2 TIMES DAILY
Status: CANCELLED | OUTPATIENT
Start: 2025-05-30

## 2025-05-30 RX ORDER — GABAPENTIN 300 MG/1
300 CAPSULE ORAL DAILY
Status: DISCONTINUED | OUTPATIENT
Start: 2025-05-30 | End: 2025-05-30 | Stop reason: HOSPADM

## 2025-05-30 RX ORDER — SODIUM CHLORIDE 9 MG/ML
INJECTION, SOLUTION INTRAVENOUS CONTINUOUS
Status: DISCONTINUED | OUTPATIENT
Start: 2025-05-30 | End: 2025-05-30 | Stop reason: HOSPADM

## 2025-05-30 RX ORDER — OXYCODONE HYDROCHLORIDE 5 MG/1
5 TABLET ORAL
Refills: 0 | Status: CANCELLED | OUTPATIENT
Start: 2025-05-30

## 2025-05-30 RX ORDER — CELECOXIB 100 MG/1
200 CAPSULE ORAL 2 TIMES DAILY
Status: CANCELLED | OUTPATIENT
Start: 2025-05-31

## 2025-05-30 RX ORDER — ONDANSETRON HYDROCHLORIDE 2 MG/ML
4 INJECTION, SOLUTION INTRAVENOUS DAILY PRN
Status: DISCONTINUED | OUTPATIENT
Start: 2025-05-30 | End: 2025-05-30 | Stop reason: HOSPADM

## 2025-05-30 RX ORDER — PROPOFOL 10 MG/ML
VIAL (ML) INTRAVENOUS
Status: DISCONTINUED | OUTPATIENT
Start: 2025-05-30 | End: 2025-05-30

## 2025-05-30 RX ORDER — DEXAMETHASONE SODIUM PHOSPHATE 4 MG/ML
8 INJECTION, SOLUTION INTRA-ARTICULAR; INTRALESIONAL; INTRAMUSCULAR; INTRAVENOUS; SOFT TISSUE
Status: DISCONTINUED | OUTPATIENT
Start: 2025-05-30 | End: 2025-05-30 | Stop reason: HOSPADM

## 2025-05-30 RX ORDER — OXYCODONE AND ACETAMINOPHEN 5; 325 MG/1; MG/1
1 TABLET ORAL
Status: DISCONTINUED | OUTPATIENT
Start: 2025-05-30 | End: 2025-05-30 | Stop reason: HOSPADM

## 2025-05-30 RX ORDER — ONDANSETRON HYDROCHLORIDE 2 MG/ML
INJECTION, SOLUTION INTRAVENOUS
Status: DISCONTINUED | OUTPATIENT
Start: 2025-05-30 | End: 2025-05-30

## 2025-05-30 RX ORDER — ROPIVACAINE/EPI/CLONIDINE/KET 2.46-0.005
SYRINGE (ML) INJECTION
Status: DISCONTINUED | OUTPATIENT
Start: 2025-05-30 | End: 2025-05-30 | Stop reason: HOSPADM

## 2025-05-30 RX ORDER — OXYCODONE HYDROCHLORIDE 5 MG/1
10 TABLET ORAL
Refills: 0 | Status: CANCELLED | OUTPATIENT
Start: 2025-05-30

## 2025-05-30 RX ORDER — CEFAZOLIN SODIUM 1 G/3ML
2 INJECTION, POWDER, FOR SOLUTION INTRAMUSCULAR; INTRAVENOUS
Status: CANCELLED | OUTPATIENT
Start: 2025-05-30 | End: 2025-05-31

## 2025-05-30 RX ORDER — PHENYLEPHRINE HYDROCHLORIDE 10 MG/ML
INJECTION INTRAVENOUS
Status: DISCONTINUED | OUTPATIENT
Start: 2025-05-30 | End: 2025-05-30

## 2025-05-30 RX ORDER — CHLORHEXIDINE GLUCONATE ORAL RINSE 1.2 MG/ML
10 SOLUTION DENTAL
Status: DISCONTINUED | OUTPATIENT
Start: 2025-05-30 | End: 2025-05-30 | Stop reason: HOSPADM

## 2025-05-30 RX ORDER — SODIUM CHLORIDE 9 MG/ML
INJECTION, SOLUTION INTRAVENOUS CONTINUOUS
Status: CANCELLED | OUTPATIENT
Start: 2025-05-30

## 2025-05-30 RX ORDER — SUCCINYLCHOLINE CHLORIDE 20 MG/ML
INJECTION INTRAMUSCULAR; INTRAVENOUS
Status: DISCONTINUED | OUTPATIENT
Start: 2025-05-30 | End: 2025-05-30

## 2025-05-30 RX ORDER — BISACODYL 10 MG/1
10 SUPPOSITORY RECTAL DAILY PRN
Status: CANCELLED | OUTPATIENT
Start: 2025-05-30

## 2025-05-30 RX ORDER — CHLORHEXIDINE GLUCONATE ORAL RINSE 1.2 MG/ML
10 SOLUTION DENTAL 2 TIMES DAILY
Status: CANCELLED | OUTPATIENT
Start: 2025-05-30 | End: 2025-06-04

## 2025-05-30 RX ORDER — KETOROLAC TROMETHAMINE 30 MG/ML
15 INJECTION, SOLUTION INTRAMUSCULAR; INTRAVENOUS EVERY 8 HOURS PRN
Status: DISCONTINUED | OUTPATIENT
Start: 2025-05-30 | End: 2025-05-30 | Stop reason: HOSPADM

## 2025-05-30 RX ORDER — ONDANSETRON 4 MG/1
4 TABLET, FILM COATED ORAL EVERY 6 HOURS PRN
Qty: 12 TABLET | Refills: 0 | Status: SHIPPED | OUTPATIENT
Start: 2025-05-30

## 2025-05-30 RX ORDER — HYDROMORPHONE HYDROCHLORIDE 2 MG/ML
0.2 INJECTION, SOLUTION INTRAMUSCULAR; INTRAVENOUS; SUBCUTANEOUS EVERY 5 MIN PRN
Status: DISCONTINUED | OUTPATIENT
Start: 2025-05-30 | End: 2025-05-30 | Stop reason: HOSPADM

## 2025-05-30 RX ADMIN — TRANEXAMIC ACID 1000 MG: 10 INJECTION, SOLUTION INTRAVENOUS at 11:05

## 2025-05-30 RX ADMIN — FENTANYL CITRATE 25 MCG: 50 INJECTION, SOLUTION INTRAMUSCULAR; INTRAVENOUS at 12:05

## 2025-05-30 RX ADMIN — PROPOFOL 140 MG: 10 INJECTION, EMULSION INTRAVENOUS at 10:05

## 2025-05-30 RX ADMIN — FENTANYL CITRATE 100 MCG: 50 INJECTION, SOLUTION INTRAMUSCULAR; INTRAVENOUS at 10:05

## 2025-05-30 RX ADMIN — ROCURONIUM BROMIDE 20 MG: 10 INJECTION, SOLUTION INTRAVENOUS at 10:05

## 2025-05-30 RX ADMIN — SUGAMMADEX 200 MG: 100 INJECTION, SOLUTION INTRAVENOUS at 11:05

## 2025-05-30 RX ADMIN — SODIUM CHLORIDE, SODIUM LACTATE, POTASSIUM CHLORIDE, AND CALCIUM CHLORIDE: .6; .31; .03; .02 INJECTION, SOLUTION INTRAVENOUS at 10:05

## 2025-05-30 RX ADMIN — ACETAMINOPHEN 650 MG: 325 TABLET ORAL at 08:05

## 2025-05-30 RX ADMIN — CHLORHEXIDINE GLUCONATE 0.12% ORAL RINSE 10 ML: 1.2 LIQUID ORAL at 08:05

## 2025-05-30 RX ADMIN — LIDOCAINE HYDROCHLORIDE 100 MG: 20 INJECTION INTRAVENOUS at 10:05

## 2025-05-30 RX ADMIN — DEXAMETHASONE SODIUM PHOSPHATE 8 MG: 4 INJECTION, SOLUTION INTRA-ARTICULAR; INTRALESIONAL; INTRAMUSCULAR; INTRAVENOUS; SOFT TISSUE at 10:05

## 2025-05-30 RX ADMIN — ONDANSETRON 4 MG: 2 INJECTION INTRAMUSCULAR; INTRAVENOUS at 10:05

## 2025-05-30 RX ADMIN — FENTANYL CITRATE 50 MCG: 50 INJECTION, SOLUTION INTRAMUSCULAR; INTRAVENOUS at 11:05

## 2025-05-30 RX ADMIN — KETOROLAC TROMETHAMINE 15 MG: 30 INJECTION, SOLUTION INTRAMUSCULAR at 12:05

## 2025-05-30 RX ADMIN — SUCCINYLCHOLINE CHLORIDE 60 MG: 20 INJECTION, SOLUTION INTRAMUSCULAR; INTRAVENOUS; PARENTERAL at 10:05

## 2025-05-30 RX ADMIN — TRANEXAMIC ACID 1000 MG: 10 INJECTION, SOLUTION INTRAVENOUS at 10:05

## 2025-05-30 RX ADMIN — FENTANYL CITRATE 25 MCG: 50 INJECTION, SOLUTION INTRAMUSCULAR; INTRAVENOUS at 11:05

## 2025-05-30 RX ADMIN — CEFAZOLIN 2 G: 2 INJECTION, POWDER, FOR SOLUTION INTRAMUSCULAR; INTRAVENOUS at 10:05

## 2025-05-30 RX ADMIN — ROCURONIUM BROMIDE 5 MG: 10 INJECTION, SOLUTION INTRAVENOUS at 10:05

## 2025-05-30 RX ADMIN — ROCURONIUM BROMIDE 25 MG: 10 INJECTION, SOLUTION INTRAVENOUS at 10:05

## 2025-05-30 RX ADMIN — PHENYLEPHRINE HYDROCHLORIDE 200 MCG: 10 INJECTION INTRAVENOUS at 11:05

## 2025-05-30 RX ADMIN — GABAPENTIN 300 MG: 300 CAPSULE ORAL at 08:05

## 2025-05-30 RX ADMIN — OXYCODONE HYDROCHLORIDE AND ACETAMINOPHEN 1 TABLET: 5; 325 TABLET ORAL at 12:05

## 2025-06-02 ENCOUNTER — PATIENT MESSAGE (OUTPATIENT)
Dept: ORTHOPEDICS | Facility: CLINIC | Age: 75
End: 2025-06-02
Payer: COMMERCIAL

## 2025-06-02 ENCOUNTER — TELEPHONE (OUTPATIENT)
Dept: PHARMACY | Facility: CLINIC | Age: 75
End: 2025-06-02
Payer: COMMERCIAL

## 2025-06-02 DIAGNOSIS — M54.16 LUMBAR RADICULOPATHY: ICD-10-CM

## 2025-06-03 RX ORDER — GABAPENTIN 300 MG/1
300 CAPSULE ORAL NIGHTLY
Qty: 30 CAPSULE | Refills: 0 | Status: SHIPPED | OUTPATIENT
Start: 2025-06-03

## 2025-06-12 ENCOUNTER — OFFICE VISIT (OUTPATIENT)
Dept: CARDIOLOGY | Facility: CLINIC | Age: 75
End: 2025-06-12
Payer: COMMERCIAL

## 2025-06-12 VITALS
RESPIRATION RATE: 16 BRPM | BODY MASS INDEX: 29.99 KG/M2 | SYSTOLIC BLOOD PRESSURE: 126 MMHG | WEIGHT: 186.63 LBS | OXYGEN SATURATION: 97 % | HEIGHT: 66 IN | DIASTOLIC BLOOD PRESSURE: 79 MMHG | HEART RATE: 101 BPM

## 2025-06-12 DIAGNOSIS — I10 ESSENTIAL HYPERTENSION: Primary | ICD-10-CM

## 2025-06-12 DIAGNOSIS — Z82.49 FAMILY HISTORY OF CARDIAC DISORDER: ICD-10-CM

## 2025-06-12 DIAGNOSIS — M16.11 ARTHRITIS OF RIGHT HIP: ICD-10-CM

## 2025-06-12 DIAGNOSIS — Z96.642 S/P TOTAL LEFT HIP ARTHROPLASTY: ICD-10-CM

## 2025-06-12 DIAGNOSIS — G89.29 CHRONIC BILATERAL LOW BACK PAIN WITHOUT SCIATICA: ICD-10-CM

## 2025-06-12 DIAGNOSIS — M16.11 ARTHRITIS OF RIGHT HIP: Primary | ICD-10-CM

## 2025-06-12 DIAGNOSIS — M16.12 ARTHRITIS OF LEFT HIP: ICD-10-CM

## 2025-06-12 DIAGNOSIS — E55.9 VITAMIN D DEFICIENCY: ICD-10-CM

## 2025-06-12 DIAGNOSIS — M54.50 CHRONIC BILATERAL LOW BACK PAIN WITHOUT SCIATICA: ICD-10-CM

## 2025-06-12 DIAGNOSIS — E78.2 MIXED HYPERLIPIDEMIA: ICD-10-CM

## 2025-06-12 DIAGNOSIS — Z96.642 S/P HIP REPLACEMENT, LEFT: ICD-10-CM

## 2025-06-12 DIAGNOSIS — M16.0 PRIMARY OSTEOARTHRITIS OF BOTH HIPS: ICD-10-CM

## 2025-06-12 PROCEDURE — 1159F MED LIST DOCD IN RCRD: CPT | Mod: CPTII,S$GLB,, | Performed by: INTERNAL MEDICINE

## 2025-06-12 PROCEDURE — 1101F PT FALLS ASSESS-DOCD LE1/YR: CPT | Mod: CPTII,S$GLB,, | Performed by: INTERNAL MEDICINE

## 2025-06-12 PROCEDURE — 4010F ACE/ARB THERAPY RXD/TAKEN: CPT | Mod: CPTII,S$GLB,, | Performed by: INTERNAL MEDICINE

## 2025-06-12 PROCEDURE — 1160F RVW MEDS BY RX/DR IN RCRD: CPT | Mod: CPTII,S$GLB,, | Performed by: INTERNAL MEDICINE

## 2025-06-12 PROCEDURE — G2211 COMPLEX E/M VISIT ADD ON: HCPCS | Mod: S$GLB,,, | Performed by: INTERNAL MEDICINE

## 2025-06-12 PROCEDURE — 99999 PR PBB SHADOW E&M-EST. PATIENT-LVL IV: CPT | Mod: PBBFAC,,, | Performed by: INTERNAL MEDICINE

## 2025-06-12 PROCEDURE — 99214 OFFICE O/P EST MOD 30 MIN: CPT | Mod: S$GLB,,, | Performed by: INTERNAL MEDICINE

## 2025-06-12 PROCEDURE — 3008F BODY MASS INDEX DOCD: CPT | Mod: CPTII,S$GLB,, | Performed by: INTERNAL MEDICINE

## 2025-06-12 PROCEDURE — 3078F DIAST BP <80 MM HG: CPT | Mod: CPTII,S$GLB,, | Performed by: INTERNAL MEDICINE

## 2025-06-12 PROCEDURE — 3288F FALL RISK ASSESSMENT DOCD: CPT | Mod: CPTII,S$GLB,, | Performed by: INTERNAL MEDICINE

## 2025-06-12 PROCEDURE — 3044F HG A1C LEVEL LT 7.0%: CPT | Mod: CPTII,S$GLB,, | Performed by: INTERNAL MEDICINE

## 2025-06-12 PROCEDURE — 3074F SYST BP LT 130 MM HG: CPT | Mod: CPTII,S$GLB,, | Performed by: INTERNAL MEDICINE

## 2025-06-12 RX ORDER — ATORVASTATIN CALCIUM 40 MG/1
40 TABLET, FILM COATED ORAL NIGHTLY
Qty: 90 TABLET | Refills: 1 | Status: SHIPPED | OUTPATIENT
Start: 2025-06-12

## 2025-06-12 RX ORDER — LISINOPRIL AND HYDROCHLOROTHIAZIDE 20; 25 MG/1; MG/1
2 TABLET ORAL DAILY
Qty: 180 TABLET | Refills: 1 | Status: SHIPPED | OUTPATIENT
Start: 2025-06-12

## 2025-06-12 NOTE — PROGRESS NOTES
Subjective:   Patient ID:  Conner Lombardi Jr. is a 75 y.o. male who presents for cardiac consult of No chief complaint on file.      Referral by: No referring provider defined for this encounter.     Reason for consult: preop CV eval      HPI  The patient came in today for cardiac consult of No chief complaint on file.      Conner Lombardi Jr. is a 75 y.o. male pt with strong FH early CAD, HTN, HLD, overweight, chronic back pin, PreDM, Vit D def, anemia presents for follow up CV eval.       12/3/24  Per Dr. Lee     - Bilateral hip severe arthritis as well as spinal stenosis multiple levels.  Last time seen he was taking gabapentin and Zanaflex as needed.  He was doing fine.  He did go through physical therapy.  Recently 08/19/2024 pain management performed left obturator and femoral nerve blocks.  Says does blocks did not seem to help.  He is extremely limited with what he can do in his pain is around 2-3/10 when he sits only gets severe when he ambulates.  He is using a quad cane to get around.  He is here with his wife who stated he is always complain in he can not even put his socks and shoes on that she has help him with that.  He still working.  He is at a point that he wants to have his hip replacement.      He has couple trips to attend in his grandson's getting  so he needs at least 6-8 weeks after surgery to recuperate so he opted that we need to wait till January 20, 2025 to undergo left total hip replacement     Pt here for preop eval for L hip replacement in Jan.    He has been having hip pain since Oct 2023  BP elevated 159/85. HR 88. BMI 29 - 183 lbs       3/11/25  Pt is s/p L hip replacement surgery with Dr. Lee in Jan 2025.     ECHO 12/2024 with normal bi V function, mild AI, mild MR, mild TR, PASP 17 mHg.   Nuc stress 12/2024 with small fixed scar apex, normal EF.   He is still doing PT/OT until April.   BP elevated 145/95. HR 98. BMI 30 - 187 lbs   He may need other hip  surgery.       6/12/25  PT is s/p R hip surgery 5/2025.   Recent lipids 5/2025 stable , LDL 70.   BP and HR stable. BMI 30 - 186 lbs.   Pt is finishing up PT/OT, home health will finish up today.   He may return to work in Aug.     FH - father - had CABG in 50s, son had CABG in 40s  Works as  for [employer].       No cardiac monitor results found for the past 12 months     Results for orders placed during the hospital encounter of 12/18/24    Nuclear Stress - Cardiology Interpreted    Interpretation Summary    Abnormal myocardial perfusion scan.    There is amoderate intensity, small sized, fixed perfusion abnormality consistent with scar in the apical wall(s).    There are no other significant perfusion abnormalities.    The gated perfusion images showed an ejection fraction of 68% at rest. The gated perfusion images showed an ejection fraction of 67% post stress. Normal ejection fraction is greater than 59%.    The ECG portion of the study is negative for ischemia.      Results for orders placed during the hospital encounter of 12/18/24    Echo    Interpretation Summary    Left Ventricle: The left ventricle is normal in size. Ventricular mass is normal. Normal wall thickness. There is concentric remodeling. There is normal systolic function with a visually estimated ejection fraction of 55 - 60%. There is normal diastolic function.    Right Ventricle: Normal right ventricular cavity size. Wall thickness is normal. Systolic function is normal.    Aortic Valve: There is mild aortic regurgitation.    Mitral Valve: There is mild regurgitation.    Tricuspid Valve: There is mild regurgitation.    Pulmonary Artery: The estimated pulmonary artery systolic pressure is 17 mmHg.    IVC/SVC: Normal venous pressure at 3 mmHg.      Past Medical History:   Diagnosis Date    Anemia     Epilepsy     Last seizure 2010.     Hyperlipidemia     Hypertension     Prediabetes     Tubular adenoma of  colon 04/02/2012    Vitamin D deficiency        Past Surgical History:   Procedure Laterality Date    BLOCK, NERVE, PERIPHERAL Left 8/19/2024    Procedure: Left femoral and obturator nerve block;  Surgeon: Helder Taylor MD;  Location: [facility];  Service: Pain Management;  Laterality: Left;    COLONOSCOPY N/A 07/12/2021    Procedure: COLONOSCOPY;  Surgeon: Subhash Martin MD;  Location: [facility] ENDO;  Service: Endoscopy;  Laterality: N/A;    HIP ARTHROPLASTY Left 1/28/2025    Procedure: ARTHROPLASTY, HIP;  Surgeon: Javier Lee MD;  Location: [facility] OR;  Service: Orthopedics;  Laterality: Left;    HIP ARTHROPLASTY Right 5/30/2025    Procedure: ARTHROPLASTY, HIP;  Surgeon: Javier Lee MD;  Location: [facility] OR;  Service: Orthopedics;  Laterality: Right;    TONSILLECTOMY      VASECTOMY  1984/1985       Social History     Tobacco Use    Smoking status: Never    Smokeless tobacco: Never   Substance Use Topics    Alcohol use: No    Drug use: Never       Family History   Problem Relation Name Age of Onset    Cancer Mother Batool bhandariAmira Lombardi         Brain    Heart disease Father Conner Lombardi, Sr.     Cancer Father Conner Lombardi, Sr.         Liver and lung    Hypertension Father Conner Lombardi, .     Heart disease Son          CABG in 2018    Diabetes Maternal Grandfather Aguilar Pepitone     Hypertension Other         Patient's Medications   New Prescriptions    No medications on file   Previous Medications    ASPIRIN (ECOTRIN) 81 MG EC TABLET    Take 81 mg by mouth once daily.    ATORVASTATIN (LIPITOR) 40 MG TABLET    TAKE 1 TABLET(40 MG) BY MOUTH EVERY DAY    CHOLECALCIFEROL, VITAMIN D3, (VITAMIN D3) 25 MCG (1,000 UNIT) CAPSULE    Take 1 capsule (1,000 Units total) by mouth once daily.    GABAPENTIN (NEURONTIN) 300 MG CAPSULE    Take 1 capsule (300 mg total) by mouth every evening.    KRILL OIL ORAL    Take by mouth.    LISINOPRIL-HYDROCHLOROTHIAZIDE (PRINZIDE,ZESTORETIC) 20-25 MG  "TAB    Take 2 tablets by mouth once daily. If BP is below 110/70 - decrease to 1 tablet daily    MELOXICAM (MOBIC) 15 MG TABLET    TAKE 1 TABLET(15 MG) BY MOUTH DAILY WITH FOOD    ONDANSETRON (ZOFRAN) 4 MG TABLET    Take 1 tablet (4 mg total) by mouth every 6 (six) hours as needed for Nausea.    OXYCODONE-ACETAMINOPHEN (PERCOCET)  MG PER TABLET    Take 1 tablet by mouth every 6 (six) hours as needed for Pain.    PHENYTOIN (DILANTIN) 100 MG ER CAPSULE    Take 4 capsules (400 mg total) by mouth every evening.   Modified Medications    No medications on file   Discontinued Medications    No medications on file       Review of Systems   Constitutional: Negative.    HENT: Negative.     Eyes: Negative.    Respiratory: Negative.     Cardiovascular: Negative.    Gastrointestinal: Negative.    Genitourinary: Negative.    Musculoskeletal:  Positive for back pain and joint pain.   Skin: Negative.    Neurological: Negative.    Endo/Heme/Allergies: Negative.    Psychiatric/Behavioral: Negative.     All 12 systems otherwise negative.      Wt Readings from Last 3 Encounters:   06/12/25 84.6 kg (186 lb 9.9 oz)   05/30/25 82.6 kg (182 lb 3.4 oz)   04/28/25 83 kg (182 lb 15.7 oz)     Temp Readings from Last 3 Encounters:   05/30/25 97.8 °F (36.6 °C) (Temporal)   05/21/25 97.3 °F (36.3 °C) (Temporal)   01/28/25 98.1 °F (36.7 °C) (Skin)     BP Readings from Last 3 Encounters:   06/12/25 126/79   05/30/25 130/74   05/21/25 (!) 147/87     Pulse Readings from Last 3 Encounters:   06/12/25 101   05/30/25 99   05/21/25 99       /79   Pulse 101   Resp 16   Ht 5' 6" (1.676 m)   Wt 84.6 kg (186 lb 9.9 oz)   SpO2 97%   BMI 30.12 kg/m²     Objective:   Physical Exam  Vitals and nursing note reviewed.   Constitutional:       General: He is not in acute distress.     Appearance: He is well-developed. He is not diaphoretic.   HENT:      Head: Normocephalic and atraumatic.      Nose: Nose normal.   Eyes:      General: No scleral " icterus.     Conjunctiva/sclera: Conjunctivae normal.   Neck:      Thyroid: No thyromegaly.      Vascular: No JVD.   Cardiovascular:      Rate and Rhythm: Normal rate and regular rhythm.      Heart sounds: S1 normal and S2 normal. Murmur heard.      No friction rub. No gallop. No S3 or S4 sounds.   Pulmonary:      Effort: Pulmonary effort is normal. No respiratory distress.      Breath sounds: Normal breath sounds. No stridor. No wheezing or rales.   Chest:      Chest wall: No tenderness.   Abdominal:      General: Bowel sounds are normal. There is no distension.      Palpations: Abdomen is soft. There is no mass.      Tenderness: There is no abdominal tenderness. There is no rebound.   Genitourinary:     Comments: Deferred  Musculoskeletal:         General: No tenderness or deformity. Normal range of motion.      Cervical back: Normal range of motion and neck supple.   Lymphadenopathy:      Cervical: No cervical adenopathy.   Skin:     General: Skin is warm and dry.      Coloration: Skin is not pale.      Findings: No erythema or rash.   Neurological:      Mental Status: He is alert and oriented to person, place, and time.      Motor: No abnormal muscle tone.      Coordination: Coordination normal.   Psychiatric:         Behavior: Behavior normal.         Thought Content: Thought content normal.         Judgment: Judgment normal.         Lab Results   Component Value Date     05/21/2025     02/07/2025    K 4.8 05/21/2025    K 4.6 02/07/2025     05/21/2025     02/07/2025    CO2 25 05/21/2025    CO2 27 02/07/2025    BUN 33 (H) 05/21/2025    CREATININE 1.3 05/21/2025     (H) 05/21/2025     (H) 02/07/2025    HGBA1C 5.9 (H) 05/21/2025    HGBA1C 5.5 02/07/2025    AST 27 05/21/2025    AST 28 02/07/2025    ALT 32 05/21/2025    ALT 28 02/07/2025    ALBUMIN 3.7 05/21/2025    ALBUMIN 3.2 (L) 02/07/2025    PROT 7.3 05/21/2025    PROT 6.8 02/07/2025    BILITOT 0.3 05/21/2025    BILITOT 0.3  "02/07/2025    WBC 6.24 05/21/2025    HGB 11.3 (L) 05/30/2025    HGB 11.6 (L) 01/28/2025    HCT 32.2 (L) 05/30/2025    HCT 33.9 (L) 01/28/2025    MCV 99 (H) 05/21/2025     (H) 01/14/2025     05/21/2025     01/14/2025    TSH 1.240 08/02/2024    CHOL 133 02/07/2025    HDL 36 (L) 02/07/2025    LDLCALC 70.8 02/07/2025    TRIG 131 02/07/2025         No results found for: "BNP", "INR"       Assessment:      1. Essential hypertension    2. Mixed hyperlipidemia    3. S/P hip replacement, left    4. Family history of cardiac disorder    5. Chronic bilateral low back pain without sciatica    6. Arthritis of left hip    7. Arthritis of right hip    8. Vitamin D deficiency            Plan:     FH CVD  - ECHO 12/2024 with normal bi V function, mild AI, mild MR, mild TR, PASP 17 mHg.   - Nuc stress 12/2024 with small fixed scar apex, normal EF.   - cont asa, statin    2. HTN - elevated occ  - titrate meds - double Lisinorpil/HCTZ dose - stable today  - elevated due to pain    3. Back/joint pain,OA of hip s/p b/l surgery   - cont tx per pain management and ortho    4. HLD  - cont statin    5. PreDM, Overweight/obesity A1c 5.8, 5.9; BMI 30 - 187 lbs -->  BMI 30 - 186 lbs.   - cont weight loss    6. VIt D def  - cont supplements       Visit today included increased complexity associated with the care of the episodic problem HTN addressed and managing the longitudinal care of the patient due to the serious and/or complex managed problem(s) .    Thank you for allowing me to participate in this patient's care. Please do not hesitate to contact me with any questions or concerns. Consult note has been forwarded to the referral physician.     Sky Tse MD, [facility]  Cardiovascular Disease  [facility], [place]  [phone] (P)          "

## 2025-06-13 ENCOUNTER — OFFICE VISIT (OUTPATIENT)
Dept: ORTHOPEDICS | Facility: CLINIC | Age: 75
End: 2025-06-13
Payer: COMMERCIAL

## 2025-06-13 ENCOUNTER — HOSPITAL ENCOUNTER (OUTPATIENT)
Dept: RADIOLOGY | Facility: HOSPITAL | Age: 75
Discharge: HOME | End: 2025-06-13
Attending: ORTHOPAEDIC SURGERY
Payer: COMMERCIAL

## 2025-06-13 DIAGNOSIS — Z96.641 S/P TOTAL RIGHT HIP ARTHROPLASTY: Primary | ICD-10-CM

## 2025-06-13 DIAGNOSIS — M16.11 ARTHRITIS OF RIGHT HIP: ICD-10-CM

## 2025-06-13 PROCEDURE — 73502 X-RAY EXAM HIP UNI 2-3 VIEWS: CPT | Mod: TC,RT

## 2025-06-13 PROCEDURE — 99999 PR PBB SHADOW E&M-EST. PATIENT-LVL III: CPT | Mod: PBBFAC,,, | Performed by: PHYSICIAN ASSISTANT

## 2025-06-13 PROCEDURE — 4010F ACE/ARB THERAPY RXD/TAKEN: CPT | Mod: CPTII,S$GLB,, | Performed by: PHYSICIAN ASSISTANT

## 2025-06-13 PROCEDURE — 99024 POSTOP FOLLOW-UP VISIT: CPT | Mod: S$GLB,,, | Performed by: PHYSICIAN ASSISTANT

## 2025-06-13 PROCEDURE — 1160F RVW MEDS BY RX/DR IN RCRD: CPT | Mod: CPTII,S$GLB,, | Performed by: PHYSICIAN ASSISTANT

## 2025-06-13 PROCEDURE — 1159F MED LIST DOCD IN RCRD: CPT | Mod: CPTII,S$GLB,, | Performed by: PHYSICIAN ASSISTANT

## 2025-06-13 PROCEDURE — 3044F HG A1C LEVEL LT 7.0%: CPT | Mod: CPTII,S$GLB,, | Performed by: PHYSICIAN ASSISTANT

## 2025-06-13 PROCEDURE — 73502 X-RAY EXAM HIP UNI 2-3 VIEWS: CPT | Mod: 26,RT,, | Performed by: RADIOLOGY

## 2025-06-13 NOTE — PROGRESS NOTES
Patient ID: Conner Lombardi Jr. is a 75 y.o. male.    Chief Complaint: No chief complaint on file.      HPI: Conner Lombardi Jr.  is a 75 y.o. male who c/o No chief complaint on file.     Post op visit 1   Patient notes pain is 1/10   The patient is doing okay since surgery he is pleased with his results   He notes a little bit of pain with activity and use  Overall he is doing well   Additionally he attests to some swelling to the left side but he is throwing his weight due to the recent surgery  He is taking Tylenol, gabapentin and Percocet does not need a refill at this time   He is using a walker to assist with ambulation   He is compliant with DVT prophylaxis   Compliant with home health; he would like to go to outpatient PT at [facility]    Patient is presently denying any shortness of breath, chest pain, fever/chills, nausea/vomiting, loss of taste or smell, numbness/tingling or sensation changes, loss of bladder or bowel function, loss of taste/smell.     Surgery: Right Total Hip    Surgery Date:  05/30/2025    Past Medical History:   Diagnosis Date    Anemia     Epilepsy     Last seizure 2010.     Hyperlipidemia     Hypertension     Prediabetes     Tubular adenoma of colon 04/02/2012    Vitamin D deficiency      Past Surgical History:   Procedure Laterality Date    BLOCK, NERVE, PERIPHERAL Left 8/19/2024    Procedure: Left femoral and obturator nerve block;  Surgeon: Helder Taylor MD;  Location: [facility];  Service: Pain Management;  Laterality: Left;    COLONOSCOPY N/A 07/12/2021    Procedure: COLONOSCOPY;  Surgeon: Subhash Martin MD;  Location: [facility];  Service: Endoscopy;  Laterality: N/A;    HIP ARTHROPLASTY Left 1/28/2025    Procedure: ARTHROPLASTY, HIP;  Surgeon: Javier Lee MD;  Location: [facility] OR;  Service: Orthopedics;  Laterality: Left;    HIP ARTHROPLASTY Right 5/30/2025    Procedure: ARTHROPLASTY, HIP;  Surgeon: Javier Lee MD;  Location: [facility];  Service:  Orthopedics;  Laterality: Right;    TONSILLECTOMY      VASECTOMY  1984/1985     Family History   Problem Relation Name Age of Onset    Cancer Mother Batool Lombardi         Brain    Heart disease Father Conner Lombardi, Sr.     Cancer Father Conner Lombardi, Sr.         Liver and lung    Hypertension Father Conner Lombardi, Sr.     Heart disease Son          CABG in 2018    Diabetes Maternal Grandfather Aguilar Pepitone     Hypertension Other       Social History[1]  Medication List with Changes/Refills   Current Medications    ASPIRIN (ECOTRIN) 81 MG EC TABLET    Take 81 mg by mouth once daily.    ATORVASTATIN (LIPITOR) 40 MG TABLET    Take 1 tablet (40 mg total) by mouth every evening.    CHOLECALCIFEROL, VITAMIN D3, (VITAMIN D3) 25 MCG (1,000 UNIT) CAPSULE    Take 1 capsule (1,000 Units total) by mouth once daily.    GABAPENTIN (NEURONTIN) 300 MG CAPSULE    Take 1 capsule (300 mg total) by mouth every evening.    KRILL OIL ORAL    Take by mouth.    LISINOPRIL-HYDROCHLOROTHIAZIDE (PRINZIDE,ZESTORETIC) 20-25 MG TAB    Take 2 tablets by mouth once daily. If BP is below 110/70 - decrease to 1 tablet daily    MELOXICAM (MOBIC) 15 MG TABLET    TAKE 1 TABLET(15 MG) BY MOUTH DAILY WITH FOOD    ONDANSETRON (ZOFRAN) 4 MG TABLET    Take 1 tablet (4 mg total) by mouth every 6 (six) hours as needed for Nausea.    OXYCODONE-ACETAMINOPHEN (PERCOCET)  MG PER TABLET    Take 1 tablet by mouth every 6 (six) hours as needed for Pain.    PHENYTOIN (DILANTIN) 100 MG ER CAPSULE    Take 4 capsules (400 mg total) by mouth every evening.     Review of patient's allergies indicates:  No Known Allergies    IMAGING  FINDINGS:  No pelvic fracture or dislocation.  Bilateral total hip arthroplasties.  No evidence for prior prosthetic fracture or loosening of the right hip.        Impression:   See above    Objective:     Right Lower Extremity  NVI  WWP foot  Comp soft  Cap refill < 2 sec  Calf NT, Soft  (-) Darling sign  TUAN  ROM  : Patient is able to easily exhibit full flexion and extension on passive range of motion.   Abduction and adduction is full   Quad resistant full  Wiggles toes  DF/PF intact  Sensation intact  Inc C/D/I; subQ closure noted  No SOI    Assessment:       Encounter Diagnosis   Name Primary?    S/P total right hip arthroplasty Yes          Plan:       Diagnoses and all orders for this visit:    S/P total right hip arthroplasty        Conner Lombardi  is an established pt here for postop follow-up after right total hip replacement by Dr. Lee.  The incision was cleaned with hydrogen peroxide.  No staples were removed a subcu closure noted today.  The patient was instructed not to soak the incision in standing water but may clean the incision with clean running water and antibacterial soap.  Patient should notify the office of any signs or symptoms of infection including fevers, erythema, purulent drainage, increasing pain.  Patient will continue with DVT prophylaxis.  Patient will start outpatient physical therapy.  Will follow-up in 4-6 weeks.  Patient verbalized understanding of all instructions and agreed with the above plan.    No follow-ups on file.    The patient understands, chooses and consents to this plan and accepts all   the risks which include but are not limited to the risks mentioned above.     Disclaimer: This note was prepared using a voice recognition system and is likely to have sound alike errors within the text.            [1]   Social History  Socioeconomic History    Marital status:    Tobacco Use    Smoking status: Never    Smokeless tobacco: Never   Substance and Sexual Activity    Alcohol use: No    Drug use: Never    Sexual activity: Not Currently     Partners: Female     Birth control/protection: None     Social Drivers of Health     Financial Resource Strain: Low Risk  (2/26/2025)    Overall Financial Resource Strain ([facility])     Difficulty of Paying Living Expenses: Not hard  at all   Food Insecurity: No Food Insecurity (2/26/2025)    Hunger Vital Sign     Worried About Running Out of Food in the Last Year: Never true     Ran Out of Food in the Last Year: Never true   Transportation Needs: No Transportation Needs (2/26/2025)    PRAPARE - Transportation     Lack of Transportation (Medical): No     Lack of Transportation (Non-Medical): No   Physical Activity: Inactive (2/26/2025)    Exercise Vital Sign     Days of Exercise per Week: 0 days     Minutes of Exercise per Session: 0 min   Stress: No Stress Concern Present (2/26/2025)    [place] [employer] - Occupational Stress Questionnaire     Feeling of Stress : Only a little   Housing Stability: Low Risk  (2/26/2025)    Housing Stability Vital Sign     Unable to Pay for Housing in the Last Year: No     Number of Times Moved in the Last Year: 0     Homeless in the Last Year: No

## 2025-06-16 ENCOUNTER — NURSE TRIAGE (OUTPATIENT)
Dept: ADMINISTRATIVE | Facility: CLINIC | Age: 75
End: 2025-06-16
Payer: COMMERCIAL

## 2025-06-16 ENCOUNTER — HOSPITAL ENCOUNTER (INPATIENT)
Facility: HOSPITAL | Age: 75
LOS: 4 days | Discharge: HOME HEALTH | DRG: 299 | End: 2025-06-20
Attending: EMERGENCY MEDICINE | Admitting: INTERNAL MEDICINE
Payer: COMMERCIAL

## 2025-06-16 DIAGNOSIS — M79.89 CALF SWELLING: ICD-10-CM

## 2025-06-16 DIAGNOSIS — I26.99 BILATERAL PULMONARY EMBOLISM: Primary | ICD-10-CM

## 2025-06-16 DIAGNOSIS — J96.01 ACUTE RESPIRATORY FAILURE WITH HYPOXIA: ICD-10-CM

## 2025-06-16 DIAGNOSIS — R07.9 CHEST PAIN: ICD-10-CM

## 2025-06-16 DIAGNOSIS — I82.A13 DVT OF AXILLARY VEIN, ACUTE BILATERAL: ICD-10-CM

## 2025-06-16 DIAGNOSIS — I26.99 PULMONARY EMBOLISM: ICD-10-CM

## 2025-06-16 DIAGNOSIS — R06.02 SOB (SHORTNESS OF BREATH): ICD-10-CM

## 2025-06-16 PROBLEM — I82.409 ACUTE DEEP VEIN THROMBOSIS (DVT) OF LOWER EXTREMITY: Status: ACTIVE | Noted: 2025-06-16

## 2025-06-16 PROBLEM — I82.403 ACUTE DEEP VEIN THROMBOSIS (DVT) OF BOTH LOWER EXTREMITIES: Status: ACTIVE | Noted: 2025-06-16

## 2025-06-16 LAB
ABSOLUTE EOSINOPHIL (OHS): 0.38 K/UL
ABSOLUTE MONOCYTE (OHS): 0.73 K/UL (ref 0.3–1)
ABSOLUTE NEUTROPHIL COUNT (OHS): 5.17 K/UL (ref 1.8–7.7)
ALBUMIN SERPL BCP-MCNC: 3.6 G/DL (ref 3.5–5.2)
ALP SERPL-CCNC: 149 UNIT/L (ref 40–150)
ALT SERPL W/O P-5'-P-CCNC: 26 UNIT/L (ref 10–44)
ANION GAP (OHS): 14 MMOL/L (ref 8–16)
APTT PPP: 37.6 SECONDS (ref 21–32)
AST SERPL-CCNC: 28 UNIT/L (ref 11–45)
BASOPHILS # BLD AUTO: 0.04 K/UL
BASOPHILS NFR BLD AUTO: 0.5 %
BILIRUB SERPL-MCNC: 0.2 MG/DL (ref 0.1–1)
BNP SERPL-MCNC: 172 PG/ML (ref 0–99)
BUN SERPL-MCNC: 29 MG/DL (ref 8–23)
CALCIUM SERPL-MCNC: 9.6 MG/DL (ref 8.7–10.5)
CHLORIDE SERPL-SCNC: 109 MMOL/L (ref 95–110)
CO2 SERPL-SCNC: 19 MMOL/L (ref 23–29)
CREAT SERPL-MCNC: 1.2 MG/DL (ref 0.5–1.4)
D DIMER PPP IA.FEU-MCNC: 22.58 MG/L FEU
ERYTHROCYTE [DISTWIDTH] IN BLOOD BY AUTOMATED COUNT: 13.7 % (ref 11.5–14.5)
GFR SERPLBLD CREATININE-BSD FMLA CKD-EPI: >60 ML/MIN/1.73/M2
GLUCOSE SERPL-MCNC: 146 MG/DL (ref 70–110)
HCT VFR BLD AUTO: 30.7 % (ref 40–54)
HGB BLD-MCNC: 10.4 GM/DL (ref 14–18)
HOLD SPECIMEN: NORMAL
IMM GRANULOCYTES # BLD AUTO: 0.05 K/UL (ref 0–0.04)
IMM GRANULOCYTES NFR BLD AUTO: 0.6 % (ref 0–0.5)
INR PPP: 1 (ref 0.8–1.2)
LACTATE SERPL-SCNC: 1.4 MMOL/L (ref 0.5–2.2)
LYMPHOCYTES # BLD AUTO: 2.17 K/UL (ref 1–4.8)
MCH RBC QN AUTO: 34.1 PG (ref 27–31)
MCHC RBC AUTO-ENTMCNC: 33.9 G/DL (ref 32–36)
MCV RBC AUTO: 101 FL (ref 82–98)
NUCLEATED RBC (/100WBC) (OHS): 0 /100 WBC
PLATELET # BLD AUTO: 245 K/UL (ref 150–450)
PMV BLD AUTO: 9.6 FL (ref 9.2–12.9)
POTASSIUM SERPL-SCNC: 3.6 MMOL/L (ref 3.5–5.1)
PROT SERPL-MCNC: 7.8 GM/DL (ref 6–8.4)
PROTHROMBIN TIME: 11.3 SECONDS (ref 9–12.5)
RBC # BLD AUTO: 3.05 M/UL (ref 4.6–6.2)
RELATIVE EOSINOPHIL (OHS): 4.4 %
RELATIVE LYMPHOCYTE (OHS): 25.4 % (ref 18–48)
RELATIVE MONOCYTE (OHS): 8.5 % (ref 4–15)
RELATIVE NEUTROPHIL (OHS): 60.6 % (ref 38–73)
SODIUM SERPL-SCNC: 142 MMOL/L (ref 136–145)
TROPONIN I SERPL DL<=0.01 NG/ML-MCNC: 0.19 NG/ML
TROPONIN I SERPL DL<=0.01 NG/ML-MCNC: 0.19 NG/ML
WBC # BLD AUTO: 8.54 K/UL (ref 3.9–12.7)

## 2025-06-16 PROCEDURE — 85025 COMPLETE CBC W/AUTO DIFF WBC: CPT | Performed by: EMERGENCY MEDICINE

## 2025-06-16 PROCEDURE — 99285 EMERGENCY DEPT VISIT HI MDM: CPT | Mod: 25

## 2025-06-16 PROCEDURE — 85379 FIBRIN DEGRADATION QUANT: CPT | Performed by: EMERGENCY MEDICINE

## 2025-06-16 PROCEDURE — 82040 ASSAY OF SERUM ALBUMIN: CPT | Performed by: EMERGENCY MEDICINE

## 2025-06-16 PROCEDURE — 85610 PROTHROMBIN TIME: CPT | Performed by: EMERGENCY MEDICINE

## 2025-06-16 PROCEDURE — 21400001 HC TELEMETRY ROOM

## 2025-06-16 PROCEDURE — 83605 ASSAY OF LACTIC ACID: CPT | Performed by: EMERGENCY MEDICINE

## 2025-06-16 PROCEDURE — 25500020 PHARM REV CODE 255: Performed by: EMERGENCY MEDICINE

## 2025-06-16 PROCEDURE — 93005 ELECTROCARDIOGRAM TRACING: CPT

## 2025-06-16 PROCEDURE — 83880 ASSAY OF NATRIURETIC PEPTIDE: CPT | Performed by: EMERGENCY MEDICINE

## 2025-06-16 PROCEDURE — 84484 ASSAY OF TROPONIN QUANT: CPT | Performed by: EMERGENCY MEDICINE

## 2025-06-16 PROCEDURE — 63600175 PHARM REV CODE 636 W HCPCS: Performed by: EMERGENCY MEDICINE

## 2025-06-16 PROCEDURE — 93010 ELECTROCARDIOGRAM REPORT: CPT | Mod: ,,, | Performed by: INTERNAL MEDICINE

## 2025-06-16 PROCEDURE — 96365 THER/PROPH/DIAG IV INF INIT: CPT

## 2025-06-16 PROCEDURE — 27000221 HC OXYGEN, UP TO 24 HOURS

## 2025-06-16 PROCEDURE — 85730 THROMBOPLASTIN TIME PARTIAL: CPT | Performed by: EMERGENCY MEDICINE

## 2025-06-16 RX ORDER — ACETAMINOPHEN 325 MG/1
650 TABLET ORAL EVERY 4 HOURS PRN
Status: DISCONTINUED | OUTPATIENT
Start: 2025-06-16 | End: 2025-06-20 | Stop reason: HOSPADM

## 2025-06-16 RX ORDER — POLYETHYLENE GLYCOL 3350 17 G/17G
17 POWDER, FOR SOLUTION ORAL DAILY PRN
Status: DISCONTINUED | OUTPATIENT
Start: 2025-06-16 | End: 2025-06-20 | Stop reason: HOSPADM

## 2025-06-16 RX ORDER — NALOXONE HCL 0.4 MG/ML
0.02 VIAL (ML) INJECTION
Status: DISCONTINUED | OUTPATIENT
Start: 2025-06-16 | End: 2025-06-20 | Stop reason: HOSPADM

## 2025-06-16 RX ORDER — HEPARIN SODIUM,PORCINE/D5W 25000/250
0-40 INTRAVENOUS SOLUTION INTRAVENOUS CONTINUOUS
Status: DISCONTINUED | OUTPATIENT
Start: 2025-06-16 | End: 2025-06-19

## 2025-06-16 RX ORDER — ATORVASTATIN CALCIUM 40 MG/1
40 TABLET, FILM COATED ORAL NIGHTLY
Status: DISCONTINUED | OUTPATIENT
Start: 2025-06-16 | End: 2025-06-20 | Stop reason: HOSPADM

## 2025-06-16 RX ORDER — PHENYTOIN SODIUM 100 MG/1
400 CAPSULE, EXTENDED RELEASE ORAL NIGHTLY
Status: DISCONTINUED | OUTPATIENT
Start: 2025-06-16 | End: 2025-06-20 | Stop reason: HOSPADM

## 2025-06-16 RX ORDER — IBUPROFEN 200 MG
24 TABLET ORAL
Status: DISCONTINUED | OUTPATIENT
Start: 2025-06-16 | End: 2025-06-20 | Stop reason: HOSPADM

## 2025-06-16 RX ORDER — SODIUM CHLORIDE 0.9 % (FLUSH) 0.9 %
3 SYRINGE (ML) INJECTION EVERY 8 HOURS PRN
Status: DISCONTINUED | OUTPATIENT
Start: 2025-06-16 | End: 2025-06-20 | Stop reason: HOSPADM

## 2025-06-16 RX ORDER — IBUPROFEN 200 MG
16 TABLET ORAL
Status: DISCONTINUED | OUTPATIENT
Start: 2025-06-16 | End: 2025-06-20 | Stop reason: HOSPADM

## 2025-06-16 RX ORDER — TALC
6 POWDER (GRAM) TOPICAL NIGHTLY PRN
Status: DISCONTINUED | OUTPATIENT
Start: 2025-06-16 | End: 2025-06-20 | Stop reason: HOSPADM

## 2025-06-16 RX ORDER — CHOLECALCIFEROL (VITAMIN D3) 25 MCG
1000 TABLET ORAL DAILY
Status: DISCONTINUED | OUTPATIENT
Start: 2025-06-17 | End: 2025-06-20 | Stop reason: HOSPADM

## 2025-06-16 RX ORDER — ASPIRIN 81 MG/1
81 TABLET ORAL 2 TIMES DAILY
Status: DISCONTINUED | OUTPATIENT
Start: 2025-06-16 | End: 2025-06-17

## 2025-06-16 RX ORDER — GABAPENTIN 300 MG/1
300 CAPSULE ORAL NIGHTLY
Status: DISCONTINUED | OUTPATIENT
Start: 2025-06-16 | End: 2025-06-20 | Stop reason: HOSPADM

## 2025-06-16 RX ORDER — PROMETHAZINE HYDROCHLORIDE 25 MG/1
25 TABLET ORAL EVERY 6 HOURS PRN
Status: DISCONTINUED | OUTPATIENT
Start: 2025-06-16 | End: 2025-06-16

## 2025-06-16 RX ORDER — ONDANSETRON HYDROCHLORIDE 2 MG/ML
4 INJECTION, SOLUTION INTRAVENOUS EVERY 8 HOURS PRN
Status: DISCONTINUED | OUTPATIENT
Start: 2025-06-16 | End: 2025-06-16

## 2025-06-16 RX ORDER — GLUCAGON 1 MG
1 KIT INJECTION
Status: DISCONTINUED | OUTPATIENT
Start: 2025-06-16 | End: 2025-06-20 | Stop reason: HOSPADM

## 2025-06-16 RX ADMIN — IOHEXOL 100 ML: 350 INJECTION, SOLUTION INTRAVENOUS at 09:06

## 2025-06-16 RX ADMIN — HEPARIN SODIUM 18 UNITS/KG/HR: 10000 INJECTION, SOLUTION INTRAVENOUS at 09:06

## 2025-06-17 PROBLEM — Z96.641 STATUS POST RIGHT HIP REPLACEMENT: Status: ACTIVE | Noted: 2025-06-17

## 2025-06-17 PROBLEM — R79.89 ELEVATED TROPONIN: Status: ACTIVE | Noted: 2025-06-17

## 2025-06-17 LAB
ABSOLUTE EOSINOPHIL (OHS): 0.39 K/UL
ABSOLUTE MONOCYTE (OHS): 0.6 K/UL (ref 0.3–1)
ABSOLUTE NEUTROPHIL COUNT (OHS): 3.75 K/UL (ref 1.8–7.7)
ALBUMIN SERPL BCP-MCNC: 3 G/DL (ref 3.5–5.2)
ALP SERPL-CCNC: 122 UNIT/L (ref 40–150)
ALT SERPL W/O P-5'-P-CCNC: 20 UNIT/L (ref 10–44)
ANION GAP (OHS): 10 MMOL/L (ref 8–16)
AORTIC ROOT ANNULUS: 3.6 CM
AORTIC SIZE INDEX: 1.6 CM/M2
APTT PPP: 59.9 SECONDS (ref 21–32)
APTT PPP: 60.8 SECONDS (ref 21–32)
APTT PPP: 78.7 SECONDS (ref 21–32)
APTT PPP: >150 SECONDS (ref 21–32)
APTT PPP: >150 SECONDS (ref 21–32)
ASCENDING AORTA: 3.1 CM
AST SERPL-CCNC: 20 UNIT/L (ref 11–45)
AV INDEX (PROSTH): 0.79
AV MEAN GRADIENT: 3 MMHG
AV PEAK GRADIENT: 6 MMHG
AV REGURGITATION PRESSURE HALF TIME: 457 MS
AV VALVE AREA BY VELOCITY RATIO: 2.9 CM²
AV VALVE AREA: 2.7 CM²
AV VELOCITY RATIO: 0.83
BASOPHILS # BLD AUTO: 0.04 K/UL
BASOPHILS NFR BLD AUTO: 0.5 %
BILIRUB SERPL-MCNC: 0.3 MG/DL (ref 0.1–1)
BILIRUB UR QL STRIP.AUTO: NEGATIVE
BSA FOR ECHO PROCEDURE: 1.99 M2
BUN SERPL-MCNC: 24 MG/DL (ref 8–23)
CALCIUM SERPL-MCNC: 8.7 MG/DL (ref 8.7–10.5)
CHLORIDE SERPL-SCNC: 109 MMOL/L (ref 95–110)
CLARITY UR: CLEAR
CO2 SERPL-SCNC: 21 MMOL/L (ref 23–29)
COLOR UR AUTO: YELLOW
CREAT SERPL-MCNC: 1.1 MG/DL (ref 0.5–1.4)
CV ECHO LV RWT: 0.42 CM
DOP CALC AO PEAK VEL: 1.2 M/S
DOP CALC AO VTI: 24.4 CM
DOP CALC LVOT AREA: 3.5 CM2
DOP CALC LVOT DIAMETER: 2.1 CM
DOP CALC LVOT PEAK VEL: 1 M/S
DOP CALC LVOT STROKE VOLUME: 66.8 CM3
DOP CALC RVOT PEAK VEL: 0.65 M/S
DOP CALC RVOT VTI: 12.2 CM
DOP CALCLVOT PEAK VEL VTI: 19.3 CM
ECHO LV POSTERIOR WALL: 1 CM (ref 0.6–1.1)
ERYTHROCYTE [DISTWIDTH] IN BLOOD BY AUTOMATED COUNT: 13.8 % (ref 11.5–14.5)
FRACTIONAL SHORTENING: 33.3 % (ref 28–44)
GFR SERPLBLD CREATININE-BSD FMLA CKD-EPI: >60 ML/MIN/1.73/M2
GLUCOSE SERPL-MCNC: 132 MG/DL (ref 70–110)
GLUCOSE UR QL STRIP: NEGATIVE
HCT VFR BLD AUTO: 26 % (ref 40–54)
HGB BLD-MCNC: 8.7 GM/DL (ref 14–18)
HGB UR QL STRIP: NEGATIVE
HOLD SPECIMEN: NORMAL
IMM GRANULOCYTES # BLD AUTO: 0.02 K/UL (ref 0–0.04)
IMM GRANULOCYTES NFR BLD AUTO: 0.3 % (ref 0–0.5)
INTERVENTRICULAR SEPTUM: 1 CM (ref 0.6–1.1)
IVC DIAMETER: 2.17 CM
KETONES UR QL STRIP: NEGATIVE
LA MAJOR: 6.2 CM
LA MINOR: 3.4 CM
LA WIDTH: 3.5 CM
LEFT ATRIUM AREA SYSTOLIC (APICAL 2 CHAMBER): 13.5 CM2
LEFT ATRIUM AREA SYSTOLIC (APICAL 4 CHAMBER): 15.33 CM2
LEFT ATRIUM SIZE: 2.9 CM
LEFT ATRIUM VOLUME INDEX MOD: 19 ML/M2
LEFT ATRIUM VOLUME INDEX: 19 ML/M2
LEFT ATRIUM VOLUME MOD: 38 ML
LEFT ATRIUM VOLUME: 38 CM3
LEFT INTERNAL DIMENSION IN SYSTOLE: 3.2 CM (ref 2.1–4)
LEFT VENTRICLE DIASTOLIC VOLUME INDEX: 56.41 ML/M2
LEFT VENTRICLE DIASTOLIC VOLUME: 110 ML
LEFT VENTRICLE END SYSTOLIC VOLUME APICAL 2 CHAMBER: 33.13 ML
LEFT VENTRICLE END SYSTOLIC VOLUME APICAL 4 CHAMBER: 33.01 ML
LEFT VENTRICLE MASS INDEX: 87.3 G/M2
LEFT VENTRICLE SYSTOLIC VOLUME INDEX: 20.5 ML/M2
LEFT VENTRICLE SYSTOLIC VOLUME: 40 ML
LEFT VENTRICULAR INTERNAL DIMENSION IN DIASTOLE: 4.8 CM (ref 3.5–6)
LEFT VENTRICULAR MASS: 170.2 G
LEUKOCYTE ESTERASE UR QL STRIP: NEGATIVE
LVED V (TEICH): 109.68 ML
LVES V (TEICH): 40.1 ML
LVOT MG: 2.57 MMHG
LVOT MV: 0.78 CM/S
LYMPHOCYTES # BLD AUTO: 2.6 K/UL (ref 1–4.8)
MCH RBC QN AUTO: 34.4 PG (ref 27–31)
MCHC RBC AUTO-ENTMCNC: 33.5 G/DL (ref 32–36)
MCV RBC AUTO: 103 FL (ref 82–98)
NITRITE UR QL STRIP: NEGATIVE
NUCLEATED RBC (/100WBC) (OHS): 0 /100 WBC
OHS CV RV/LV RATIO: 0.65 CM
PH UR STRIP: 6 [PH]
PISA AR MAX VEL: 3.82 M/S
PISA MRMAX VEL: 6.06 M/S
PISA TR MAX VEL: 2.5 M/S
PLATELET # BLD AUTO: 216 K/UL (ref 150–450)
PMV BLD AUTO: 9.6 FL (ref 9.2–12.9)
POTASSIUM SERPL-SCNC: 4.2 MMOL/L (ref 3.5–5.1)
PROT SERPL-MCNC: 6.3 GM/DL (ref 6–8.4)
PROT UR QL STRIP: NEGATIVE
PV MEAN GRADIENT: 1 MMHG
RA MAJOR: 4.61 CM
RA PRESSURE ESTIMATED: 15 MMHG
RA WIDTH: 3 CM
RBC # BLD AUTO: 2.53 M/UL (ref 4.6–6.2)
RELATIVE EOSINOPHIL (OHS): 5.3 %
RELATIVE LYMPHOCYTE (OHS): 35.1 % (ref 18–48)
RELATIVE MONOCYTE (OHS): 8.1 % (ref 4–15)
RELATIVE NEUTROPHIL (OHS): 50.7 % (ref 38–73)
RIGHT VENTRICLE DIASTOLIC BASEL DIMENSION: 3.1 CM
RIGHT VENTRICLE DIASTOLIC MID DIMENSION: 3.5 CM
RIGHT VENTRICULAR END-DIASTOLIC DIMENSION: 3.07 CM
RV MID DIAMA: 3.49 CM
RV TB RVSP: 18 MMHG
SODIUM SERPL-SCNC: 140 MMOL/L (ref 136–145)
SP GR UR STRIP: >=1.03
STJ: 2.9 CM
TDI LATERAL: 0.1 M/S
TDI SEPTAL: 0.09 M/S
TDI: 0.1 M/S
TR MAX PG: 26 MMHG
TRICUSPID ANNULAR PLANE SYSTOLIC EXCURSION: 1.6 CM
TROPONIN I SERPL DL<=0.01 NG/ML-MCNC: 0.13 NG/ML
TROPONIN I SERPL DL<=0.01 NG/ML-MCNC: 0.16 NG/ML
TV REST PULMONARY ARTERY PRESSURE: 40 MMHG
UROBILINOGEN UR STRIP-ACNC: NEGATIVE EU/DL
WBC # BLD AUTO: 7.4 K/UL (ref 3.9–12.7)
Z-SCORE OF LEFT VENTRICULAR DIMENSION IN END DIASTOLE: -1.47
Z-SCORE OF LEFT VENTRICULAR DIMENSION IN END SYSTOLE: -0.52

## 2025-06-17 PROCEDURE — 85730 THROMBOPLASTIN TIME PARTIAL: CPT | Performed by: INTERNAL MEDICINE

## 2025-06-17 PROCEDURE — 99233 SBSQ HOSP IP/OBS HIGH 50: CPT | Mod: 25,,, | Performed by: INTERNAL MEDICINE

## 2025-06-17 PROCEDURE — 25000003 PHARM REV CODE 250: Performed by: NURSE PRACTITIONER

## 2025-06-17 PROCEDURE — 36415 COLL VENOUS BLD VENIPUNCTURE: CPT | Performed by: EMERGENCY MEDICINE

## 2025-06-17 PROCEDURE — 94761 N-INVAS EAR/PLS OXIMETRY MLT: CPT

## 2025-06-17 PROCEDURE — 99900035 HC TECH TIME PER 15 MIN (STAT)

## 2025-06-17 PROCEDURE — 81003 URINALYSIS AUTO W/O SCOPE: CPT | Performed by: NURSE PRACTITIONER

## 2025-06-17 PROCEDURE — 85730 THROMBOPLASTIN TIME PARTIAL: CPT | Performed by: EMERGENCY MEDICINE

## 2025-06-17 PROCEDURE — 85730 THROMBOPLASTIN TIME PARTIAL: CPT | Performed by: STUDENT IN AN ORGANIZED HEALTH CARE EDUCATION/TRAINING PROGRAM

## 2025-06-17 PROCEDURE — 36415 COLL VENOUS BLD VENIPUNCTURE: CPT | Performed by: NURSE PRACTITIONER

## 2025-06-17 PROCEDURE — 85025 COMPLETE CBC W/AUTO DIFF WBC: CPT | Performed by: EMERGENCY MEDICINE

## 2025-06-17 PROCEDURE — 27000221 HC OXYGEN, UP TO 24 HOURS

## 2025-06-17 PROCEDURE — 21400001 HC TELEMETRY ROOM

## 2025-06-17 PROCEDURE — 84484 ASSAY OF TROPONIN QUANT: CPT | Performed by: NURSE PRACTITIONER

## 2025-06-17 PROCEDURE — 82040 ASSAY OF SERUM ALBUMIN: CPT | Performed by: NURSE PRACTITIONER

## 2025-06-17 PROCEDURE — 63600175 PHARM REV CODE 636 W HCPCS: Performed by: EMERGENCY MEDICINE

## 2025-06-17 RX ADMIN — ATORVASTATIN CALCIUM 40 MG: 40 TABLET, FILM COATED ORAL at 08:06

## 2025-06-17 RX ADMIN — EXTENDED PHENYTOIN SODIUM 400 MG: 100 CAPSULE, EXTENDED RELEASE ORAL at 12:06

## 2025-06-17 RX ADMIN — CHOLECALCIFEROL TAB 25 MCG (1000 UNIT) 1000 UNITS: 25 TAB at 08:06

## 2025-06-17 RX ADMIN — ASPIRIN 81 MG: 81 TABLET, COATED ORAL at 12:06

## 2025-06-17 RX ADMIN — EXTENDED PHENYTOIN SODIUM 400 MG: 100 CAPSULE, EXTENDED RELEASE ORAL at 08:06

## 2025-06-17 RX ADMIN — GABAPENTIN 300 MG: 300 CAPSULE ORAL at 12:06

## 2025-06-17 RX ADMIN — ATORVASTATIN CALCIUM 40 MG: 40 TABLET, FILM COATED ORAL at 12:06

## 2025-06-17 RX ADMIN — Medication 6 MG: at 08:06

## 2025-06-17 RX ADMIN — HEPARIN SODIUM 14 UNITS/KG/HR: 10000 INJECTION, SOLUTION INTRAVENOUS at 04:06

## 2025-06-17 RX ADMIN — GABAPENTIN 300 MG: 300 CAPSULE ORAL at 08:06

## 2025-06-17 RX ADMIN — Medication 6 MG: at 12:06

## 2025-06-17 NOTE — HPI
Mr. Lombardi is a 75 year old male patient whose current medical conditions include seizures, HTN, hyperlipidemia, anemia, colon adenoma, pre-DM, and recent TRH arthroplasty on 5/30/2025 who presented to [facility] ED overnight due to increased SOB/[place]USTIN that initially began over the weekend. Other associated symptoms included BLE edema (L > R). He denied any associated fever, chills, LH, dizziness, palpitations, near syncope, or syncope. Initial workup in ED revealed hypoxia and tachycardia. Labs reviewed BNP of 172, troponin of 0.194, and D-dimer of 22. CTA of chest positive for bilateral PE with right heart strain. BLE venous U/S showed bilateral acute DVT's with acute thrombus in right popliteal and right peroneal veins as well as acute thrombus in the left popliteal and anterior tibial as well as peroneal veins. Patient initiated on heparin drip and admitted for further evaluation and treatment. Cardiology consulted to assist with management. Patient seen and examined today, resting in bed. Feels ok. Weak/tired. CP free. He reports compliance with his medications, followed in clinic by Dr. Tse. Labs reviewed. Troponin 0.194>0.192>0.160>0.133. EKG showed sinus tachycardia, non-specific ST-T wave abnormalities. TTE pending. Prior MPI stress test 12/24 negative for ischemia, +fixed defects. H/H dipped to 8.6/26.0. IR on board for possible intervention.

## 2025-06-17 NOTE — ASSESSMENT & PLAN NOTE
"CTA shows "Positive for extensive bilateral pulmonary emboli with right heart strain" per Dr. Raman Molina (radiology interpretation)  -- Heparin drip, per pharmacy management  --Current oxygen requirement 2L/min NC, he reports mainly short of breath only with exertion/ambulation  --Initially NPO- then cardiac diet ordered   -- Bedrest for now  --IR was consulted -- Dr. Jatinder Sharif aware-troponin trended- Echo obtained and Heparin infusion continued- Surgical intervention vs conservative management to be discussed   -- trending troponin levels- downward trend   -- Echo showed septal motion is abnormal. Septal flattening in diastole and systole consistent with right ventricular volume and pressure overload. EF of 55 - 60%. There is diastolic dysfunction. Right Ventricle: The right ventricle is severely dilated Wall thickness is normal. Right ventricle wall motion has Benitez's sign. Systolic function is severely reduced. Right Atrium: The right atrium is dilated. Aortic Valve: There is mild aortic valve sclerosis. There is mild annular calcification present. There is moderate aortic regurgitation. Mitral Valve: There is mild regurgitation.Tricuspid Valve: There is mild to moderate regurgitation. PASP is 40 mmHg. IVC/SVC: Elevated venous pressure at 15 mmHg.   -Cardiology following   -Pulmonology aware- will formally consult if surgical intervention warranteed     "

## 2025-06-17 NOTE — RESPIRATORY THERAPY
Home Oxygen Evaluation - [facility] - Cardiopulmonary Department      Date Performed: 2025      1) Patient's Home O2 Sat on room air, while at rest: Room Air SpO2 At Rest: (!) 87 %        If O2 sats on room air at rest are 88% or below, patient qualifies.  Document O2 liter flow needed in Step 2.  If O2 sats are 89% or above, complete Step 3.        2)  If patient is not ambulated and O2 sats are <88%, what is the O2 liter flow required to meet ordered saturation?      If O2 sats on room air while exercising remain 89% or above patient does not qualify, no further testing needed Document N/A in step 3. If O2 sats on room air while exercising are 88% or below, continue to Step 4.    3) Patient's O2 Sat on room air while exercisin) Patient's O2 Sat while exercising on O2: SpO2 During Ambulation on O2: 91 % at Ambulation O2 LPM: 2 LPM         (Must show improvement from #4 for patients to qualify)

## 2025-06-17 NOTE — ASSESSMENT & PLAN NOTE
-Noted on CTA   -TTE pending  -Continue heparin gtt, will need NOAC upon d/c  -IR on board for possible intervention

## 2025-06-17 NOTE — ASSESSMENT & PLAN NOTE
Patient's blood pressure range in the last 24 hours was: BP  Min: 105/65  Max: 147/80.The patient's inpatient anti-hypertensive regimen is listed below:  Current Antihypertensives   On hold for now, BP controlled    Plan  - BP is controlled, no changes needed to their regimen  - will resume home med when appropriate

## 2025-06-17 NOTE — CONSULTS
[facility] - Telemetry ([facility])  Cardiology  Consult Note    Patient Name: Conner Lombardi Jr.  MRN: [id number]  Admission Date: 6/16/2025  Hospital Length of Stay: 1 days  Code Status: Full Code   Attending Provider: Janel Wheeler MD   Consulting Provider: Clemencia Murray PA-C  Primary Care Physician: Tia Lopez MD  Principal Problem:Bilateral pulmonary embolism    Patient information was obtained from patient, spouse/SO, past medical records, and ER records.     Inpatient consult to Cardiology  Consult performed by: Clemencia Murray PA-C  Consult ordered by: Lyric Ellis NP      E-Consult to General Cardiology  Consult performed by: Clemencia Murray PA-C  Consult ordered by: Cynthia Lara NP        Subjective:     Chief Complaint:  SOB    HPI:   Mr. Lombardi is a 75 year old male patient whose current medical conditions include seizures, HTN, hyperlipidemia, anemia, colon adenoma, pre-DM, and recent TRH arthroplasty on 5/30/2025 who presented to [facility] ED overnight due to increased SOB/[place] that initially began over the weekend. Other associated symptoms included BLE edema (L > R). He denied any associated fever, chills, LH, dizziness, palpitations, near syncope, or syncope. Initial workup in ED revealed hypoxia and tachycardia. Labs reviewed BNP of 172, troponin of 0.194, and D-dimer of 22. CTA of chest positive for bilateral PE with right heart strain. BLE venous U/S showed bilateral acute DVT's with acute thrombus in right popliteal and right peroneal veins as well as acute thrombus in the left popliteal and anterior tibial as well as peroneal veins. Patient initiated on heparin drip and admitted for further evaluation and treatment. Cardiology consulted to assist with management. Patient seen and examined today, resting in bed. Feels ok. Weak/tired. CP free. He reports compliance with his medications, followed in clinic by Dr. Tse. Labs reviewed. Troponin 0.194>0.192>0.160>0.133. EKG  showed sinus tachycardia, non-specific ST-T wave abnormalities. TTE pending. Prior MPI stress test 12/24 negative for ischemia, +fixed defects. H/H dipped to 8.6/26.0. IR on board for possible intervention.    Past Medical History:   Diagnosis Date    Anemia     Arthritis     Epilepsy     Last seizure 2010.     Hyperlipidemia     Hypertension     Prediabetes     Tubular adenoma of colon 04/02/2012    Vitamin D deficiency        Past Surgical History:   Procedure Laterality Date    BLOCK, NERVE, PERIPHERAL Left 8/19/2024    Procedure: Left femoral and obturator nerve block;  Surgeon: Helder Taylor MD;  Location: [facility] [place] [facility];  Service: Pain Management;  Laterality: Left;    COLONOSCOPY N/A 07/12/2021    Procedure: COLONOSCOPY;  Surgeon: Subhash Martin MD;  Location: [facility] ENDO;  Service: Endoscopy;  Laterality: N/A;    HIP ARTHROPLASTY Left 1/28/2025    Procedure: ARTHROPLASTY, HIP;  Surgeon: Javier Lee MD;  Location: [facility] OR;  Service: Orthopedics;  Laterality: Left;    HIP ARTHROPLASTY Right 5/30/2025    Procedure: ARTHROPLASTY, HIP;  Surgeon: Javier Lee MD;  Location: [facility] OR;  Service: Orthopedics;  Laterality: Right;    TONSILLECTOMY      VASECTOMY  1984/1985       Review of patient's allergies indicates:  No Known Allergies    No current facility-administered medications on file prior to encounter.     Current Outpatient Medications on File Prior to Encounter   Medication Sig    aspirin (ECOTRIN) 81 MG EC tablet Take 81 mg by mouth 2 (two) times a day.    atorvastatin (LIPITOR) 40 MG tablet Take 1 tablet (40 mg total) by mouth every evening.    gabapentin (NEURONTIN) 300 MG capsule Take 1 capsule (300 mg total) by mouth every evening.    lisinopriL-hydrochlorothiazide (PRINZIDE,ZESTORETIC) 20-25 mg Tab Take 2 tablets by mouth once daily. If BP is below 110/70 - decrease to 1 tablet daily    meloxicam (MOBIC) 15 MG tablet TAKE 1 TABLET(15 MG) BY MOUTH DAILY WITH FOOD (Patient  taking differently: Take 15 mg by mouth once daily.)    phenytoin (DILANTIN) 100 MG ER capsule Take 4 capsules (400 mg total) by mouth every evening.    cholecalciferol, vitamin D3, (VITAMIN D3) 25 mcg (1,000 unit) capsule Take 1 capsule (1,000 Units total) by mouth once daily.     Family History       Problem Relation (Age of Onset)    Cancer Mother, Father    Diabetes Maternal Grandfather    Heart disease Father, Son    Hypertension Father, Other          Tobacco Use    Smoking status: Never    Smokeless tobacco: Never   Substance and Sexual Activity    Alcohol use: No    Drug use: Never    Sexual activity: Not Currently     Partners: Female     Birth control/protection: None     Review of Systems   Constitutional: Positive for malaise/fatigue.   HENT: Negative.     Eyes: Negative.    Cardiovascular:  Positive for dyspnea on exertion and leg swelling.   Respiratory:  Positive for shortness of breath.    Endocrine: Negative.    Hematologic/Lymphatic: Negative.    Skin: Negative.    Musculoskeletal:  Positive for arthritis and joint pain.   Gastrointestinal: Negative.    Genitourinary: Negative.    Neurological: Negative.    Psychiatric/Behavioral: Negative.     Allergic/Immunologic: Negative.      Objective:     Vital Signs (Most Recent):  Temp: 97.8 °F (36.6 °C) (06/17/25 0732)  Pulse: 95 (06/17/25 0732)  Resp: 19 (06/17/25 0732)  BP: 124/70 (06/17/25 0732)  SpO2: 96 % (06/17/25 0732) Vital Signs (24h Range):  Temp:  [97.8 °F (36.6 °C)-99 °F (37.2 °C)] 97.8 °F (36.6 °C)  Pulse:  [] 95  Resp:  [15-22] 19  SpO2:  [88 %-96 %] 96 %  BP: (105-147)/(62-80) 124/70     Weight: 85.5 kg (188 lb 7.9 oz)  Body mass index is 30.42 kg/m².    SpO2: 96 %         Intake/Output Summary (Last 24 hours) at 6/17/2025 0845  Last data filed at 6/17/2025 0553  Gross per 24 hour   Intake --   Output 390 ml   Net -390 ml       Lines/Drains/Airways       Peripheral Intravenous Line  Duration                  Peripheral IV - Single  "Lumen 06/16/25 2032 18 G Anterior;Right Forearm <1 day         Peripheral IV - Single Lumen 06/16/25 2032 20 G Left Antecubital <1 day         Peripheral IV - Single Lumen 06/16/25 2033 20 G Anterior;Left Forearm <1 day                     Physical Exam  Vitals and nursing note reviewed.   Constitutional:       Appearance: Normal appearance. He is obese.      Comments: On supplemental O2   HENT:      Head: Normocephalic and atraumatic.   Eyes:      Pupils: Pupils are equal, round, and reactive to light.   Cardiovascular:      Rate and Rhythm: Tachycardia present.      Heart sounds: S1 normal and S2 normal. No murmur heard.  Pulmonary:      Effort: Pulmonary effort is normal.   Musculoskeletal:      Right lower leg: Edema present.      Left lower leg: Edema present.      Comments: LLE > RLE   Skin:     General: Skin is warm and dry.   Neurological:      General: No focal deficit present.      Mental Status: He is oriented to person, place, and time.   Psychiatric:         Mood and Affect: Mood normal.         Behavior: Behavior normal.         Thought Content: Thought content normal.       TTE pending to assess EF/RV function     Significant Labs: CMP   Recent Labs   Lab 06/16/25 1919 06/17/25  0405    140   K 3.6 4.2    109   CO2 19* 21*   * 132*   BUN 29* 24*   CREATININE 1.2 1.1   CALCIUM 9.6 8.7   PROT 7.8 6.3   ALBUMIN 3.6 3.0*   BILITOT 0.2 0.3   ALKPHOS 149 122   AST 28 20   ALT 26 20   ANIONGAP 14 10   , CBC   Recent Labs   Lab 06/16/25 1919 06/17/25  0405   WBC 8.54 7.40   HGB 10.4* 8.7*   HCT 30.7* 26.0*    216   , Troponin No results for input(s): "TROPONINIHS" in the last 48 hours., and All pertinent lab results from the last 24 hours have been reviewed.    Significant Imaging: Echocardiogram: Transthoracic echo (TTE) complete (Cupid Only):    and EKG: Reviewed  Assessment and Plan:   Patient who presents with bilateral PE. Elevated troponin due to demand ischemia. Continue " OMT/supportive care.    * Bilateral pulmonary embolism  -Noted on CTA   -TTE pending  -Continue heparin gtt, will need NOAC upon d/c  -IR on board for possible intervention    Elevated troponin  Troponin 0.194>0.192>0.160>0.133  -Elevation secondary to demand ischemia from acute PE  -Continue OMT  -TTE pending  -Can consider repeat ischemic workup as OP, prior MPI stress test 12/24 + scar, no ischemia    Status post right hip replacement  -Per ortho/primary team    Acute deep vein thrombosis (DVT) of both lower extremities  -Continue IV heparin, NOAC upon d/c  -IR on board     Anemia  -Per primary team    Mixed hyperlipidemia  -Statin    Essential hypertension  -Resume home meds as tolerated        VTE Risk Mitigation (From admission, onward)           Ordered     Reason for No Pharmacological VTE Prophylaxis  Once        Comments: Heparin drip is ordered   Question:  Reasons:  Answer:  Physician Provided (leave comment)    06/16/25 2242     Reason for no Mechanical VTE Prophylaxis  Once        Comments: Current DVT per US tech at bedside   Question:  Reasons:  Answer:  Physician Provided (leave comment)    06/16/25 2242     IP VTE HIGH RISK PATIENT  Once         06/16/25 2242     heparin 25,000 units in dextrose 5% (100 units/ml) IV bolus from bag HIGH INTENSITY nomogram - [facility]  As needed (PRN)        Question:  Heparin Infusion Adjustment (DO NOT MODIFY ANSWER)  Answer:  [email or web address]\Images\[email or web address]\HeparinInfusions\heparin HIGH INTENSITY nomogram for [facility] [id number]    06/16/25 2109     heparin 25,000 units in dextrose 5% (100 units/ml) IV bolus from bag HIGH INTENSITY nomogram - [facility]  As needed (PRN)        Question:  Heparin Infusion Adjustment (DO NOT MODIFY ANSWER)  Answer:  [email or web address]\Images\Pharmacy\HeparinInfusions\heparin HIGH INTENSITY nomogram for [facility] [id number]    06/16/25 2109     heparin 25,000 units in dextrose 5% 250 mL (100 units/mL) infusion HIGH INTENSITY nomogram - [facility]  Continuous         Question:  Begin at (units/kg/hr)  Answer:  18    06/16/25 5903                    Thank you for your consult. I will follow-up with patient. Please contact us if you have any additional questions.    Clemencia Murray PA-C  Cardiology   [facility] - Telemetry ([facility])

## 2025-06-17 NOTE — ASSESSMENT & PLAN NOTE
Troponin 0.194>0.192>0.160>0.133  -Elevation secondary to demand ischemia from acute PE  -Continue OMT  -TTE pending  -Can consider repeat ischemic workup as OP, prior MPI stress test 12/24 + scar, no ischemia

## 2025-06-17 NOTE — ASSESSMENT & PLAN NOTE
--Acute thrombus within the right popliteal and peroneal veins.  There is acute thrombus in the left popliteal and anterior tibial and peroneal veins   --Will not use SCD  --Heparin drip infusing   -- IR consult to determine if candidate for intervention  -pt to ambulate with surgical intervention vs conservative management to be determined

## 2025-06-17 NOTE — ASSESSMENT & PLAN NOTE
Anemia is likely due to acute blood loss which was from recent surgery. Most recent hemoglobin and hematocrit are listed below.  Recent Labs     06/16/25  1919 06/17/25  0405   HGB 10.4* 8.7*   HCT 30.7* 26.0*     Plan  - Monitor serial CBC: Daily  - Transfuse PRBC if patient becomes hemodynamically unstable, symptomatic or H/H drops below 7/21.

## 2025-06-17 NOTE — ED PROVIDER NOTES
SCRIBE #1 NOTE: I, Sydney Sheldon, am scribing for, and in the presence of, Nadia Fall MD. I have scribed the entire note.       History     Chief Complaint   Patient presents with    Shortness of Breath     C/o exertional SOB and tachycardia for the couple of days. Post ops hip replacement x2 wks ago.      Review of patient's allergies indicates:  No Known Allergies      History of Present Illness     HPI    6/16/2025, 7:23 PM  History obtained from the patient      History of Present Illness: Conner Lombardi Jr. is a 75 y.o. male patient with a PMHx of HTN, HLD, Anemia, prediabetes, vitamin D deficiency, and tubular adenoma of colon who presents to the Emergency Department for evaluation of SOB which onset gradually over the past 3 days. Symptoms are constant and moderate in severity. Pt's sxs are exacerbated with exertion. Associated sxs include frequency and LLE swelling. Patient denies any fever, chills, congestion, and all other sxs at this time. No prior Tx reported. Pt had a right hip replacement 5/30/25 by Dr. Lee. No further complaints or concerns at this time.       Arrival mode: Personal vehicle    PCP: Tia Lopez MD        Past Medical History:  Past Medical History:   Diagnosis Date    Anemia     Arthritis     Epilepsy     Last seizure 2010.     Hyperlipidemia     Hypertension     Prediabetes     Tubular adenoma of colon 04/02/2012    Vitamin D deficiency        Past Surgical History:  Past Surgical History:   Procedure Laterality Date    BLOCK, NERVE, PERIPHERAL Left 8/19/2024    Procedure: Left femoral and obturator nerve block;  Surgeon: Helder Taylor MD;  Location: [facility];  Service: Pain Management;  Laterality: Left;    COLONOSCOPY N/A 07/12/2021    Procedure: COLONOSCOPY;  Surgeon: Subhash Martin MD;  Location: [facility] ENDO;  Service: Endoscopy;  Laterality: N/A;    HIP ARTHROPLASTY Left 1/28/2025    Procedure: ARTHROPLASTY, HIP;  Surgeon: Javier Lee  MD DEON;  Location: [facility] OR;  Service: Orthopedics;  Laterality: Left;    HIP ARTHROPLASTY Right 5/30/2025    Procedure: ARTHROPLASTY, HIP;  Surgeon: Javier Lee MD;  Location: [facility] OR;  Service: Orthopedics;  Laterality: Right;    TONSILLECTOMY      VASECTOMY  1984/1985         Family History:  Family History   Problem Relation Name Age of Onset    Cancer Mother Batool Lombardi         Brain    Heart disease Father Conner Lombardi, Sr.     Cancer Father Conner Lombardi, Sr.         Liver and lung    Hypertension Father Conner Lombardi, Sr.     Heart disease Son          CABG in 2018    Diabetes Maternal Grandfather Aguilar Wilkerson     Hypertension Other         Social History:  Social History     Tobacco Use    Smoking status: Never    Smokeless tobacco: Never   Substance and Sexual Activity    Alcohol use: No    Drug use: Never    Sexual activity: Not Currently     Partners: Female     Birth control/protection: None        Review of Systems     Review of Systems   Constitutional:  Negative for chills and fever.   HENT:  Negative for congestion.    Respiratory:  Positive for shortness of breath. Negative for cough.    Cardiovascular:  Positive for leg swelling (left).   Genitourinary:  Positive for frequency.   All other systems reviewed and are negative.       Physical Exam     Initial Vitals [06/16/25 1846]   BP Pulse Resp Temp SpO2   139/75 (!) 133 (!) 22 98.1 °F (36.7 °C) (!) 88 %      MAP       --          Physical Exam  Nursing Notes and Vital Signs Reviewed.  Constitutional: Patient is in mild acute distress. Well-developed and well-nourished. Appears stated age.  Doing well on 2 L of oxygen  Head: Atraumatic. Normocephalic.  Eyes:  EOM intact. Conjunctivae are not pale. No scleral icterus.  ENT: Mucous membranes are moist. Oropharynx is clear and symmetric.   Neck: Supple. Full ROM. No lymphadenopathy.  Cardiovascular: Tachycardic. Regular rhythm. No murmurs, rubs, or gallops. Distal  "pulses are 1+ and symmetric.  Pulmonary/Chest: Mild respiratory distress. Clear to auscultation bilaterally. No wheezing or rales. No retraction.  Abdominal: Soft and non-distended.  There is no tenderness.  No rebound, guarding, or rigidity. Good bowel sounds.  Musculoskeletal: Moves all extremities. No obvious deformities. No edema. No calf tenderness. LLE calf swelling bilaterally but worse on the left, scar to the right hip is healing well with no pus drainage or swelling.  Skin: Warm and dry.  Neurological:  Alert, awake, and appropriate.  Normal speech. Speaking in full sentences. No acute focal neurological deficits are appreciated.  Psychiatric: Normal affect. Good eye contact. Appropriate in content.     ED Course   Procedures  ED Vital Signs:  Vitals:    06/16/25 1846 06/16/25 1929 06/16/25 2002 06/16/25 2032   BP: 139/75  119/62 105/65   Pulse: (!) 133 (!) 113 (!) 112 109   Resp: (!) 22 16 16 19   Temp: 98.1 °F (36.7 °C)      TempSrc: Oral      SpO2: (!) 88% (!) 93% (!) 93% (!) 91%   Weight: 84 kg (185 lb 3.2 oz)      Height: 5' 6" (1.676 m)       06/16/25 2111 06/16/25 2126 06/16/25 2130 06/16/25 2201   BP: 126/69   133/67   Pulse: (!) 112  109 106   Resp:    15   Temp:       TempSrc:       SpO2: (!) 94% (S) 96%  96%   Weight:       Height:        06/16/25 2202 06/16/25 2300 06/16/25 2359   BP:  (!) 142/79 (!) 147/80   Pulse: 105 109 104   Resp:  18 18   Temp:  99 °F (37.2 °C) 98.7 °F (37.1 °C)   TempSrc:  Oral Oral   SpO2:  (!) 94% 96%   Weight:      Height:          Abnormal Lab Results:  Labs Reviewed   D DIMER, QUANTITATIVE - Abnormal       Result Value    D-Dimer 22.58 (*)    APTT - Abnormal    PTT 37.6 (*)    COMPREHENSIVE METABOLIC PANEL - Abnormal    Sodium 142      Potassium 3.6      Chloride 109      CO2 19 (*)     Glucose 146 (*)     BUN 29 (*)     Creatinine 1.2      Calcium 9.6      Protein Total 7.8      Albumin 3.6      Bilirubin Total 0.2            AST 28      ALT 26      Anion " Gap 14      eGFR >60     TROPONIN I - Abnormal    Troponin-I 0.194 (*)    B-TYPE NATRIURETIC PEPTIDE - Abnormal     (*)    CBC WITH DIFFERENTIAL - Abnormal    WBC 8.54      RBC 3.05 (*)     HGB 10.4 (*)     HCT 30.7 (*)      (*)     MCH 34.1 (*)     MCHC 33.9      RDW 13.7      Platelet Count 245      MPV 9.6      Nucleated RBC 0      Neut % 60.6      Lymph % 25.4      Mono % 8.5      Eos % 4.4      Basophil % 0.5      Imm Grans % 0.6 (*)     Neut # 5.17      Lymph # 2.17      Mono # 0.73      Eos # 0.38      Baso # 0.04      Imm Grans # 0.05 (*)    PROTIME-INR - Normal    PT 11.3      INR 1.0     LACTIC ACID, PLASMA - Normal    Lactic Acid Level 1.4      Narrative:     Falsely low lactic acid results can be found in samples containing >=13.0 mg/dL total bilirubin and/or >=3.5 mg/dL direct bilirubin.    CBC W/ AUTO DIFFERENTIAL    Narrative:     The following orders were created for panel order CBC auto differential.  Procedure                               Abnormality         Status                     ---------                               -----------         ------                     CBC with Differential[[id number]]       Abnormal            Final result                 Please view results for these tests on the individual orders.   EXTRA TUBES    Narrative:     The following orders were created for panel order EXTRA TUBES.  Procedure                               Abnormality         Status                     ---------                               -----------         ------                     Light Green Top Hold[[id number]]                            Final result               Gold Top Hold[[id number]]                                   Final result               Gold Top Hold[[id number]]                                   Final result                 Please view results for these tests on the individual orders.   LIGHT GREEN TOP HOLD    Extra Tube Hold for add-ons.     GOLD TOP HOLD    Extra  Tube Hold for add-ons.     GOLD TOP HOLD    Extra Tube Hold for add-ons.     TROPONIN I        All Lab Results:  Results for orders placed or performed during the hospital encounter of 06/16/25   Light Green Top Hold    Collection Time: 06/16/25  7:04 PM   Result Value Ref Range    Extra Tube Hold for add-ons.    Gold Top Hold    Collection Time: 06/16/25  7:04 PM   Result Value Ref Range    Extra Tube Hold for add-ons.    Gold Top Hold    Collection Time: 06/16/25  7:04 PM   Result Value Ref Range    Extra Tube Hold for add-ons.    D dimer, quantitative    Collection Time: 06/16/25  7:19 PM   Result Value Ref Range    D-Dimer 22.58 (H) <0.50 mg/L FEU   APTT    Collection Time: 06/16/25  7:19 PM   Result Value Ref Range    PTT 37.6 (H) 21.0 - 32.0 seconds   Protime-INR    Collection Time: 06/16/25  7:19 PM   Result Value Ref Range    PT 11.3 9.0 - 12.5 seconds    INR 1.0 0.8 - 1.2   Comprehensive metabolic panel    Collection Time: 06/16/25  7:19 PM   Result Value Ref Range    Sodium 142 136 - 145 mmol/L    Potassium 3.6 3.5 - 5.1 mmol/L    Chloride 109 95 - 110 mmol/L    CO2 19 (L) 23 - 29 mmol/L    Glucose 146 (H) 70 - 110 mg/dL    BUN 29 (H) 8 - 23 mg/dL    Creatinine 1.2 0.5 - 1.4 mg/dL    Calcium 9.6 8.7 - 10.5 mg/dL    Protein Total 7.8 6.0 - 8.4 gm/dL    Albumin 3.6 3.5 - 5.2 g/dL    Bilirubin Total 0.2 0.1 - 1.0 mg/dL     40 - 150 unit/L    AST 28 11 - 45 unit/L    ALT 26 10 - 44 unit/L    Anion Gap 14 8 - 16 mmol/L    eGFR >60 >60 mL/min/1.73/m2   Troponin I    Collection Time: 06/16/25  7:19 PM   Result Value Ref Range    Troponin-I 0.194 (H) <=0.026 ng/mL   Brain natriuretic peptide    Collection Time: 06/16/25  7:19 PM   Result Value Ref Range     (H) 0 - 99 pg/mL   CBC with Differential    Collection Time: 06/16/25  7:19 PM   Result Value Ref Range    WBC 8.54 3.90 - 12.70 K/uL    RBC 3.05 (L) 4.60 - 6.20 M/uL    HGB 10.4 (L) 14.0 - 18.0 gm/dL    HCT 30.7 (L) 40.0 - 54.0 %     (H)  82 - 98 fL    MCH 34.1 (H) 27.0 - 31.0 pg    MCHC 33.9 32.0 - 36.0 g/dL    RDW 13.7 11.5 - 14.5 %    Platelet Count 245 150 - 450 K/uL    MPV 9.6 9.2 - 12.9 fL    Nucleated RBC 0 <=0 /100 WBC    Neut % 60.6 38 - 73 %    Lymph % 25.4 18 - 48 %    Mono % 8.5 4 - 15 %    Eos % 4.4 <=8 %    Basophil % 0.5 <=1.9 %    Imm Grans % 0.6 (H) 0.0 - 0.5 %    Neut # 5.17 1.8 - 7.7 K/uL    Lymph # 2.17 1 - 4.8 K/uL    Mono # 0.73 0.3 - 1 K/uL    Eos # 0.38 <=0.5 K/uL    Baso # 0.04 <=0.2 K/uL    Imm Grans # 0.05 (H) 0.00 - 0.04 K/uL   Lactic Acid, Plasma    Collection Time: 06/16/25  7:36 PM   Result Value Ref Range    Lactic Acid Level 1.4 0.5 - 2.2 mmol/L   Troponin I #2    Collection Time: 06/16/25 10:36 PM   Result Value Ref Range    Troponin-I 0.192 (H) <=0.026 ng/mL   Urinalysis, Reflex to Urine Culture Urine, Clean Catch    Collection Time: 06/17/25 12:27 AM    Specimen: Urine   Result Value Ref Range    Color, UA Yellow Straw, Em, Yellow, Light-Orange    Appearance, UA Clear Clear    pH, UA 6.0 5.0 - 8.0    Spec Grav UA >=1.030 (A) 1.005 - 1.030    Protein, UA Negative Negative    Glucose, UA Negative Negative    Ketones, UA Negative Negative    Bilirubin, UA Negative Negative    Blood, UA Negative Negative    Nitrites, UA Negative Negative    Urobilinogen, UA Negative <2.0 EU/dL    Leukocyte Esterase, UA Negative Negative         Imaging Results:  Imaging Results              US Lower Extremity Veins Bilateral (Final result)  Result time 06/16/25 22:48:09      Final result by Raman Molina Jr., MD (06/16/25 22:48:09)                   Impression:      Bilateral acute DVT.      Finalized on: 6/16/2025 10:48 PM By:  Raman Molina MD  [facility]# [id number]      2025-06-16 22:50:12.398     [facility]               Narrative:    EXAM:  Bilateral lower extremity ultrasound    COMPARISON: None    CLINICAL HISTORY:  Leg pain and swelling.    TECHNIQUE: Gray-scale, color Doppler, and spectral Doppler imaging was obtained through both  lower extremities.    FINDINGS: Acute thrombus within the right popliteal and peroneal veins.  There is acute thrombus in the left popliteal and anterior tibial and peroneal veins.                                           CTA Chest Non-Coronary (PE Studies) (Final result)  Result time 06/16/25 21:34:17      Final result by Raman Molina Jr., MD (06/16/25 21:34:17)                   Impression:      1.   Positive for extensive bilateral pulmonary emboli with right heart strain.    Findings discussed with Dr. Fall the time of dictation.        All CT scans at [this location] are performed using dose modulation techniques as appropriate to a performed exam including the following: automated exposure control; adjustment of the mA and/or kV according to patient size (this includes techniques or standardized protocols for targeted exams where dose is matched to indication / reason for exam; i.e. extremities or head); use of iterative reconstruction technique.      Finalized on: 6/16/2025 9:34 PM By:  Raman Molina MD  [facility]# [id number]      2025-06-16 21:36:18.408     [facility]               Narrative:    EXAM: CTA CHEST NON CORONARY (PE STUDIES)    COMPARISON:   None    INDICATION: Chest pain.  Shortness of breath.    TECHNIQUE: Standard CTA chest, PE protocol, was performed following the uneventful administration of IV contrast.  Coronal and sagittal reconstructions were performed on these images.    FINDINGS:   Extensive bilateral pulmonary emboli with dilatation of the right ventricle in comparison to the left concerning for right heart strain.  RV to LV ratio is 1.3.  Mild reflux of contrast into the upper IVC.  Thoracic aorta is normal in size without dissection.  Calcified coronary arteries.  No pericardial effusion.    The lungs are clear.  No effusion or pneumothorax.  No adenopathy.    Visualized abdominal organs unremarkable.                                           X-Ray Chest AP Portable (Final result)  Result  time 06/16/25 20:10:38      Final result by Raman Molina Jr., MD (06/16/25 20:10:38)                   Impression:      No acute chest findings.    Finalized on: 6/16/2025 8:10 PM By:  Raman Molina MD  [facility]# [id number]      2025-06-16 20:12:43.876     [facility]               Narrative:    EXAM:  XR CHEST AP PORTABLE    CLINICAL HISTORY:   Chest pain.    FINDINGS: The lungs are clear.  The cardiac silhouette and mediastinum are within normal limits.  Osseous structures and soft tissues are within normal limits.  Calcified aortic knob.                                         The EKG was ordered, reviewed, and independently interpreted by the ED provider.  Interpretation time: 18:51   Rate: 125 BPM  Rhythm: sinus tachycardia  Interpretation: nonspecific ST and T WA. No STEMI.           The Emergency Provider reviewed the vital signs and test results, which are outlined above.     ED Discussion       Patient with bilateral PEs. He was hypoxic wean got here at 88%. He does have some right heart strain and elevated troponin. Blood pressure has been stable in the emergency room. He is doing very well on 2 L. Heparin drip has been started.     9:35 PM: Discussed pt's case with Dr. Sharif (radiology) who agrees with the plan of admit and heparin. Dr. Sharif states that there is not a large amount of clot and requests for a troponin trend. Dr. Sharif also requests an echo in the morning.    9:44 PM: Discussed case with MD Clive (Hospital Medicine). Dr. Wheeler agrees with current care and management of pt and accepts admission.  Ultrasound bilateral extremity shows bilateral DVTs.  Admitting Service: Hospital Medicine  Admitting Physician: Dr. Wheeler  Admit to: Inpatient tele    9:44 PM: Re-evaluated pt. I have discussed test results, shared treatment plan, and the need for admission with patient/family/caretaker at bedside. Pt and/or family/caretaker express understanding at this time and agree with all information.  All questions answered. Pt/caretaker/family member(s) have no further questions or concerns at this time. Pt is ready for admit.             Medical Decision Making  Differential diagnoses:  Pneumonia, Congestive heart failure, Acute Myocardial infarction, Pleural effusion, Pulmonary edema, Pulmonary embolism, Electrolyte imbalance, Infectious etiology, COPD exacerbation, Asthma exacerbation, Pneumothorax,  Cardiac tamponade, Anemia, Deconditioning, bronchospasm, upper airway obstruction such: Aspiration, Foreign body      Amount and/or Complexity of Data Reviewed  Labs: ordered. Decision-making details documented in ED Course.  Radiology: ordered. Decision-making details documented in ED Course.  ECG/medicine tests: ordered and independent interpretation performed. Decision-making details documented in ED Course.    Risk  Prescription drug management.  Decision regarding hospitalization.                ED Medication(s):  Medications   heparin 25,000 units in dextrose 5% 250 mL (100 units/mL) infusion HIGH INTENSITY nomogram - [facility] (18 Units/kg/hr × 71.9 kg (Adjusted) Intravenous New Bag 6/16/25 0267)   heparin 25,000 units in dextrose 5% (100 units/ml) IV bolus from bag HIGH INTENSITY nomogram - [facility] (has no administration in time range)   heparin 25,000 units in dextrose 5% (100 units/ml) IV bolus from bag HIGH INTENSITY nomogram - [facility] (has no administration in time range)   sodium chloride 0.9% flush 3 mL (has no administration in time range)   polyethylene glycol packet 17 g (has no administration in time range)   acetaminophen tablet 650 mg (has no administration in time range)   naloxone 0.4 mg/mL injection 0.02 mg (has no administration in time range)   glucose chewable tablet 16 g (has no administration in time range)   glucose chewable tablet 24 g (has no administration in time range)   dextrose 50% injection 12.5 g (has no administration in time range)   dextrose 50% injection 25 g (has no administration in  time range)   glucagon (human recombinant) injection 1 mg (has no administration in time range)   melatonin tablet 6 mg (6 mg Oral Given 6/17/25 0002)   aspirin EC tablet 81 mg (81 mg Oral Given 6/17/25 0002)   atorvastatin tablet 40 mg (40 mg Oral Given 6/17/25 0002)   vitamin D 1000 units tablet 1,000 Units (has no administration in time range)   gabapentin capsule 300 mg (300 mg Oral Given 6/17/25 0002)   phenytoin (DILANTIN) ER capsule 400 mg (400 mg Oral Given 6/17/25 0003)   iohexoL (OMNIPAQUE 350) injection 100 mL (100 mLs Intravenous Given 6/16/25 2102)   heparin 25,000 units in dextrose 5% (100 units/ml) IV bolus from bag HIGH INTENSITY nomogram - [facility] (5,752 Units Intravenous Bolus from Bag 6/16/25 2118)       Current Discharge Medication List                  Scribe Attestation:   Scribe #1: I performed the above scribed service and the documentation accurately describes the services I performed. I attest to the accuracy of the note.     Attending:   Physician Attestation Statement for Scribe #1: I, Nadia Fall MD, personally performed the services described in this documentation, as scribed by Sydney Sheldon, in my presence, and it is both accurate and complete.           Clinical Impression       ICD-10-CM ICD-9-CM   1. Bilateral pulmonary embolism  I26.99 415.19   2. SOB (shortness of breath)  R06.02 786.05   3. Calf swelling  M79.89 729.81   4. Acute respiratory failure with hypoxia  J96.01 518.81   5. Chest pain  R07.9 786.50   6. Pulmonary embolism  I26.99 415.19   7. DVT of axillary vein, acute bilateral  I82.A13 453.84       Disposition:   Disposition: Admitted  Condition: Stable         Nadia Fall MD  06/17/25 0121     INTENSITY nomogram - [facility] (5,752 Units Intravenous Bolus from Bag 6/16/25 2118)       Current Discharge Medication List                  Scribe Attestation:   Scribe #1: I performed the above scribed service and the documentation accurately describes the services I performed. I attest to the accuracy of the note.     Attending:   Physician Attestation Statement for Scribe #1: I, Nadia Fall MD, personally performed the services described in this documentation, as scribed by Sydney Sheldon, in my presence, and it is both accurate and complete.           Clinical Impression       ICD-10-CM ICD-9-CM   1. Bilateral pulmonary embolism  I26.99 415.19   2. SOB (shortness of breath)  R06.02 786.05   3. Calf swelling  M79.89 729.81   4. Acute respiratory failure with hypoxia  J96.01 518.81   5. Chest pain  R07.9 786.50   6. Pulmonary embolism  I26.99 415.19   7. DVT of axillary vein, acute bilateral  I82.A13 453.84       Disposition:   Disposition: Admitted  Condition: Stable         Nadia Fall MD  06/17/25 0121       Nadia Fall MD  07/10/25 1954

## 2025-06-17 NOTE — H&P
[facility] Medicine  History & Physical    Patient Name: Conner Lombardi Jr.  MRN: [id number]  Patient Class: IP- Inpatient  Admission Date: 6/16/2025  Attending Physician: Janel Wheeler MD   Primary Care Provider: Tia Lopez MD         Patient information was obtained from patient, spouse/SO, past medical records, and ER records.     Subjective:     Principal Problem:Bilateral pulmonary embolism    Chief Complaint:   Chief Complaint   Patient presents with    Shortness of Breath     C/o exertional SOB and tachycardia for the couple of days. Post ops hip replacement x2 wks ago.         HPI:   Patient is a 75-year-old male with past medical history significant for seizures, hypertension, hyperlipidemia, anemia, prediabetes, vitamin-D deficiency, colon adenoma, and recent total right hip replacement secondary to arthritis on 5/30/2025 per Dr. Lee who presented to ED for evaluation of shortness of breath upon exertion. He also reports tachycardia. Patient reports that these symptoms first started on Saturday, however it was not noticed by his wife until today and he states that it got significantly worse today. He also reports swelling in legs (worse on left) and urinary frequency. He denies dizziness, lightheadedness, headache, weakness, fever, chills, chest pain, productive cough, abdominal pain, nausea, vomiting, diarrhea, dysuria, flank pain, urgency, decreased urine volume, difficulty urinating, hematuria, calf pain, thigh pain.  On arrival to ED, temp 98.1°, heart rate 133, respirations 22, blood pressure 139/75, 88% SpO2 on room air.  He was placed on 2 L nasal cannula with improvement to 94 to 96% SpO2.  Lab workup showed WBC 8.54, RBC 3.05, hemoglobin 10.4, hematocrit 30.7, platelets 245, D-dimer 22.58, CO2 19, BUN 29, creatinine 1.2, glucose 146, normal LFTs, , troponin 0.194, lactic acid 1.4.  chest x-ray demonstrated lungs are clear.  CTA chest PE  study was done which was positive for extensive bilateral pulmonary emboli with right heart strain noted, this was communicated to Dr. Fall in ED who then notified IR on call, Dr Sharif. Ultrasound lower extremities veins bilateral shows bilateral acute DVTs with acute thrombus in right popliteal and peroneal veins as well as acute thrombus in left popliteal and anterior tibial as well as peroneal veins.  EKG showed sinus tachycardia with heart rate 125, nonspecific ST and T-wave abnormalities.  Patient was started on heparin drip.  IR on-call (Dr Sharif) communicated that IR will see patient in a.m. to determine if candidate for intervention.  Hospital Medicine team was consulted for admission due to bilateral PEs, hypoxia, and bilateral DVTs.    Past Medical History:   Diagnosis Date    Anemia     Arthritis     Epilepsy     Last seizure 2010.     Hyperlipidemia     Hypertension     Prediabetes     Tubular adenoma of colon 04/02/2012    Vitamin D deficiency        Past Surgical History:   Procedure Laterality Date    BLOCK, NERVE, PERIPHERAL Left 8/19/2024    Procedure: Left femoral and obturator nerve block;  Surgeon: Helder Taylor MD;  Location: [facility];  Service: Pain Management;  Laterality: Left;    COLONOSCOPY N/A 07/12/2021    Procedure: COLONOSCOPY;  Surgeon: Subhash Martin MD;  Location: [facility];  Service: Endoscopy;  Laterality: N/A;    HIP ARTHROPLASTY Left 1/28/2025    Procedure: ARTHROPLASTY, HIP;  Surgeon: Javier Lee MD;  Location: [facility] OR;  Service: Orthopedics;  Laterality: Left;    HIP ARTHROPLASTY Right 5/30/2025    Procedure: ARTHROPLASTY, HIP;  Surgeon: Javier Lee MD;  Location: [facility] OR;  Service: Orthopedics;  Laterality: Right;    TONSILLECTOMY      VASECTOMY  1984/1985       Review of patient's allergies indicates:  No Known Allergies    No current facility-administered medications on file prior to encounter.     Current Outpatient Medications on File  Prior to Encounter   Medication Sig    aspirin (ECOTRIN) 81 MG EC tablet Take 81 mg by mouth 2 (two) times a day.    atorvastatin (LIPITOR) 40 MG tablet Take 1 tablet (40 mg total) by mouth every evening.    gabapentin (NEURONTIN) 300 MG capsule Take 1 capsule (300 mg total) by mouth every evening.    lisinopriL-hydrochlorothiazide (PRINZIDE,ZESTORETIC) 20-25 mg Tab Take 2 tablets by mouth once daily. If BP is below 110/70 - decrease to 1 tablet daily    meloxicam (MOBIC) 15 MG tablet TAKE 1 TABLET(15 MG) BY MOUTH DAILY WITH FOOD (Patient taking differently: Take 15 mg by mouth once daily.)    phenytoin (DILANTIN) 100 MG ER capsule Take 4 capsules (400 mg total) by mouth every evening.    cholecalciferol, vitamin D3, (VITAMIN D3) 25 mcg (1,000 unit) capsule Take 1 capsule (1,000 Units total) by mouth once daily.    [DISCONTINUED] KRILL OIL ORAL Take by mouth.    [DISCONTINUED] ondansetron (ZOFRAN) 4 MG tablet Take 1 tablet (4 mg total) by mouth every 6 (six) hours as needed for Nausea.    [DISCONTINUED] oxyCODONE-acetaminophen (PERCOCET)  mg per tablet Take 1 tablet by mouth every 6 (six) hours as needed for Pain. (Patient not taking: Reported on 6/12/2025)     Family History       Problem Relation (Age of Onset)    Cancer Mother, Father    Diabetes Maternal Grandfather    Heart disease Father, Son    Hypertension Father, Other          Tobacco Use    Smoking status: Never    Smokeless tobacco: Never   Substance and Sexual Activity    Alcohol use: No    Drug use: Never    Sexual activity: Not Currently     Partners: Female     Birth control/protection: None     Review of Systems   Constitutional: Negative.  Negative for chills, fatigue and fever.   HENT: Negative.     Eyes: Negative.    Respiratory:  Positive for shortness of breath. Negative for cough, chest tightness and wheezing.         SOB with exertion/ambulation   Cardiovascular:  Positive for leg swelling. Negative for chest pain.        Tachycardia  (when monitoring heart rate)  Bilateral lower extremity swelling  Denies calf/thigh pain     Gastrointestinal: Negative.  Negative for abdominal distention, abdominal pain, blood in stool, constipation, diarrhea, nausea and vomiting.   Endocrine: Negative.    Genitourinary:  Positive for frequency. Negative for decreased urine volume, difficulty urinating, dysuria, flank pain, hematuria and urgency.   Musculoskeletal:  Positive for arthralgias. Negative for myalgias.   Skin: Negative.    Allergic/Immunologic: Negative.    Neurological: Negative.  Negative for dizziness, seizures, syncope, facial asymmetry, speech difficulty, weakness, light-headedness and headaches.   Hematological: Negative.    Psychiatric/Behavioral: Negative.     All other systems reviewed and are negative.    Objective:     Vital Signs (Most Recent):  Temp: 99 °F (37.2 °C) (06/16/25 2300)  Pulse: 109 (06/16/25 2300)  Resp: 18 (06/16/25 2300)  BP: (!) 142/79 (06/16/25 2300)  SpO2: (!) 94 % (06/16/25 2300) Vital Signs (24h Range):  Temp:  [98.1 °F (36.7 °C)-99 °F (37.2 °C)] 99 °F (37.2 °C)  Pulse:  [106-133] 109  Resp:  [15-22] 18  SpO2:  [88 %-96 %] 94 %  BP: (105-142)/(62-79) 142/79     Weight: 84 kg (185 lb 3.2 oz)  Body mass index is 29.89 kg/m².     Physical Exam  Vitals reviewed.   Constitutional:       General: He is not in acute distress.     Appearance: He is not toxic-appearing or diaphoretic.   HENT:      Head: Normocephalic and atraumatic.      Nose: Nose normal.      Mouth/Throat:      Mouth: Mucous membranes are moist.      Pharynx: Oropharynx is clear.   Eyes:      Extraocular Movements: Extraocular movements intact.      Pupils: Pupils are equal, round, and reactive to light.   Cardiovascular:      Rate and Rhythm: Regular rhythm. Tachycardia present.      Pulses: Normal pulses.      Heart sounds: Normal heart sounds.   Pulmonary:      Effort: Pulmonary effort is normal. No respiratory distress.      Breath sounds: Normal breath  sounds. No stridor. No wheezing, rhonchi or rales.      Comments: (While at rest)  Chest:      Chest wall: No tenderness.   Abdominal:      General: Bowel sounds are normal. There is no distension.      Palpations: Abdomen is soft.      Tenderness: There is no abdominal tenderness.   Genitourinary:     Comments: deferred  Musculoskeletal:         General: Swelling present. No tenderness. Normal range of motion.      Cervical back: Neck supple.      Right lower leg: Edema present.      Left lower leg: Edema present.      Comments: Swelling is greater in left lower extremity, but mild edema in right lower extremity as well   Skin:     General: Skin is warm and dry.      Capillary Refill: Capillary refill takes less than 2 seconds.      Comments: Healing incision -- right hip -- no drainage noted   Neurological:      General: No focal deficit present.      Mental Status: He is alert and oriented to person, place, and time.      Motor: No weakness.   Psychiatric:         Mood and Affect: Mood normal.         Behavior: Behavior normal.         Thought Content: Thought content normal.              CRANIAL NERVES     CN III, IV, VI   Pupils are equal, round, and reactive to light.       Significant Labs: All pertinent labs within the past 24 hours have been reviewed.  CBC:   Recent Labs   Lab 06/16/25 1919   WBC 8.54   HGB 10.4*   HCT 30.7*        CMP:   Recent Labs   Lab 06/16/25 1919      K 3.6      CO2 19*   *   BUN 29*   CREATININE 1.2   CALCIUM 9.6   PROT 7.8   ALBUMIN 3.6   BILITOT 0.2   ALKPHOS 149   AST 28   ALT 26   ANIONGAP 14     Lactic Acid:   Recent Labs   Lab 06/16/25  1936   LACTATE 1.4     Troponin:   Recent Labs   Lab 06/16/25 1919 06/16/25  2236   TROPONINI 0.194* 0.192*         D- Dimer 22.58    EKG -- ST, nonspecific ST and T-wave abnormality, heart rate 125,     Significant Imaging: I have reviewed all pertinent imaging results/findings within the past 24  "hours.    CXR -- Lungs clear, no acute chest findings    CTA chest PE studies -- positive for extensive bilateral pulmonary emboli with right heart strain    US lower extremities-- bilateral acute DVT (Acute thrombus within the right popliteal and peroneal veins.  There is acute thrombus in the left popliteal and anterior tibial and peroneal veins)    Assessment/Plan:     Assessment & Plan  Bilateral pulmonary embolism  CTA shows "Positive for extensive bilateral pulmonary emboli with right heart strain" per Dr. Raman Molina (radiology interpretation)  -- Heparin drip, per pharmacy management  --Current oxygen requirement 2L/min NC, he reports mainly short of breath only with exertion/ambulation  --Will keep NPO x medications  -- Bedrest for now  --IR was consulted -- per Dr. Jatinder Sharif secure chat message -- "Agree the plan of admit and heparin. Not a ton of clot. Prob has baseline right heart dysfunction. Would trend troponins and get echo in am. We can decide in am if we need to do anything."  -- trending troponin levels  -- Echo ordered    Seizures  Continue home regimen antiepileptics -- Dilantin  mg nightly  Seizure precautions    Acute deep vein thrombosis (DVT) of both lower extremities  --Acute thrombus within the right popliteal and peroneal veins.  There is acute thrombus in the left popliteal and anterior tibial and peroneal veins   --Will not use SCD  --Heparin drip ordered  -- IR consult to determine if candidate for intervention    Essential hypertension  Patient's blood pressure range in the last 24 hours was: BP  Min: 105/65  Max: 147/80.The patient's inpatient anti-hypertensive regimen is listed below:  Current Antihypertensives   On hold for now, BP controlled    Plan  - BP is controlled, no changes needed to their regimen  - will resume home med when appropriate  Mixed hyperlipidemia  Continue statin    Anemia  Anemia is likely due to acute blood loss which was from recent surgery. Most " recent hemoglobin and hematocrit are listed below.  Recent Labs     06/16/25  1919 06/17/25  0405   HGB 10.4* 8.7*   HCT 30.7* 26.0*     Plan  - Monitor serial CBC: Daily  - Transfuse PRBC if patient becomes hemodynamically unstable, symptomatic or H/H drops below 7/21.    Status post right hip replacement  Patient had total right hip arthroplasty on 05/30/2025 per Dr. Lee  Monitor incision  He reports minimal pain and has not required narcotic pain medication for past couple of days  Would consider consult or communication with Dr. Lee per day team to inform him of his patient's post-op complications      VTE Risk Mitigation (From admission, onward)           Ordered     Reason for No Pharmacological VTE Prophylaxis  Once        Comments: Heparin drip is ordered   Question:  Reasons:  Answer:  Physician Provided (leave comment)    06/16/25 2242     Reason for no Mechanical VTE Prophylaxis  Once        Comments: Current DVT per US tech at bedside   Question:  Reasons:  Answer:  Physician Provided (leave comment)    06/16/25 2242     IP VTE HIGH RISK PATIENT  Once         06/16/25 2242     heparin 25,000 units in dextrose 5% (100 units/ml) IV bolus from bag HIGH INTENSITY nomogram - [facility]  As needed (PRN)        Question:  Heparin Infusion Adjustment (DO NOT MODIFY ANSWER)  Answer:  [email or web address]\Images\Pharmacy\HeparinInfusions\heparin HIGH INTENSITY nomogram for [facility] [id number]    06/16/25 2109     heparin 25,000 units in dextrose 5% (100 units/ml) IV bolus from bag HIGH INTENSITY nomogram - [facility]  As needed (PRN)        Question:  Heparin Infusion Adjustment (DO NOT MODIFY ANSWER)  Answer:  \[email or web address]\Images\Pharmacy\HeparinInfusions\heparin HIGH INTENSITY nomogram for [facility] [id number].pdf    06/16/25 2109     heparin 25,000 units in dextrose 5% 250 mL (100 units/mL) infusion HIGH INTENSITY nomogram - [facility]  Continuous        Question:  Begin at (units/kg/hr)  Answer:  18    06/16/25 2109                     This patient's case has been reviewed by my supervising physician, Dr. Janel Wheeler.  Supervising physician will provide additional orders and recommendations at his or her discretion.         Lyric Ellis NP  Department of Hospital Medicine  [facility] [place])

## 2025-06-17 NOTE — PLAN OF CARE
"3:45 AM  6/16/25    CC: SOB, [place] >> Horacio EXTENSIVE Pe's and BLE DVT's. S/P L hip surgery on 5/30 and L hip surgery 1/28.  Neuro: A/Ox4  Cardiac: NSR/ST on tele  Resp: 2L NC  GI/: LBM 6/16  Skin: intact  Pain: no c/o pain  Mobility: STRICT BED REST  IV: 20g L ant FA, 22g R FA, 20g L outer AC  Plan of Care: POC reviewed with pt. Pt verbalizes understanding of POC. No questions at this time.  Safety: bed in lowest position, purposeful rounding, call light in reach, personal items in reach, call for assistance when needed. Pt verbalizes understanding. Will continue to monitor.     0334: called lab to see if PTT had been drawn with other labs, no answer.     0345:  approached this RN asking when to draw his 0330 PTT and 0430 Trop. Informed  to draw at 0400.    0405: lab anoop PTT and Trop.    0515: 0330 PTT drawn at 0400 due to pt having a Trop due at 0430. As of now, PTT has not resulted.     0549: Heparin gtt stopped at the direction of the Night Charge RN.    0553: Got notification on my phone that pt had a critical PTT from 0405. PTT >150.0. Heparin gtt stopped, STAT redraw order placed, and provider notified.    0606: notified overnight provider. Response was to call pharmacy and find out the best time to restart Heparin gtt. Called pharmacy and talked to Sang who stated to restart in 2 hours from stop time (0549).    0609: Tawanna called and stated that she anoop the pt at 0545 and that test has not been received in lab or resulted yet.     0724: Lab called day RN and reported that pts PTT was >150.0. At 0545, the pt was still on heparin and would be elevated. That PTT should be NULL/VOID due to HIGH intensity protocol; was to stop for 2 hours and decrease by 4 ukh. Pt was restarted on Heparin at 0749 at 14 ukh at handoff with the day nurse.      BP (!) 147/80 (BP Location: Right arm, Patient Position: Lying)   Pulse 92   Temp 98.7 °F (37.1 °C) (Oral)   Resp 18   Ht 5' 6" (1.676 m)   Wt 84 kg " (185 lb 3.2 oz)   SpO2 96%   BMI 29.89 kg/m²     Cassi Stack RN  06/17/2025

## 2025-06-17 NOTE — PLAN OF CARE
[facility] - Telemetry (Hospital)  Initial Discharge Assessment       Primary Care Provider: Tai Lopez MD    Admission Diagnosis: Pulmonary embolism [I26.99]  SOB (shortness of breath) [R06.02]  Bilateral pulmonary embolism [I26.99]  Acute respiratory failure with hypoxia [J96.01]  Chest pain [R07.9]  Calf swelling [M79.89]    Admission Date: 6/16/2025  Expected Discharge Date: 6/18/2025    Transition of Care Barriers: None    Payor: BLUE CROSS BLUE SHIELD / Plan: BCBS OF [place] LOCAL PLUS / Product Type: Commercial /     Extended Emergency Contact Information  Primary Emergency Contact: Nena Valdez  Address: [address] [place] [address] [place] [address] [facility]  Home Phone: [phone]  Work Phone: [phone]  Mobile Phone: [phone]  Relation: Spouse    Discharge Plan A: Home  Discharge Plan B: Home      [id number] - [place] - [id number] [facility]  [id number]  [place] [address]  Phone: [phone] Fax: [phone]      Initial Assessment (most recent)       Adult Discharge Assessment - 06/17/25 1441          Discharge Assessment    Assessment Type Discharge Planning Assessment     Confirmed/corrected address, phone number and insurance Yes     Confirmed Demographics Correct on Facesheet     Source of Information patient     People in Home spouse     Name(s) of People in Home Spouse     Facility Arrived From: Home     Do you expect to return to your current living situation? Yes     Do you have help at home or someone to help you manage your care at home? Yes     Who are your caregiver(s) and their phone number(s)? Spouse     Prior to hospitilization cognitive status: Alert/Oriented     Current cognitive status: Alert/Oriented     Walking or Climbing Stairs Difficulty no     Dressing/Bathing Difficulty no     Equipment Currently Used at Home none;walker, rolling     Readmission within 30 days? No     Patient  currently being followed by outpatient case management? No     Do you currently have service(s) that help you manage your care at home? No     Do you take prescription medications? Yes     Do you have prescription coverage? Yes     Do you have any problems affording any of your prescribed medications? No     Is the patient taking medications as prescribed? yes     Who is going to help you get home at discharge? Spouse     How do you get to doctors appointments? family or friend will provide     Are you on dialysis? No     Do you take coumadin? No     Discharge Plan A Home     Discharge Plan B Home     DME Needed Upon Discharge  none     Discharge Plan discussed with: Patient;Spouse/sig other     Transition of Care Barriers None                   Anticipated DC dispo: Home; home with family  Prior Level of Function: Lives at home with spouse, independent with Adls and has assist from spouse; home RW at bedside. Spouse will assist with DC transport home  PCP: MD Jessica      Comments: Pt's whiteboard updated with CM contact and anticipated discharge disposition. SW to remain available as needed.

## 2025-06-17 NOTE — ASSESSMENT & PLAN NOTE
-in the setting of bilateral PE   -Heparin infusion continued   -trending down- 0.194> 0.192> 0.160> 0.133  -Cardiology following

## 2025-06-17 NOTE — HPI
Patient is a 75-year-old male with past medical history significant for seizures, hypertension, hyperlipidemia, anemia, prediabetes, vitamin-D deficiency, colon adenoma, and recent total right hip replacement secondary to arthritis on 5/30/2025 per Dr. Lee who presented to ED for evaluation of shortness of breath upon exertion. He also reports tachycardia. Patient reports that these symptoms first started on Saturday, however it was not noticed by his wife until today and he states that it got significantly worse today. He also reports swelling in legs (worse on left) and urinary frequency. He denies dizziness, lightheadedness, headache, weakness, fever, chills, chest pain, productive cough, abdominal pain, nausea, vomiting, diarrhea, dysuria, flank pain, urgency, decreased urine volume, difficulty urinating, hematuria, calf pain, thigh pain.  On arrival to ED, temp 98.1°, heart rate 133, respirations 22, blood pressure 139/75, 88% SpO2 on room air.  He was placed on 2 L nasal cannula with improvement to 94 to 96% SpO2.  Lab workup showed WBC 8.54, RBC 3.05, hemoglobin 10.4, hematocrit 30.7, platelets 245, D-dimer 22.58, CO2 19, BUN 29, creatinine 1.2, glucose 146, normal LFTs, , troponin 0.194, lactic acid 1.4.  chest x-ray demonstrated lungs are clear.  CTA chest PE study was done which was positive for extensive bilateral pulmonary emboli with right heart strain noted, this was communicated to Dr. Fall in ED who then notified IR on call, Dr Sharif. Ultrasound lower extremities veins bilateral shows bilateral acute DVTs with acute thrombus in right popliteal and peroneal veins as well as acute thrombus in left popliteal and anterior tibial as well as peroneal veins.  EKG showed sinus tachycardia with heart rate 125, nonspecific ST and T-wave abnormalities.  Patient was started on heparin drip.  IR on-call (Dr Sharif) communicated that IR will see patient in a.m. to determine if candidate for  intervention.  Hospital Medicine team was consulted for admission due to bilateral PEs, hypoxia, and bilateral DVTs.

## 2025-06-17 NOTE — SUBJECTIVE & OBJECTIVE
Past Medical History:   Diagnosis Date    Anemia     Arthritis     Epilepsy     Last seizure 2010.     Hyperlipidemia     Hypertension     Prediabetes     Tubular adenoma of colon 04/02/2012    Vitamin D deficiency        Past Surgical History:   Procedure Laterality Date    BLOCK, NERVE, PERIPHERAL Left 8/19/2024    Procedure: Left femoral and obturator nerve block;  Surgeon: Helder Taylor MD;  Location: [facility];  Service: Pain Management;  Laterality: Left;    COLONOSCOPY N/A 07/12/2021    Procedure: COLONOSCOPY;  Surgeon: Subhash Martin MD;  Location: [facility] ENDO;  Service: Endoscopy;  Laterality: N/A;    HIP ARTHROPLASTY Left 1/28/2025    Procedure: ARTHROPLASTY, HIP;  Surgeon: Javier Lee MD;  Location: [facility] OR;  Service: Orthopedics;  Laterality: Left;    HIP ARTHROPLASTY Right 5/30/2025    Procedure: ARTHROPLASTY, HIP;  Surgeon: Javier Lee MD;  Location: [facility] OR;  Service: Orthopedics;  Laterality: Right;    TONSILLECTOMY      VASECTOMY  1984/1985       Review of patient's allergies indicates:  No Known Allergies    No current facility-administered medications on file prior to encounter.     Current Outpatient Medications on File Prior to Encounter   Medication Sig    aspirin (ECOTRIN) 81 MG EC tablet Take 81 mg by mouth 2 (two) times a day.    atorvastatin (LIPITOR) 40 MG tablet Take 1 tablet (40 mg total) by mouth every evening.    gabapentin (NEURONTIN) 300 MG capsule Take 1 capsule (300 mg total) by mouth every evening.    lisinopriL-hydrochlorothiazide (PRINZIDE,ZESTORETIC) 20-25 mg Tab Take 2 tablets by mouth once daily. If BP is below 110/70 - decrease to 1 tablet daily    meloxicam (MOBIC) 15 MG tablet TAKE 1 TABLET(15 MG) BY MOUTH DAILY WITH FOOD (Patient taking differently: Take 15 mg by mouth once daily.)    phenytoin (DILANTIN) 100 MG ER capsule Take 4 capsules (400 mg total) by mouth every evening.    cholecalciferol, vitamin D3, (VITAMIN D3) 25 mcg (1,000 unit)  capsule Take 1 capsule (1,000 Units total) by mouth once daily.     Family History       Problem Relation (Age of Onset)    Cancer Mother, Father    Diabetes Maternal Grandfather    Heart disease Father, Son    Hypertension Father, Other          Tobacco Use    Smoking status: Never    Smokeless tobacco: Never   Substance and Sexual Activity    Alcohol use: No    Drug use: Never    Sexual activity: Not Currently     Partners: Female     Birth control/protection: None     Review of Systems   Constitutional: Positive for malaise/fatigue.   HENT: Negative.     Eyes: Negative.    Cardiovascular:  Positive for dyspnea on exertion and leg swelling.   Respiratory:  Positive for shortness of breath.    Endocrine: Negative.    Hematologic/Lymphatic: Negative.    Skin: Negative.    Musculoskeletal:  Positive for arthritis and joint pain.   Gastrointestinal: Negative.    Genitourinary: Negative.    Neurological: Negative.    Psychiatric/Behavioral: Negative.     Allergic/Immunologic: Negative.      Objective:     Vital Signs (Most Recent):  Temp: 97.8 °F (36.6 °C) (06/17/25 0732)  Pulse: 95 (06/17/25 0732)  Resp: 19 (06/17/25 0732)  BP: 124/70 (06/17/25 0732)  SpO2: 96 % (06/17/25 0732) Vital Signs (24h Range):  Temp:  [97.8 °F (36.6 °C)-99 °F (37.2 °C)] 97.8 °F (36.6 °C)  Pulse:  [] 95  Resp:  [15-22] 19  SpO2:  [88 %-96 %] 96 %  BP: (105-147)/(62-80) 124/70     Weight: 85.5 kg (188 lb 7.9 oz)  Body mass index is 30.42 kg/m².    SpO2: 96 %         Intake/Output Summary (Last 24 hours) at 6/17/2025 0816  Last data filed at 6/17/2025 0553  Gross per 24 hour   Intake --   Output 390 ml   Net -390 ml       Lines/Drains/Airways       Peripheral Intravenous Line  Duration                  Peripheral IV - Single Lumen 06/16/25 2032 18 G Anterior;Right Forearm <1 day         Peripheral IV - Single Lumen 06/16/25 2032 20 G Left Antecubital <1 day         Peripheral IV - Single Lumen 06/16/25 2033 20 G Anterior;Left Forearm <1  "day                     Physical Exam  Vitals and nursing note reviewed.   Constitutional:       Appearance: Normal appearance. He is obese.      Comments: On supplemental O2   HENT:      Head: Normocephalic and atraumatic.   Eyes:      Pupils: Pupils are equal, round, and reactive to light.   Cardiovascular:      Rate and Rhythm: Tachycardia present.      Heart sounds: S1 normal and S2 normal. No murmur heard.  Pulmonary:      Effort: Pulmonary effort is normal.   Musculoskeletal:      Right lower leg: Edema present.      Left lower leg: Edema present.      Comments: LLE > RLE   Skin:     General: Skin is warm and dry.   Neurological:      General: No focal deficit present.      Mental Status: He is oriented to person, place, and time.   Psychiatric:         Mood and Affect: Mood normal.         Behavior: Behavior normal.         Thought Content: Thought content normal.       TTE pending to assess EF/RV function     Significant Labs: CMP   Recent Labs   Lab 06/16/25 1919 06/17/25  0405    140   K 3.6 4.2    109   CO2 19* 21*   * 132*   BUN 29* 24*   CREATININE 1.2 1.1   CALCIUM 9.6 8.7   PROT 7.8 6.3   ALBUMIN 3.6 3.0*   BILITOT 0.2 0.3   ALKPHOS 149 122   AST 28 20   ALT 26 20   ANIONGAP 14 10   , CBC   Recent Labs   Lab 06/16/25 1919 06/17/25  0405   WBC 8.54 7.40   HGB 10.4* 8.7*   HCT 30.7* 26.0*    216   , Troponin No results for input(s): "TROPONINIHS" in the last 48 hours., and All pertinent lab results from the last 24 hours have been reviewed.    Significant Imaging: Echocardiogram: Transthoracic echo (TTE) complete (Cupid Only):    and EKG: Reviewed  "

## 2025-06-17 NOTE — PROGRESS NOTES
[facility] Medicine  Progress Note    Patient Name: Conner Lombardi Jr.  MRN: [id number]  Patient Class: IP- Inpatient   Admission Date: 6/16/2025  Length of Stay: 1 days  Attending Physician: Janel Wheeler MD  Primary Care Provider: Tia Lopez MD        Subjective     Principal Problem:Bilateral pulmonary embolism      HPI:    Patient is a 75-year-old male with past medical history significant for seizures, hypertension, hyperlipidemia, anemia, prediabetes, vitamin-D deficiency, colon adenoma, and recent total right hip replacement secondary to arthritis on 5/30/2025 per Dr. Lee who presented to ED for evaluation of shortness of breath upon exertion. He also reports tachycardia. Patient reports that these symptoms first started on Saturday, however it was not noticed by his wife until today and he states that it got significantly worse today. He also reports swelling in legs (worse on left) and urinary frequency. He denies dizziness, lightheadedness, headache, weakness, fever, chills, chest pain, productive cough, abdominal pain, nausea, vomiting, diarrhea, dysuria, flank pain, urgency, decreased urine volume, difficulty urinating, hematuria, calf pain, thigh pain.  On arrival to ED, temp 98.1°, heart rate 133, respirations 22, blood pressure 139/75, 88% SpO2 on room air.  He was placed on 2 L nasal cannula with improvement to 94 to 96% SpO2.  Lab workup showed WBC 8.54, RBC 3.05, hemoglobin 10.4, hematocrit 30.7, platelets 245, D-dimer 22.58, CO2 19, BUN 29, creatinine 1.2, glucose 146, normal LFTs, , troponin 0.194, lactic acid 1.4.  chest x-ray demonstrated lungs are clear.  CTA chest PE study was done which was positive for extensive bilateral pulmonary emboli with right heart strain noted, this was communicated to Dr. Fall in ED who then notified IR on call, Dr Sharif. Ultrasound lower extremities veins bilateral shows bilateral acute DVTs with acute  thrombus in right popliteal and peroneal veins as well as acute thrombus in left popliteal and anterior tibial as well as peroneal veins.  EKG showed sinus tachycardia with heart rate 125, nonspecific ST and T-wave abnormalities.  Patient was started on heparin drip.  IR on-call (Dr Sharif) communicated that IR will see patient in a.m. to determine if candidate for intervention.  Hospital Medicine team was consulted for admission due to bilateral PEs, hypoxia, and bilateral DVTs.    Overview/Hospital Course:  Patient admitted to Hospital Medicine for Bilateral Pulmonary embolism. D-dimer elevated and CTA positive for extensive bilateral pulmonary emboli with right heart strain. Troponin elevated with downward trend. IR consulted for intervention. Echo showed septal motion is abnormal. Septal flattening in diastole and systole consistent with right ventricular volume and pressure overload. EF of 55 - 60%. There is diastolic dysfunction. Right Ventricle: The right ventricle is severely dilated Wall thickness is normal. Right ventricle wall motion has Benitez's sign. Systolic function is severely reduced. Right Atrium: The right atrium is dilated. Aortic Valve: There is mild aortic valve sclerosis. There is mild annular calcification present. There is moderate aortic regurgitation. Mitral Valve: There is mild regurgitation.Tricuspid Valve: There is mild to moderate regurgitation. PASP is 40 mmHg. IVC/SVC: Elevated venous pressure at 15 mmHg. Cardiology consulted. Heparin infusion initiated. US of BLE showed bilateral acute DVT.  Intervention vs conservative treatment to be reviewed. Pulmonology aware with consult to be placed pending need for surgical intervention.     Interval History: pt in bed upon evaluation with no acute symptoms reported. Heparin infusion continued. IR and cardiology following. Echo results reviewed. Surgical intervention vs conservative management to be discussed.     Review of Systems    Constitutional:  Positive for activity change and fatigue.   Respiratory:  Positive for shortness of breath.    Cardiovascular:  Positive for leg swelling.   Gastrointestinal: Negative.    Genitourinary: Negative.    Musculoskeletal: Negative.    Skin: Negative.    Neurological: Negative.    Psychiatric/Behavioral: Negative.       Objective:     Vital Signs (Most Recent):  Temp: 97.9 °F (36.6 °C) (06/17/25 1121)  Pulse: 97 (06/17/25 1121)  Resp: 19 (06/17/25 1121)  BP: 118/68 (06/17/25 1121)  SpO2: 98 % (06/17/25 1121) Vital Signs (24h Range):  Temp:  [97.8 °F (36.6 °C)-99 °F (37.2 °C)] 97.9 °F (36.6 °C)  Pulse:  [] 97  Resp:  [15-22] 19  SpO2:  [88 %-98 %] 98 %  BP: (105-147)/(62-80) 118/68     Weight: 85.3 kg (188 lb)  Body mass index is 30.34 kg/m².    Intake/Output Summary (Last 24 hours) at 6/17/2025 1325  Last data filed at 6/17/2025 0553  Gross per 24 hour   Intake --   Output 390 ml   Net -390 ml         Physical Exam  HENT:      Mouth/Throat:      Pharynx: Oropharynx is clear.   Cardiovascular:      Rate and Rhythm: Normal rate and regular rhythm.      Pulses: Normal pulses.      Heart sounds: Normal heart sounds.   Pulmonary:      Effort: Pulmonary effort is normal.      Breath sounds: Normal breath sounds.   Abdominal:      General: Bowel sounds are normal. There is no distension.      Palpations: Abdomen is soft.      Tenderness: There is no abdominal tenderness.   Musculoskeletal:      Left lower leg: Edema present.      Comments: Edema to L calf    Skin:     General: Skin is warm and dry.   Neurological:      Mental Status: He is alert and oriented to person, place, and time.   Psychiatric:         Mood and Affect: Mood normal.               Significant Labs: All pertinent labs within the past 24 hours have been reviewed.    Significant Imaging: I have reviewed all pertinent imaging results/findings within the past 24 hours.      Assessment & Plan  Bilateral pulmonary embolism  CTA shows  ""Positive for extensive bilateral pulmonary emboli with right heart strain" per Dr. Raman Molina (radiology interpretation)  -- Heparin drip, per pharmacy management  --Current oxygen requirement 2L/min NC, he reports mainly short of breath only with exertion/ambulation  --Initially NPO- then cardiac diet ordered   -- Bedrest for now  --IR was consulted -- Dr. Jatinder Sharif aware-troponin trended- Echo obtained and Heparin infusion continued- Surgical intervention vs conservative management to be discussed   -- trending troponin levels- downward trend   -- Echo showed septal motion is abnormal. Septal flattening in diastole and systole consistent with right ventricular volume and pressure overload. EF of 55 - 60%. There is diastolic dysfunction. Right Ventricle: The right ventricle is severely dilated Wall thickness is normal. Right ventricle wall motion has Benitez's sign. Systolic function is severely reduced. Right Atrium: The right atrium is dilated. Aortic Valve: There is mild aortic valve sclerosis. There is mild annular calcification present. There is moderate aortic regurgitation. Mitral Valve: There is mild regurgitation.Tricuspid Valve: There is mild to moderate regurgitation. PASP is 40 mmHg. IVC/SVC: Elevated venous pressure at 15 mmHg.   -Cardiology following   -Pulmonology aware- will formally consult if surgical intervention warranteed     Seizures  Continue home regimen antiepileptics -- Dilantin  mg nightly  Seizure precautions    Acute deep vein thrombosis (DVT) of both lower extremities  --Acute thrombus within the right popliteal and peroneal veins.  There is acute thrombus in the left popliteal and anterior tibial and peroneal veins   --Will not use SCD  --Heparin drip infusing   -- IR consult to determine if candidate for intervention  -pt to ambulate with surgical intervention vs conservative management to be determined    Essential hypertension  Patient's blood pressure range in the " last 24 hours was: BP  Min: 105/65  Max: 147/80.The patient's inpatient anti-hypertensive regimen is listed below:  Current Antihypertensives   On hold for now, BP controlled    Plan  - BP is controlled, no changes needed to their regimen  - will resume home med when appropriate  Mixed hyperlipidemia  Continue statin    Anemia  Anemia is likely due to acute blood loss which was from recent surgery. Most recent hemoglobin and hematocrit are listed below.  Recent Labs     06/16/25  1919 06/17/25  0405   HGB 10.4* 8.7*   HCT 30.7* 26.0*     Plan  - Monitor serial CBC: Daily  - Transfuse PRBC if patient becomes hemodynamically unstable, symptomatic or H/H drops below 7/21.    Status post right hip replacement  Patient had total right hip arthroplasty on 05/30/2025 per Dr. Lee  Monitor incision  He reports minimal pain and has not required narcotic pain medication for past couple of days  Would consider consult or communication with Dr. Lee per day team to inform him of his patient's post-op complications    Elevated troponin  -in the setting of bilateral PE   -Heparin infusion continued   -trending down- 0.194> 0.192> 0.160> 0.133  -Cardiology following     VTE Risk Mitigation (From admission, onward)           Ordered     Reason for No Pharmacological VTE Prophylaxis  Once        Comments: Heparin drip is ordered   Question:  Reasons:  Answer:  Physician Provided (leave comment)    06/16/25 2242     Reason for no Mechanical VTE Prophylaxis  Once        Comments: Current DVT per US tech at bedside   Question:  Reasons:  Answer:  Physician Provided (leave comment)    06/16/25 2242     IP VTE HIGH RISK PATIENT  Once         06/16/25 2242     heparin 25,000 units in dextrose 5% (100 units/ml) IV bolus from bag HIGH INTENSITY nomogram - [facility]  As needed (PRN)        Question:  Heparin Infusion Adjustment (DO NOT MODIFY ANSWER)  Answer:  [email or web address]\Images\Pharmacy\HeparinInfusions\heparin HIGH INTENSITY  nomogram for [facility] [id number]    06/16/25 2109     heparin 25,000 units in dextrose 5% (100 units/ml) IV bolus from bag HIGH INTENSITY nomogram - [facility]  As needed (PRN)        Question:  Heparin Infusion Adjustment (DO NOT MODIFY ANSWER)  Answer:  [email or web address]\Images\Pharmacy\HeparinInfusions\heparin HIGH INTENSITY nomogram for [facility] [id number]    06/16/25 2109     heparin 25,000 units in dextrose 5% 250 mL (100 units/mL) infusion HIGH INTENSITY nomogram - [facility]  Continuous        Question:  Begin at (units/kg/hr)  Answer:  18 06/16/25 2109                    Discharge Planning   ARABELLA: 6/18/2025     Code Status: Full Code   Medical Readiness for Discharge Date:                            Cynthia Lara NP  Department of Hospital Medicine   [facility] - Telemetry ([facility])

## 2025-06-17 NOTE — ASSESSMENT & PLAN NOTE
--Acute thrombus within the right popliteal and peroneal veins.  There is acute thrombus in the left popliteal and anterior tibial and peroneal veins   --Will not use SCD  --Heparin drip ordered  -- IR consult to determine if candidate for intervention

## 2025-06-17 NOTE — ASSESSMENT & PLAN NOTE
"CTA shows "Positive for extensive bilateral pulmonary emboli with right heart strain" per Dr. Raman Molina (radiology interpretation)  -- Heparin drip, per pharmacy management  --Current oxygen requirement 2L/min NC, he reports mainly short of breath only with exertion/ambulation  --Will keep NPO x medications  -- Bedrest for now  --IR was consulted -- per Dr. Jatinder Sharif secure chat message -- "Agree the plan of admit and heparin. Not a ton of clot. Prob has baseline right heart dysfunction. Would trend troponins and get echo in am. We can decide in am if we need to do anything."  -- trending troponin levels  -- Echo ordered    "

## 2025-06-17 NOTE — HOSPITAL COURSE
Patient admitted to Hospital Medicine for Bilateral Pulmonary embolism. D-dimer elevated and CTA positive for extensive bilateral pulmonary emboli with right heart strain. Troponin elevated with downward trend. IR consulted for intervention. Echo showed septal motion is abnormal. Septal flattening in diastole and systole consistent with right ventricular volume and pressure overload. EF of 55 - 60%. There is diastolic dysfunction. Right Ventricle: The right ventricle is severely dilated Wall thickness is normal. Right ventricle wall motion has Benitez's sign. Systolic function is severely reduced. Right Atrium: The right atrium is dilated. Aortic Valve: There is mild aortic valve sclerosis. There is mild annular calcification present. There is moderate aortic regurgitation. Mitral Valve: There is mild regurgitation.Tricuspid Valve: There is mild to moderate regurgitation. PASP is 40 mmHg. IVC/SVC: Elevated venous pressure at 15 mmHg. Cardiology consulted. Heparin infusion initiated. US of BLE showed bilateral acute DVT.  Intervention vs conservative treatment to be reviewed. Pulmonology aware with consult to be placed pending need for surgical intervention. On 6/18/2025, pt remains stable on 2L NC with no intervention to be performed at this time per IR. Heparin infusion continued- will transition to oral dosing tomorrow if appropriate. Cardiology following. On 6/19/2025, pt verbalized symptom improvement with no signs of acute distress on 2L NC- will wean supplemental oxygen as tolerated. Heparin infusion transitioned to Coumadin with Lovenox bridge. Pharmacy consulted. CM to assist with discharge planning and appointment with Coumadin clinic established. Patient instructed on Lovenox administration per nurse. Ambulatory referral for home oxygen obtained.  On 6/20/2025, patient seen and examined and deemed medically stable for discharge to home.  Patient evaluated for home oxygen with none required per results.   Home health orders placed prior to discharge. Vital signs stable with no signs of acute distress witnessed or reported.  No seizure activity witnessed or reported.  Medications reconciled with Lovenox (therapeutic dose) twice daily and Coumadin prescribed.  Lisinopril/HCTZ paused.  Patient instructed to follow up with PCP, Cardiology, and outpatient Coumadin Clinic with ambulatory referral placed prior to discharge per .

## 2025-06-17 NOTE — SUBJECTIVE & OBJECTIVE
Interval History: pt in bed upon evaluation with no acute symptoms reported. Heparin infusion continued. IR and cardiology following. Echo results reviewed. Surgical intervention vs conservative management to be discussed.     Review of Systems   Constitutional:  Positive for activity change and fatigue.   Respiratory:  Positive for shortness of breath.    Cardiovascular:  Positive for leg swelling.   Gastrointestinal: Negative.    Genitourinary: Negative.    Musculoskeletal: Negative.    Skin: Negative.    Neurological: Negative.    Psychiatric/Behavioral: Negative.       Objective:     Vital Signs (Most Recent):  Temp: 97.9 °F (36.6 °C) (06/17/25 1121)  Pulse: 97 (06/17/25 1121)  Resp: 19 (06/17/25 1121)  BP: 118/68 (06/17/25 1121)  SpO2: 98 % (06/17/25 1121) Vital Signs (24h Range):  Temp:  [97.8 °F (36.6 °C)-99 °F (37.2 °C)] 97.9 °F (36.6 °C)  Pulse:  [] 97  Resp:  [15-22] 19  SpO2:  [88 %-98 %] 98 %  BP: (105-147)/(62-80) 118/68     Weight: 85.3 kg (188 lb)  Body mass index is 30.34 kg/m².    Intake/Output Summary (Last 24 hours) at 6/17/2025 1325  Last data filed at 6/17/2025 0553  Gross per 24 hour   Intake --   Output 390 ml   Net -390 ml         Physical Exam  HENT:      Mouth/Throat:      Pharynx: Oropharynx is clear.   Cardiovascular:      Rate and Rhythm: Normal rate and regular rhythm.      Pulses: Normal pulses.      Heart sounds: Normal heart sounds.   Pulmonary:      Effort: Pulmonary effort is normal.      Breath sounds: Normal breath sounds.   Abdominal:      General: Bowel sounds are normal. There is no distension.      Palpations: Abdomen is soft.      Tenderness: There is no abdominal tenderness.   Musculoskeletal:      Left lower leg: Edema present.      Comments: Edema to L calf    Skin:     General: Skin is warm and dry.   Neurological:      Mental Status: He is alert and oriented to person, place, and time.   Psychiatric:         Mood and Affect: Mood normal.               Significant  Labs: All pertinent labs within the past 24 hours have been reviewed.    Significant Imaging: I have reviewed all pertinent imaging results/findings within the past 24 hours.

## 2025-06-17 NOTE — ASSESSMENT & PLAN NOTE
Patient had total right hip arthroplasty on 05/30/2025 per Dr. Lee  Monitor incision  He reports minimal pain and has not required narcotic pain medication for past couple of days  Would consider consult or communication with Dr. Lee per day team to inform him of his patient's post-op complications

## 2025-06-17 NOTE — PHARMACY MED REC
"Admission Medication History     The home medication history was taken by Phyllis John.    You may go to "Admission" then "Reconcile Home Medications" tabs to review and/or act upon these items.     The home medication list has been updated by the Pharmacy department.   Please read ALL comments highlighted in yellow.   Please address this information as you see fit.    Feel free to contact us if you have any questions or require assistance.      The medications listed below were removed from the home medication list. Please reorder if appropriate:  Patient reports no longer taking the following medication(s):  Krill oil  Zofran 4 mg  Percocet  mg    Medications listed below were obtained from: Patient/family and Analytic software- [employer]      LAST MED REC COMPLETED:     Phyllis John  [id number]    Current Outpatient Medications on File Prior to Encounter   Medication Sig Dispense Refill Last Dose/Taking    aspirin (ECOTRIN) 81 MG EC tablet Take 81 mg by mouth 2 (two) times a day.   6/15/2025    atorvastatin (LIPITOR) 40 MG tablet Take 1 tablet (40 mg total) by mouth every evening. 90 tablet 1 6/15/2025    gabapentin (NEURONTIN) 300 MG capsule Take 1 capsule (300 mg total) by mouth every evening. 30 capsule 0 6/15/2025    lisinopriL-hydrochlorothiazide (PRINZIDE,ZESTORETIC) 20-25 mg Tab Take 2 tablets by mouth once daily. If BP is below 110/70 - decrease to 1 tablet daily 180 tablet 1 6/16/2025    meloxicam (MOBIC) 15 MG tablet TAKE 1 TABLET(15 MG) BY MOUTH DAILY WITH FOOD (Patient taking differently: Take 15 mg by mouth once daily.) 30 tablet 3 6/15/2025    phenytoin (DILANTIN) 100 MG ER capsule Take 4 capsules (400 mg total) by mouth every evening. 360 capsule 3 6/15/2025    cholecalciferol, vitamin D3, (VITAMIN D3) 25 mcg (1,000 unit) capsule Take 1 capsule (1,000 Units total) by mouth once daily.  0 Unknown                           .          "

## 2025-06-17 NOTE — SUBJECTIVE & OBJECTIVE
Past Medical History:   Diagnosis Date    Anemia     Arthritis     Epilepsy     Last seizure 2010.     Hyperlipidemia     Hypertension     Prediabetes     Tubular adenoma of colon 04/02/2012    Vitamin D deficiency        Past Surgical History:   Procedure Laterality Date    BLOCK, NERVE, PERIPHERAL Left 8/19/2024    Procedure: Left femoral and obturator nerve block;  Surgeon: Helder Taylor MD;  Location: [facility];  Service: Pain Management;  Laterality: Left;    COLONOSCOPY N/A 07/12/2021    Procedure: COLONOSCOPY;  Surgeon: Subhash Martin MD;  Location: [facility] ENDO;  Service: Endoscopy;  Laterality: N/A;    HIP ARTHROPLASTY Left 1/28/2025    Procedure: ARTHROPLASTY, HIP;  Surgeon: Javier Lee MD;  Location: [facility] OR;  Service: Orthopedics;  Laterality: Left;    HIP ARTHROPLASTY Right 5/30/2025    Procedure: ARTHROPLASTY, HIP;  Surgeon: Javier Lee MD;  Location: [facility] OR;  Service: Orthopedics;  Laterality: Right;    TONSILLECTOMY      VASECTOMY  1984/1985       Review of patient's allergies indicates:  No Known Allergies    No current facility-administered medications on file prior to encounter.     Current Outpatient Medications on File Prior to Encounter   Medication Sig    aspirin (ECOTRIN) 81 MG EC tablet Take 81 mg by mouth 2 (two) times a day.    atorvastatin (LIPITOR) 40 MG tablet Take 1 tablet (40 mg total) by mouth every evening.    gabapentin (NEURONTIN) 300 MG capsule Take 1 capsule (300 mg total) by mouth every evening.    lisinopriL-hydrochlorothiazide (PRINZIDE,ZESTORETIC) 20-25 mg Tab Take 2 tablets by mouth once daily. If BP is below 110/70 - decrease to 1 tablet daily    meloxicam (MOBIC) 15 MG tablet TAKE 1 TABLET(15 MG) BY MOUTH DAILY WITH FOOD (Patient taking differently: Take 15 mg by mouth once daily.)    phenytoin (DILANTIN) 100 MG ER capsule Take 4 capsules (400 mg total) by mouth every evening.    cholecalciferol, vitamin D3, (VITAMIN D3) 25 mcg (1,000 unit)  capsule Take 1 capsule (1,000 Units total) by mouth once daily.    [DISCONTINUED] KRILL OIL ORAL Take by mouth.    [DISCONTINUED] ondansetron (ZOFRAN) 4 MG tablet Take 1 tablet (4 mg total) by mouth every 6 (six) hours as needed for Nausea.    [DISCONTINUED] oxyCODONE-acetaminophen (PERCOCET)  mg per tablet Take 1 tablet by mouth every 6 (six) hours as needed for Pain. (Patient not taking: Reported on 6/12/2025)     Family History       Problem Relation (Age of Onset)    Cancer Mother, Father    Diabetes Maternal Grandfather    Heart disease Father, Son    Hypertension Father, Other          Tobacco Use    Smoking status: Never    Smokeless tobacco: Never   Substance and Sexual Activity    Alcohol use: No    Drug use: Never    Sexual activity: Not Currently     Partners: Female     Birth control/protection: None     Review of Systems   Constitutional: Negative.  Negative for chills, fatigue and fever.   HENT: Negative.     Eyes: Negative.    Respiratory:  Positive for shortness of breath. Negative for cough, chest tightness and wheezing.         SOB with exertion/ambulation   Cardiovascular:  Positive for leg swelling. Negative for chest pain.        Tachycardia (when monitoring heart rate)  Bilateral lower extremity swelling  Denies calf/thigh pain     Gastrointestinal: Negative.  Negative for abdominal distention, abdominal pain, blood in stool, constipation, diarrhea, nausea and vomiting.   Endocrine: Negative.    Genitourinary:  Positive for frequency. Negative for decreased urine volume, difficulty urinating, dysuria, flank pain, hematuria and urgency.   Musculoskeletal:  Positive for arthralgias. Negative for myalgias.   Skin: Negative.    Allergic/Immunologic: Negative.    Neurological: Negative.  Negative for dizziness, seizures, syncope, facial asymmetry, speech difficulty, weakness, light-headedness and headaches.   Hematological: Negative.    Psychiatric/Behavioral: Negative.     All other systems  reviewed and are negative.    Objective:     Vital Signs (Most Recent):  Temp: 99 °F (37.2 °C) (06/16/25 2300)  Pulse: 109 (06/16/25 2300)  Resp: 18 (06/16/25 2300)  BP: (!) 142/79 (06/16/25 2300)  SpO2: (!) 94 % (06/16/25 2300) Vital Signs (24h Range):  Temp:  [98.1 °F (36.7 °C)-99 °F (37.2 °C)] 99 °F (37.2 °C)  Pulse:  [106-133] 109  Resp:  [15-22] 18  SpO2:  [88 %-96 %] 94 %  BP: (105-142)/(62-79) 142/79     Weight: 84 kg (185 lb 3.2 oz)  Body mass index is 29.89 kg/m².     Physical Exam  Vitals reviewed.   Constitutional:       General: He is not in acute distress.     Appearance: He is not toxic-appearing or diaphoretic.   HENT:      Head: Normocephalic and atraumatic.      Nose: Nose normal.      Mouth/Throat:      Mouth: Mucous membranes are moist.      Pharynx: Oropharynx is clear.   Eyes:      Extraocular Movements: Extraocular movements intact.      Pupils: Pupils are equal, round, and reactive to light.   Cardiovascular:      Rate and Rhythm: Regular rhythm. Tachycardia present.      Pulses: Normal pulses.      Heart sounds: Normal heart sounds.   Pulmonary:      Effort: Pulmonary effort is normal. No respiratory distress.      Breath sounds: Normal breath sounds. No stridor. No wheezing, rhonchi or rales.      Comments: (While at rest)  Chest:      Chest wall: No tenderness.   Abdominal:      General: Bowel sounds are normal. There is no distension.      Palpations: Abdomen is soft.      Tenderness: There is no abdominal tenderness.   Genitourinary:     Comments: deferred  Musculoskeletal:         General: Swelling present. No tenderness. Normal range of motion.      Cervical back: Neck supple.      Right lower leg: Edema present.      Left lower leg: Edema present.      Comments: Swelling is greater in left lower extremity, but mild edema in right lower extremity as well   Skin:     General: Skin is warm and dry.      Capillary Refill: Capillary refill takes less than 2 seconds.      Comments: Healing  incision -- right hip -- no drainage noted   Neurological:      General: No focal deficit present.      Mental Status: He is alert and oriented to person, place, and time.      Motor: No weakness.   Psychiatric:         Mood and Affect: Mood normal.         Behavior: Behavior normal.         Thought Content: Thought content normal.              CRANIAL NERVES     CN III, IV, VI   Pupils are equal, round, and reactive to light.       Significant Labs: All pertinent labs within the past 24 hours have been reviewed.  CBC:   Recent Labs   Lab 06/16/25 1919   WBC 8.54   HGB 10.4*   HCT 30.7*        CMP:   Recent Labs   Lab 06/16/25 1919      K 3.6      CO2 19*   *   BUN 29*   CREATININE 1.2   CALCIUM 9.6   PROT 7.8   ALBUMIN 3.6   BILITOT 0.2   ALKPHOS 149   AST 28   ALT 26   ANIONGAP 14     Lactic Acid:   Recent Labs   Lab 06/16/25  1936   LACTATE 1.4     Troponin:   Recent Labs   Lab 06/16/25 1919 06/16/25  2236   TROPONINI 0.194* 0.192*         D- Dimer 22.58    EKG -- ST, nonspecific ST and T-wave abnormality, heart rate 125,     Significant Imaging: I have reviewed all pertinent imaging results/findings within the past 24 hours.    CXR -- Lungs clear, no acute chest findings    CTA chest PE studies -- positive for extensive bilateral pulmonary emboli with right heart strain    US lower extremities-- bilateral acute DVT (Acute thrombus within the right popliteal and peroneal veins.  There is acute thrombus in the left popliteal and anterior tibial and peroneal veins)

## 2025-06-18 LAB
ABSOLUTE EOSINOPHIL (OHS): 0.61 K/UL
ABSOLUTE MONOCYTE (OHS): 0.61 K/UL (ref 0.3–1)
ABSOLUTE NEUTROPHIL COUNT (OHS): 3.55 K/UL (ref 1.8–7.7)
ALBUMIN SERPL BCP-MCNC: 3.1 G/DL (ref 3.5–5.2)
ALP SERPL-CCNC: 133 UNIT/L (ref 40–150)
ALT SERPL W/O P-5'-P-CCNC: 22 UNIT/L (ref 10–44)
ANION GAP (OHS): 9 MMOL/L (ref 8–16)
APTT PPP: 50.1 SECONDS (ref 21–32)
APTT PPP: 50.4 SECONDS (ref 21–32)
AST SERPL-CCNC: 24 UNIT/L (ref 11–45)
BASOPHILS # BLD AUTO: 0.04 K/UL
BASOPHILS NFR BLD AUTO: 0.6 %
BILIRUB SERPL-MCNC: 0.3 MG/DL (ref 0.1–1)
BUN SERPL-MCNC: 23 MG/DL (ref 8–23)
CALCIUM SERPL-MCNC: 8.9 MG/DL (ref 8.7–10.5)
CHLORIDE SERPL-SCNC: 108 MMOL/L (ref 95–110)
CO2 SERPL-SCNC: 24 MMOL/L (ref 23–29)
CREAT SERPL-MCNC: 1.1 MG/DL (ref 0.5–1.4)
ERYTHROCYTE [DISTWIDTH] IN BLOOD BY AUTOMATED COUNT: 13.7 % (ref 11.5–14.5)
GFR SERPLBLD CREATININE-BSD FMLA CKD-EPI: >60 ML/MIN/1.73/M2
GLUCOSE SERPL-MCNC: 118 MG/DL (ref 70–110)
HCT VFR BLD AUTO: 27.6 % (ref 40–54)
HGB BLD-MCNC: 9.1 GM/DL (ref 14–18)
IMM GRANULOCYTES # BLD AUTO: 0.03 K/UL (ref 0–0.04)
IMM GRANULOCYTES NFR BLD AUTO: 0.5 % (ref 0–0.5)
LYMPHOCYTES # BLD AUTO: 1.82 K/UL (ref 1–4.8)
MCH RBC QN AUTO: 33.5 PG (ref 27–31)
MCHC RBC AUTO-ENTMCNC: 33 G/DL (ref 32–36)
MCV RBC AUTO: 102 FL (ref 82–98)
NUCLEATED RBC (/100WBC) (OHS): 0 /100 WBC
OHS QRS DURATION: 94 MS
OHS QTC CALCULATION: 519 MS
PLATELET # BLD AUTO: 215 K/UL (ref 150–450)
PMV BLD AUTO: 9.4 FL (ref 9.2–12.9)
POTASSIUM SERPL-SCNC: 4.2 MMOL/L (ref 3.5–5.1)
PROT SERPL-MCNC: 6.8 GM/DL (ref 6–8.4)
RBC # BLD AUTO: 2.72 M/UL (ref 4.6–6.2)
RELATIVE EOSINOPHIL (OHS): 9.2 %
RELATIVE LYMPHOCYTE (OHS): 27.3 % (ref 18–48)
RELATIVE MONOCYTE (OHS): 9.2 % (ref 4–15)
RELATIVE NEUTROPHIL (OHS): 53.2 % (ref 38–73)
SODIUM SERPL-SCNC: 141 MMOL/L (ref 136–145)
WBC # BLD AUTO: 6.66 K/UL (ref 3.9–12.7)

## 2025-06-18 PROCEDURE — 36415 COLL VENOUS BLD VENIPUNCTURE: CPT | Performed by: NURSE PRACTITIONER

## 2025-06-18 PROCEDURE — 94761 N-INVAS EAR/PLS OXIMETRY MLT: CPT

## 2025-06-18 PROCEDURE — 80053 COMPREHEN METABOLIC PANEL: CPT | Performed by: NURSE PRACTITIONER

## 2025-06-18 PROCEDURE — 85730 THROMBOPLASTIN TIME PARTIAL: CPT | Performed by: STUDENT IN AN ORGANIZED HEALTH CARE EDUCATION/TRAINING PROGRAM

## 2025-06-18 PROCEDURE — 27000221 HC OXYGEN, UP TO 24 HOURS

## 2025-06-18 PROCEDURE — 99900035 HC TECH TIME PER 15 MIN (STAT)

## 2025-06-18 PROCEDURE — 63600175 PHARM REV CODE 636 W HCPCS: Performed by: EMERGENCY MEDICINE

## 2025-06-18 PROCEDURE — 99233 SBSQ HOSP IP/OBS HIGH 50: CPT | Mod: ,,, | Performed by: INTERNAL MEDICINE

## 2025-06-18 PROCEDURE — 94799 UNLISTED PULMONARY SVC/PX: CPT

## 2025-06-18 PROCEDURE — 21400001 HC TELEMETRY ROOM

## 2025-06-18 PROCEDURE — 85025 COMPLETE CBC W/AUTO DIFF WBC: CPT | Performed by: EMERGENCY MEDICINE

## 2025-06-18 PROCEDURE — 36415 COLL VENOUS BLD VENIPUNCTURE: CPT | Performed by: STUDENT IN AN ORGANIZED HEALTH CARE EDUCATION/TRAINING PROGRAM

## 2025-06-18 PROCEDURE — 25000003 PHARM REV CODE 250: Performed by: NURSE PRACTITIONER

## 2025-06-18 RX ADMIN — HEPARIN SODIUM 11 UNITS/KG/HR: 10000 INJECTION, SOLUTION INTRAVENOUS at 11:06

## 2025-06-18 RX ADMIN — ATORVASTATIN CALCIUM 40 MG: 40 TABLET, FILM COATED ORAL at 09:06

## 2025-06-18 RX ADMIN — GABAPENTIN 300 MG: 300 CAPSULE ORAL at 09:06

## 2025-06-18 RX ADMIN — CHOLECALCIFEROL TAB 25 MCG (1000 UNIT) 1000 UNITS: 25 TAB at 08:06

## 2025-06-18 RX ADMIN — EXTENDED PHENYTOIN SODIUM 400 MG: 100 CAPSULE, EXTENDED RELEASE ORAL at 09:06

## 2025-06-18 NOTE — ASSESSMENT & PLAN NOTE
-Noted on CTA   -TTE pending  -Continue heparin gtt, will need NOAC upon d/c  -IR on board for possible intervention    6/18/2025  -Continue heparin gtt  -IR on board, no intervention planned at this juncture

## 2025-06-18 NOTE — ASSESSMENT & PLAN NOTE
--Acute thrombus within the right popliteal and peroneal veins.  There is acute thrombus in the left popliteal and anterior tibial and peroneal veins   --Will not use SCD  --Heparin drip infusing -will transition to oral dosing appropriate   -- IR consult to determine if candidate for intervention- no surgical intervention at this time per IR  --pt able to ambulate with O2 sat of 87% on RA and stable on 2L NC

## 2025-06-18 NOTE — PROGRESS NOTES
[facility] - Tele[facility] ([facility])  Cardiology  Progress Note    Patient Name: Conner Lombardi Jr.  MRN: [id number]  Admission Date: 6/16/2025  Hospital Length of Stay: 2 days  Code Status: Full Code   Attending Physician: Zeke Sheridan DO   Primary Care Physician: Tia Lopez MD  Expected Discharge Date: 6/19/2025  Principal Problem:Bilateral pulmonary embolism    Subjective:   HPI:  Mr. Lombardi is a 75 year old male patient whose current medical conditions include seizures, HTN, hyperlipidemia, anemia, colon adenoma, pre-DM, and recent TRH arthroplasty on 5/30/2025 who presented to [facility] ED overnight due to increased SOB/[place] that initially began over the weekend. Other associated symptoms included BLE edema (L > R). He denied any associated fever, chills, LH, dizziness, palpitations, near syncope, or syncope. Initial workup in ED revealed hypoxia and tachycardia. Labs reviewed BNP of 172, troponin of 0.194, and D-dimer of 22. CTA of chest positive for bilateral PE with right heart strain. BLE venous U/S showed bilateral acute DVT's with acute thrombus in right popliteal and right peroneal veins as well as acute thrombus in the left popliteal and anterior tibial as well as peroneal veins. Patient initiated on heparin drip and admitted for further evaluation and treatment. Cardiology consulted to assist with management. Patient seen and examined today, resting in bed. Feels ok. Weak/tired. CP free. He reports compliance with his medications, followed in clinic by Dr. Tse. Labs reviewed. Troponin 0.194>0.192>0.160>0.133. EKG showed sinus tachycardia, non-specific ST-T wave abnormalities. TTE pending. Prior MPI stress test 12/24 negative for ischemia, +fixed defects. H/H dipped to 8.6/26.0. IR on board for possible intervention.       Hospital Course:   6/18/2025-Patient seen and examined today, resting in bed. Feels ok. No real complaints. Ambulated earlier. Denies CP. TTE with normal EF, severely reduced  systolic function, mild to mod TR, pulmonary HTN. Labs reviewed. Creatinine 1.1. H/H 9.1/27.6.       Review of Systems   Constitutional: Positive for malaise/fatigue.   HENT: Negative.     Eyes: Negative.    Cardiovascular:  Positive for dyspnea on exertion and leg swelling.   Respiratory:  Positive for shortness of breath.    Endocrine: Negative.    Hematologic/Lymphatic: Negative.    Skin: Negative.    Musculoskeletal: Negative.    Gastrointestinal: Negative.    Genitourinary: Negative.    Neurological:  Positive for weakness.   Psychiatric/Behavioral: Negative.     Allergic/Immunologic: Negative.      Objective:     Vital Signs (Most Recent):  Temp: 98 °F (36.7 °C) (06/18/25 1257)  Pulse: 89 (06/18/25 1257)  Resp: 18 (06/18/25 1257)  BP: 134/63 (06/18/25 1257)  SpO2: 96 % (06/18/25 1257) Vital Signs (24h Range):  Temp:  [98 °F (36.7 °C)-99.2 °F (37.3 °C)] 98 °F (36.7 °C)  Pulse:  [] 89  Resp:  [18-19] 18  SpO2:  [94 %-97 %] 96 %  BP: (117-139)/(63-69) 134/63     Weight: 85.3 kg (188 lb)  Body mass index is 30.34 kg/m².     SpO2: 96 %         Intake/Output Summary (Last 24 hours) at 6/18/2025 1337  Last data filed at 6/18/2025 1039  Gross per 24 hour   Intake --   Output 825 ml   Net -825 ml       Lines/Drains/Airways       Peripheral Intravenous Line  Duration                  Peripheral IV - Single Lumen 06/16/25 2032 18 G Anterior;Right Forearm 1 day         Peripheral IV - Single Lumen 06/16/25 2032 20 G Left Antecubital 1 day         Peripheral IV - Single Lumen 06/16/25 2033 20 G Anterior;Left Forearm 1 day                       Physical Exam  Vitals and nursing note reviewed.   Constitutional:       Appearance: Normal appearance.      Comments: On supplemental O2   HENT:      Head: Normocephalic and atraumatic.   Cardiovascular:      Rate and Rhythm: Normal rate and regular rhythm.      Heart sounds: S1 normal and S2 normal. No murmur heard.  Pulmonary:      Effort: Pulmonary effort is normal.       "Comments: Diminished   Musculoskeletal:      Right lower leg: Edema present.      Left lower leg: Edema present.      Comments: LLE > RLE   Skin:     General: Skin is warm and dry.   Neurological:      General: No focal deficit present.      Mental Status: He is alert and oriented to person, place, and time.   Psychiatric:         Mood and Affect: Mood normal.         Behavior: Behavior normal.         Thought Content: Thought content normal.            Significant Labs: CMP   Recent Labs   Lab 06/16/25 1919 06/17/25 0405 06/18/25  0648    140 141   K 3.6 4.2 4.2    109 108   CO2 19* 21* 24   * 132* 118*   BUN 29* 24* 23   CREATININE 1.2 1.1 1.1   CALCIUM 9.6 8.7 8.9   PROT 7.8 6.3 6.8   ALBUMIN 3.6 3.0* 3.1*   BILITOT 0.2 0.3 0.3   ALKPHOS 149 122 133   AST 28 20 24   ALT 26 20 22   ANIONGAP 14 10 9   , CBC   Recent Labs   Lab 06/16/25 1919 06/17/25 0405 06/18/25  0648   WBC 8.54 7.40 6.66   HGB 10.4* 8.7* 9.1*   HCT 30.7* 26.0* 27.6*    216 215   , Troponin No results for input(s): "TROPONINIHS" in the last 48 hours., and All pertinent lab results from the last 24 hours have been reviewed.    Significant Imaging: Echocardiogram: Transthoracic echo (TTE) complete ([facility] Only):   Results for orders placed or performed during the hospital encounter of 06/16/25   Echo   Result Value Ref Range    BSA 1.99 m2    LVOT stroke volume 66.8 cm3    LVIDd 4.8 3.5 - 6.0 cm    LV Systolic Volume 40 mL    LV Systolic Volume Index 20.5 mL/m2    LVIDs 3.2 2.1 - 4.0 cm    LV ESV A2C 33.13 mL    LV Diastolic Volume 110 mL    LV ESV A4C 33.01 mL    LV Diastolic Volume Index 56.41 mL/m2    Left Ventricular End Systolic Volume by Teichholz Method 40.10 mL    Left Ventricular End Diastolic Volume by Teichholz Method 109.68 mL    IVS 1.0 0.6 - 1.1 cm    LVOT diameter 2.1 cm    LVOT area 3.5 cm2    FS 33.3 28 - 44 %    Left Ventricle Relative Wall Thickness 0.42 cm    PW 1.0 0.6 - 1.1 cm    LV mass 170.2 g    " LV Mass Index 87.3 g/m2    TDI LATERAL 0.10 m/s    TDI SEPTAL 0.09 m/s    TR Max Ruddy 2.5 m/s    LVOT peak ruddy 1.0 m/s    Left Ventricular Outflow Tract Mean Velocity 0.78 cm/s    Left Ventricular Outflow Tract Mean Gradient 2.57 mmHg    RV- marley basal diam 3.1 cm    RV-marley mid d 3.5 cm    RV mid diameter 3.49 cm    RVOT peak VTI 12.2 cm    TAPSE 1.6 cm    RV/LV Ratio 0.65 cm    LA size 2.9 cm    Left Atrium Minor Axis 3.4 cm    Left Atrium Major Axis 6.2 cm    LA Vol (MOD) 38 mL    LUKE (MOD) 19 mL/m2    RA Major Axis 4.61 cm    AV regurgitation pressure 1/2 time 457 ms    AR Max Ruddy 3.82 m/s    AV mean gradient 3 mmHg    AV peak gradient 6 mmHg    Ao peak ruddy 1.2 m/s    Ao VTI 24.4 cm    LVOT peak VTI 19.3 cm    AV valve area 2.7 cm²    AV Velocity Ratio 0.83     AV index (prosthetic) 0.79     JORGE by Velocity Ratio 2.9 cm²    Mr max ruddy 6.06 m/s    Triscuspid Valve Regurgitation Peak Gradient 26 mmHg    PV mean gradient 1 mmHg    RVOT peak ruddy 0.65 m/s    Ao root annulus 3.6 cm    STJ 2.9 cm    Ascending aorta 3.1 cm    ASI 1.6 cm/m2    IVC diameter 2.17 cm    Mean e' 0.10 m/s    ZLVIDS -0.52     ZLVIDD -1.47     LA area A4C 15.33 cm2    LA area A2C 13.50 cm2    RVDD 3.07 cm    LUKE 19 mL/m2    LA Vol 38 cm3    LA WIDTH 3.5 cm    RA Width 3.0 cm    TV resting pulmonary artery pressure 40 mmHg    RV TB RVSP 18 mmHg    Est. RA pres 15 mmHg    Narrative      Left Ventricle: The left ventricle is normal in size. Normal wall   thickness. Septal motion is abnormal. Septal flattening in diastole and   systole consistent with right ventricular volume and pressure overload.   There is normal systolic function with a visually estimated ejection   fraction of 55 - 60%. There is diastolic dysfunction.    Right Ventricle: The right ventricle is severely dilated Wall thickness   is normal. Right ventricle wall motion has Benitez's sign. Systolic   function is severely reduced.    Right Atrium: The right atrium is dilated.     Aortic Valve: There is mild aortic valve sclerosis. There is mild   annular calcification present. There is moderate aortic regurgitation.    Mitral Valve: There is mild regurgitation.    Tricuspid Valve: There is mild to moderate regurgitation.    Pulmonary Artery: There is pulmonary hypertension. The estimated   pulmonary artery systolic pressure is 40 mmHg.    IVC/SVC: Elevated venous pressure at 15 mmHg.     , EKG: Reviewed, and X-Ray: CXR: X-Ray Chest PA and Lateral (CXR): No results found for this visit on 06/16/25.  Assessment and Plan:   Patient who presents with SOB/bilateral PE. Elevated trop due to demand. TTE with severely reduced RV function. Continue AC/supportive care.    * Bilateral pulmonary embolism  -Noted on CTA   -TTE pending  -Continue heparin gtt, will need NOAC upon d/c  -IR on board for possible intervention    6/18/2025  -Continue heparin gtt  -IR on board, no intervention planned at this juncture    Elevated troponin  Troponin 0.194>0.192>0.160>0.133  -Elevation secondary to demand ischemia from acute PE  -Continue OMT  -TTE pending  -Can consider repeat ischemic workup as OP, prior MPI stress test 12/24 + scar, no ischemia    6/18/2025  -TTE with normal EF, severely reduced RV function  -See above    Status post right hip replacement  -Per ortho/primary team    Acute deep vein thrombosis (DVT) of both lower extremities  -Continue IV heparin, NOAC upon d/c  -IR on board     Anemia  -Per primary team    Mixed hyperlipidemia  -Statin    Essential hypertension  -Resume home meds as tolerated        VTE Risk Mitigation (From admission, onward)           Ordered     Place ZACK hose  Until discontinued         06/18/25 1108     Reason for No Pharmacological VTE Prophylaxis  Once        Comments: Heparin drip is ordered   Question:  Reasons:  Answer:  Physician Provided (leave comment)    06/16/25 5685     Reason for no Mechanical VTE Prophylaxis  Once        Comments: Current DVT per US tech at  bedside   Question:  Reasons:  Answer:  Physician Provided (leave comment)    06/16/25 2242     IP VTE HIGH RISK PATIENT  Once         06/16/25 2242     heparin 25,000 units in dextrose 5% (100 units/ml) IV bolus from bag HIGH INTENSITY nomogram - [facility]  As needed (PRN)        Question:  Heparin Infusion Adjustment (DO NOT MODIFY ANSWER)  Answer:  [email or web address]\Images\Pharmacy\HeparinInfusions\heparin HIGH INTENSITY nomogram for [facility] [id number]    06/16/25 2109     heparin 25,000 units in dextrose 5% (100 units/ml) IV bolus from bag HIGH INTENSITY nomogram - [facility]  As needed (PRN)        Question:  Heparin Infusion Adjustment (DO NOT MODIFY ANSWER)  Answer:  [email or web address]\Images\Pharmacy\HeparinInfusions\heparin HIGH INTENSITY nomogram for [facility] [id number]    06/16/25 2109     heparin 25,000 units in dextrose 5% 250 mL (100 units/mL) infusion HIGH INTENSITY nomogram - [facility]  Continuous        Question:  Begin at (units/kg/hr)  Answer:  18    06/16/25 2109                    Clemencia Murray PA-C  Cardiology  [facility] - Telemetry ([facility])

## 2025-06-18 NOTE — SUBJECTIVE & OBJECTIVE
Review of Systems   Constitutional: Positive for malaise/fatigue.   HENT: Negative.     Eyes: Negative.    Cardiovascular:  Positive for dyspnea on exertion and leg swelling.   Respiratory:  Positive for shortness of breath.    Endocrine: Negative.    Hematologic/Lymphatic: Negative.    Skin: Negative.    Musculoskeletal: Negative.    Gastrointestinal: Negative.    Genitourinary: Negative.    Neurological:  Positive for weakness.   Psychiatric/Behavioral: Negative.     Allergic/Immunologic: Negative.      Objective:     Vital Signs (Most Recent):  Temp: 98 °F (36.7 °C) (06/18/25 1257)  Pulse: 89 (06/18/25 1257)  Resp: 18 (06/18/25 1257)  BP: 134/63 (06/18/25 1257)  SpO2: 96 % (06/18/25 1257) Vital Signs (24h Range):  Temp:  [98 °F (36.7 °C)-99.2 °F (37.3 °C)] 98 °F (36.7 °C)  Pulse:  [] 89  Resp:  [18-19] 18  SpO2:  [94 %-97 %] 96 %  BP: (117-139)/(63-69) 134/63     Weight: 85.3 kg (188 lb)  Body mass index is 30.34 kg/m².     SpO2: 96 %         Intake/Output Summary (Last 24 hours) at 6/18/2025 1337  Last data filed at 6/18/2025 1039  Gross per 24 hour   Intake --   Output 825 ml   Net -825 ml       Lines/Drains/Airways       Peripheral Intravenous Line  Duration                  Peripheral IV - Single Lumen 06/16/25 2032 18 G Anterior;Right Forearm 1 day         Peripheral IV - Single Lumen 06/16/25 2032 20 G Left Antecubital 1 day         Peripheral IV - Single Lumen 06/16/25 2033 20 G Anterior;Left Forearm 1 day                       Physical Exam  Vitals and nursing note reviewed.   Constitutional:       Appearance: Normal appearance.      Comments: On supplemental O2   HENT:      Head: Normocephalic and atraumatic.   Cardiovascular:      Rate and Rhythm: Normal rate and regular rhythm.      Heart sounds: S1 normal and S2 normal. No murmur heard.  Pulmonary:      Effort: Pulmonary effort is normal.      Comments: Diminished   Musculoskeletal:      Right lower leg: Edema present.      Left lower leg:  "Edema present.      Comments: LLE > RLE   Skin:     General: Skin is warm and dry.   Neurological:      General: No focal deficit present.      Mental Status: He is alert and oriented to person, place, and time.   Psychiatric:         Mood and Affect: Mood normal.         Behavior: Behavior normal.         Thought Content: Thought content normal.            Significant Labs: CMP   Recent Labs   Lab 06/16/25 1919 06/17/25 0405 06/18/25  0648    140 141   K 3.6 4.2 4.2    109 108   CO2 19* 21* 24   * 132* 118*   BUN 29* 24* 23   CREATININE 1.2 1.1 1.1   CALCIUM 9.6 8.7 8.9   PROT 7.8 6.3 6.8   ALBUMIN 3.6 3.0* 3.1*   BILITOT 0.2 0.3 0.3   ALKPHOS 149 122 133   AST 28 20 24   ALT 26 20 22   ANIONGAP 14 10 9   , CBC   Recent Labs   Lab 06/16/25 1919 06/17/25 0405 06/18/25  0648   WBC 8.54 7.40 6.66   HGB 10.4* 8.7* 9.1*   HCT 30.7* 26.0* 27.6*    216 215   , Troponin No results for input(s): "TROPONINIHS" in the last 48 hours., and All pertinent lab results from the last 24 hours have been reviewed.    Significant Imaging: Echocardiogram: Transthoracic echo (TTE) complete (Cupid Only):   Results for orders placed or performed during the hospital encounter of 06/16/25   Echo   Result Value Ref Range    BSA 1.99 m2    LVOT stroke volume 66.8 cm3    LVIDd 4.8 3.5 - 6.0 cm    LV Systolic Volume 40 mL    LV Systolic Volume Index 20.5 mL/m2    LVIDs 3.2 2.1 - 4.0 cm    LV ESV A2C 33.13 mL    LV Diastolic Volume 110 mL    LV ESV A4C 33.01 mL    LV Diastolic Volume Index 56.41 mL/m2    Left Ventricular End Systolic Volume by Teichholz Method 40.10 mL    Left Ventricular End Diastolic Volume by Teichholz Method 109.68 mL    IVS 1.0 0.6 - 1.1 cm    LVOT diameter 2.1 cm    LVOT area 3.5 cm2    FS 33.3 28 - 44 %    Left Ventricle Relative Wall Thickness 0.42 cm    PW 1.0 0.6 - 1.1 cm    LV mass 170.2 g    LV Mass Index 87.3 g/m2    TDI LATERAL 0.10 m/s    TDI SEPTAL 0.09 m/s    TR Max Ruddy 2.5 m/s    " LVOT peak amaneul 1.0 m/s    Left Ventricular Outflow Tract Mean Velocity 0.78 cm/s    Left Ventricular Outflow Tract Mean Gradient 2.57 mmHg    RV- marley basal diam 3.1 cm    RV-marley mid d 3.5 cm    RV mid diameter 3.49 cm    RVOT peak VTI 12.2 cm    TAPSE 1.6 cm    RV/LV Ratio 0.65 cm    LA size 2.9 cm    Left Atrium Minor Axis 3.4 cm    Left Atrium Major Axis 6.2 cm    LA Vol (MOD) 38 mL    LUKE (MOD) 19 mL/m2    RA Major Axis 4.61 cm    AV regurgitation pressure 1/2 time 457 ms    AR Max Amanuel 3.82 m/s    AV mean gradient 3 mmHg    AV peak gradient 6 mmHg    Ao peak amanuel 1.2 m/s    Ao VTI 24.4 cm    LVOT peak VTI 19.3 cm    AV valve area 2.7 cm²    AV Velocity Ratio 0.83     AV index (prosthetic) 0.79     JORGE by Velocity Ratio 2.9 cm²    Mr max amanuel 6.06 m/s    Triscuspid Valve Regurgitation Peak Gradient 26 mmHg    PV mean gradient 1 mmHg    RVOT peak amanuel 0.65 m/s    Ao root annulus 3.6 cm    STJ 2.9 cm    Ascending aorta 3.1 cm    ASI 1.6 cm/m2    IVC diameter 2.17 cm    Mean e' 0.10 m/s    ZLVIDS -0.52     ZLVIDD -1.47     LA area A4C 15.33 cm2    LA area A2C 13.50 cm2    RVDD 3.07 cm    LUKE 19 mL/m2    LA Vol 38 cm3    LA WIDTH 3.5 cm    RA Width 3.0 cm    TV resting pulmonary artery pressure 40 mmHg    RV TB RVSP 18 mmHg    Est. RA pres 15 mmHg    Narrative      Left Ventricle: The left ventricle is normal in size. Normal wall   thickness. Septal motion is abnormal. Septal flattening in diastole and   systole consistent with right ventricular volume and pressure overload.   There is normal systolic function with a visually estimated ejection   fraction of 55 - 60%. There is diastolic dysfunction.    Right Ventricle: The right ventricle is severely dilated Wall thickness   is normal. Right ventricle wall motion has Benitez's sign. Systolic   function is severely reduced.    Right Atrium: The right atrium is dilated.    Aortic Valve: There is mild aortic valve sclerosis. There is mild   annular calcification  present. There is moderate aortic regurgitation.    Mitral Valve: There is mild regurgitation.    Tricuspid Valve: There is mild to moderate regurgitation.    Pulmonary Artery: There is pulmonary hypertension. The estimated   pulmonary artery systolic pressure is 40 mmHg.    IVC/SVC: Elevated venous pressure at 15 mmHg.     , EKG: Reviewed, and X-Ray: CXR: X-Ray Chest PA and Lateral (CXR): No results found for this visit on 06/16/25.

## 2025-06-18 NOTE — ASSESSMENT & PLAN NOTE
Troponin 0.194>0.192>0.160>0.133  -Elevation secondary to demand ischemia from acute PE  -Continue OMT  -TTE pending  -Can consider repeat ischemic workup as OP, prior MPI stress test 12/24 + scar, no ischemia    6/18/2025  -TTE with normal EF, severely reduced RV function  -See above

## 2025-06-18 NOTE — SUBJECTIVE & OBJECTIVE
Interval History: pt in bed upon exam and denies any acute symptoms at this time. Stable on supplemental oxygen. No procedure per IR on today. Heparin infusion continued- will transition to oral dosing as appropriate. Cardiology following.     Review of Systems   Constitutional:  Positive for activity change and fatigue.   Respiratory:  Positive for shortness of breath (improved).    Cardiovascular:  Positive for leg swelling (improved). Negative for chest pain.   Gastrointestinal: Negative.    Genitourinary: Negative.    Musculoskeletal: Negative.    Skin: Negative.    Neurological: Negative.    Psychiatric/Behavioral: Negative.       Objective:     Vital Signs (Most Recent):  Temp: 98.7 °F (37.1 °C) (06/18/25 1525)  Pulse: 100 (06/18/25 1525)  Resp: 18 (06/18/25 1525)  BP: 119/64 (06/18/25 1525)  SpO2: 95 % (06/18/25 1525) Vital Signs (24h Range):  Temp:  [98 °F (36.7 °C)-99.2 °F (37.3 °C)] 98.7 °F (37.1 °C)  Pulse:  [] 100  Resp:  [18] 18  SpO2:  [94 %-97 %] 95 %  BP: (119-139)/(63-69) 119/64     Weight: 85.3 kg (188 lb)  Body mass index is 30.34 kg/m².    Intake/Output Summary (Last 24 hours) at 6/18/2025 1623  Last data filed at 6/18/2025 1039  Gross per 24 hour   Intake --   Output 825 ml   Net -825 ml         Physical Exam  HENT:      Mouth/Throat:      Pharynx: Oropharynx is clear.   Cardiovascular:      Rate and Rhythm: Normal rate and regular rhythm.      Pulses: Normal pulses.      Heart sounds: Normal heart sounds.   Pulmonary:      Effort: Pulmonary effort is normal.      Breath sounds: Normal breath sounds.   Abdominal:      General: Bowel sounds are normal. There is no distension.      Palpations: Abdomen is soft.      Tenderness: There is no abdominal tenderness.   Musculoskeletal:      Cervical back: Normal range of motion.      Left lower leg: Edema (improved) present.      Comments: Edema to L calf    Skin:     General: Skin is warm and dry.   Neurological:      Mental Status: He is alert  and oriented to person, place, and time.   Psychiatric:         Mood and Affect: Mood normal.               Significant Labs: All pertinent labs within the past 24 hours have been reviewed.    Significant Imaging: I have reviewed all pertinent imaging results/findings within the past 24 hours.

## 2025-06-18 NOTE — PLAN OF CARE
"5:12 AM  6/17/25     CC: SOB, [place] >> Horacio EXTENSIVE Pe's and BLE DVT's. S/P L hip surgery on 5/30 and L hip surgery 1/28.  Neuro: A/Ox4  Cardiac: NSR/ST on tele  Resp: 2L NC  GI/: LBM 6/16  Skin: intact  Pain: no c/o pain  Mobility: STRICT BED REST  IV: 20g L ant FA (Hep gtt 11 ukh), 22g R FA, 20g L outer AC  Plan of Care: POC reviewed with pt. Pt verbalizes understanding of POC. No questions at this time.  Safety: bed in lowest position, purposeful rounding, call light in reach, personal items in reach, call for assistance when needed. Pt verbalizes understanding. Will continue to monitor.     JON: PTT  came back at 78.7, hold for 1 hour, decrease by 3 ukh, restarted Hep gtt at 0100 @ 11 ukh. Next PTT at 0700, along with CBC and CMP.      /65 (BP Location: Right arm, Patient Position: Lying)   Pulse 91   Temp 98.1 °F (36.7 °C) (Oral)   Resp 18   Ht 5' 6" (1.676 m)   Wt 85.3 kg (188 lb)   SpO2 95%   BMI 30.34 kg/m²     Cassi Stack RN  06/18/2025    "

## 2025-06-18 NOTE — HOSPITAL COURSE
6/18/2025-Patient seen and examined today, resting in bed. Feels ok. No real complaints. Ambulated earlier. Denies CP. TTE with normal EF, severely reduced systolic function, mild to mod TR, pulmonary HTN. Labs reviewed. Creatinine 1.1. H/H 9.1/27.6.     6/19/2025-Patient seen and examined today, lying in bed. Stable CV wise. No real complaints. Give dose of IV Lasix. Will be transitioned to Coumadin with Lovenox bridge given interaction between Dilantin and Eliquis (reduced efficacy). Labs reviewed.    6/20/25- stable overnight, no CP/SOB, ready for DC with coumadin and coumadin clinic

## 2025-06-18 NOTE — ASSESSMENT & PLAN NOTE
Patient's blood pressure range in the last 24 hours was: BP  Min: 119/64  Max: 139/69.The patient's inpatient anti-hypertensive regimen is listed below:  Current Antihypertensives   On hold for now, BP controlled    Plan  - BP is controlled, no changes needed to their regimen  - will resume home med when appropriate

## 2025-06-18 NOTE — ASSESSMENT & PLAN NOTE
Anemia is likely due to acute blood loss which was from recent surgery. Most recent hemoglobin and hematocrit are listed below.  Recent Labs     06/16/25  1919 06/17/25  0405 06/18/25  0648   HGB 10.4* 8.7* 9.1*   HCT 30.7* 26.0* 27.6*     Plan  - Monitor serial CBC: Daily  - Transfuse PRBC if patient becomes hemodynamically unstable, symptomatic or H/H drops below 7/21.     Soolantra Counseling: I discussed with the patients the risks of topial Soolantra. This is a medicine which decreases the number of mites and inflammation in the skin. You experience burning, stinging, eye irritation or allergic reactions.  Please call our office if you develop any problems from using this medication.

## 2025-06-18 NOTE — ASSESSMENT & PLAN NOTE
"CTA shows "Positive for extensive bilateral pulmonary emboli with right heart strain" per Dr. Raman Molina (radiology interpretation)  -- Heparin drip, per pharmacy management  --Current oxygen requirement 2L/min NC, he reports mainly short of breath only with exertion/ambulation  --Initially NPO- then cardiac diet ordered   -- Bedrest for now  --IR was consulted -- Dr. Jatinder Sharif aware-troponin trended- Echo obtained and Heparin infusion continued- Surgical intervention vs conservative management to be discussed   -- trending troponin levels- downward trend   -- Echo showed septal motion is abnormal. Septal flattening in diastole and systole consistent with right ventricular volume and pressure overload. EF of 55 - 60%. There is diastolic dysfunction. Right Ventricle: The right ventricle is severely dilated Wall thickness is normal. Right ventricle wall motion has Benitez's sign. Systolic function is severely reduced. Right Atrium: The right atrium is dilated. Aortic Valve: There is mild aortic valve sclerosis. There is mild annular calcification present. There is moderate aortic regurgitation. Mitral Valve: There is mild regurgitation.Tricuspid Valve: There is mild to moderate regurgitation. PASP is 40 mmHg. IVC/SVC: Elevated venous pressure at 15 mmHg.   -Cardiology following   -Pulmonology aware- will formally consult if surgical intervention warranteed   -6/18/2025- pt remains stable with no intervention per IR- Heparin continued- will transition to oral dosing as appropriate-   "

## 2025-06-18 NOTE — ASSESSMENT & PLAN NOTE
Patient had total right hip arthroplasty on 05/30/2025 per Dr. Lee  Monitor incision  He reports minimal pain and has not required narcotic pain medication for past couple of days  -Orthopedic Surgery aware of post-op complications

## 2025-06-18 NOTE — PROGRESS NOTES
[facility] Medicine  Progress Note    Patient Name: Conner Lombardi Jr.  MRN: [id number]  Patient Class: IP- Inpatient   Admission Date: 6/16/2025  Length of Stay: 2 days  Attending Physician: Zeke Sheridan DO  Primary Care Provider: Tia Lopez MD        Subjective     Principal Problem:Bilateral pulmonary embolism      HPI:    Patient is a 75-year-old male with past medical history significant for seizures, hypertension, hyperlipidemia, anemia, prediabetes, vitamin-D deficiency, colon adenoma, and recent total right hip replacement secondary to arthritis on 5/30/2025 per Dr. Lee who presented to ED for evaluation of shortness of breath upon exertion. He also reports tachycardia. Patient reports that these symptoms first started on Saturday, however it was not noticed by his wife until today and he states that it got significantly worse today. He also reports swelling in legs (worse on left) and urinary frequency. He denies dizziness, lightheadedness, headache, weakness, fever, chills, chest pain, productive cough, abdominal pain, nausea, vomiting, diarrhea, dysuria, flank pain, urgency, decreased urine volume, difficulty urinating, hematuria, calf pain, thigh pain.  On arrival to ED, temp 98.1°, heart rate 133, respirations 22, blood pressure 139/75, 88% SpO2 on room air.  He was placed on 2 L nasal cannula with improvement to 94 to 96% SpO2.  Lab workup showed WBC 8.54, RBC 3.05, hemoglobin 10.4, hematocrit 30.7, platelets 245, D-dimer 22.58, CO2 19, BUN 29, creatinine 1.2, glucose 146, normal LFTs, , troponin 0.194, lactic acid 1.4.  chest x-ray demonstrated lungs are clear.  CTA chest PE study was done which was positive for extensive bilateral pulmonary emboli with right heart strain noted, this was communicated to Dr. Fall in ED who then notified IR on call, Dr Sharif. Ultrasound lower extremities veins bilateral shows bilateral acute DVTs with acute  thrombus in right popliteal and peroneal veins as well as acute thrombus in left popliteal and anterior tibial as well as peroneal veins.  EKG showed sinus tachycardia with heart rate 125, nonspecific ST and T-wave abnormalities.  Patient was started on heparin drip.  IR on-call (Dr Sharif) communicated that IR will see patient in a.m. to determine if candidate for intervention.  Hospital Medicine team was consulted for admission due to bilateral PEs, hypoxia, and bilateral DVTs.    Overview/Hospital Course:  Patient admitted to Hospital Medicine for Bilateral Pulmonary embolism. D-dimer elevated and CTA positive for extensive bilateral pulmonary emboli with right heart strain. Troponin elevated with downward trend. IR consulted for intervention. Echo showed septal motion is abnormal. Septal flattening in diastole and systole consistent with right ventricular volume and pressure overload. EF of 55 - 60%. There is diastolic dysfunction. Right Ventricle: The right ventricle is severely dilated Wall thickness is normal. Right ventricle wall motion has Benitez's sign. Systolic function is severely reduced. Right Atrium: The right atrium is dilated. Aortic Valve: There is mild aortic valve sclerosis. There is mild annular calcification present. There is moderate aortic regurgitation. Mitral Valve: There is mild regurgitation.Tricuspid Valve: There is mild to moderate regurgitation. PASP is 40 mmHg. IVC/SVC: Elevated venous pressure at 15 mmHg. Cardiology consulted. Heparin infusion initiated. US of BLE showed bilateral acute DVT.  Intervention vs conservative treatment to be reviewed. Pulmonology aware with consult to be placed pending need for surgical intervention. On 6/18/2025, pt remains stable on 2L NC with no intervention to be performed at this time per IR. Heparin infusion continued- will transition to oral dosing tomorrow if appropriate. Cardiology following.     Interval History: pt in bed upon exam and  denies any acute symptoms at this time. Stable on supplemental oxygen. No procedure per IR on today. Heparin infusion continued- will transition to oral dosing as appropriate. Cardiology following.     Review of Systems   Constitutional:  Positive for activity change and fatigue.   Respiratory:  Positive for shortness of breath (improved).    Cardiovascular:  Positive for leg swelling (improved). Negative for chest pain.   Gastrointestinal: Negative.    Genitourinary: Negative.    Musculoskeletal: Negative.    Skin: Negative.    Neurological: Negative.    Psychiatric/Behavioral: Negative.       Objective:     Vital Signs (Most Recent):  Temp: 98.7 °F (37.1 °C) (06/18/25 1525)  Pulse: 100 (06/18/25 1525)  Resp: 18 (06/18/25 1525)  BP: 119/64 (06/18/25 1525)  SpO2: 95 % (06/18/25 1525) Vital Signs (24h Range):  Temp:  [98 °F (36.7 °C)-99.2 °F (37.3 °C)] 98.7 °F (37.1 °C)  Pulse:  [] 100  Resp:  [18] 18  SpO2:  [94 %-97 %] 95 %  BP: (119-139)/(63-69) 119/64     Weight: 85.3 kg (188 lb)  Body mass index is 30.34 kg/m².    Intake/Output Summary (Last 24 hours) at 6/18/2025 1623  Last data filed at 6/18/2025 1039  Gross per 24 hour   Intake --   Output 825 ml   Net -825 ml         Physical Exam  HENT:      Mouth/Throat:      Pharynx: Oropharynx is clear.   Cardiovascular:      Rate and Rhythm: Normal rate and regular rhythm.      Pulses: Normal pulses.      Heart sounds: Normal heart sounds.   Pulmonary:      Effort: Pulmonary effort is normal.      Breath sounds: Normal breath sounds.   Abdominal:      General: Bowel sounds are normal. There is no distension.      Palpations: Abdomen is soft.      Tenderness: There is no abdominal tenderness.   Musculoskeletal:      Cervical back: Normal range of motion.      Left lower leg: Edema (improved) present.      Comments: Edema to L calf    Skin:     General: Skin is warm and dry.   Neurological:      Mental Status: He is alert and oriented to person, place, and time.  "  Psychiatric:         Mood and Affect: Mood normal.               Significant Labs: All pertinent labs within the past 24 hours have been reviewed.    Significant Imaging: I have reviewed all pertinent imaging results/findings within the past 24 hours.      Assessment & Plan  Bilateral pulmonary embolism  CTA shows "Positive for extensive bilateral pulmonary emboli with right heart strain" per Dr. Raman Molina (radiology interpretation)  -- Heparin drip, per pharmacy management  --Current oxygen requirement 2L/min NC, he reports mainly short of breath only with exertion/ambulation  --Initially NPO- then cardiac diet ordered   -- Bedrest for now  --IR was consulted -- Dr. Jatinder Sharif aware-troponin trended- Echo obtained and Heparin infusion continued- Surgical intervention vs conservative management to be discussed   -- trending troponin levels- downward trend   -- Echo showed septal motion is abnormal. Septal flattening in diastole and systole consistent with right ventricular volume and pressure overload. EF of 55 - 60%. There is diastolic dysfunction. Right Ventricle: The right ventricle is severely dilated Wall thickness is normal. Right ventricle wall motion has Benitez's sign. Systolic function is severely reduced. Right Atrium: The right atrium is dilated. Aortic Valve: There is mild aortic valve sclerosis. There is mild annular calcification present. There is moderate aortic regurgitation. Mitral Valve: There is mild regurgitation.Tricuspid Valve: There is mild to moderate regurgitation. PASP is 40 mmHg. IVC/SVC: Elevated venous pressure at 15 mmHg.   -Cardiology following   -Pulmonology aware- will formally consult if surgical intervention warranteed   -6/18/2025- pt remains stable with no intervention per IR- Heparin continued- will transition to oral dosing as appropriate-   Seizures  Continue home regimen antiepileptics -- Dilantin  mg nightly  Seizure precautions  -no seizure activity " witnessed or reported     Acute deep vein thrombosis (DVT) of both lower extremities  --Acute thrombus within the right popliteal and peroneal veins.  There is acute thrombus in the left popliteal and anterior tibial and peroneal veins   --Will not use SCD  --Heparin drip infusing -will transition to oral dosing appropriate   -- IR consult to determine if candidate for intervention- no surgical intervention at this time per IR  --pt able to ambulate with O2 sat of 87% on RA and stable on 2L NC    Essential hypertension  Patient's blood pressure range in the last 24 hours was: BP  Min: 119/64  Max: 139/69.The patient's inpatient anti-hypertensive regimen is listed below:  Current Antihypertensives   On hold for now, BP controlled    Plan  - BP is controlled, no changes needed to their regimen  - will resume home med when appropriate  Mixed hyperlipidemia  Continue statin    Anemia  Anemia is likely due to acute blood loss which was from recent surgery. Most recent hemoglobin and hematocrit are listed below.  Recent Labs     06/16/25  1919 06/17/25  0405 06/18/25  0648   HGB 10.4* 8.7* 9.1*   HCT 30.7* 26.0* 27.6*     Plan  - Monitor serial CBC: Daily  - Transfuse PRBC if patient becomes hemodynamically unstable, symptomatic or H/H drops below 7/21.    Status post right hip replacement  Patient had total right hip arthroplasty on 05/30/2025 per Dr. Lee  Monitor incision  He reports minimal pain and has not required narcotic pain medication for past couple of days  -Orthopedic Surgery aware of post-op complications    Elevated troponin  -in the setting of bilateral PE   -Heparin infusion continued   -trending down- 0.194> 0.192> 0.160> 0.133  -Cardiology following     Vitamin D deficiency  -Vitamin D supplement continued     VTE Risk Mitigation (From admission, onward)           Ordered     Place ZACK hose  Until discontinued         06/18/25 1108     Reason for No Pharmacological VTE Prophylaxis  Once         Comments: Heparin drip is ordered   Question:  Reasons:  Answer:  Physician Provided (leave comment)    06/16/25 2242     Reason for no Mechanical VTE Prophylaxis  Once        Comments: Current DVT per US tech at bedside   Question:  Reasons:  Answer:  Physician Provided (leave comment)    06/16/25 2242     IP VTE HIGH RISK PATIENT  Once         06/16/25 2242     heparin 25,000 units in dextrose 5% (100 units/ml) IV bolus from bag HIGH INTENSITY nomogram - [facility]  As needed (PRN)        Question:  Heparin Infusion Adjustment (DO NOT MODIFY ANSWER)  Answer:  [email or web address]\Images\[email or web address]\[email or web address]\heparin HIGH INTENSITY nomogram for [facility] [id number]    06/16/25 2109     heparin 25,000 units in dextrose 5% (100 units/ml) IV bolus from bag HIGH INTENSITY nomogram - [facility]  As needed (PRN)        Question:  Heparin Infusion Adjustment (DO NOT MODIFY ANSWER)  Answer:  \\[email or web address]\Images\Pharmacy\HeparinInfusions\heparin HIGH INTENSITY nomogram for [facility] [id number].pdf    06/16/25 2109     heparin 25,000 units in dextrose 5% 250 mL (100 units/mL) infusion HIGH INTENSITY nomogram - [facility]  Continuous        Question:  Begin at (units/kg/hr)  Answer:  18    06/16/25 2109                    Discharge Planning   ARABELLA: 6/19/2025     Code Status: Full Code   Medical Readiness for Discharge Date:   Discharge Plan A: Home                        Cynthia Lara NP  Department of Hospital Medicine   [facility] - Telemetry ([facility])

## 2025-06-18 NOTE — ASSESSMENT & PLAN NOTE
Continue home regimen antiepileptics -- Dilantin  mg nightly  Seizure precautions  -no seizure activity witnessed or reported

## 2025-06-18 NOTE — CONSULTS
Chart reviewed by Dr. Sharif.       ASSESSMENT/PLAN:    PE    Dr Sharif saw the patient this morning without c/o.  IR recommendation to continue anticoagulation and monitor improvement.  No intervention by radiology at this time.  If condition changes or worsens please re consult and evaluation will be made at that time.        Thank you for the consult.

## 2025-06-19 LAB
ABSOLUTE EOSINOPHIL (OHS): 0.63 K/UL
ABSOLUTE MONOCYTE (OHS): 0.55 K/UL (ref 0.3–1)
ABSOLUTE NEUTROPHIL COUNT (OHS): 3.19 K/UL (ref 1.8–7.7)
ALBUMIN SERPL BCP-MCNC: 3 G/DL (ref 3.5–5.2)
ALP SERPL-CCNC: 140 UNIT/L (ref 40–150)
ALT SERPL W/O P-5'-P-CCNC: 24 UNIT/L (ref 10–44)
ANION GAP (OHS): 8 MMOL/L (ref 8–16)
APTT PPP: 51.3 SECONDS (ref 21–32)
AST SERPL-CCNC: 23 UNIT/L (ref 11–45)
BASOPHILS # BLD AUTO: 0.04 K/UL
BASOPHILS NFR BLD AUTO: 0.7 %
BILIRUB SERPL-MCNC: 0.3 MG/DL (ref 0.1–1)
BUN SERPL-MCNC: 22 MG/DL (ref 8–23)
CALCIUM SERPL-MCNC: 8.7 MG/DL (ref 8.7–10.5)
CHLORIDE SERPL-SCNC: 109 MMOL/L (ref 95–110)
CO2 SERPL-SCNC: 24 MMOL/L (ref 23–29)
CREAT SERPL-MCNC: 1.1 MG/DL (ref 0.5–1.4)
ERYTHROCYTE [DISTWIDTH] IN BLOOD BY AUTOMATED COUNT: 13.7 % (ref 11.5–14.5)
FERRITIN SERPL-MCNC: 224.5 NG/ML (ref 20–300)
GFR SERPLBLD CREATININE-BSD FMLA CKD-EPI: >60 ML/MIN/1.73/M2
GLUCOSE SERPL-MCNC: 122 MG/DL (ref 70–110)
HCT VFR BLD AUTO: 27.9 % (ref 40–54)
HGB BLD-MCNC: 9 GM/DL (ref 14–18)
IMM GRANULOCYTES # BLD AUTO: 0.02 K/UL (ref 0–0.04)
IMM GRANULOCYTES NFR BLD AUTO: 0.3 % (ref 0–0.5)
IRON SATN MFR SERPL: 28 % (ref 20–50)
IRON SERPL-MCNC: 84 UG/DL (ref 45–160)
LYMPHOCYTES # BLD AUTO: 1.58 K/UL (ref 1–4.8)
MCH RBC QN AUTO: 33.3 PG (ref 27–31)
MCHC RBC AUTO-ENTMCNC: 32.3 G/DL (ref 32–36)
MCV RBC AUTO: 103 FL (ref 82–98)
NUCLEATED RBC (/100WBC) (OHS): 0 /100 WBC
PLATELET # BLD AUTO: 218 K/UL (ref 150–450)
PMV BLD AUTO: 9.4 FL (ref 9.2–12.9)
POTASSIUM SERPL-SCNC: 4.3 MMOL/L (ref 3.5–5.1)
PROT SERPL-MCNC: 6.5 GM/DL (ref 6–8.4)
RBC # BLD AUTO: 2.7 M/UL (ref 4.6–6.2)
RELATIVE EOSINOPHIL (OHS): 10.5 %
RELATIVE LYMPHOCYTE (OHS): 26.3 % (ref 18–48)
RELATIVE MONOCYTE (OHS): 9.2 % (ref 4–15)
RELATIVE NEUTROPHIL (OHS): 53 % (ref 38–73)
SODIUM SERPL-SCNC: 141 MMOL/L (ref 136–145)
TIBC SERPL-MCNC: 303 UG/DL (ref 250–450)
TRANSFERRIN SERPL-MCNC: 205 MG/DL (ref 200–375)
WBC # BLD AUTO: 6.01 K/UL (ref 3.9–12.7)

## 2025-06-19 PROCEDURE — 85025 COMPLETE CBC W/AUTO DIFF WBC: CPT | Performed by: EMERGENCY MEDICINE

## 2025-06-19 PROCEDURE — 25000003 PHARM REV CODE 250: Performed by: NURSE PRACTITIONER

## 2025-06-19 PROCEDURE — 63600175 PHARM REV CODE 636 W HCPCS: Performed by: INTERNAL MEDICINE

## 2025-06-19 PROCEDURE — 63600175 PHARM REV CODE 636 W HCPCS: Performed by: NURSE PRACTITIONER

## 2025-06-19 PROCEDURE — 36415 COLL VENOUS BLD VENIPUNCTURE: CPT | Performed by: STUDENT IN AN ORGANIZED HEALTH CARE EDUCATION/TRAINING PROGRAM

## 2025-06-19 PROCEDURE — 94799 UNLISTED PULMONARY SVC/PX: CPT

## 2025-06-19 PROCEDURE — 85730 THROMBOPLASTIN TIME PARTIAL: CPT | Performed by: STUDENT IN AN ORGANIZED HEALTH CARE EDUCATION/TRAINING PROGRAM

## 2025-06-19 PROCEDURE — 99233 SBSQ HOSP IP/OBS HIGH 50: CPT | Mod: ,,, | Performed by: INTERNAL MEDICINE

## 2025-06-19 PROCEDURE — 82728 ASSAY OF FERRITIN: CPT | Performed by: INTERNAL MEDICINE

## 2025-06-19 PROCEDURE — 80053 COMPREHEN METABOLIC PANEL: CPT | Performed by: NURSE PRACTITIONER

## 2025-06-19 PROCEDURE — 21400001 HC TELEMETRY ROOM

## 2025-06-19 PROCEDURE — 36415 COLL VENOUS BLD VENIPUNCTURE: CPT | Performed by: INTERNAL MEDICINE

## 2025-06-19 PROCEDURE — 99900035 HC TECH TIME PER 15 MIN (STAT)

## 2025-06-19 PROCEDURE — 94618 PULMONARY STRESS TESTING: CPT

## 2025-06-19 PROCEDURE — 84466 ASSAY OF TRANSFERRIN: CPT | Performed by: INTERNAL MEDICINE

## 2025-06-19 RX ORDER — ENOXAPARIN SODIUM 100 MG/ML
1 INJECTION SUBCUTANEOUS EVERY 12 HOURS
Status: DISCONTINUED | OUTPATIENT
Start: 2025-06-19 | End: 2025-06-20 | Stop reason: HOSPADM

## 2025-06-19 RX ORDER — FUROSEMIDE 10 MG/ML
40 INJECTION INTRAMUSCULAR; INTRAVENOUS ONCE
Status: COMPLETED | OUTPATIENT
Start: 2025-06-19 | End: 2025-06-19

## 2025-06-19 RX ORDER — WARFARIN SODIUM 5 MG/1
5 TABLET ORAL DAILY
Status: DISCONTINUED | OUTPATIENT
Start: 2025-06-19 | End: 2025-06-20 | Stop reason: HOSPADM

## 2025-06-19 RX ADMIN — ENOXAPARIN SODIUM 90 MG: 100 INJECTION SUBCUTANEOUS at 03:06

## 2025-06-19 RX ADMIN — CHOLECALCIFEROL TAB 25 MCG (1000 UNIT) 1000 UNITS: 25 TAB at 09:06

## 2025-06-19 RX ADMIN — WARFARIN SODIUM 5 MG: 5 TABLET ORAL at 04:06

## 2025-06-19 RX ADMIN — GABAPENTIN 300 MG: 300 CAPSULE ORAL at 09:06

## 2025-06-19 RX ADMIN — EXTENDED PHENYTOIN SODIUM 400 MG: 100 CAPSULE, EXTENDED RELEASE ORAL at 09:06

## 2025-06-19 RX ADMIN — ATORVASTATIN CALCIUM 40 MG: 40 TABLET, FILM COATED ORAL at 09:06

## 2025-06-19 RX ADMIN — FUROSEMIDE 40 MG: 10 INJECTION, SOLUTION INTRAVENOUS at 11:06

## 2025-06-19 NOTE — SUBJECTIVE & OBJECTIVE
Interval History: pt in bed upon exam with no signs of acute distress. Pt on 2L NC - will wean as tolerated. Heparin infusion transitioned to Coumadin with Lovenox bridge. Pharmacy consulted. CM to assist with discharge planning with monitoring per Outpatient Coumadin clinic established.     Review of Systems   Constitutional:  Positive for activity change and fatigue.   HENT: Negative.     Respiratory:  Negative for shortness of breath.    Cardiovascular: Negative.    Gastrointestinal:  Negative for nausea and vomiting.   Genitourinary: Negative.    Musculoskeletal: Negative.    Skin: Negative.    Neurological: Negative.    Psychiatric/Behavioral: Negative.       Objective:     Vital Signs (Most Recent):  Temp: 98 °F (36.7 °C) (06/19/25 1121)  Pulse: 99 (06/19/25 1525)  Resp: 17 (06/19/25 1121)  BP: 121/66 (06/19/25 1121)  SpO2: 98 % (06/19/25 1121) Vital Signs (24h Range):  Temp:  [97.6 °F (36.4 °C)-98.5 °F (36.9 °C)] 98 °F (36.7 °C)  Pulse:  [] 99  Resp:  [17-18] 17  SpO2:  [92 %-98 %] 98 %  BP: (121-140)/(60-67) 121/66     Weight: 85.7 kg (188 lb 15 oz)  Body mass index is 30.49 kg/m².    Intake/Output Summary (Last 24 hours) at 6/19/2025 1527  Last data filed at 6/19/2025 1500  Gross per 24 hour   Intake 600 ml   Output 2075 ml   Net -1475 ml         Physical Exam  HENT:      Mouth/Throat:      Pharynx: Oropharynx is clear.   Cardiovascular:      Rate and Rhythm: Normal rate and regular rhythm.      Pulses: Normal pulses.      Heart sounds: Normal heart sounds.   Pulmonary:      Effort: Pulmonary effort is normal.      Breath sounds: Normal breath sounds.   Abdominal:      General: Bowel sounds are normal. There is no distension.      Palpations: Abdomen is soft.      Tenderness: There is no abdominal tenderness.   Musculoskeletal:      Cervical back: Normal range of motion.      Left lower leg: Edema (improved) present.      Comments: Edema to L calf    Skin:     General: Skin is warm and dry.    Neurological:      Mental Status: He is alert and oriented to person, place, and time.   Psychiatric:         Mood and Affect: Mood normal.               Significant Labs: All pertinent labs within the past 24 hours have been reviewed.    Significant Imaging: I have reviewed all pertinent imaging results/findings within the past 24 hours.

## 2025-06-19 NOTE — PROGRESS NOTES
Pharmacy Brief Progress Note:    Patient/caregiver educated on warfarin indication, side effects, and drug interactions. Discussed importance of medication compliance and INR monitoring and reviewed signs of abnormal bleeding. Patient/caregiver given warfarin educational handout. Patient/caregiver expressed understanding and had no further questions.    Thank you for allowing us to participate in this patient's care.     Gisela Newby 6/19/2025 2:41 PM

## 2025-06-19 NOTE — PROGRESS NOTES
Pharmacy Coumadin Consult Note     Baseline INR=1.0  Indication:bilateral PE   Goal INR=2-3    New Coumadin start  Will start patient on 5mg daily  Daily INR ordered    On lovenox bridge until INR within target range  Current drug interactions: phenytoin and melatonin may increase anticoagulant effects of coumadin   Pharmacy is consulted to dose  Patient will need to be educated this admission     Gisela Newby, Pharm D 6/19/2025 2:14 PM

## 2025-06-19 NOTE — ASSESSMENT & PLAN NOTE
"CTA shows "Positive for extensive bilateral pulmonary emboli with right heart strain" per Dr. Raman Molina (radiology interpretation)  -- Heparin drip- transitioned to Coumadin with Lovenox bridge   --Current oxygen requirement 2L/min NC, he reports mainly short of breath only with exertion/ambulation- weaned as tolerated with ambulatory home oxygen referral obtained   --Initially NPO- then cardiac diet ordered   -- Bedrest for now  --IR was consulted -- Dr. Jatinder Sharif aware-troponin trended- Echo obtained and Heparin infusion continued- Surgical intervention vs conservative management to be discussed   -- trending troponin levels- downward trend   -- Echo showed septal motion is abnormal. Septal flattening in diastole and systole consistent with right ventricular volume and pressure overload. EF of 55 - 60%. There is diastolic dysfunction. Right Ventricle: The right ventricle is severely dilated Wall thickness is normal. Right ventricle wall motion has Benitez's sign. Systolic function is severely reduced. Right Atrium: The right atrium is dilated. Aortic Valve: There is mild aortic valve sclerosis. There is mild annular calcification present. There is moderate aortic regurgitation. Mitral Valve: There is mild regurgitation.Tricuspid Valve: There is mild to moderate regurgitation. PASP is 40 mmHg. IVC/SVC: Elevated venous pressure at 15 mmHg.   -Cardiology following   -Pulmonology aware- will formally consult if surgical intervention warranteed   -6/18/2025- pt remains stable with no intervention per IR- Heparin continued- will transition to oral dosing as appropriate-   -6/19/2025- Heparin infusion transitioned to Coumadin and Lovenox with pharmacy consulted   outpatient monitoring with Coumadin clinic established per CM   "

## 2025-06-19 NOTE — ASSESSMENT & PLAN NOTE
-in the setting of bilateral PE   -Heparin infusion continued - transitioned to Coumadin with Lovenox bridge   -trending down- 0.194> 0.192> 0.160> 0.133  -Cardiology following   -established with Coumadin Clinic outpatient prior to discharge per CM

## 2025-06-19 NOTE — PROGRESS NOTES
[facility] Tele[facility] ([facility])  Cardiology  Progress Note    Patient Name: Conner Lombardi Jr.  MRN: [id number]  Admission Date: 6/16/2025  Hospital Length of Stay: 3 days  Code Status: Full Code   Attending Physician: Zeke Sheridan DO   Primary Care Physician: Tia Lopez MD  Expected Discharge Date: 6/19/2025  Principal Problem:Bilateral pulmonary embolism    Subjective:   HPI:  Mr. Lombardi is a 75 year old male patient whose current medical conditions include seizures, HTN, hyperlipidemia, anemia, colon adenoma, pre-DM, and recent TRH arthroplasty on 5/30/2025 who presented to [facility] ED overnight due to increased SOB/AUSTIN that initially began over the weekend. Other associated symptoms included BLE edema (L > R). He denied any associated fever, chills, LH, dizziness, palpitations, near syncope, or syncope. Initial workup in ED revealed hypoxia and tachycardia. Labs reviewed BNP of 172, troponin of 0.194, and D-dimer of 22. CTA of chest positive for bilateral PE with right heart strain. BLE venous U/S showed bilateral acute DVT's with acute thrombus in right popliteal and right peroneal veins as well as acute thrombus in the left popliteal and anterior tibial as well as peroneal veins. Patient initiated on heparin drip and admitted for further evaluation and treatment. Cardiology consulted to assist with management. Patient seen and examined today, resting in bed. Feels ok. Weak/tired. CP free. He reports compliance with his medications, followed in clinic by Dr. Tse. Labs reviewed. Troponin 0.194>0.192>0.160>0.133. EKG showed sinus tachycardia, non-specific ST-T wave abnormalities. TTE pending. Prior MPI stress test 12/24 negative for ischemia, +fixed defects. H/H dipped to 8.6/26.0. IR on board for possible intervention.       Hospital Course:   6/18/2025-Patient seen and examined today, resting in bed. Feels ok. No real complaints. Ambulated earlier. Denies CP. TTE with normal EF, severely reduced  systolic function, mild to mod TR, pulmonary HTN. Labs reviewed. Creatinine 1.1. H/H 9.1/27.6.     6/19/2025-Patient seen and examined today, lying in bed. Stable CV wise. No real complaints. Give dose of IV Lasix. Will be transitioned to Coumadin with Lovenox bridge given interaction between Dilantin and Eliquis (reduced efficacy). Labs reviewed.        Review of Systems   Constitutional: Positive for malaise/fatigue.   HENT: Negative.     Eyes: Negative.    Cardiovascular:  Positive for dyspnea on exertion and leg swelling.   Respiratory:  Positive for shortness of breath.    Endocrine: Negative.    Hematologic/Lymphatic: Negative.    Skin: Negative.    Musculoskeletal: Negative.    Gastrointestinal: Negative.    Genitourinary: Negative.    Neurological: Negative.    Psychiatric/Behavioral: Negative.     Allergic/Immunologic: Negative.      Objective:     Vital Signs (Most Recent):  Temp: 98 °F (36.7 °C) (06/19/25 1121)  Pulse: 93 (06/19/25 1125)  Resp: 17 (06/19/25 1121)  BP: 121/66 (06/19/25 1121)  SpO2: 98 % (06/19/25 1121) Vital Signs (24h Range):  Temp:  [97.6 °F (36.4 °C)-98.7 °F (37.1 °C)] 98 °F (36.7 °C)  Pulse:  [] 93  Resp:  [17-18] 17  SpO2:  [92 %-98 %] 98 %  BP: (119-140)/(60-67) 121/66     Weight: 85.7 kg (188 lb 15 oz)  Body mass index is 30.49 kg/m².     SpO2: 98 %         Intake/Output Summary (Last 24 hours) at 6/19/2025 1414  Last data filed at 6/19/2025 1330  Gross per 24 hour   Intake 600 ml   Output 1775 ml   Net -1175 ml       Lines/Drains/Airways       Peripheral Intravenous Line  Duration                  Peripheral IV - Single Lumen 06/16/25 2032 18 G Anterior;Right Forearm 2 days         Peripheral IV - Single Lumen 06/16/25 2032 20 G Left Antecubital 2 days         Peripheral IV - Single Lumen 06/16/25 2033 20 G Anterior;Left Forearm 2 days                       Physical Exam  Vitals and nursing note reviewed.   Constitutional:       Comments: On supplemental O2   HENT:      Head:  "Normocephalic and atraumatic.   Eyes:      Pupils: Pupils are equal, round, and reactive to light.   Cardiovascular:      Rate and Rhythm: Normal rate and regular rhythm.      Heart sounds: S1 normal and S2 normal. No murmur heard.  Pulmonary:      Effort: Pulmonary effort is normal.   Musculoskeletal:      Right lower leg: Edema present.      Left lower leg: Edema present.      Comments: BLE edema improved   Skin:     General: Skin is warm and dry.   Neurological:      General: No focal deficit present.      Mental Status: He is oriented to person, place, and time.   Psychiatric:         Mood and Affect: Mood normal.         Behavior: Behavior normal.            Significant Labs: CMP   Recent Labs   Lab 06/18/25  0648 06/19/25  0620    141   K 4.2 4.3    109   CO2 24 24   * 122*   BUN 23 22   CREATININE 1.1 1.1   CALCIUM 8.9 8.7   PROT 6.8 6.5   ALBUMIN 3.1* 3.0*   BILITOT 0.3 0.3   ALKPHOS 133 140   AST 24 23   ALT 22 24   ANIONGAP 9 8   , CBC   Recent Labs   Lab 06/18/25  0648 06/19/25  0620   WBC 6.66 6.01   HGB 9.1* 9.0*   HCT 27.6* 27.9*    218   , and Troponin No results for input(s): "TROPONINIHS" in the last 48 hours.    Significant Imaging: Echocardiogram: Transthoracic echo (TTE) complete (Cupid Only):   Results for orders placed or performed during the hospital encounter of 06/16/25   Echo   Result Value Ref Range    BSA 1.99 m2    LVOT stroke volume 66.8 cm3    LVIDd 4.8 3.5 - 6.0 cm    LV Systolic Volume 40 mL    LV Systolic Volume Index 20.5 mL/m2    LVIDs 3.2 2.1 - 4.0 cm    LV ESV A2C 33.13 mL    LV Diastolic Volume 110 mL    LV ESV A4C 33.01 mL    LV Diastolic Volume Index 56.41 mL/m2    Left Ventricular End Systolic Volume by Teichholz Method 40.10 mL    Left Ventricular End Diastolic Volume by Teichholz Method 109.68 mL    IVS 1.0 0.6 - 1.1 cm    LVOT diameter 2.1 cm    LVOT area 3.5 cm2    FS 33.3 28 - 44 %    Left Ventricle Relative Wall Thickness 0.42 cm    PW 1.0 0.6 " - 1.1 cm    LV mass 170.2 g    LV Mass Index 87.3 g/m2    TDI LATERAL 0.10 m/s    TDI SEPTAL 0.09 m/s    TR Max Ruddy 2.5 m/s    LVOT peak ruddy 1.0 m/s    Left Ventricular Outflow Tract Mean Velocity 0.78 cm/s    Left Ventricular Outflow Tract Mean Gradient 2.57 mmHg    RV- marley basal diam 3.1 cm    RV-marley mid d 3.5 cm    RV mid diameter 3.49 cm    RVOT peak VTI 12.2 cm    TAPSE 1.6 cm    RV/LV Ratio 0.65 cm    LA size 2.9 cm    Left Atrium Minor Axis 3.4 cm    Left Atrium Major Axis 6.2 cm    LA Vol (MOD) 38 mL    LUKE (MOD) 19 mL/m2    RA Major Axis 4.61 cm    AV regurgitation pressure 1/2 time 457 ms    AR Max Ruddy 3.82 m/s    AV mean gradient 3 mmHg    AV peak gradient 6 mmHg    Ao peak ruddy 1.2 m/s    Ao VTI 24.4 cm    LVOT peak VTI 19.3 cm    AV valve area 2.7 cm²    AV Velocity Ratio 0.83     AV index (prosthetic) 0.79     JORGE by Velocity Ratio 2.9 cm²    Mr max ruddy 6.06 m/s    Triscuspid Valve Regurgitation Peak Gradient 26 mmHg    PV mean gradient 1 mmHg    RVOT peak ruddy 0.65 m/s    Ao root annulus 3.6 cm    STJ 2.9 cm    Ascending aorta 3.1 cm    ASI 1.6 cm/m2    IVC diameter 2.17 cm    Mean e' 0.10 m/s    ZLVIDS -0.52     ZLVIDD -1.47     LA area A4C 15.33 cm2    LA area A2C 13.50 cm2    RVDD 3.07 cm    LUKE 19 mL/m2    LA Vol 38 cm3    LA WIDTH 3.5 cm    RA Width 3.0 cm    TV resting pulmonary artery pressure 40 mmHg    RV TB RVSP 18 mmHg    Est. RA pres 15 mmHg    Narrative      Left Ventricle: The left ventricle is normal in size. Normal wall   thickness. Septal motion is abnormal. Septal flattening in diastole and   systole consistent with right ventricular volume and pressure overload.   There is normal systolic function with a visually estimated ejection   fraction of 55 - 60%. There is diastolic dysfunction.    Right Ventricle: The right ventricle is severely dilated Wall thickness   is normal. Right ventricle wall motion has Benitez's sign. Systolic   function is severely reduced.    Right Atrium:  The right atrium is dilated.    Aortic Valve: There is mild aortic valve sclerosis. There is mild   annular calcification present. There is moderate aortic regurgitation.    Mitral Valve: There is mild regurgitation.    Tricuspid Valve: There is mild to moderate regurgitation.    Pulmonary Artery: There is pulmonary hypertension. The estimated   pulmonary artery systolic pressure is 40 mmHg.    IVC/SVC: Elevated venous pressure at 15 mmHg.      and EKG: Reviewed  Assessment and Plan:   Patient who presents with PE/DVT. Stable CV wise. Given dose of IV Lasix. Being transitioned to Coumadin + Lovenox bridge.    * Bilateral pulmonary embolism  -Noted on CTA   -TTE pending  -Continue heparin gtt, will need NOAC upon d/c  -IR on board for possible intervention    6/18/2025  -Continue heparin gtt  -IR on board, no intervention planned at this juncture    6/19/2025  -Being transitioned to Eliquis + Lovenox bridge given interaction of Dilantin and Eliquis (reduced efficacy)    Elevated troponin  Troponin 0.194>0.192>0.160>0.133  -Elevation secondary to demand ischemia from acute PE  -Continue OMT  -TTE pending  -Can consider repeat ischemic workup as OP, prior MPI stress test 12/24 + scar, no ischemia    6/18/2025  -TTE with normal EF, severely reduced RV function  -See above    Status post right hip replacement  -Per ortho/primary team    Acute deep vein thrombosis (DVT) of both lower extremities  -Continue IV heparin, NOAC upon d/c  -IR on board     6/19/2025  -Coumadin + Lovenox bridge    Anemia  -Per primary team    Mixed hyperlipidemia  -Statin    Essential hypertension  -Resume home meds as tolerated        VTE Risk Mitigation (From admission, onward)           Ordered     warfarin (COUMADIN) tablet 5 mg  Daily         06/19/25 1358     enoxaparin injection 90 mg  Every 12 hours         06/19/25 1409     Place ZACK hose  Until discontinued         06/18/25 1108     Reason for No Pharmacological VTE Prophylaxis  Once         Comments: Heparin drip is ordered   Question:  Reasons:  Answer:  Physician Provided (leave comment)    06/16/25 2242     Reason for no Mechanical VTE Prophylaxis  Once        Comments: Current DVT per US tech at bedside   Question:  Reasons:  Answer:  Physician Provided (leave comment)    06/16/25 2242     IP VTE HIGH RISK PATIENT  Once         06/16/25 2242                    Clemencia Murray PA-C  Cardiology  [facility] - Telemetry ([facility])

## 2025-06-19 NOTE — SUBJECTIVE & OBJECTIVE
Review of Systems   Constitutional: Positive for malaise/fatigue.   HENT: Negative.     Eyes: Negative.    Cardiovascular:  Positive for dyspnea on exertion and leg swelling.   Respiratory:  Positive for shortness of breath.    Endocrine: Negative.    Hematologic/Lymphatic: Negative.    Skin: Negative.    Musculoskeletal: Negative.    Gastrointestinal: Negative.    Genitourinary: Negative.    Neurological: Negative.    Psychiatric/Behavioral: Negative.     Allergic/Immunologic: Negative.      Objective:     Vital Signs (Most Recent):  Temp: 98 °F (36.7 °C) (06/19/25 1121)  Pulse: 93 (06/19/25 1125)  Resp: 17 (06/19/25 1121)  BP: 121/66 (06/19/25 1121)  SpO2: 98 % (06/19/25 1121) Vital Signs (24h Range):  Temp:  [97.6 °F (36.4 °C)-98.7 °F (37.1 °C)] 98 °F (36.7 °C)  Pulse:  [] 93  Resp:  [17-18] 17  SpO2:  [92 %-98 %] 98 %  BP: (119-140)/(60-67) 121/66     Weight: 85.7 kg (188 lb 15 oz)  Body mass index is 30.49 kg/m².     SpO2: 98 %         Intake/Output Summary (Last 24 hours) at 6/19/2025 1414  Last data filed at 6/19/2025 1330  Gross per 24 hour   Intake 600 ml   Output 1775 ml   Net -1175 ml       Lines/Drains/Airways       Peripheral Intravenous Line  Duration                  Peripheral IV - Single Lumen 06/16/25 2032 18 G Anterior;Right Forearm 2 days         Peripheral IV - Single Lumen 06/16/25 2032 20 G Left Antecubital 2 days         Peripheral IV - Single Lumen 06/16/25 2033 20 G Anterior;Left Forearm 2 days                       Physical Exam  Vitals and nursing note reviewed.   Constitutional:       Comments: On supplemental O2   HENT:      Head: Normocephalic and atraumatic.   Eyes:      Pupils: Pupils are equal, round, and reactive to light.   Cardiovascular:      Rate and Rhythm: Normal rate and regular rhythm.      Heart sounds: S1 normal and S2 normal. No murmur heard.  Pulmonary:      Effort: Pulmonary effort is normal.   Musculoskeletal:      Right lower leg: Edema present.      Left  "lower leg: Edema present.      Comments: BLE edema improved   Skin:     General: Skin is warm and dry.   Neurological:      General: No focal deficit present.      Mental Status: He is oriented to person, place, and time.   Psychiatric:         Mood and Affect: Mood normal.         Behavior: Behavior normal.            Significant Labs: CMP   Recent Labs   Lab 06/18/25  0648 06/19/25  0620    141   K 4.2 4.3    109   CO2 24 24   * 122*   BUN 23 22   CREATININE 1.1 1.1   CALCIUM 8.9 8.7   PROT 6.8 6.5   ALBUMIN 3.1* 3.0*   BILITOT 0.3 0.3   ALKPHOS 133 140   AST 24 23   ALT 22 24   ANIONGAP 9 8   , CBC   Recent Labs   Lab 06/18/25  0648 06/19/25  0620   WBC 6.66 6.01   HGB 9.1* 9.0*   HCT 27.6* 27.9*    218   , and Troponin No results for input(s): "TROPONINIHS" in the last 48 hours.    Significant Imaging: Echocardiogram: Transthoracic echo (TTE) complete (Cupid Only):   Results for orders placed or performed during the hospital encounter of 06/16/25   Echo   Result Value Ref Range    BSA 1.99 m2    LVOT stroke volume 66.8 cm3    LVIDd 4.8 3.5 - 6.0 cm    LV Systolic Volume 40 mL    LV Systolic Volume Index 20.5 mL/m2    LVIDs 3.2 2.1 - 4.0 cm    LV ESV A2C 33.13 mL    LV Diastolic Volume 110 mL    LV ESV A4C 33.01 mL    LV Diastolic Volume Index 56.41 mL/m2    Left Ventricular End Systolic Volume by Teichholz Method 40.10 mL    Left Ventricular End Diastolic Volume by Teichholz Method 109.68 mL    IVS 1.0 0.6 - 1.1 cm    LVOT diameter 2.1 cm    LVOT area 3.5 cm2    FS 33.3 28 - 44 %    Left Ventricle Relative Wall Thickness 0.42 cm    PW 1.0 0.6 - 1.1 cm    LV mass 170.2 g    LV Mass Index 87.3 g/m2    TDI LATERAL 0.10 m/s    TDI SEPTAL 0.09 m/s    TR Max Ruddy 2.5 m/s    LVOT peak ruddy 1.0 m/s    Left Ventricular Outflow Tract Mean Velocity 0.78 cm/s    Left Ventricular Outflow Tract Mean Gradient 2.57 mmHg    RV- marley basal diam 3.1 cm    RV-marley mid d 3.5 cm    RV mid diameter 3.49 cm    " RVOT peak VTI 12.2 cm    TAPSE 1.6 cm    RV/LV Ratio 0.65 cm    LA size 2.9 cm    Left Atrium Minor Axis 3.4 cm    Left Atrium Major Axis 6.2 cm    LA Vol (MOD) 38 mL    LUKE (MOD) 19 mL/m2    RA Major Axis 4.61 cm    AV regurgitation pressure 1/2 time 457 ms    AR Max Amanuel 3.82 m/s    AV mean gradient 3 mmHg    AV peak gradient 6 mmHg    Ao peak amanuel 1.2 m/s    Ao VTI 24.4 cm    LVOT peak VTI 19.3 cm    AV valve area 2.7 cm²    AV Velocity Ratio 0.83     AV index (prosthetic) 0.79     JORGE by Velocity Ratio 2.9 cm²    Mr max amanuel 6.06 m/s    Triscuspid Valve Regurgitation Peak Gradient 26 mmHg    PV mean gradient 1 mmHg    RVOT peak amanuel 0.65 m/s    Ao root annulus 3.6 cm    STJ 2.9 cm    Ascending aorta 3.1 cm    ASI 1.6 cm/m2    IVC diameter 2.17 cm    Mean e' 0.10 m/s    ZLVIDS -0.52     ZLVIDD -1.47     LA area A4C 15.33 cm2    LA area A2C 13.50 cm2    RVDD 3.07 cm    LUKE 19 mL/m2    LA Vol 38 cm3    LA WIDTH 3.5 cm    RA Width 3.0 cm    TV resting pulmonary artery pressure 40 mmHg    RV TB RVSP 18 mmHg    Est. RA pres 15 mmHg    Narrative      Left Ventricle: The left ventricle is normal in size. Normal wall   thickness. Septal motion is abnormal. Septal flattening in diastole and   systole consistent with right ventricular volume and pressure overload.   There is normal systolic function with a visually estimated ejection   fraction of 55 - 60%. There is diastolic dysfunction.    Right Ventricle: The right ventricle is severely dilated Wall thickness   is normal. Right ventricle wall motion has Benitez's sign. Systolic   function is severely reduced.    Right Atrium: The right atrium is dilated.    Aortic Valve: There is mild aortic valve sclerosis. There is mild   annular calcification present. There is moderate aortic regurgitation.    Mitral Valve: There is mild regurgitation.    Tricuspid Valve: There is mild to moderate regurgitation.    Pulmonary Artery: There is pulmonary hypertension. The estimated    pulmonary artery systolic pressure is 40 mmHg.    IVC/SVC: Elevated venous pressure at 15 mmHg.      and EKG: Reviewed

## 2025-06-19 NOTE — PLAN OF CARE
A235/A235 RUTHIE Lombardi Jr. is a 75 y.o.male admitted on 6/16/2025 for Bilateral pulmonary embolism   Code Status: Full Code MRN: [id number]   Review of patient's allergies indicates:  No Known Allergies  Past Medical History:   Diagnosis Date    Anemia     Arthritis     Epilepsy     Last seizure 2010.     Hyperlipidemia     Hypertension     Prediabetes     Tubular adenoma of colon 04/02/2012    Vitamin D deficiency       PRN meds    acetaminophen, 650 mg, Q4H PRN  dextrose 50%, 12.5 g, PRN  dextrose 50%, 25 g, PRN  glucagon (human recombinant), 1 mg, PRN  glucose, 16 g, PRN  glucose, 24 g, PRN  melatonin, 6 mg, Nightly PRN  naloxone, 0.02 mg, PRN  polyethylene glycol, 17 g, Daily PRN  sodium chloride 0.9%, 3 mL, Q8H PRN         Pt oriented x4.    Pt remained afebrile throughout this shift.   All meds administered per order.   Pt remained free of falls this shift.   Plan of care reviewed. Patient verbalizes understanding.   Pt moving/turning independently in bed  Bed low, side rails up x 2, wheels locked, call light in reach.   Patient instructed to call for assistance.  Patient education provided                  Dyllan Coma Scale Score: 15     Lead Monitored: Lead II Rhythm: normal sinus rhythm    Cardiac/Telemetry Box Number: [id number]  VTE Core Measure: Pharmacological prophylaxis initiated/maintained Last Bowel Movement: 06/18/25  Diet Heart Healthy     Justin Score: 20  Fall Risk Score: 9  Accucheck []   Freq?      Lines/Drains/Airways       Peripheral Intravenous Line  Duration                  Peripheral IV - Single Lumen 06/16/25 2032 18 G Anterior;Right Forearm 2 days         Peripheral IV - Single Lumen 06/16/25 2032 20 G Left Antecubital 2 days         Peripheral IV - Single Lumen 06/16/25 2033 20 G Anterior;Left Forearm 2 days

## 2025-06-19 NOTE — PROGRESS NOTES
[facility] Medicine  Progress Note    Patient Name: Conner Lombardi Jr.  MRN: [id number]  Patient Class: IP- Inpatient   Admission Date: 6/16/2025  Length of Stay: 3 days  Attending Physician: Zeke Sheridan DO  Primary Care Provider: Tia Lopez MD      Subjective     Principal Problem:Bilateral pulmonary embolism      HPI:    Patient is a 75-year-old male with past medical history significant for seizures, hypertension, hyperlipidemia, anemia, prediabetes, vitamin-D deficiency, colon adenoma, and recent total right hip replacement secondary to arthritis on 5/30/2025 per Dr. Lee who presented to ED for evaluation of shortness of breath upon exertion. He also reports tachycardia. Patient reports that these symptoms first started on Saturday, however it was not noticed by his wife until today and he states that it got significantly worse today. He also reports swelling in legs (worse on left) and urinary frequency. He denies dizziness, lightheadedness, headache, weakness, fever, chills, chest pain, productive cough, abdominal pain, nausea, vomiting, diarrhea, dysuria, flank pain, urgency, decreased urine volume, difficulty urinating, hematuria, calf pain, thigh pain.  On arrival to ED, temp 98.1°, heart rate 133, respirations 22, blood pressure 139/75, 88% SpO2 on room air.  He was placed on 2 L nasal cannula with improvement to 94 to 96% SpO2.  Lab workup showed WBC 8.54, RBC 3.05, hemoglobin 10.4, hematocrit 30.7, platelets 245, D-dimer 22.58, CO2 19, BUN 29, creatinine 1.2, glucose 146, normal LFTs, , troponin 0.194, lactic acid 1.4.  chest x-ray demonstrated lungs are clear.  CTA chest PE study was done which was positive for extensive bilateral pulmonary emboli with right heart strain noted, this was communicated to Dr. Fall in ED who then notified IR on call, Dr Sharif. Ultrasound lower extremities veins bilateral shows bilateral acute DVTs with acute thrombus  in right popliteal and peroneal veins as well as acute thrombus in left popliteal and anterior tibial as well as peroneal veins.  EKG showed sinus tachycardia with heart rate 125, nonspecific ST and T-wave abnormalities.  Patient was started on heparin drip.  IR on-call (Dr Sharif) communicated that IR will see patient in a.m. to determine if candidate for intervention.  Hospital Medicine team was consulted for admission due to bilateral PEs, hypoxia, and bilateral DVTs.    Overview/Hospital Course:  Patient admitted to Hospital Medicine for Bilateral Pulmonary embolism. D-dimer elevated and CTA positive for extensive bilateral pulmonary emboli with right heart strain. Troponin elevated with downward trend. IR consulted for intervention. Echo showed septal motion is abnormal. Septal flattening in diastole and systole consistent with right ventricular volume and pressure overload. EF of 55 - 60%. There is diastolic dysfunction. Right Ventricle: The right ventricle is severely dilated Wall thickness is normal. Right ventricle wall motion has Benitez's sign. Systolic function is severely reduced. Right Atrium: The right atrium is dilated. Aortic Valve: There is mild aortic valve sclerosis. There is mild annular calcification present. There is moderate aortic regurgitation. Mitral Valve: There is mild regurgitation.Tricuspid Valve: There is mild to moderate regurgitation. PASP is 40 mmHg. IVC/SVC: Elevated venous pressure at 15 mmHg. Cardiology consulted. Heparin infusion initiated. US of BLE showed bilateral acute DVT.  Intervention vs conservative treatment to be reviewed. Pulmonology aware with consult to be placed pending need for surgical intervention. On 6/18/2025, pt remains stable on 2L NC with no intervention to be performed at this time per IR. Heparin infusion continued- will transition to oral dosing tomorrow if appropriate. Cardiology following. On 6/19/2025, pt verbalized symptom improvement with no  signs of acute distress on 2L NC- will wean supplemental oxygen as tolerated. Heparin infusion transitioned to Coumadin with Lovenox bridge. Pharmacy consulted. CM to assist with discharge planning and appointment with Coumadin clinic established. Patient instructed on Lovenox administration per nurse. Ambulatory referral for home oxygen obtained.     Interval History: pt in bed upon exam with no signs of acute distress. Pt on 2L NC - will wean as tolerated. Heparin infusion transitioned to Coumadin with Lovenox bridge. Pharmacy consulted. CM to assist with discharge planning with monitoring per Outpatient Coumadin clinic established.     Review of Systems   Constitutional:  Positive for activity change and fatigue.   HENT: Negative.     Respiratory:  Negative for shortness of breath.    Cardiovascular: Negative.    Gastrointestinal:  Negative for nausea and vomiting.   Genitourinary: Negative.    Musculoskeletal: Negative.    Skin: Negative.    Neurological: Negative.    Psychiatric/Behavioral: Negative.       Objective:     Vital Signs (Most Recent):  Temp: 98 °F (36.7 °C) (06/19/25 1121)  Pulse: 99 (06/19/25 1525)  Resp: 17 (06/19/25 1121)  BP: 121/66 (06/19/25 1121)  SpO2: 98 % (06/19/25 1121) Vital Signs (24h Range):  Temp:  [97.6 °F (36.4 °C)-98.5 °F (36.9 °C)] 98 °F (36.7 °C)  Pulse:  [] 99  Resp:  [17-18] 17  SpO2:  [92 %-98 %] 98 %  BP: (121-140)/(60-67) 121/66     Weight: 85.7 kg (188 lb 15 oz)  Body mass index is 30.49 kg/m².    Intake/Output Summary (Last 24 hours) at 6/19/2025 1527  Last data filed at 6/19/2025 1500  Gross per 24 hour   Intake 600 ml   Output 2075 ml   Net -1475 ml         Physical Exam  HENT:      Mouth/Throat:      Pharynx: Oropharynx is clear.   Cardiovascular:      Rate and Rhythm: Normal rate and regular rhythm.      Pulses: Normal pulses.      Heart sounds: Normal heart sounds.   Pulmonary:      Effort: Pulmonary effort is normal.      Breath sounds: Normal breath sounds.  "  Abdominal:      General: Bowel sounds are normal. There is no distension.      Palpations: Abdomen is soft.      Tenderness: There is no abdominal tenderness.   Musculoskeletal:      Cervical back: Normal range of motion.      Left lower leg: Edema (improved) present.      Comments: Edema to L calf    Skin:     General: Skin is warm and dry.   Neurological:      Mental Status: He is alert and oriented to person, place, and time.   Psychiatric:         Mood and Affect: Mood normal.               Significant Labs: All pertinent labs within the past 24 hours have been reviewed.    Significant Imaging: I have reviewed all pertinent imaging results/findings within the past 24 hours.      Assessment & Plan  Bilateral pulmonary embolism  CTA shows "Positive for extensive bilateral pulmonary emboli with right heart strain" per Dr. Raman Molina (radiology interpretation)  -- Heparin drip- transitioned to Coumadin with Lovenox bridge   --Current oxygen requirement 2L/min NC, he reports mainly short of breath only with exertion/ambulation- weaned as tolerated with ambulatory home oxygen referral obtained   --Initially NPO- then cardiac diet ordered   -- Bedrest for now  --IR was consulted -- Dr. Jatinder Sharif aware-troponin trended- Echo obtained and Heparin infusion continued- Surgical intervention vs conservative management to be discussed   -- trending troponin levels- downward trend   -- Echo showed septal motion is abnormal. Septal flattening in diastole and systole consistent with right ventricular volume and pressure overload. EF of 55 - 60%. There is diastolic dysfunction. Right Ventricle: The right ventricle is severely dilated Wall thickness is normal. Right ventricle wall motion has Benitez's sign. Systolic function is severely reduced. Right Atrium: The right atrium is dilated. Aortic Valve: There is mild aortic valve sclerosis. There is mild annular calcification present. There is moderate aortic " regurgitation. Mitral Valve: There is mild regurgitation.Tricuspid Valve: There is mild to moderate regurgitation. PASP is 40 mmHg. IVC/SVC: Elevated venous pressure at 15 mmHg.   -Cardiology following   -Pulmonology aware- will formally consult if surgical intervention warranteed   -6/18/2025- pt remains stable with no intervention per IR- Heparin continued- will transition to oral dosing as appropriate-   -6/19/2025- Heparin infusion transitioned to Coumadin and Lovenox with pharmacy consulted   outpatient monitoring with Coumadin clinic established per CM   Seizures  Continue home regimen antiepileptics -- Dilantin  mg nightly  Seizure precautions  -no seizure activity witnessed or reported     Acute deep vein thrombosis (DVT) of both lower extremities  --Acute thrombus within the right popliteal and peroneal veins.  There is acute thrombus in the left popliteal and anterior tibial and peroneal veins   --Will not use SCD  --Heparin drip infusing -transitioned to Coumadin with Lovenox bridge   -- IR consult to determine if candidate for intervention- no surgical intervention at this time per IR  --pt able to ambulate with O2 sat of 87% on RA and stable on 2L NC on 6/17/2025  --repeat home oxygen evaluation obtained - will wean supplemental oxygen as tolerated     Essential hypertension  Patient's blood pressure range in the last 24 hours was: BP  Min: 121/66  Max: 140/63.The patient's inpatient anti-hypertensive regimen is listed below:  Current Antihypertensives   On hold for now, BP controlled    Plan  - BP is controlled, no changes needed to their regimen  - will resume home med when appropriate  Mixed hyperlipidemia  Continue statin    Anemia  Anemia is likely due to acute blood loss which was from recent surgery. Most recent hemoglobin and hematocrit are listed below.  Recent Labs     06/17/25  0405 06/18/25  0648 06/19/25  0620   HGB 8.7* 9.1* 9.0*   HCT 26.0* 27.6* 27.9*     Plan  - Monitor serial  CBC: Daily  - Transfuse PRBC if patient becomes hemodynamically unstable, symptomatic or H/H drops below 7/21.    Status post right hip replacement  Patient had total right hip arthroplasty on 05/30/2025 per Dr. Lee  Monitor incision  He reports minimal pain and has not required narcotic pain medication for past couple of days  -Orthopedic Surgery aware of post-op complications    Elevated troponin  -in the setting of bilateral PE   -Heparin infusion continued - transitioned to Coumadin with Lovenox bridge   -trending down- 0.194> 0.192> 0.160> 0.133  -Cardiology following   -established with Coumadin Clinic outpatient prior to discharge per CM   Vitamin D deficiency  -Vitamin D supplement continued     VTE Risk Mitigation (From admission, onward)           Ordered     warfarin (COUMADIN) tablet 5 mg  Daily         06/19/25 1358     enoxaparin injection 90 mg  Every 12 hours         06/19/25 1409     Place ZACK hose  Until discontinued         06/18/25 1108     Reason for No Pharmacological VTE Prophylaxis  Once        Comments: Heparin drip is ordered   Question:  Reasons:  Answer:  Physician Provided (leave comment)    06/16/25 2242     Reason for no Mechanical VTE Prophylaxis  Once        Comments: Current DVT per US tech at bedside   Question:  Reasons:  Answer:  Physician Provided (leave comment)    06/16/25 2242     IP VTE HIGH RISK PATIENT  Once         06/16/25 2242                    Discharge Planning   ARABELLA: 6/19/2025     Code Status: Full Code   Patient is Medically Not Yet Ready for discharge.  Medical Readiness for Discharge Date:   Discharge Plan A: Home Health          SDOH Screening:  The patient was screened for utility difficulties, food insecurity, transport difficulties, housing insecurity, and interpersonal safety and there were no concerns identified this admission.                      Cynthia Lara NP  Department of Hospital Medicine   '[place] - Telemetry ([facility])

## 2025-06-19 NOTE — ASSESSMENT & PLAN NOTE
Anemia is likely due to acute blood loss which was from recent surgery. Most recent hemoglobin and hematocrit are listed below.  Recent Labs     06/17/25  0405 06/18/25  0648 06/19/25  0620   HGB 8.7* 9.1* 9.0*   HCT 26.0* 27.6* 27.9*     Plan  - Monitor serial CBC: Daily  - Transfuse PRBC if patient becomes hemodynamically unstable, symptomatic or H/H drops below 7/21.

## 2025-06-19 NOTE — PLAN OF CARE
06/19/25 1019   Rounds   Attendance Provider;Nurse ;Charge nurse;Physical therapist   Discharge Plan A Home Health   Why the patient remains in the hospital Requires continued medical care   Transition of Care Barriers None     On Heparin gtt, no IR intervention, Cards following, on 2L NC. Will need home O2 eval prior to DC.

## 2025-06-19 NOTE — ASSESSMENT & PLAN NOTE
--Acute thrombus within the right popliteal and peroneal veins.  There is acute thrombus in the left popliteal and anterior tibial and peroneal veins   --Will not use SCD  --Heparin drip infusing -transitioned to Coumadin with Lovenox bridge   -- IR consult to determine if candidate for intervention- no surgical intervention at this time per IR  --pt able to ambulate with O2 sat of 87% on RA and stable on 2L NC on 6/17/2025  --repeat home oxygen evaluation obtained - will wean supplemental oxygen as tolerated

## 2025-06-19 NOTE — RESPIRATORY THERAPY
Home Oxygen Evaluation - [facility] - Cardiopulmonary Department      Date Performed: 6/19/2025      1) Patient's Home O2 Sat on room air, while at rest: Room Air SpO2 At Rest: 95 %        If O2 sats on room air at rest are 88% or below, patient qualifies.  Document O2 liter flow needed in Step 2.  If O2 sats are 89% or above, complete Step 3.        2)  If patient is not ambulated and O2 sats are <88%, what is the O2 liter flow required to meet ordered saturation? Home O2 Eval Comments: Lowest SaO2 achieved=92% room air    If O2 sats on room air while exercising remain 89% or above patient does not qualify, no further testing needed Document N/A in step 3. If O2 sats on room air while exercising are 88% or below, continue to Step 4.    3) Patient's O2 Sat on room air while exercising: Room Air SpO2 During Ambulation: 92 %        4) Patient's O2 Sat while exercising on O2: SpO2 During Ambulation on O2: 91 % at Ambulation O2 LPM: 2 LPM         (Must show improvement from #4 for patients to qualify)           No SOB noted throughout evaL

## 2025-06-19 NOTE — ASSESSMENT & PLAN NOTE
Patient's blood pressure range in the last 24 hours was: BP  Min: 121/66  Max: 140/63.The patient's inpatient anti-hypertensive regimen is listed below:  Current Antihypertensives   On hold for now, BP controlled    Plan  - BP is controlled, no changes needed to their regimen  - will resume home med when appropriate

## 2025-06-19 NOTE — ASSESSMENT & PLAN NOTE
-Noted on CTA   -TTE pending  -Continue heparin gtt, will need NOAC upon d/c  -IR on board for possible intervention    6/18/2025  -Continue heparin gtt  -IR on board, no intervention planned at this juncture    6/19/2025  -Being transitioned to Eliquis + Lovenox bridge given interaction of Dilantin and Eliquis (reduced efficacy)

## 2025-06-20 ENCOUNTER — ANTI-COAG VISIT (OUTPATIENT)
Dept: CARDIOLOGY | Facility: CLINIC | Age: 75
End: 2025-06-20
Payer: COMMERCIAL

## 2025-06-20 VITALS
DIASTOLIC BLOOD PRESSURE: 82 MMHG | RESPIRATION RATE: 18 BRPM | BODY MASS INDEX: 30.36 KG/M2 | TEMPERATURE: 98 F | WEIGHT: 188.94 LBS | SYSTOLIC BLOOD PRESSURE: 142 MMHG | HEART RATE: 98 BPM | HEIGHT: 66 IN | OXYGEN SATURATION: 96 %

## 2025-06-20 DIAGNOSIS — Z79.01 LONG TERM (CURRENT) USE OF ANTICOAGULANTS: Primary | ICD-10-CM

## 2025-06-20 DIAGNOSIS — I26.99 BILATERAL PULMONARY EMBOLISM: ICD-10-CM

## 2025-06-20 LAB
ABSOLUTE EOSINOPHIL (OHS): 0.75 K/UL
ABSOLUTE MONOCYTE (OHS): 0.64 K/UL (ref 0.3–1)
ABSOLUTE NEUTROPHIL COUNT (OHS): 2.89 K/UL (ref 1.8–7.7)
ANION GAP (OHS): 10 MMOL/L (ref 8–16)
BASOPHILS # BLD AUTO: 0.04 K/UL
BASOPHILS NFR BLD AUTO: 0.7 %
BUN SERPL-MCNC: 25 MG/DL (ref 8–23)
CALCIUM SERPL-MCNC: 9.2 MG/DL (ref 8.7–10.5)
CHLORIDE SERPL-SCNC: 106 MMOL/L (ref 95–110)
CO2 SERPL-SCNC: 23 MMOL/L (ref 23–29)
CREAT SERPL-MCNC: 1.1 MG/DL (ref 0.5–1.4)
ERYTHROCYTE [DISTWIDTH] IN BLOOD BY AUTOMATED COUNT: 13.4 % (ref 11.5–14.5)
GFR SERPLBLD CREATININE-BSD FMLA CKD-EPI: >60 ML/MIN/1.73/M2
GLUCOSE SERPL-MCNC: 106 MG/DL (ref 70–110)
HCT VFR BLD AUTO: 29.3 % (ref 40–54)
HGB BLD-MCNC: 10 GM/DL (ref 14–18)
IMM GRANULOCYTES # BLD AUTO: 0.03 K/UL (ref 0–0.04)
IMM GRANULOCYTES NFR BLD AUTO: 0.5 % (ref 0–0.5)
INR PPP: 1.1 (ref 0.8–1.2)
LYMPHOCYTES # BLD AUTO: 1.72 K/UL (ref 1–4.8)
MCH RBC QN AUTO: 34.5 PG (ref 27–31)
MCHC RBC AUTO-ENTMCNC: 34.1 G/DL (ref 32–36)
MCV RBC AUTO: 101 FL (ref 82–98)
NUCLEATED RBC (/100WBC) (OHS): 0 /100 WBC
PLATELET # BLD AUTO: 235 K/UL (ref 150–450)
PMV BLD AUTO: 9.7 FL (ref 9.2–12.9)
POTASSIUM SERPL-SCNC: 3.9 MMOL/L (ref 3.5–5.1)
PROTHROMBIN TIME: 12.4 SECONDS (ref 9–12.5)
RBC # BLD AUTO: 2.9 M/UL (ref 4.6–6.2)
RELATIVE EOSINOPHIL (OHS): 12.4 %
RELATIVE LYMPHOCYTE (OHS): 28.3 % (ref 18–48)
RELATIVE MONOCYTE (OHS): 10.5 % (ref 4–15)
RELATIVE NEUTROPHIL (OHS): 47.6 % (ref 38–73)
SODIUM SERPL-SCNC: 139 MMOL/L (ref 136–145)
WBC # BLD AUTO: 6.07 K/UL (ref 3.9–12.7)

## 2025-06-20 PROCEDURE — 36415 COLL VENOUS BLD VENIPUNCTURE: CPT | Performed by: EMERGENCY MEDICINE

## 2025-06-20 PROCEDURE — 85025 COMPLETE CBC W/AUTO DIFF WBC: CPT | Performed by: EMERGENCY MEDICINE

## 2025-06-20 PROCEDURE — 85610 PROTHROMBIN TIME: CPT | Performed by: NURSE PRACTITIONER

## 2025-06-20 PROCEDURE — 99233 SBSQ HOSP IP/OBS HIGH 50: CPT | Mod: ,,, | Performed by: INTERNAL MEDICINE

## 2025-06-20 PROCEDURE — 82310 ASSAY OF CALCIUM: CPT | Performed by: INTERNAL MEDICINE

## 2025-06-20 PROCEDURE — 25000003 PHARM REV CODE 250: Performed by: NURSE PRACTITIONER

## 2025-06-20 PROCEDURE — 63600175 PHARM REV CODE 636 W HCPCS: Performed by: NURSE PRACTITIONER

## 2025-06-20 PROCEDURE — 36415 COLL VENOUS BLD VENIPUNCTURE: CPT | Performed by: INTERNAL MEDICINE

## 2025-06-20 RX ORDER — ENOXAPARIN SODIUM 100 MG/ML
1 INJECTION SUBCUTANEOUS EVERY 12 HOURS
Qty: 75.6 ML | Refills: 0 | Status: SHIPPED | OUTPATIENT
Start: 2025-06-20 | End: 2025-06-24 | Stop reason: SDUPTHER

## 2025-06-20 RX ORDER — WARFARIN SODIUM 5 MG/1
5 TABLET ORAL DAILY
Qty: 42 TABLET | Refills: 0 | Status: SHIPPED | OUTPATIENT
Start: 2025-06-21 | End: 2025-08-02

## 2025-06-20 RX ADMIN — ENOXAPARIN SODIUM 90 MG: 100 INJECTION SUBCUTANEOUS at 03:06

## 2025-06-20 RX ADMIN — CHOLECALCIFEROL TAB 25 MCG (1000 UNIT) 1000 UNITS: 25 TAB at 08:06

## 2025-06-20 NOTE — DISCHARGE INSTRUCTIONS
-You were admitted to our hospital for treatment of pulmonary embolism/DVT concerns. Over the course of your hospitalization, our Cardiology and Interventional Radiology teams evaluated you    -Please read the attached information regarding DVT, pulmonary embolism, warfarin adverse effects, enoxaparin adverse effects and go to your nearest ED if any of the alarm/concerning signs develop.    -We have placed a referral to Cardiology.  Please give our scheduling line a call at [phone] to schedule an appointment with them if it has not been setup already.    -Your labs remained stable, but some labs still remained abnormal. As we discussed, it is extremely important for you to followup with a primary care physician within 1 week for repeat lab work to ensure normalization, follow up of pending labs, medication adjustments/refills, routine post-discharge care, and any other evaluations as necessitated.        .  Our goal at [facility] is to always give you outstanding care and exceptional service. You may receive a survey from [employer] by mail, text or e-mail in the next 24-48 hours asking about the care you received with us. The survey should only take 5-10 minutes to complete and is very important to us.     Your feedback provides us with a way to recognize our staff who work tirelessly to provide the best care! Also, your responses help us learn how to improve when your experience was below our aspiration of excellence. We are always looking for ways to improve your stay. We WILL use your feedback to continue making improvements to help us provide the highest quality care. We keep your personal information and feedback confidential. We appreciate your time completing this survey and can't wait to hear from you!!!    We look forward to your continued care with us! Thanks so much for choosing [facility] for your healthcare needs!    Referral placed for [facility] per Case Management

## 2025-06-20 NOTE — PROGRESS NOTES
[facility] ([facility])  Cardiology  Progress Note    Patient Name: Conner Lombardi Jr.  MRN: [id number]  Admission Date: 6/16/2025  Hospital Length of Stay: 4 days  Code Status: Prior   Attending Physician: No att. providers found   Primary Care Physician: Tia Lopez MD  Expected Discharge Date: 6/20/2025  Principal Problem:Bilateral pulmonary embolism    Subjective:     Hospital Course:   6/18/2025-Patient seen and examined today, resting in bed. Feels ok. No real complaints. Ambulated earlier. Denies CP. TTE with normal EF, severely reduced systolic function, mild to mod TR, pulmonary HTN. Labs reviewed. Creatinine 1.1. H/H 9.1/27.6.     6/19/2025-Patient seen and examined today, lying in bed. Stable CV wise. No real complaints. Give dose of IV Lasix. Will be transitioned to Coumadin with Lovenox bridge given interaction between Dilantin and Eliquis (reduced efficacy). Labs reviewed.    6/20/25- stable overnight, no CP/SOB, ready for DC with coumadin and coumadin clinic        Review of Systems   Constitutional: Positive for malaise/fatigue.   HENT: Negative.     Eyes: Negative.    Cardiovascular:  Positive for dyspnea on exertion and leg swelling.   Respiratory:  Positive for shortness of breath.    Endocrine: Negative.    Hematologic/Lymphatic: Negative.    Skin: Negative.    Musculoskeletal: Negative.    Gastrointestinal: Negative.    Genitourinary: Negative.    Neurological: Negative.    Psychiatric/Behavioral: Negative.     Allergic/Immunologic: Negative.      Objective:     Vital Signs (Most Recent):  Temp: 98.4 °F (36.9 °C) (06/20/25 1154)  Pulse: 98 (06/20/25 1154)  Resp: 18 (06/20/25 1154)  BP: (!) 142/82 (06/20/25 1154)  SpO2: 96 % (06/20/25 1154) Vital Signs (24h Range):  Temp:  [97.6 °F (36.4 °C)-98.5 °F (36.9 °C)] 98.4 °F (36.9 °C)  Pulse:  [] 98  Resp:  [18] 18  SpO2:  [92 %-96 %] 96 %  BP: (121-142)/(58-82) 142/82     Weight: 85.7 kg (188 lb 15 oz)  Body mass index is 30.49  "kg/m².     SpO2: 96 %         Intake/Output Summary (Last 24 hours) at 6/20/2025 1748  Last data filed at 6/20/2025 1230  Gross per 24 hour   Intake 440 ml   Output 1050 ml   Net -610 ml       Lines/Drains/Airways       None                      Physical Exam  Vitals and nursing note reviewed.   Constitutional:       Comments: On supplemental O2   HENT:      Head: Normocephalic and atraumatic.   Eyes:      Pupils: Pupils are equal, round, and reactive to light.   Cardiovascular:      Rate and Rhythm: Normal rate and regular rhythm.      Heart sounds: S1 normal and S2 normal. No murmur heard.  Pulmonary:      Effort: Pulmonary effort is normal.   Musculoskeletal:      Right lower leg: Edema present.      Left lower leg: Edema present.      Comments: BLE edema improved   Skin:     General: Skin is warm and dry.   Neurological:      General: No focal deficit present.      Mental Status: He is oriented to person, place, and time.   Psychiatric:         Mood and Affect: Mood normal.         Behavior: Behavior normal.            Significant Labs: CMP   Recent Labs   Lab 06/19/25  0620 06/20/25  0653    139   K 4.3 3.9    106   CO2 24 23   * 106   BUN 22 25*   CREATININE 1.1 1.1   CALCIUM 8.7 9.2   PROT 6.5  --    ALBUMIN 3.0*  --    BILITOT 0.3  --    ALKPHOS 140  --    AST 23  --    ALT 24  --    ANIONGAP 8 10   , CBC   Recent Labs   Lab 06/19/25  0620 06/20/25  0449   WBC 6.01 6.07   HGB 9.0* 10.0*   HCT 27.9* 29.3*    235   , and Troponin No results for input(s): "TROPONINIHS" in the last 48 hours.    Significant Imaging: Echocardiogram: Transthoracic echo (TTE) complete (Cupid Only):   Results for orders placed or performed during the hospital encounter of 06/16/25   Echo   Result Value Ref Range    BSA 1.99 m2    LVOT stroke volume 66.8 cm3    LVIDd 4.8 3.5 - 6.0 cm    LV Systolic Volume 40 mL    LV Systolic Volume Index 20.5 mL/m2    LVIDs 3.2 2.1 - 4.0 cm    LV ESV A2C 33.13 mL    LV " Diastolic Volume 110 mL    LV ESV A4C 33.01 mL    LV Diastolic Volume Index 56.41 mL/m2    Left Ventricular End Systolic Volume by Teichholz Method 40.10 mL    Left Ventricular End Diastolic Volume by Teichholz Method 109.68 mL    IVS 1.0 0.6 - 1.1 cm    LVOT diameter 2.1 cm    LVOT area 3.5 cm2    FS 33.3 28 - 44 %    Left Ventricle Relative Wall Thickness 0.42 cm    PW 1.0 0.6 - 1.1 cm    LV mass 170.2 g    LV Mass Index 87.3 g/m2    TDI LATERAL 0.10 m/s    TDI SEPTAL 0.09 m/s    TR Max Ruddy 2.5 m/s    LVOT peak ruddy 1.0 m/s    Left Ventricular Outflow Tract Mean Velocity 0.78 cm/s    Left Ventricular Outflow Tract Mean Gradient 2.57 mmHg    RV- marley basal diam 3.1 cm    RV-marley mid d 3.5 cm    RV mid diameter 3.49 cm    RVOT peak VTI 12.2 cm    TAPSE 1.6 cm    RV/LV Ratio 0.65 cm    LA size 2.9 cm    Left Atrium Minor Axis 3.4 cm    Left Atrium Major Axis 6.2 cm    LA Vol (MOD) 38 mL    LUKE (MOD) 19 mL/m2    RA Major Axis 4.61 cm    AV regurgitation pressure 1/2 time 457 ms    AR Max Ruddy 3.82 m/s    AV mean gradient 3 mmHg    AV peak gradient 6 mmHg    Ao peak ruddy 1.2 m/s    Ao VTI 24.4 cm    LVOT peak VTI 19.3 cm    AV valve area 2.7 cm²    AV Velocity Ratio 0.83     AV index (prosthetic) 0.79     JORGE by Velocity Ratio 2.9 cm²    Mr max ruddy 6.06 m/s    Triscuspid Valve Regurgitation Peak Gradient 26 mmHg    PV mean gradient 1 mmHg    RVOT peak ruddy 0.65 m/s    Ao root annulus 3.6 cm    STJ 2.9 cm    Ascending aorta 3.1 cm    ASI 1.6 cm/m2    IVC diameter 2.17 cm    Mean e' 0.10 m/s    ZLVIDS -0.52     ZLVIDD -1.47     LA area A4C 15.33 cm2    LA area A2C 13.50 cm2    RVDD 3.07 cm    LUKE 19 mL/m2    LA Vol 38 cm3    LA WIDTH 3.5 cm    RA Width 3.0 cm    TV resting pulmonary artery pressure 40 mmHg    RV TB RVSP 18 mmHg    Est. RA pres 15 mmHg    Narrative      Left Ventricle: The left ventricle is normal in size. Normal wall   thickness. Septal motion is abnormal. Septal flattening in diastole and   systole  consistent with right ventricular volume and pressure overload.   There is normal systolic function with a visually estimated ejection   fraction of 55 - 60%. There is diastolic dysfunction.    Right Ventricle: The right ventricle is severely dilated Wall thickness   is normal. Right ventricle wall motion has Benitez's sign. Systolic   function is severely reduced.    Right Atrium: The right atrium is dilated.    Aortic Valve: There is mild aortic valve sclerosis. There is mild   annular calcification present. There is moderate aortic regurgitation.    Mitral Valve: There is mild regurgitation.    Tricuspid Valve: There is mild to moderate regurgitation.    Pulmonary Artery: There is pulmonary hypertension. The estimated   pulmonary artery systolic pressure is 40 mmHg.    IVC/SVC: Elevated venous pressure at 15 mmHg.      and EKG: Reviewed  Assessment and Plan:         * Bilateral pulmonary embolism  -Noted on CTA   -TTE pending  -Continue heparin gtt, will need NOAC upon d/c  -IR on board for possible intervention    6/18/2025  -Continue heparin gtt  -IR on board, no intervention planned at this juncture    6/19/2025  -Being transitioned to Eliquis + Lovenox bridge given interaction of Dilantin and Eliquis (reduced efficacy)    6/20/25  DC with coumadin and Lovenox bridge     Elevated troponin  Troponin 0.194>0.192>0.160>0.133  -Elevation secondary to demand ischemia from acute PE  -Continue OMT  -TTE pending  -Can consider repeat ischemic workup as OP, prior MPI stress test 12/24 + scar, no ischemia    6/18/2025  -TTE with normal EF, severely reduced RV function  -See above    Status post right hip replacement  -Per ortho/primary team    Acute deep vein thrombosis (DVT) of both lower extremities  -Continue IV heparin, NOAC upon d/c  -IR on board     6/19/2025  -Coumadin + Lovenox bridge    Anemia  -Per primary team    Mixed hyperlipidemia  -Statin    Essential hypertension  -Resume home meds as  tolerated        VTE Risk Mitigation (From admission, onward)           Ordered     IP VTE HIGH RISK PATIENT  Once         06/16/25 9419                    Sky Tse Md, MD  Cardiology  [facility] - Tele[facility] ([facility])

## 2025-06-20 NOTE — ASSESSMENT & PLAN NOTE
Anemia is likely due to acute blood loss which was from recent surgery. Most recent hemoglobin and hematocrit are listed below.  Recent Labs     06/18/25  0648 06/19/25  0620 06/20/25  0449   HGB 9.1* 9.0* 10.0*   HCT 27.6* 27.9* 29.3*     Plan  - Monitor serial CBC: Daily  - Transfuse PRBC if patient becomes hemodynamically unstable, symptomatic or H/H drops below 7/21.

## 2025-06-20 NOTE — PLAN OF CARE
T A235/A235 RUTHIE Lombardi Jr. is a 75 y.o.male admitted on 6/16/2025 for Bilateral pulmonary embolism   Code Status: Full Code MRN: [id number]   Review of patient's allergies indicates:  No Known Allergies  Past Medical History:   Diagnosis Date    Anemia     Arthritis     Epilepsy     Last seizure 2010.     Hyperlipidemia     Hypertension     Prediabetes     Tubular adenoma of colon 04/02/2012    Vitamin D deficiency       PRN meds    acetaminophen, 650 mg, Q4H PRN  dextrose 50%, 12.5 g, PRN  dextrose 50%, 25 g, PRN  glucagon (human recombinant), 1 mg, PRN  glucose, 16 g, PRN  glucose, 24 g, PRN  melatonin, 6 mg, Nightly PRN  naloxone, 0.02 mg, PRN  polyethylene glycol, 17 g, Daily PRN  sodium chloride 0.9%, 3 mL, Q8H PRN         Discharge instructions received and reviewed with pt and family at bedside with ryanne Hightower  Pt voiced understanding and all questions answered to satisfaction.  Stressed importance to making and keeping all follow up appointments.  Medications sent to pt pharmacy and reviewed with pt.  Tele monitor removed and brought to monitor tech.  IV d/c'd with tip intact, pressure dressing applied.  Pt transported to front of hospital via w/c by PCT to be discharged home.                   [place] Coma Scale Score: 15     Lead Monitored: Lead II Rhythm: sinus tachycardia    Cardiac/Telemetry Box Number: [id number]  VTE Core Measure: Pharmacological prophylaxis initiated/maintained Last Bowel Movement: 06/18/25  Diet Cardiac     Justin Score: 20  Fall Risk Score: 9  Accucheck []   Freq?      Lines/Drains/Airways       None

## 2025-06-20 NOTE — PROGRESS NOTES
76 y/o referred to [facility] from hospital stay related to bilateral PE.  Pt is a few weeks post-op from hip replacement.  PMHX seizures, hypertension, hyperlipidemia, anemia, prediabetes, vitamin-D deficiency, colon adenoma.  ED visit showed Ultrasound lower extremities veins bilateral shows bilateral acute DVTs with acute thrombus in right popliteal and peroneal veins as well as acute thrombus in left popliteal and anterior tibial as well as peroneal veins. D-dimer elevated and CTA positive for extensive bilateral pulmonary emboli with right heart strain.  Pt has left the hospital on enoxaparin 90 mg q 12 hours + warfarin 5 mg QD.  He is taking phenytoin, which can have varying affect on INR - increase or decrease.  No other significant DDIs.  He should have [facility].  We will obtain next INR on Monday with [facility].  LVM for Mrs Lombardi regarding our monitoring.  INR requested Monday via [facility], faxed orders.  Reminded on VM to overlap warfarin + enoxaparin.  Will review INR with them on Monday 6/23/25.  Will send Vit K info in the portal.

## 2025-06-20 NOTE — SUBJECTIVE & OBJECTIVE
Review of Systems   Constitutional: Positive for malaise/fatigue.   HENT: Negative.     Eyes: Negative.    Cardiovascular:  Positive for dyspnea on exertion and leg swelling.   Respiratory:  Positive for shortness of breath.    Endocrine: Negative.    Hematologic/Lymphatic: Negative.    Skin: Negative.    Musculoskeletal: Negative.    Gastrointestinal: Negative.    Genitourinary: Negative.    Neurological: Negative.    Psychiatric/Behavioral: Negative.     Allergic/Immunologic: Negative.      Objective:     Vital Signs (Most Recent):  Temp: 98.4 °F (36.9 °C) (06/20/25 1154)  Pulse: 98 (06/20/25 1154)  Resp: 18 (06/20/25 1154)  BP: (!) 142/82 (06/20/25 1154)  SpO2: 96 % (06/20/25 1154) Vital Signs (24h Range):  Temp:  [97.6 °F (36.4 °C)-98.5 °F (36.9 °C)] 98.4 °F (36.9 °C)  Pulse:  [] 98  Resp:  [18] 18  SpO2:  [92 %-96 %] 96 %  BP: (121-142)/(58-82) 142/82     Weight: 85.7 kg (188 lb 15 oz)  Body mass index is 30.49 kg/m².     SpO2: 96 %         Intake/Output Summary (Last 24 hours) at 6/20/2025 1748  Last data filed at 6/20/2025 1230  Gross per 24 hour   Intake 440 ml   Output 1050 ml   Net -610 ml       Lines/Drains/Airways       None                      Physical Exam  Vitals and nursing note reviewed.   Constitutional:       Comments: On supplemental O2   HENT:      Head: Normocephalic and atraumatic.   Eyes:      Pupils: Pupils are equal, round, and reactive to light.   Cardiovascular:      Rate and Rhythm: Normal rate and regular rhythm.      Heart sounds: S1 normal and S2 normal. No murmur heard.  Pulmonary:      Effort: Pulmonary effort is normal.   Musculoskeletal:      Right lower leg: Edema present.      Left lower leg: Edema present.      Comments: BLE edema improved   Skin:     General: Skin is warm and dry.   Neurological:      General: No focal deficit present.      Mental Status: He is oriented to person, place, and time.   Psychiatric:         Mood and Affect: Mood normal.          "Behavior: Behavior normal.            Significant Labs: CMP   Recent Labs   Lab 06/19/25  0620 06/20/25  0653    139   K 4.3 3.9    106   CO2 24 23   * 106   BUN 22 25*   CREATININE 1.1 1.1   CALCIUM 8.7 9.2   PROT 6.5  --    ALBUMIN 3.0*  --    BILITOT 0.3  --    ALKPHOS 140  --    AST 23  --    ALT 24  --    ANIONGAP 8 10   , CBC   Recent Labs   Lab 06/19/25  0620 06/20/25  0449   WBC 6.01 6.07   HGB 9.0* 10.0*   HCT 27.9* 29.3*    235   , and Troponin No results for input(s): "TROPONINIHS" in the last 48 hours.    Significant Imaging: Echocardiogram: Transthoracic echo (TTE) complete (Cupid Only):   Results for orders placed or performed during the hospital encounter of 06/16/25   Echo   Result Value Ref Range    BSA 1.99 m2    LVOT stroke volume 66.8 cm3    LVIDd 4.8 3.5 - 6.0 cm    LV Systolic Volume 40 mL    LV Systolic Volume Index 20.5 mL/m2    LVIDs 3.2 2.1 - 4.0 cm    LV ESV A2C 33.13 mL    LV Diastolic Volume 110 mL    LV ESV A4C 33.01 mL    LV Diastolic Volume Index 56.41 mL/m2    Left Ventricular End Systolic Volume by Teichholz Method 40.10 mL    Left Ventricular End Diastolic Volume by Teichholz Method 109.68 mL    IVS 1.0 0.6 - 1.1 cm    LVOT diameter 2.1 cm    LVOT area 3.5 cm2    FS 33.3 28 - 44 %    Left Ventricle Relative Wall Thickness 0.42 cm    PW 1.0 0.6 - 1.1 cm    LV mass 170.2 g    LV Mass Index 87.3 g/m2    TDI LATERAL 0.10 m/s    TDI SEPTAL 0.09 m/s    TR Max Ruddy 2.5 m/s    LVOT peak ruddy 1.0 m/s    Left Ventricular Outflow Tract Mean Velocity 0.78 cm/s    Left Ventricular Outflow Tract Mean Gradient 2.57 mmHg    RV- marley basal diam 3.1 cm    RV-marley mid d 3.5 cm    RV mid diameter 3.49 cm    RVOT peak VTI 12.2 cm    TAPSE 1.6 cm    RV/LV Ratio 0.65 cm    LA size 2.9 cm    Left Atrium Minor Axis 3.4 cm    Left Atrium Major Axis 6.2 cm    LA Vol (MOD) 38 mL    LUKE (MOD) 19 mL/m2    RA Major Axis 4.61 cm    AV regurgitation pressure 1/2 time 457 ms    AR Max Ruddy " 3.82 m/s    AV mean gradient 3 mmHg    AV peak gradient 6 mmHg    Ao peak amanuel 1.2 m/s    Ao VTI 24.4 cm    LVOT peak VTI 19.3 cm    AV valve area 2.7 cm²    AV Velocity Ratio 0.83     AV index (prosthetic) 0.79     JORGE by Velocity Ratio 2.9 cm²    Mr max amanuel 6.06 m/s    Triscuspid Valve Regurgitation Peak Gradient 26 mmHg    PV mean gradient 1 mmHg    RVOT peak amanuel 0.65 m/s    Ao root annulus 3.6 cm    STJ 2.9 cm    Ascending aorta 3.1 cm    ASI 1.6 cm/m2    IVC diameter 2.17 cm    Mean e' 0.10 m/s    ZLVIDS -0.52     ZLVIDD -1.47     LA area A4C 15.33 cm2    LA area A2C 13.50 cm2    RVDD 3.07 cm    LUKE 19 mL/m2    LA Vol 38 cm3    LA WIDTH 3.5 cm    RA Width 3.0 cm    TV resting pulmonary artery pressure 40 mmHg    RV TB RVSP 18 mmHg    Est. RA pres 15 mmHg    Narrative      Left Ventricle: The left ventricle is normal in size. Normal wall   thickness. Septal motion is abnormal. Septal flattening in diastole and   systole consistent with right ventricular volume and pressure overload.   There is normal systolic function with a visually estimated ejection   fraction of 55 - 60%. There is diastolic dysfunction.    Right Ventricle: The right ventricle is severely dilated Wall thickness   is normal. Right ventricle wall motion has Benitez's sign. Systolic   function is severely reduced.    Right Atrium: The right atrium is dilated.    Aortic Valve: There is mild aortic valve sclerosis. There is mild   annular calcification present. There is moderate aortic regurgitation.    Mitral Valve: There is mild regurgitation.    Tricuspid Valve: There is mild to moderate regurgitation.    Pulmonary Artery: There is pulmonary hypertension. The estimated   pulmonary artery systolic pressure is 40 mmHg.    IVC/SVC: Elevated venous pressure at 15 mmHg.      and EKG: Reviewed

## 2025-06-20 NOTE — ASSESSMENT & PLAN NOTE
Patient's blood pressure range in the last 24 hours was: BP  Min: 121/58  Max: 139/72.The patient's inpatient anti-hypertensive regimen is listed below:  Current Antihypertensives   On hold for now, BP controlled    Plan  - BP is controlled, no changes needed to their regimen  - will resume home med when appropriate

## 2025-06-20 NOTE — PLAN OF CARE
[facility] - Tele[facility] (Hospital)  Discharge Final Note    Primary Care Provider: Tia Lopez MD    Expected Discharge Date: 6/20/2025    Final Discharge Note (most recent)       Final Note - 06/20/25 1111          Final Note    Assessment Type Final Discharge Note     Anticipated Discharge Disposition Home-Health Care [facility]     Hospital Resources/Appts/Education Provided Post-Acute resouces added to AVS        Post-Acute Status    Post-Acute Authorization Home Health     Home Health Status Set-up Complete/Auth obtained     Discharge Delays None known at this time                     Important Message from Medicare              Follow-up providers       Tia Lopez MD   Specialty: Family Medicine   Relationship: PCP - General    [address] [place] [address] [address] [place] [id number]   Phone: [phone]       Next Steps: Schedule an appointment as soon as possible for a visit in 1 week(s)    Instructions: Your labs remained stable, but some labs still remained abnormal. As we discussed, it is extremely important for you to followup with a primary care physician within 1 week for repeat lab work to ensure normalization, follow up of pending labs, medication adjustments/refills, routine post-discharge care, and any other evaluations as necessitated.    Clemencia Murray PA-C   Specialty: Cardiology    [address] [place] [address] [address] [id number]   Phone: [phone]       Next Steps: Schedule an appointment as soon as possible for a visit    Instructions: -We have placed a referral to Cardiology.  Please give our scheduling line a call at [phone] to schedule an appointment with them if it has not been setup already.              After-discharge care                Home Medical Care       *[facility]   Service: Home Health Services    [address] [facility] [address]  [place] [address]   Phone: [phone]                             [facility] accepted patient.      DC disposition:home health   PCP hospital follow up scheduled for:  Tuesday Jul 1, 2025 2:20 PM     No other CM needs.

## 2025-06-20 NOTE — PROGRESS NOTES
AVS virtually reviewed with patient and his wife in its entirety with emphasis on diet, medications, follow-up appointments and reasons to return to the ED. Patient also encouraged to utilize their patient portal. Ease and convenience of use reiterated. Education complete and patient voiced understanding. All questions answered. Discharge teaching completed.

## 2025-06-20 NOTE — ASSESSMENT & PLAN NOTE
-Noted on CTA   -TTE pending  -Continue heparin gtt, will need NOAC upon d/c  -IR on board for possible intervention    6/18/2025  -Continue heparin gtt  -IR on board, no intervention planned at this juncture    6/19/2025  -Being transitioned to Eliquis + Lovenox bridge given interaction of Dilantin and Eliquis (reduced efficacy)    6/20/25  DC with coumadin and Lovenox bridge

## 2025-06-20 NOTE — PROGRESS NOTES
Pharmacy Coumadin Consult Note    INR=1.1 (up from 1.0 baseline)  Indication:bilateral PE   Goal INR=2-3    Current dose: 5mg daily   Daily INR ordered  On lovenox bridge until INR within target range    Current drug interactions: phenytoin and melatonin may increase anticoagulant effects of coumadin   Pharmacy is consulted to dose  Patient educated this admission      Gisela Newby, Pharm D 6/20/2025 8:07 AM

## 2025-06-20 NOTE — PLAN OF CARE
06/20/25 1051   Post-Acute Status   Post-Acute Authorization Home Health   Home Health Status Referrals Sent   Discharge Plan   Discharge Plan A Home Health     Met with patient and spouse to review discharge recommendation of home health and is agreeable to plan    Patient/family provided list of facilities in-network with patient's payor plan. Providers that are owned, operated, or affiliated with [facility] are included on the list.     Notified that referral sent to below listed facilities from in-network list based on proximity to home/family support:   [facility]     Preferred Facility: (if more than 1, listed in order of descending preference)  [facility]    If an additional preferred facility not listed above is identified, additional referral to be sent. If above facilities unable to accept, will send additional referrals to in-network providers.

## 2025-06-20 NOTE — DISCHARGE SUMMARY
[facility] Medicine  Discharge Summary      Patient Name: Conner Lombardi Jr.  MRN: [id number]  YEN: [id number]  Patient Class: IP- Inpatient  Admission Date: 6/16/2025  Hospital Length of Stay: 4 days  Discharge Date and Time: 6/20/2025  1:41 PM  Attending Physician: Zeke Sheridan DO   Discharging Provider: Cynthia Lara NP  Primary Care Provider: Tia Lopez MD    Primary Care Team: Networked reference to record PCT     HPI:     Patient is a 75-year-old male with past medical history significant for seizures, hypertension, hyperlipidemia, anemia, prediabetes, vitamin-D deficiency, colon adenoma, and recent total right hip replacement secondary to arthritis on 5/30/2025 per Dr. Lee who presented to ED for evaluation of shortness of breath upon exertion. He also reports tachycardia. Patient reports that these symptoms first started on Saturday, however it was not noticed by his wife until today and he states that it got significantly worse today. He also reports swelling in legs (worse on left) and urinary frequency. He denies dizziness, lightheadedness, headache, weakness, fever, chills, chest pain, productive cough, abdominal pain, nausea, vomiting, diarrhea, dysuria, flank pain, urgency, decreased urine volume, difficulty urinating, hematuria, calf pain, thigh pain.  On arrival to ED, temp 98.1°, heart rate 133, respirations 22, blood pressure 139/75, 88% SpO2 on room air.  He was placed on 2 L nasal cannula with improvement to 94 to 96% SpO2.  Lab workup showed WBC 8.54, RBC 3.05, hemoglobin 10.4, hematocrit 30.7, platelets 245, D-dimer 22.58, CO2 19, BUN 29, creatinine 1.2, glucose 146, normal LFTs, , troponin 0.194, lactic acid 1.4.  chest x-ray demonstrated lungs are clear.  CTA chest PE study was done which was positive for extensive bilateral pulmonary emboli with right heart strain noted, this was communicated to Dr. Fall in ED who then notified IR on  call, Dr Sharif. Ultrasound lower extremities veins bilateral shows bilateral acute DVTs with acute thrombus in right popliteal and peroneal veins as well as acute thrombus in left popliteal and anterior tibial as well as peroneal veins.  EKG showed sinus tachycardia with heart rate 125, nonspecific ST and T-wave abnormalities.  Patient was started on heparin drip.  IR on-call (Dr Sharif) communicated that IR will see patient in a.m. to determine if candidate for intervention.  Hospital Medicine team was consulted for admission due to bilateral PEs, hypoxia, and bilateral DVTs.    * No surgery found *      Hospital Course:   Patient admitted to Hospital Medicine for Bilateral Pulmonary embolism. D-dimer elevated and CTA positive for extensive bilateral pulmonary emboli with right heart strain. Troponin elevated with downward trend. IR consulted for intervention. Echo showed septal motion is abnormal. Septal flattening in diastole and systole consistent with right ventricular volume and pressure overload. EF of 55 - 60%. There is diastolic dysfunction. Right Ventricle: The right ventricle is severely dilated Wall thickness is normal. Right ventricle wall motion has Benitez's sign. Systolic function is severely reduced. Right Atrium: The right atrium is dilated. Aortic Valve: There is mild aortic valve sclerosis. There is mild annular calcification present. There is moderate aortic regurgitation. Mitral Valve: There is mild regurgitation.Tricuspid Valve: There is mild to moderate regurgitation. PASP is 40 mmHg. IVC/SVC: Elevated venous pressure at 15 mmHg. Cardiology consulted. Heparin infusion initiated. US of BLE showed bilateral acute DVT.  Intervention vs conservative treatment to be reviewed. Pulmonology aware with consult to be placed pending need for surgical intervention. On 6/18/2025, pt remains stable on 2L NC with no intervention to be performed at this time per IR. Heparin infusion continued- will  transition to oral dosing tomorrow if appropriate. Cardiology following. On 6/19/2025, pt verbalized symptom improvement with no signs of acute distress on 2L NC- will wean supplemental oxygen as tolerated. Heparin infusion transitioned to Coumadin with Lovenox bridge. Pharmacy consulted. CM to assist with discharge planning and appointment with Coumadin clinic established. Patient instructed on Lovenox administration per nurse. Ambulatory referral for home oxygen obtained.  On 6/20/2025, patient seen and examined and deemed medically stable for discharge to home.  Patient evaluated for home oxygen with none required per results.  Home health orders placed prior to discharge. Vital signs stable with no signs of acute distress witnessed or reported.  No seizure activity witnessed or reported.  Medications reconciled with Lovenox (therapeutic dose) twice daily and Coumadin prescribed.  Lisinopril/HCTZ paused.  Patient instructed to follow up with PCP, Cardiology, and outpatient Coumadin Clinic with ambulatory referral placed prior to discharge per .    Goals of Care Treatment Preferences:  Code Status: Full Code         Consults:   Consults (From admission, onward)          Status Ordering Provider     Inpatient consult to Social Work/Case Management  Once        Provider:  (Not yet assigned)    Ordered GISELLA MARKHAM.     Inpatient consult to Social Work/Case Management  Once        Provider:  (Not yet assigned)    Completed GISELLA MARKHAM.     Pharmacy to dose Warfarin consult  Once        Provider:  (Not yet assigned)    Acknowledged LACPITO GISELLA J.     Inpatient consult to Interventional Radiology  Once        Provider:  (Not yet assigned)    Completed ALFREDO SOSA     Inpatient consult to Cardiology  Once        Provider:  Sky Tse MD    Completed ALFREDO SSOA            Assessment & Plan    Final Active Diagnoses:    Diagnosis Date Noted POA    PRINCIPAL PROBLEM:  Bilateral pulmonary embolism  [I26.99] 06/16/2025 Yes    Status post right hip replacement [Z96.641] 06/17/2025 Not Applicable    Elevated troponin [R79.89] 06/17/2025 Yes    Acute deep vein thrombosis (DVT) of both lower extremities [I82.403] 06/16/2025 Yes    Mixed hyperlipidemia [E78.2] 01/22/2021 Yes    Anemia [D64.9] 01/22/2021 Yes    Vitamin D deficiency [E55.9] 01/22/2021 Yes    Seizures [R56.9] 11/17/2020 Yes    Essential hypertension [I10]  Yes      Problems Resolved During this Admission:       Discharged Condition: stable    Disposition: Home or Self Care    Follow Up:   Follow-up Information       Tia Lopez MD Follow up in 2 week(s).    Specialty: Family Medicine  Why: -hospital follow up  Contact information:  [address] [place] [address] [address] [id number]  [phone]               Clemencia Murray PA-C Follow up in 2 week(s).    Specialty: Cardiology  Why: -hospital follow up  Contact information:  [address] [place] [address] [address] [id number]  [phone]                           Patient Instructions:      Ambulatory referral/consult to Anticoagulation Monitoring (Renewal)   Standing Status: Future   Referral Priority: Routine Referral Type: Consultation   Referral Reason: Specialty Services Required   Requested Specialty: Cardiology   Number of Visits Requested: 1     Diet Cardiac     Notify your health care provider if you experience any of the following:  temperature >100.4     Notify your health care provider if you experience any of the following:  increased confusion or weakness     Notify your health care provider if you experience any of the following:  persistent dizziness, light-headedness, or visual disturbances     Notify your health care provider if you experience any of the following:  worsening rash     Notify your health care provider if you experience any of the following:  severe persistent headache     Notify your health care provider if you experience any of the following:  difficulty breathing  or increased cough     Notify your health care provider if you experience any of the following:  redness, tenderness, or signs of infection (pain, swelling, redness, odor or green/yellow discharge around incision site)     Notify your health care provider if you experience any of the following:  severe uncontrolled pain     Notify your health care provider if you experience any of the following:  persistent nausea and vomiting or diarrhea     Activity as tolerated       Significant Diagnostic Studies: N/A    Pending Diagnostic Studies:       None           Medications:  Reconciled Home Medications:      Medication List        PAUSE taking these medications      lisinopriL-hydrochlorothiazide 20-25 mg Tab  Wait to take this until your doctor or other care provider tells you to start again.  BP controlled during hospitalization- please take BP daily and hold medications until reviewed by PCP   Commonly known as: PRINZIDE,ZESTORETIC  Take 2 tablets by mouth once daily. If BP is below 110/70 - decrease to 1 tablet daily            START taking these medications      enoxaparin 100 mg/mL Syrg  Commonly known as: LOVENOX  Inject 0.9 mLs (90 mg total) into the skin every 12 (twelve) hours.     warfarin 5 MG tablet  Commonly known as: COUMADIN  Take 1 tablet (5 mg total) by mouth Daily.            CONTINUE taking these medications      atorvastatin 40 MG tablet  Commonly known as: LIPITOR  Take 1 tablet (40 mg total) by mouth every evening.     cholecalciferol (vitamin D3) 25 mcg (1,000 unit) capsule  Commonly known as: VITAMIN D3  Take 1 capsule (1,000 Units total) by mouth once daily.     gabapentin 300 MG capsule  Commonly known as: NEURONTIN  Take 1 capsule (300 mg total) by mouth every evening.     phenytoin 100 MG ER capsule  Commonly known as: DILANTIN  Take 4 capsules (400 mg total) by mouth every evening.            STOP taking these medications      aspirin 81 MG EC tablet  Commonly known as: ECOTRIN     meloxicam  15 MG tablet  Commonly known as: MOBIC              Indwelling Lines/Drains at time of discharge:   Lines/Drains/Airways       None                       Time spent on the discharge of patient: > 35 minutes         Cynthia Lara NP  Department of Hospital Medicine  '[facility] - Telemetry ([facility])

## 2025-06-23 ENCOUNTER — TELEPHONE (OUTPATIENT)
Dept: PHARMACY | Facility: HOSPITAL | Age: 75
End: 2025-06-23
Payer: COMMERCIAL

## 2025-06-23 ENCOUNTER — TELEPHONE (OUTPATIENT)
Dept: CARDIOLOGY | Facility: CLINIC | Age: 75
End: 2025-06-23
Payer: COMMERCIAL

## 2025-06-23 ENCOUNTER — RESULTS FOLLOW-UP (OUTPATIENT)
Dept: INTERNAL MEDICINE | Facility: CLINIC | Age: 75
End: 2025-06-23

## 2025-06-23 ENCOUNTER — PATIENT OUTREACH (OUTPATIENT)
Dept: ADMINISTRATIVE | Facility: CLINIC | Age: 75
End: 2025-06-23
Payer: COMMERCIAL

## 2025-06-23 ENCOUNTER — ANTI-COAG VISIT (OUTPATIENT)
Dept: CARDIOLOGY | Facility: CLINIC | Age: 75
End: 2025-06-23
Payer: COMMERCIAL

## 2025-06-23 ENCOUNTER — LAB VISIT (OUTPATIENT)
Dept: LAB | Facility: HOSPITAL | Age: 75
End: 2025-06-23
Attending: INTERNAL MEDICINE
Payer: COMMERCIAL

## 2025-06-23 ENCOUNTER — PATIENT MESSAGE (OUTPATIENT)
Dept: INTERNAL MEDICINE | Facility: CLINIC | Age: 75
End: 2025-06-23

## 2025-06-23 ENCOUNTER — PATIENT MESSAGE (OUTPATIENT)
Dept: NEUROLOGY | Facility: CLINIC | Age: 75
End: 2025-06-23
Payer: COMMERCIAL

## 2025-06-23 ENCOUNTER — OFFICE VISIT (OUTPATIENT)
Dept: CARDIOLOGY | Facility: CLINIC | Age: 75
End: 2025-06-23
Payer: COMMERCIAL

## 2025-06-23 ENCOUNTER — OFFICE VISIT (OUTPATIENT)
Dept: INTERNAL MEDICINE | Facility: CLINIC | Age: 75
End: 2025-06-23
Payer: COMMERCIAL

## 2025-06-23 VITALS
SYSTOLIC BLOOD PRESSURE: 142 MMHG | HEIGHT: 66 IN | DIASTOLIC BLOOD PRESSURE: 80 MMHG | HEART RATE: 100 BPM | WEIGHT: 180.13 LBS | OXYGEN SATURATION: 98 % | BODY MASS INDEX: 28.95 KG/M2

## 2025-06-23 VITALS
HEART RATE: 110 BPM | SYSTOLIC BLOOD PRESSURE: 138 MMHG | HEIGHT: 66 IN | DIASTOLIC BLOOD PRESSURE: 78 MMHG | WEIGHT: 178.13 LBS | BODY MASS INDEX: 28.63 KG/M2 | TEMPERATURE: 98 F | OXYGEN SATURATION: 97 %

## 2025-06-23 DIAGNOSIS — Z09 HOSPITAL DISCHARGE FOLLOW-UP: ICD-10-CM

## 2025-06-23 DIAGNOSIS — I10 ESSENTIAL HYPERTENSION: ICD-10-CM

## 2025-06-23 DIAGNOSIS — Z79.01 LONG TERM (CURRENT) USE OF ANTICOAGULANTS: ICD-10-CM

## 2025-06-23 DIAGNOSIS — I26.99 BILATERAL PULMONARY EMBOLISM: ICD-10-CM

## 2025-06-23 DIAGNOSIS — I82.4Y3 ACUTE DEEP VEIN THROMBOSIS (DVT) OF PROXIMAL VEIN OF BOTH LOWER EXTREMITIES: ICD-10-CM

## 2025-06-23 DIAGNOSIS — R56.9 SEIZURES: ICD-10-CM

## 2025-06-23 DIAGNOSIS — E78.2 MIXED HYPERLIPIDEMIA: ICD-10-CM

## 2025-06-23 DIAGNOSIS — Z79.01 LONG TERM (CURRENT) USE OF ANTICOAGULANTS: Primary | ICD-10-CM

## 2025-06-23 DIAGNOSIS — D64.9 ANEMIA, UNSPECIFIED TYPE: ICD-10-CM

## 2025-06-23 DIAGNOSIS — I26.99 BILATERAL PULMONARY EMBOLISM: Primary | ICD-10-CM

## 2025-06-23 DIAGNOSIS — I82.4Y3 ACUTE DEEP VEIN THROMBOSIS (DVT) OF PROXIMAL VEIN OF BOTH LOWER EXTREMITIES: Primary | ICD-10-CM

## 2025-06-23 LAB
ABSOLUTE EOSINOPHIL (OHS): 0.51 K/UL
ABSOLUTE MONOCYTE (OHS): 0.64 K/UL (ref 0.3–1)
ABSOLUTE NEUTROPHIL COUNT (OHS): 2.96 K/UL (ref 1.8–7.7)
BASOPHILS # BLD AUTO: 0.04 K/UL
BASOPHILS NFR BLD AUTO: 0.7 %
ERYTHROCYTE [DISTWIDTH] IN BLOOD BY AUTOMATED COUNT: 14.6 % (ref 11.5–14.5)
HCT VFR BLD AUTO: 32.1 % (ref 40–54)
HGB BLD-MCNC: 10.7 GM/DL (ref 14–18)
IMM GRANULOCYTES # BLD AUTO: 0.02 K/UL (ref 0–0.04)
IMM GRANULOCYTES NFR BLD AUTO: 0.3 % (ref 0–0.5)
INR PPP: 1.3 (ref 0.8–1.2)
LYMPHOCYTES # BLD AUTO: 1.83 K/UL (ref 1–4.8)
MCH RBC QN AUTO: 34.4 PG (ref 27–31)
MCHC RBC AUTO-ENTMCNC: 33.3 G/DL (ref 32–36)
MCV RBC AUTO: 103 FL (ref 82–98)
NUCLEATED RBC (/100WBC) (OHS): 0 /100 WBC
PHENYTOIN SERPL-MCNC: 5.2 UG/ML (ref 10–20)
PLATELET # BLD AUTO: 255 K/UL (ref 150–450)
PMV BLD AUTO: 10 FL (ref 9.2–12.9)
PROTHROMBIN TIME: 14.4 SECONDS (ref 9–12.5)
RBC # BLD AUTO: 3.11 M/UL (ref 4.6–6.2)
RELATIVE EOSINOPHIL (OHS): 8.5 %
RELATIVE LYMPHOCYTE (OHS): 30.5 % (ref 18–48)
RELATIVE MONOCYTE (OHS): 10.7 % (ref 4–15)
RELATIVE NEUTROPHIL (OHS): 49.3 % (ref 38–73)
WBC # BLD AUTO: 6 K/UL (ref 3.9–12.7)

## 2025-06-23 PROCEDURE — 85025 COMPLETE CBC W/AUTO DIFF WBC: CPT

## 2025-06-23 PROCEDURE — 3044F HG A1C LEVEL LT 7.0%: CPT | Mod: CPTII,S$GLB,, | Performed by: PHYSICIAN ASSISTANT

## 2025-06-23 PROCEDURE — 85610 PROTHROMBIN TIME: CPT

## 2025-06-23 PROCEDURE — 1101F PT FALLS ASSESS-DOCD LE1/YR: CPT | Mod: CPTII,S$GLB,, | Performed by: PHYSICIAN ASSISTANT

## 2025-06-23 PROCEDURE — 93793 ANTICOAG MGMT PT WARFARIN: CPT | Mod: S$GLB,,,

## 2025-06-23 PROCEDURE — 36415 COLL VENOUS BLD VENIPUNCTURE: CPT

## 2025-06-23 PROCEDURE — 3075F SYST BP GE 130 - 139MM HG: CPT | Mod: CPTII,S$GLB,, | Performed by: PHYSICIAN ASSISTANT

## 2025-06-23 PROCEDURE — 3078F DIAST BP <80 MM HG: CPT | Mod: CPTII,S$GLB,, | Performed by: PHYSICIAN ASSISTANT

## 2025-06-23 PROCEDURE — 80185 ASSAY OF PHENYTOIN TOTAL: CPT

## 2025-06-23 PROCEDURE — 99999 PR PBB SHADOW E&M-EST. PATIENT-LVL III: CPT | Mod: PBBFAC,,,

## 2025-06-23 PROCEDURE — 1126F AMNT PAIN NOTED NONE PRSNT: CPT | Mod: CPTII,S$GLB,, | Performed by: PHYSICIAN ASSISTANT

## 2025-06-23 PROCEDURE — 4010F ACE/ARB THERAPY RXD/TAKEN: CPT | Mod: CPTII,S$GLB,, | Performed by: PHYSICIAN ASSISTANT

## 2025-06-23 PROCEDURE — 99999 PR PBB SHADOW E&M-EST. PATIENT-LVL V: CPT | Mod: PBBFAC,,, | Performed by: PHYSICIAN ASSISTANT

## 2025-06-23 PROCEDURE — 99495 TRANSJ CARE MGMT MOD F2F 14D: CPT | Mod: S$GLB,,, | Performed by: PHYSICIAN ASSISTANT

## 2025-06-23 PROCEDURE — 3288F FALL RISK ASSESSMENT DOCD: CPT | Mod: CPTII,S$GLB,, | Performed by: PHYSICIAN ASSISTANT

## 2025-06-23 RX ORDER — METOPROLOL TARTRATE 25 MG/1
25 TABLET, FILM COATED ORAL 2 TIMES DAILY
Qty: 180 TABLET | Refills: 3 | Status: SHIPPED | OUTPATIENT
Start: 2025-06-23 | End: 2026-06-23

## 2025-06-23 NOTE — PROGRESS NOTES
INR not at goal-1.3. Medications, chart, and patient findings reviewed. See calendar for adjustments to dose and follow up plan.  INR is increasing.  No large jump.  We will boost with INR dose of 7.5 mg today only to assist INR climb as he continues on Lovenox 90 mg q 12 then resume 5 mg daily.  No bleeding or bruising reported.  Injections are going well at home.  Repeat INR on Wednesday this week for close follow up.  Mrs Lombardi has no questions or concerns.  Voices understanding dosing directions.

## 2025-06-23 NOTE — PATIENT INSTRUCTIONS
911: short of breath/trouble breathing/sharp-severe chest pain when you breathe/coughing up blood. Stroke: sudden numbness-weakness to face/arm/leg joel. to 1 side of body/confusion-trouble speaking-understanding/trouble seeing in 1-both eyes/trouble walking/dizziness/loss of balance-coordination/severe headache-no cause/injury-fall and hit head/bleeding from gums after brushing teeth/diarrhea/vomiting/unable to eat for more than 24 hours/fever (100.4F)/cannot take your medication.

## 2025-06-23 NOTE — TELEPHONE ENCOUNTER
Patient has been advised of warfarin and Lovenox dosing/instructions per encounter 6/20/25 and states his wife did receive via v/m also. All questions answered.

## 2025-06-23 NOTE — PROGRESS NOTES
C3 nurse spoke with Conner Lombardi Jr., patient's spouse, Nena, for a TCC post hospital discharge follow up call. The patient has a scheduled HOSFU appointment with Chrissie Maguire PA-C on 6/23/2025 at 10:30 AM.

## 2025-06-23 NOTE — PROGRESS NOTES
Transitional Care Note  Subjective:      Patient ID: Conner Lombardi Jr. is a 75 y.o. male.  Chief Complaint: hosp f/u         History of Present Illness    CHIEF COMPLAINT:  - Conner presents for a hospital follow-up after being discharged for bilateral pulmonary embolisms (PEs) and deep vein thromboses (DVTs).    HPI:  - Conner, a 75-year-old male, was hospitalized from June 16th through June 20th for 4 days. He presented to the Emergency Department with shortness of breath on exertion and tachycardia. Upon evaluation, he was found to have bilateral pulmonary embolisms (PEs) and bilateral deep vein thromboses (DVTs). Conner believed symptoms would resolve spontaneously prior to hospitalization, but was informed of the potential fatal consequences had he not sought medical attention.  - He had undergone hip surgery less than 2 weeks before the onset of these symptoms. Following the surgery, his wife reports that his legs were very swollen. The doctor at that time advised to apply ice packs without further investigation. Both legs were swollen and warm at that time.  - Currently, he is undergoing anticoagulation therapy with a combination of Lovenox and Coumadin as a bridge treatment. The duration of this dual therapy is yet to be determined, but he was informed that he would be on Warfarin for 6 months. He is awaiting setup for regular INR monitoring at a Coumadin clinic, which has not yet been arranged. He was under the impression that he was supposed to have labs done today.  - His legs are still warm and a bit swollen on the top, though the swelling has decreased since his hospital stay. Previously, the swelling was more pronounced in the lower calf than in the upper leg. He is currently wearing compression socks. He denies any current redness in his legs.              Family and/or Caretaker present at visit?  Yes.  Diagnostic tests reviewed/disposition: I have reviewed all completed as well as pending  diagnostic tests at the time of discharge.  Disease/illness education: BL DVTs and BL PEs  Home health/community services discussion/referrals: Patient has home health established at [facility].   Establishment or re-establishment of referral orders for community resources: No other necessary community resources.   Discussion with other health care providers: referral to hematology/oncology placed. Needs coumadin clinic follow up.     Review of Systems   Constitutional:  Negative for chills, fatigue, fever and unexpected weight change.   HENT:  Negative for congestion, dental problem, ear pain, hearing loss, rhinorrhea and trouble swallowing.    Eyes:  Negative for pain and visual disturbance.   Respiratory:  Negative for cough and shortness of breath.    Cardiovascular:  Negative for chest pain, palpitations and leg swelling.   Gastrointestinal:  Negative for abdominal distention, abdominal pain, blood in stool, constipation, diarrhea, nausea and vomiting.   Genitourinary:  Negative for difficulty urinating.   Musculoskeletal:  Negative for arthralgias and myalgias.   Skin:  Negative for rash.   Neurological:  Negative for dizziness, weakness, numbness and headaches.   Hematological:  Negative for adenopathy. Does not bruise/bleed easily.   Psychiatric/Behavioral:  Negative for agitation, dysphoric mood and sleep disturbance.         Objective:      Physical Exam  Vitals reviewed.   Constitutional:       General: He is not in acute distress.     Appearance: Normal appearance. He is well-developed and normal weight. He is not ill-appearing or toxic-appearing.   HENT:      Head: Normocephalic and atraumatic.   Eyes:      General: Lids are normal. No scleral icterus.     Extraocular Movements: Extraocular movements intact.      Conjunctiva/sclera: Conjunctivae normal.      Pupils: Pupils are equal, round, and reactive to light.   Cardiovascular:      Rate and Rhythm: Normal rate and regular rhythm.      Heart sounds:  Normal heart sounds. No murmur heard.     No friction rub. No gallop.   Pulmonary:      Effort: Pulmonary effort is normal. No respiratory distress.      Breath sounds: Normal breath sounds. No stridor. No decreased breath sounds, wheezing, rhonchi or rales.   Musculoskeletal:      Comments: Using walker   Neurological:      General: No focal deficit present.      Mental Status: He is alert and oriented to person, place, and time. Mental status is at baseline.      Cranial Nerves: No cranial nerve deficit.      Gait: Gait normal.   Psychiatric:         Mood and Affect: Mood and affect normal.         Behavior: Behavior normal.         Thought Content: Thought content normal.         Judgment: Judgment normal.         Assessment:       1. Acute deep vein thrombosis (DVT) of proximal vein of both lower extremities    2. Bilateral pulmonary embolism    3. Seizures        Plan:   1. Acute deep vein thrombosis (DVT) of proximal vein of both lower extremities  Overview:  bilateral acute DVTs with acute thrombus in right popliteal and peroneal veins as well as acute thrombus in left popliteal and anterior tibial as well as peroneal veins     Orders:  -     Ambulatory referral/consult to Hematology / Oncology; Future; Expected date: 06/30/2025    2. Bilateral pulmonary embolism  Overview:  6/16/25:  Positive for extensive bilateral pulmonary emboli with right heart strain.     Orders:  -     Ambulatory referral/consult to Hematology / Oncology; Future; Expected date: 06/30/2025    3. Seizures      Assessment & Plan    IMPRESSION:  - Assessed recent hospitalization for bilateral PEs and DVTs, likely related to recent hip surgery.  - Evaluated current anticoagulation therapy with Lovenox and Coumadin bridge.  - Considered need for hematology consult due to extensive clotting despite recent surgery.  - Reviewed decision to discontinue BP medication, noting normal BP in office.  - Assessed need for home oxygen, which was deemed  unnecessary.  - Evaluated legs for residual swelling and warmth from DVTs.  - Discussed management of BP with Dr. Lopez, deciding to monitor at home and restart medication if BP elevates significantly.    ## PULMONARY EMBOLISM:   Conner was hospitalized for shortness of breath on exertion and tachycardia, found to have bilateral PEs.   Instructed the patient to check toes in the morning and at night for normal color (not white or blue) and check pulse in feet to ensure adequate blood flow while on anticoagulation.   Scheduled follow-up with cardiology next week, attempting to secure an earlier appointment.    ## DEEP VEIN THROMBOSIS:   Conner was found to have bilateral DVTs with legs swollen and warm, currently wearing compression socks.   Previously, swelling was more prominent in the lower calf than the upper leg.   Scheduled follow-up with hematology for tomorrow at 2:20 at the cancer center due to multiple blood clots.    ## EPILEPSY:   Noted the patient takes phenytoin for seizures.   Confirmed phenytoin level was checked during hospitalization and ordered another level check today.   Continued phenytoin for seizure management.    ## HYPERTENSION:   Blood pressure today is normal and was not low during hospitalization.   Explained normal range for BP and when to report elevated readings.   Instructed the patient to monitor BP at home and contact office if readings are elevated (150s, 170s, 180s).   Advised the patient to restart lisinopril HCTZ if blood pressure becomes elevated and to follow up with Cardiology as scheduled.     ## ANEMIA:   Ordered CBC to assess anemia.    ## VITAMIN D DEFICIENCY:   Continued D3 supplementation for vitamin D deficiency.    ## ANTICOAGULATION MANAGEMENT:   Conner is currently on Lovenox and Coumadin as a bridge therapy.   Ordered INR blood test today as recommended by the pharmacist.   Conner is supposed to have weekly labs for monitoring, but the Coumadin clinic is not set  up yet.   Instructed medical assistant to arrange Coumadin clinic setup for regular monitoring.   Discontinued aspirin and meloxicam.   Educated the patient on importance of monitoring extremities for color changes and checking pulses while on anticoagulation.    ## FOLLOW-UP:   Scheduled follow-up with cardiology next week.   Referred the patient to hematology for consultation, scheduled for tomorrow at 2:20 PM at the cancer center.           Needs INR lab today (previously ordered), added a CBC and phenytoin level today  Needs appt with Coumadin clinic for this week. Reach out to cardiology if needed.   Referral to Hematology.       This note was generated with the assistance of ambient listening technology. Verbal consent was obtained by the patient and accompanying visitor(s) for the recording of patient appointment to facilitate this note. I attest to having reviewed and edited the generated note for accuracy, though some syntax or spelling errors may persist. Please contact the author of this note for any clarification.

## 2025-06-23 NOTE — PROGRESS NOTES
Subjective:   Patient ID:  Conner Lombardi Jr. is a 75 y.o. male who presents for evaluation of Hospital Follow Up  His current medical conditions include bilateral pulmonary embolism with acute DVTs, HTN, and HLD    6/12/25  PT is s/p R hip surgery 5/2025.   Recent lipids 5/2025 stable , LDL 70.   BP and HR stable. BMI 30 - 186 lbs.   Pt is finishing up PT/OT, home health will finish up today.   He may return to work in Aug.      FH - father - had CABG in 50s, son had CABG in 40s  Works as  for [employer].     Hospital Stay - 6/17-6/20/25  Mr. Lombardi is a 75 year old male patient whose current medical conditions include seizures, HTN, hyperlipidemia, anemia, colon adenoma, pre-DM, and recent TRH arthroplasty on 5/30/2025 who presented to [facility] ED overnight due to increased SOB/AUSTIN that initially began over the weekend. Other associated symptoms included BLE edema (L > R). He denied any associated fever, chills, LH, dizziness, palpitations, near syncope, or syncope. Initial workup in ED revealed hypoxia and tachycardia. Labs reviewed BNP of 172, troponin of 0.194, and D-dimer of 22. CTA of chest positive for bilateral PE with right heart strain. BLE venous U/S showed bilateral acute DVT's with acute thrombus in right popliteal and right peroneal veins as well as acute thrombus in the left popliteal and anterior tibial as well as peroneal veins. Patient initiated on heparin drip and admitted for further evaluation and treatment. Cardiology consulted to assist with management. Patient seen and examined today, resting in bed. Feels ok. Weak/tired. CP free. He reports compliance with his medications, followed in clinic by Dr. Tse. Labs reviewed. Troponin 0.194>0.192>0.160>0.133. EKG showed sinus tachycardia, non-specific ST-T wave abnormalities. TTE pending. Prior MPI stress test 12/24 negative for ischemia, +fixed defects. H/H dipped to 8.6/26.0. IR on board for possible  intervention     6/18/2025-Patient seen and examined today, resting in bed. Feels ok. No real complaints. Ambulated earlier. Denies CP. TTE with normal EF, severely reduced systolic function, mild to mod TR, pulmonary HTN. Labs reviewed. Creatinine 1.1. H/H 9.1/27.6.      6/19/2025-Patient seen and examined today, lying in bed. Stable CV wise. No real complaints. Give dose of IV Lasix. Will be transitioned to Coumadin with Lovenox bridge given interaction between Dilantin and Eliquis (reduced efficacy). Labs reviewed.     6/20/25- stable overnight, no CP/SOB, ready for DC with coumadin and coumadin clinic    6/23/2025 clinic visit  Patient reports for follow up after recent hospital visit for bilateral pulmonary embolism and DVTs. He reports he is feeling significantly better. He is able to complete activity without significant shortness of breath. He is currently holding his blood pressure meds as advised on discharge. He has remained tachy since his discharge. He does have residual swelling in his left lower extremity. He has started coumadin but has not followed up with coumadin clinic. He is also set to see hematology soon.   He denies all other symptoms including chest pain, dyspnea, palpitations, pre-syncope, syncope, orthopnea, PND and leg swelling.       Past Medical History:   Diagnosis Date    Anemia     Arthritis     Epilepsy     Last seizure 2010.     Hyperlipidemia     Hypertension     Prediabetes     Tubular adenoma of colon 04/02/2012    Vitamin D deficiency        Past Surgical History:   Procedure Laterality Date    BLOCK, NERVE, PERIPHERAL Left 8/19/2024    Procedure: Left femoral and obturator nerve block;  Surgeon: Helder Taylor MD;  Location: [facility] [place];  Service: Pain Management;  Laterality: Left;    COLONOSCOPY N/A 07/12/2021    Procedure: COLONOSCOPY;  Surgeon: Subhash Martin MD;  Location: [facility] ENDO;  Service: Endoscopy;  Laterality: N/A;    HIP ARTHROPLASTY Left 1/28/2025     Procedure: ARTHROPLASTY, HIP;  Surgeon: Javier Lee MD;  Location: [facility] OR;  Service: Orthopedics;  Laterality: Left;    HIP ARTHROPLASTY Right 5/30/2025    Procedure: ARTHROPLASTY, HIP;  Surgeon: Javier Lee MD;  Location: [facility] OR;  Service: Orthopedics;  Laterality: Right;    TONSILLECTOMY      VASECTOMY  1984/1985       Social History[1]    Family History   Problem Relation Name Age of Onset    Cancer Mother Batool Lombardi         Brain    Heart disease Father Conner Lombardi, Sr.     Cancer Father Conner Lombardi, Sr.         Liver and lung    Hypertension Father Conner Coppolae, Sr.     Heart disease Son          CABG in 2018    Diabetes Maternal Grandfather Aguilar Pepitone     Hypertension Other         Wt Readings from Last 3 Encounters:   06/23/25 81.7 kg (180 lb 1.9 oz)   06/23/25 80.8 kg (178 lb 2.1 oz)   06/19/25 85.7 kg (188 lb 15 oz)     Temp Readings from Last 3 Encounters:   06/23/25 97.8 °F (36.6 °C)   06/20/25 98.4 °F (36.9 °C) (Oral)   05/30/25 97.8 °F (36.6 °C) (Temporal)     BP Readings from Last 3 Encounters:   06/23/25 (!) 142/80   06/23/25 138/78   06/20/25 (!) 142/82     Pulse Readings from Last 3 Encounters:   06/23/25 100   06/23/25 110   06/20/25 98       Medications Ordered Prior to Encounter[2]    No cardiac monitor results found for the past 12 months    Results for orders placed during the hospital encounter of 06/16/25    Echo    Interpretation Summary    Left Ventricle: The left ventricle is normal in size. Normal wall thickness. Septal motion is abnormal. Septal flattening in diastole and systole consistent with right ventricular volume and pressure overload. There is normal systolic function with a visually estimated ejection fraction of 55 - 60%. There is diastolic dysfunction.    Right Ventricle: The right ventricle is severely dilated Wall thickness is normal. Right ventricle wall motion has Benitez's sign. Systolic function is severely  reduced.    Right Atrium: The right atrium is dilated.    Aortic Valve: There is mild aortic valve sclerosis. There is mild annular calcification present. There is moderate aortic regurgitation.    Mitral Valve: There is mild regurgitation.    Tricuspid Valve: There is mild to moderate regurgitation.    Pulmonary Artery: There is pulmonary hypertension. The estimated pulmonary artery systolic pressure is 40 mmHg.    IVC/SVC: Elevated venous pressure at 15 mmHg.    Results for orders placed during the hospital encounter of 12/18/24    Nuclear Stress - Cardiology Interpreted    Interpretation Summary    Abnormal myocardial perfusion scan.    There is amoderate intensity, small sized, fixed perfusion abnormality consistent with scar in the apical wall(s).    There are no other significant perfusion abnormalities.    The gated perfusion images showed an ejection fraction of 68% at rest. The gated perfusion images showed an ejection fraction of 67% post stress. Normal ejection fraction is greater than 59%.    The ECG portion of the study is negative for ischemia.        Results for orders placed or performed during the hospital encounter of 06/16/25   EKG 12-lead    Collection Time: 06/16/25  6:51 PM   Result Value Ref Range    QRS Duration 94 ms    [facility] QTC Calculation 519 ms    Narrative    Test Reason : R06.02,    Vent. Rate : 125 BPM     Atrial Rate : 125 BPM     P-R Int : 158 ms          QRS Dur :  94 ms      QT Int : 360 ms       P-R-T Axes :  56  23  51 degrees    QTcB Int : 519 ms    Sinus tachycardia  Nonspecific ST and T wave abnormality  Abnormal ECG  No previous ECGs available  Confirmed by Sky Tse ([id number]) on 6/18/2025 3:47:09 PM    Referred By: AAAREFERRAL SELF           Confirmed By: Sky Tse         Review of Systems   Constitutional: Negative.   HENT: Negative.     Eyes: Negative.    Cardiovascular:  Positive for leg swelling. Negative for chest pain, dyspnea on exertion, irregular heartbeat,  near-syncope, orthopnea, palpitations, paroxysmal nocturnal dyspnea and syncope.   Respiratory: Negative.     Skin: Negative.    Musculoskeletal: Negative.    Gastrointestinal: Negative.    Genitourinary: Negative.    Neurological: Negative.    Psychiatric/Behavioral: Negative.           Physical Exam  Vitals and nursing note reviewed.   Constitutional:       Appearance: Normal appearance.   HENT:      Head: Normocephalic and atraumatic.   Eyes:      General:         Right eye: No discharge.         Left eye: No discharge.      Pupils: Pupils are equal, round, and reactive to light.   Cardiovascular:      Rate and Rhythm: Regular rhythm. Tachycardia present.      Heart sounds: S1 normal and S2 normal. No murmur heard.     No friction rub.   Pulmonary:      Effort: Pulmonary effort is normal. No respiratory distress.      Breath sounds: Normal breath sounds. No rales.   Abdominal:      Palpations: Abdomen is soft.      Tenderness: There is no abdominal tenderness.   Musculoskeletal:      Right lower leg: No edema.      Left lower leg: No edema.   Skin:     General: Skin is warm and dry.   Neurological:      General: No focal deficit present.      Mental Status: He is alert and oriented to person, place, and time.   Psychiatric:         Mood and Affect: Mood normal.         Behavior: Behavior normal.         Thought Content: Thought content normal.         Lab Results   Component Value Date    CHOL 133 02/07/2025    CHOL 133 08/02/2024    CHOL 135 05/07/2024     Lab Results   Component Value Date    HDL 36 (L) 02/07/2025    HDL 44 08/02/2024    HDL 34 (L) 05/07/2024     Lab Results   Component Value Date    LDLCALC 70.8 02/07/2025    LDLCALC 71.6 08/02/2024    LDLCALC 69.6 05/07/2024     Lab Results   Component Value Date    TRIG 131 02/07/2025    TRIG 87 08/02/2024    TRIG 157 (H) 05/07/2024     Lab Results   Component Value Date    CHOLHDL 27.1 02/07/2025    CHOLHDL 33.1 08/02/2024    CHOLHDL 25.2 05/07/2024        Chemistry        Component Value Date/Time     06/20/2025 0653     02/07/2025 0736    K 3.9 06/20/2025 0653    K 4.6 02/07/2025 0736     06/20/2025 0653     02/07/2025 0736    CO2 23 06/20/2025 0653    CO2 27 02/07/2025 0736    BUN 25 (H) 06/20/2025 0653    CREATININE 1.1 06/20/2025 0653     06/20/2025 0653     (H) 02/07/2025 0736        Component Value Date/Time    CALCIUM 9.2 06/20/2025 0653    CALCIUM 9.4 02/07/2025 0736    ALKPHOS 140 06/19/2025 0620    ALKPHOS 124 02/07/2025 0736    AST 23 06/19/2025 0620    AST 28 02/07/2025 0736    ALT 24 06/19/2025 0620    ALT 28 02/07/2025 0736    BILITOT 0.3 06/19/2025 0620    BILITOT 0.3 02/07/2025 0736    ESTGFRAFRICA >60.0 01/19/2022 0737    EGFRNONAA >60.0 01/19/2022 0737          Lab Results   Component Value Date    TSH 1.240 08/02/2024     Lab Results   Component Value Date    INR 1.1 06/20/2025    INR 1.0 06/16/2025    PROTIME 12.4 06/20/2025    PROTIME 11.3 06/16/2025     Lab Results   Component Value Date    WBC 6.07 06/20/2025    HGB 10.0 (L) 06/20/2025    HCT 29.3 (L) 06/20/2025     (H) 06/20/2025     06/20/2025          Assessment:      1. Bilateral pulmonary embolism    2. Acute deep vein thrombosis (DVT) of proximal vein of both lower extremities    3. Essential hypertension        Plan:   Bilateral pulmonary embolism    Acute deep vein thrombosis (DVT) of proximal vein of both lower extremities    Essential hypertension  -     metoprolol tartrate (LOPRESSOR) 25 MG tablet; Take 1 tablet (25 mg total) by mouth 2 (two) times daily.  Dispense: 180 tablet; Refill: 3      Patient needs follow up with coumadin clinic.   Continue coumadin - PE/DVT  Start metoprolol tartrate 25 mg BID for tachycardia.   RTC 2 months with Dr. Tse with echo    Haley Smith, PA-C [facility]           [1]   Social History  Tobacco Use    Smoking status: Never    Smokeless tobacco: Never   Substance Use Topics    Alcohol use:  No    Drug use: Never   [2]   Current Outpatient Medications on File Prior to Visit   Medication Sig Dispense Refill    atorvastatin (LIPITOR) 40 MG tablet Take 1 tablet (40 mg total) by mouth every evening. 90 tablet 1    cholecalciferol, vitamin D3, (VITAMIN D3) 25 mcg (1,000 unit) capsule Take 1 capsule (1,000 Units total) by mouth once daily.  0    enoxaparin (LOVENOX) 100 mg/mL Syrg Inject 0.9 mLs (90 mg total) into the skin every 12 (twelve) hours. 75.6 mL 0    gabapentin (NEURONTIN) 300 MG capsule Take 1 capsule (300 mg total) by mouth every evening. 30 capsule 0    [Paused] lisinopriL-hydrochlorothiazide (PRINZIDE,ZESTORETIC) 20-25 mg Tab Take 2 tablets by mouth once daily. If BP is below 110/70 - decrease to 1 tablet daily 180 tablet 1    phenytoin (DILANTIN) 100 MG ER capsule Take 4 capsules (400 mg total) by mouth every evening. 360 capsule 3    warfarin (COUMADIN) 5 MG tablet Take 1 tablet (5 mg total) by mouth Daily. 42 tablet 0     No current facility-administered medications on file prior to visit.

## 2025-06-23 NOTE — PROGRESS NOTES
C3 nurse attempted to contact Conner Lombardi Jr.  for a TCC post hospital discharge follow up call. No answer.  Voicemail left with callback information. The patient has a scheduled HOSFU appointment with Chrissie Maguire PA-C on 6/23/2025 at 10:30 AM.

## 2025-06-24 ENCOUNTER — LAB VISIT (OUTPATIENT)
Dept: LAB | Facility: HOSPITAL | Age: 75
End: 2025-06-24
Attending: INTERNAL MEDICINE
Payer: COMMERCIAL

## 2025-06-24 ENCOUNTER — OFFICE VISIT (OUTPATIENT)
Dept: HEMATOLOGY/ONCOLOGY | Facility: CLINIC | Age: 75
End: 2025-06-24
Payer: COMMERCIAL

## 2025-06-24 VITALS
SYSTOLIC BLOOD PRESSURE: 144 MMHG | TEMPERATURE: 98 F | OXYGEN SATURATION: 96 % | DIASTOLIC BLOOD PRESSURE: 82 MMHG | BODY MASS INDEX: 29.16 KG/M2 | HEART RATE: 103 BPM | HEIGHT: 66 IN | WEIGHT: 181.44 LBS

## 2025-06-24 DIAGNOSIS — I82.4Y3 ACUTE DEEP VEIN THROMBOSIS (DVT) OF PROXIMAL VEIN OF BOTH LOWER EXTREMITIES: ICD-10-CM

## 2025-06-24 DIAGNOSIS — I26.99 BILATERAL PULMONARY EMBOLISM: ICD-10-CM

## 2025-06-24 DIAGNOSIS — G40.109 TEMPORAL LOBE EPILEPSY: ICD-10-CM

## 2025-06-24 DIAGNOSIS — Z96.642 S/P HIP REPLACEMENT, LEFT: ICD-10-CM

## 2025-06-24 DIAGNOSIS — Z96.641 STATUS POST RIGHT HIP REPLACEMENT: ICD-10-CM

## 2025-06-24 DIAGNOSIS — D64.9 ANEMIA, UNSPECIFIED TYPE: Primary | ICD-10-CM

## 2025-06-24 PROCEDURE — 85300 ANTITHROMBIN III ACTIVITY: CPT

## 2025-06-24 PROCEDURE — 99204 OFFICE O/P NEW MOD 45 MIN: CPT | Mod: S$GLB,,, | Performed by: INTERNAL MEDICINE

## 2025-06-24 PROCEDURE — 3008F BODY MASS INDEX DOCD: CPT | Mod: CPTII,S$GLB,, | Performed by: INTERNAL MEDICINE

## 2025-06-24 PROCEDURE — 85303 CLOT INHIBIT PROT C ACTIVITY: CPT

## 2025-06-24 PROCEDURE — 1111F DSCHRG MED/CURRENT MED MERGE: CPT | Mod: CPTII,S$GLB,, | Performed by: INTERNAL MEDICINE

## 2025-06-24 PROCEDURE — 3288F FALL RISK ASSESSMENT DOCD: CPT | Mod: CPTII,S$GLB,, | Performed by: INTERNAL MEDICINE

## 2025-06-24 PROCEDURE — 85306 CLOT INHIBIT PROT S FREE: CPT

## 2025-06-24 PROCEDURE — 81241 F5 GENE: CPT

## 2025-06-24 PROCEDURE — 85210 CLOT FACTOR II PROTHROM SPEC: CPT

## 2025-06-24 PROCEDURE — 36415 COLL VENOUS BLD VENIPUNCTURE: CPT

## 2025-06-24 PROCEDURE — 99999 PR PBB SHADOW E&M-EST. PATIENT-LVL V: CPT | Mod: PBBFAC,,, | Performed by: INTERNAL MEDICINE

## 2025-06-24 PROCEDURE — 1159F MED LIST DOCD IN RCRD: CPT | Mod: CPTII,S$GLB,, | Performed by: INTERNAL MEDICINE

## 2025-06-24 PROCEDURE — 1101F PT FALLS ASSESS-DOCD LE1/YR: CPT | Mod: CPTII,S$GLB,, | Performed by: INTERNAL MEDICINE

## 2025-06-24 PROCEDURE — 3079F DIAST BP 80-89 MM HG: CPT | Mod: CPTII,S$GLB,, | Performed by: INTERNAL MEDICINE

## 2025-06-24 PROCEDURE — 3044F HG A1C LEVEL LT 7.0%: CPT | Mod: CPTII,S$GLB,, | Performed by: INTERNAL MEDICINE

## 2025-06-24 PROCEDURE — 86146 BETA-2 GLYCOPROTEIN ANTIBODY: CPT

## 2025-06-24 PROCEDURE — 3077F SYST BP >= 140 MM HG: CPT | Mod: CPTII,S$GLB,, | Performed by: INTERNAL MEDICINE

## 2025-06-24 PROCEDURE — 1126F AMNT PAIN NOTED NONE PRSNT: CPT | Mod: CPTII,S$GLB,, | Performed by: INTERNAL MEDICINE

## 2025-06-24 PROCEDURE — 4010F ACE/ARB THERAPY RXD/TAKEN: CPT | Mod: CPTII,S$GLB,, | Performed by: INTERNAL MEDICINE

## 2025-06-24 RX ORDER — ENOXAPARIN SODIUM 100 MG/ML
1 INJECTION SUBCUTANEOUS EVERY 12 HOURS
Qty: 75.6 ML | Refills: 11 | Status: SHIPPED | OUTPATIENT
Start: 2025-06-24 | End: 2025-08-05

## 2025-06-24 NOTE — PROGRESS NOTES
Chronic Pain -- Established Patient (Follow-up visit)    Referring Physician: Tia Lopez MD    PCP: Tia Lopez MD      Chief complaint:  Follow-up (Patient has no pain in hip today.  In for 3 month f/u)     s/p bilateral total hip arthroplasty, with recent history of post-op pulmonary embolism  Lower Back Pain          SUBJECTIVE:    Interval History (6/25/2025): Patient presents for follow-up after bilateral hip replacements. Patient was last seen on 3/17/2025.  His left hip replacement was performed in January 2025, and the right hip replacement on May 30th. He reports good pain control from the hip surgeries, praising Dr. Lee for his surgical outcomes.    On June 16th, approximately two weeks after the right hip surgery, he went to the ER due to complications. He had been experiencing shortness of breath and an elevated pulse rate for a few days prior. Both legs were significantly swollen and warm. At a follow-up visit on June 13th (prior to the ER visit), his wife informed the medical provider about the significant leg swelling, stating that she could not put his socks on due to the swelling. However, the medical provider attributed it to the recent surgery and weight compensation.    He was diagnosed with blood clots, likely related to the recent surgery. He is currently on anticoagulation therapy, taking Warfarin and Lovenox injections twice daily. The Lovenox will continue until the INR reaches 2-3, and then he will remain on Warfarin for six months. Recent lab work showed an INR of 1.3. A genetic test was performed yesterday to investigate if there might be other factors contributing to the blood clots.    He reports no current pain and states he has not been taking Gabapentin as he has not had any pain. He has upcoming follow-up visits with various specialists, including cardiology on September 5th, Dr. Benton (Heme/onc) on July 31st, and Dr. Lee at the end of August. An  "echocardiogram is scheduled for July 31st.    He denies any current pain in the legs.    Interval History (3/17/2025):  Conner Lombardi JrAmira presents today for follow-up visit.  Patient was last seen on 11/25/2024, about 4 months ago.  S/p LEFT hip arthroplasty on 1/28/2025 with great results. He continues to go to physical therapy postoperatively 2x per week.  He continues to take mobic QHS, which helps.  He recently stopped gabapentin because he felt like he did need it from any additional pain.  He is also fearful of becoming dependent on medications.  Patient reports pain as "0/10 today.  He plans to have right hip replacement in May of this year.    Interval History (11/25/2024):  Patient presents today for follow-up visit.  Patient was last seen on 9/23/2024, about 2-3 months ago. Pain is still stable from last injection.   Patient reports pain as 2/10 today.  He is seeing Cardiology next month for clearance for surgery, which isn't scheduled.     Interval History (9/23/2024): Conner Lombardi Jr. presents today for follow-up visit.  he underwent Left femoral & obturator nerve block on 8/19/24.  The patient reports that he is/was better following the procedure.  he reports 85% pain relief.  The changes lasted >4 weeks so far.  The changes have continued through this visit.  Patient reports pain as "0/10 today.  He does feel much better since the procedure.  Since then, he has seen Dr. Lee who is planning for total left hip arthroplasty. He has this scheduled for January 2025.  He has several trips planned before so can not commit to doing this sooner.    Interval History (07/31/2024):   patient returns to clinic for evaluation and hip pain.  Since last being seen, patient reports that lower back pain has greatly improved.  Primarily pain described as throbbing aching pain over the lateral aspect of the left hip.  Patient reports occasional pain in lateral aspect of the left thigh as well.  Patient denies " any numbness or tingling in his bilateral feet.  Patient denies any right lower extremity pain.  Pain is worse with prolonged standing and crossing his legs, better with rest and anti-inflammatories.  Pain is currently rated a 7/10. Denies any fevers, chills, changes in gait, saddle anesthesia, or bowel and bladder incontinence    Interval History (01/25/2024):  Patient presents today for follow-up visit.  Patient was last seen on 12/15/2023. At that visit, the plan was to complete imaging of lumbar spine. Patient reports pain as 1/10 today.  His condition has improved. States he usually has increased lower back pain with driving for an hour or more but this pain has lessened. Whenever he has pain, it is either in the lower back or L thigh. It does not radiate from lower back down his leg. He also has Left knee pain at times.    Initial HPI (12/15/2023):  Conner Lombardi Jr. is a 73 y.o. male who presents to the clinic for the evaluation of lower back pain.     Patient reports 4 month history of lower back pain.  Patient denies any significant inciting traumas to his lower back pain.  Patient denies any previous surgical intervention in his lumbar spine.  Lower back pain described as an aching throbbing pain that starts in the midline of lower back.  This pain will then radiate to his bilateral hips and into his bilateral anterior thighs to just above the knee.  Patient denies any significant radiation beyond this point.  Pain is worse with flexion and sitting for prolonged periods of time, better with stretching and anti-inflammatories.  Pain is currently rated a 2/10, but can increase to a 7/10 with exacerbating activities. Denies any fevers, chills, saddle anesthesia, or bowel and bladder incontinence      Non-Pharmacologic Treatments:  Physical Therapy/Home Exercise: yes  Ice/Heat:yes  TENS: no  Acupuncture: no  Massage: yes  Chiropractic: no        Previous Pain Medications:  Tylenol, ibuprofen, tizanidine,  topicals      Pain Procedures:   -8/19/24: Left femoral & obturator nerve block with 85% pain relief            Pain Disability Index (PDI) Score Review:      3/17/2025     9:44 AM 9/23/2024     8:31 AM 7/31/2024     2:27 PM   Last 3 PDI Scores   Pain Disability Index (PDI) 4 12 35           Imaging/ Diagnostic Studies/ Labs (Reviewed on 6/25/2025):    12/18/23 MRI Lumbar Spine Without Contrast  COMPARISON: Lumbar radiograph 12/07/2023    FINDINGS:  Alignment: Within normal limits.    Vertebrae: Lumbar vertebral body heights are maintained.  Mild multilevel endplate degenerative changes throughout the lumbar spine.  Minor L4-L5 endplate edema.  No evidence of acute fracture.  Marrow signal is otherwise within normal limits.    Discs: Disc desiccation and height loss throughout the lumbar spine.  Disc height loss is most pronounced at L5-S1.    Cord: Within normal limits.  Conus terminates at L1-L2.    Degenerative findings:    T12-L1: Tiny central disc bulge.  Minor facet arthropathy.  No significant spinal canal stenosis or neural foraminal stenosis.    L1-L2: Minor broad-based posterior disc bulge and mild bilateral facet arthropathy with ligamentum flavum hypertrophy.  Mild spinal canal stenosis.  No significant neural foraminal stenosis.    L2-L3: Mild broad-based posterior disc bulge and bilateral facet arthropathy with ligamentum flavum hypertrophy contribute to moderate spinal canal stenosis and mild bilateral neural foraminal stenosis.    L3-L4: Broad-based posterior disc bulge and bilateral facet arthropathy with ligamentum flavum hypertrophy contribute to moderate to severe spinal canal stenosis and mild bilateral neural foraminal stenosis.    L4-L5: Broad-based posterior disc bulge.  Bilateral facet arthropathy with ligamentum flavum hypertrophy and likely a tiny left anteriorly projecting facet synovial cyst.  Findings contribute to severe spinal canal stenosis and mild bilateral neural foraminal  stenosis.    L5-S1: Broad-based posterior disc bulge and bilateral facet arthropathy ligamentum flavum hypertrophy contribute to mild spinal canal stenosis.  No significant neural foraminal stenosis.    Paraspinal muscles & soft tissues: There appear to be left parapelvic renal cysts.  Paraspinal soft tissues appear within normal limits.    Impression  1. Multilevel degenerative changes of the lumbar spine are most pronounced at L4-L5 with severe spinal canal stenosis and mild bilateral neural foraminal stenosis.  2. Moderate to severe L3-L4 spinal canal stenosis with mild bilateral neural foraminal stenosis.  3. Moderate L2-L3 spinal canal stenosis with mild bilateral neural foraminal stenosis.         12/07/23 X-Ray Lumbar Spine AP And Lateral  Moderate to severe multilevel discogenic degenerative change most notable at L4-5 and L5-S1.  Moderate lower lumbar facet arthropathy.  Congenitally narrow spinal canal.  Lumbar vertebral body height and alignment are normal.  No evidence of acute fracture.        12/07/23 X-Ray Hips Bilateral 2 View Inc AP Pelvis  Bone-on-bone bilateral hip joint space narrowing with articular surface sclerosis and osteophyte formation.  Mild remodeling of the superior aspect bilateral femoral heads.  No fracture, suspicious bone marrow lesion or other acute disease is seen in the pelvis or either hip.  Joint alignment is anatomic.  There is no radiographic evidence of femoral head osteonecrosis.    Impression  Bone-on-bone bilateral hip joint space narrowing.                    Review of Systems:   Constitutional:  Negative for activity change, appetite change and fever.   HENT:  Negative for facial swelling, rhinorrhea and sore throat.    Respiratory:  Negative for cough, chest tightness, shortness of breath, wheezing and stridor.    Cardiovascular:  Negative for chest pain and palpitations.   Gastrointestinal:  Negative for abdominal pain, blood in stool, constipation, diarrhea, nausea  "and vomiting.   Endocrine: Negative for polydipsia, polyphagia and polyuria.   Genitourinary:  Negative for dysuria and hematuria.   Musculoskeletal:  Positive for arthralgias, back pain and myalgias. Negative for joint swelling, neck pain and neck stiffness.   Skin:  Negative for rash.   Allergic/Immunologic: Negative for food allergies.   Neurological:  Negative for dizziness, tremors, seizures, syncope, facial asymmetry, speech difficulty, light-headedness and headaches.   Psychiatric/Behavioral:  Negative for agitation, hallucinations, self-injury and suicidal ideas. The patient is not nervous/anxious and is not hyperactive.           OBJECTIVE:    Physical Exam  Vitals:    06/25/25 0853   BP: 134/81   Pulse: 87   Resp: 17   Weight: 82.2 kg (181 lb 3.5 oz)   Height: 5' 6" (1.676 m)   PainSc: 0-No pain       Body mass index is 29.25 kg/m².   (reviewed on 6/25/2025)    Constitutional:       General: He is not in acute distress.     Appearance: Normal appearance. He is not ill-appearing.   HENT:      Head: Normocephalic and atraumatic.   Eyes:      Extraocular Movements: Extraocular movements intact.   Pulmonary:      Effort: Pulmonary effort is normal.   Musculoskeletal:      Lumbar back: No tenderness or bony tenderness.      Right hip: No deformity, lacerations or bony tenderness. Normal range of motion. Normal strength.      Left hip: Tenderness, bony tenderness and crepitus present. No deformity or lacerations. Decreased range of motion. Decreased strength.   Skin:     General: Skin is warm and dry.   Neurological:      General: No focal deficit present.      Mental Status: He is alert and oriented to person, place, and time.      Sensory: No sensory deficit.      Motor: No abnormal muscle tone.      Gait: using rollator walker.     Deep Tendon Reflexes:      Reflex Scores:       Patellar reflexes are 2+ on the right side and 2+ on the left side.       Achilles reflexes are 1+ on the right side.  Psychiatric:  "        Mood and Affect: Mood normal.         Behavior: Behavior normal.         Thought Content: Thought content normal.     Musculoskeletal: Lumbar Exam  Incision: no  Pain with Flexion: no  Pain with Extension: no  ROM:   Improved compared to prior exam  Paraspinous TTP:  negative bilaterally  Facet TTP:  negative bilaterally  Facet Loading:  negative bilaterally  SLR:  negative bilaterally  SIJ TTP:  positive on left  JANA:  positive on left        ASSESSMENT:     75 y.o. year old male with lower back pain, consistent with underlying hip pathology.  Less likely to be overlapping lumbar radiculopathy.    1. History of total left hip replacement        2. History of total right hip replacement        3. Spinal stenosis, lumbosacral region        4. Lumbar spondylosis                    PLAN:   - Interventions:   - S/p LEFT hip arthroplasty on 1/28/2025 with great results.  S/p RIGHT hip arthroplasty on 5/30/25.   - S/p Left femoral & obturator nerve block on 8/19/24 with 85% pain relief.    - Anticoagulation use:   No no anticoagulation    - Medications:  - Can restart gabapentin 300mg QHS when needed.  - Continue Mobic 15mg QD PRN - recently started by Dr. Lee (Orthopedics).    - LA  reviewed and appropriate.       - Therapy:    -  Patient has completed formal physical therapy.  Continue home physical therapy exercises.     - Diagnostic/ Imaging: No new imaging ordered. Previous imaging reviewed.   - Lumbar X-ray: Consistent for advanced loss of disc height at L5-S1 and congenital spinal narrowing.  - Lumbar MRI: Multilevel degenerative changes of the lumbar spine are most pronounced at L4-L5 with severe spinal canal stenosis   - Hip x-ray: advance arthrosis     - Consults:   - Continue follow-up with Dr. Lee (Orthopedics) with plans to proceed with left total hip arthroplasty.      - Follow up visit: 3 months follow-up - in clinic (per pt request)       Future Appointments   Date Time Provider  Department Center   6/25/2025  2:30 PM Michelle Pena MD [facility] NEURO [facility]   7/3/2025  1:45 PM CARDIOVASCULAR 1, [facility] SPECCPR [facility]   7/31/2025  8:30 AM Serenity Salgado PA ON ORTHO [facility]   8/19/2025  8:30 AM LABORATORY, [facility]   8/26/2025  8:20 AM Chrissie Maguire PA-C ON IM [facility]   9/4/2025  8:15 AM ONL XR1-DR ON XRAY [facility]   9/4/2025  8:40 AM Javier Lee MD ON ORTHO [facility]   9/5/2025  2:00 PM Sky Tse MD [facility] CARDIO [facility]   9/29/2025  8:00 AM Sonia Martínez PA-C [facility] INT [facility]   12/9/2025  8:20 AM Sky Tse MD [facility] CARDIO [facility]         - Patient Questions: Answered all of the patient's questions regarding diagnosis, therapy, and treatment.    - This condition does not require this patient to take time off of work, and the primary goal of our Pain Management services is to improve the patient's functional capacity.   - I discussed the risks, benefits, and alternatives to potential treatment options. All questions and concerns were fully addressed today in clinic.         Sonia Martínez PA-C  Interventional Pain Management - [facility]    Disclaimer:  This note was prepared using voice recognition system and is likely to have sound alike errors that may have been overlooked even after proof reading.  Please call me with any questions.       This note was generated with the assistance of ambient listening technology. Recording of patient appointment was utlized to facilitate this note. I attest to having reviewed and edited the generated note for accuracy, though some syntax or spelling errors may persist. Please contact the author of this note for any clarification.

## 2025-06-24 NOTE — PROGRESS NOTES
Subjective:       Patient ID: Conner Lombardi Jr. is a 75 y.o. male.    Chief Complaint: Results, Anemia, and Coagulation Disorder    HPI:  75-year-old male with left hip replacement in January of 2025 subsequent right hip replacement after right hip replacement develop bilateral lower extremity DVTs and pulmonary embolus.  Was asked to see the patient to coordinate transition to warfarin clinic unable to take Eliquis because of seizure disorder in seizure medication ECOG status 2    Past Medical History:   Diagnosis Date    Anemia     Arthritis     Epilepsy     Last seizure 2010.     Hyperlipidemia     Hypertension     Prediabetes     Tubular adenoma of colon 04/02/2012    Vitamin D deficiency      Family History   Problem Relation Name Age of Onset    Cancer Mother Batool Lombardi         Brain    Heart disease Father Conner Lombardi, Sr.     Cancer Father Conner Lombardi, Sr.         Liver and lung    Hypertension Father Conner Lombardi, Sr.     Heart disease Son          CABG in 2018    Diabetes Maternal Grandfather Aguilar Pepitone     Hypertension Other       Social History[1]  Past Surgical History:   Procedure Laterality Date    BLOCK, NERVE, PERIPHERAL Left 8/19/2024    Procedure: Left femoral and obturator nerve block;  Surgeon: Helder Taylor MD;  Location: [facility];  Service: Pain Management;  Laterality: Left;    COLONOSCOPY N/A 07/12/2021    Procedure: COLONOSCOPY;  Surgeon: Subhash Martin MD;  Location: [facility];  Service: Endoscopy;  Laterality: N/A;    HIP ARTHROPLASTY Left 1/28/2025    Procedure: ARTHROPLASTY, HIP;  Surgeon: Javier Lee MD;  Location: [facility] OR;  Service: Orthopedics;  Laterality: Left;    HIP ARTHROPLASTY Right 5/30/2025    Procedure: ARTHROPLASTY, HIP;  Surgeon: Javier Lee MD;  Location: [facility] OR;  Service: Orthopedics;  Laterality: Right;    TONSILLECTOMY      VASECTOMY  1984/1985       Labs:  Lab Results   Component Value Date    WBC  6.00 06/23/2025    HGB 10.7 (L) 06/23/2025    HCT 32.1 (L) 06/23/2025     (H) 06/23/2025     06/23/2025     BMP  Lab Results   Component Value Date     06/20/2025    K 3.9 06/20/2025     06/20/2025    CO2 23 06/20/2025    BUN 25 (H) 06/20/2025    CREATININE 1.1 06/20/2025    CALCIUM 9.2 06/20/2025    ANIONGAP 10 06/20/2025    ESTGFRAFRICA >60.0 01/19/2022    EGFRNONAA >60.0 01/19/2022     Lab Results   Component Value Date    ALT 24 06/19/2025    AST 23 06/19/2025    ALKPHOS 140 06/19/2025    BILITOT 0.3 06/19/2025       Lab Results   Component Value Date    IRON 84 06/19/2025    TIBC 303 06/19/2025    FERRITIN 224.5 06/19/2025     Lab Results   Component Value Date    NKJEAXWF95 1029 (H) 08/29/2022     Lab Results   Component Value Date    FOLATE 8.3 08/29/2022     Lab Results   Component Value Date    TSH 1.240 08/02/2024         Review of Systems   Constitutional:  Negative for activity change, appetite change, chills, diaphoresis, fatigue, fever and unexpected weight change.   HENT:  Negative for congestion, dental problem, drooling, ear discharge, ear pain, facial swelling, hearing loss, mouth sores, nosebleeds, postnasal drip, rhinorrhea, sinus pressure, sneezing, sore throat, tinnitus, trouble swallowing and voice change.    Eyes:  Negative for photophobia, pain, discharge, redness, itching and visual disturbance.   Respiratory:  Negative for apnea, cough, choking, chest tightness, shortness of breath, wheezing and stridor.    Cardiovascular:  Negative for chest pain, palpitations and leg swelling.   Gastrointestinal:  Negative for abdominal distention, abdominal pain, anal bleeding, blood in stool, constipation, diarrhea, nausea, rectal pain and vomiting.   Endocrine: Negative for cold intolerance, heat intolerance, polydipsia, polyphagia and polyuria.   Genitourinary:  Negative for decreased urine volume, difficulty urinating, dysuria, enuresis, flank pain, frequency, genital  sores, hematuria, penile discharge, penile pain, penile swelling, scrotal swelling, testicular pain and urgency.   Musculoskeletal:  Positive for arthralgias and gait problem. Negative for back pain, joint swelling, myalgias, neck pain and neck stiffness.   Skin:  Negative for color change, pallor, rash and wound.   Allergic/Immunologic: Negative for environmental allergies, food allergies and immunocompromised state.   Neurological:  Negative for dizziness, tremors, seizures, syncope, facial asymmetry, speech difficulty, weakness, light-headedness, numbness and headaches.   Hematological:  Negative for adenopathy. Does not bruise/bleed easily.   Psychiatric/Behavioral:  Negative for agitation, behavioral problems, confusion, decreased concentration, dysphoric mood, hallucinations, self-injury, sleep disturbance and suicidal ideas. The patient is not nervous/anxious and is not hyperactive.        Objective:      Physical Exam  Vitals reviewed.   Constitutional:       General: He is not in acute distress.     Appearance: Normal appearance. He is well-developed. He is ill-appearing. He is not diaphoretic.   HENT:      Head: Normocephalic.      Right Ear: External ear normal.      Left Ear: External ear normal.      Nose: Nose normal.      Right Sinus: No maxillary sinus tenderness or frontal sinus tenderness.      Left Sinus: No maxillary sinus tenderness or frontal sinus tenderness.      Mouth/Throat:      Pharynx: No oropharyngeal exudate.   Eyes:      General: Lids are normal. No scleral icterus.        Right eye: No discharge.         Left eye: No discharge.      Extraocular Movements:      Right eye: Normal extraocular motion.      Left eye: Normal extraocular motion.      Conjunctiva/sclera:      Right eye: Right conjunctiva is not injected. No hemorrhage.     Left eye: Left conjunctiva is not injected. No hemorrhage.     Pupils: Pupils are equal, round, and reactive to light.   Neck:      Thyroid: No  thyromegaly.      Vascular: No JVD.      Trachea: No tracheal deviation.   Cardiovascular:      Rate and Rhythm: Normal rate.   Pulmonary:      Effort: Pulmonary effort is normal. No respiratory distress.      Breath sounds: No stridor.   Abdominal:      General: Bowel sounds are normal.      Palpations: Abdomen is soft. There is no hepatomegaly, splenomegaly or mass.      Tenderness: There is no abdominal tenderness.   Musculoskeletal:         General: No tenderness. Normal range of motion.      Cervical back: Normal range of motion and neck supple.   Lymphadenopathy:      Head:      Right side of head: No posterior auricular or occipital adenopathy.      Left side of head: No posterior auricular or occipital adenopathy.      Cervical: No cervical adenopathy.      Right cervical: No superficial, deep or posterior cervical adenopathy.     Left cervical: No superficial, deep or posterior cervical adenopathy.      Upper Body:      Right upper body: No supraclavicular adenopathy.      Left upper body: No supraclavicular adenopathy.   Skin:     General: Skin is dry.      Findings: No erythema or rash.      Nails: There is no clubbing.   Neurological:      Mental Status: He is alert and oriented to person, place, and time.      Cranial Nerves: No cranial nerve deficit.      Motor: Weakness present.      Coordination: Coordination abnormal.      Gait: Gait abnormal.   Psychiatric:         Behavior: Behavior normal.         Thought Content: Thought content normal.         Judgment: Judgment normal.             Assessment:      1. Anemia, unspecified type    2. Acute deep vein thrombosis (DVT) of proximal vein of both lower extremities    3. Bilateral pulmonary embolism    4. Status post right hip replacement    5. S/P hip replacement, left    6. Temporal lobe epilepsy           Med Onc Chart Routing      Follow up with physician . Return in 6 weeks to see me with CBC   Follow up with ERAN    Infusion scheduling note     Injection scheduling note    Labs   Scheduling:  Preferred lab:  Lab interval:  Factor 5 Leiden and factor 2 level today   Imaging    Pharmacy appointment    Other referrals           Needs to be seen in warfarin clinic this week for initiation of treatment INR between 2 and 3          Plan:     Patient will minimum of 6 months of treatment with warfarin.  Provoked event with bilateral hip replacements.  Will check factor 5 and prothrombin gene complex follow-up with me in 6 weeks repeat CBC to follow to see if resolution variant improvement in counts        Nick Blackburn Jr, MD FACP         [1]   Social History  Socioeconomic History    Marital status:    Tobacco Use    Smoking status: Never    Smokeless tobacco: Never   Substance and Sexual Activity    Alcohol use: No    Drug use: Never    Sexual activity: Not Currently     Partners: Female     Birth control/protection: None     Social Drivers of Health     Financial Resource Strain: Low Risk  (6/17/2025)    Overall Financial Resource Strain (CARDIA)     Difficulty of Paying Living Expenses: Not hard at all   Food Insecurity: No Food Insecurity (6/17/2025)    Hunger Vital Sign     Worried About Running Out of Food in the Last Year: Never true     Ran Out of Food in the Last Year: Never true   Transportation Needs: No Transportation Needs (6/17/2025)    PRAPARE - Transportation     Lack of Transportation (Medical): No     Lack of Transportation (Non-Medical): No   Physical Activity: Inactive (2/26/2025)    Exercise Vital Sign     Days of Exercise per Week: 0 days     Minutes of Exercise per Session: 0 min   Stress: No Stress Concern Present (6/17/2025)    [employer] - Occupational Stress Questionnaire     Feeling of Stress : Not at all   Housing Stability: Low Risk  (6/17/2025)    Housing Stability Vital Sign     Unable to Pay for Housing in the Last Year: No     Number of Times Moved in the Last Year: 0     Homeless in the  Last Year: No

## 2025-06-25 ENCOUNTER — OFFICE VISIT (OUTPATIENT)
Dept: NEUROLOGY | Facility: CLINIC | Age: 75
End: 2025-06-25
Payer: COMMERCIAL

## 2025-06-25 ENCOUNTER — LAB VISIT (OUTPATIENT)
Dept: LAB | Facility: HOSPITAL | Age: 75
End: 2025-06-25
Attending: INTERNAL MEDICINE
Payer: COMMERCIAL

## 2025-06-25 ENCOUNTER — TELEPHONE (OUTPATIENT)
Dept: HEMATOLOGY/ONCOLOGY | Facility: CLINIC | Age: 75
End: 2025-06-25
Payer: COMMERCIAL

## 2025-06-25 ENCOUNTER — ANTI-COAG VISIT (OUTPATIENT)
Dept: CARDIOLOGY | Facility: CLINIC | Age: 75
End: 2025-06-25
Payer: COMMERCIAL

## 2025-06-25 ENCOUNTER — OFFICE VISIT (OUTPATIENT)
Dept: PAIN MEDICINE | Facility: CLINIC | Age: 75
End: 2025-06-25
Payer: COMMERCIAL

## 2025-06-25 VITALS
HEART RATE: 108 BPM | HEIGHT: 66 IN | RESPIRATION RATE: 18 BRPM | SYSTOLIC BLOOD PRESSURE: 150 MMHG | DIASTOLIC BLOOD PRESSURE: 84 MMHG | WEIGHT: 180 LBS | BODY MASS INDEX: 28.93 KG/M2

## 2025-06-25 VITALS
BODY MASS INDEX: 29.12 KG/M2 | RESPIRATION RATE: 17 BRPM | HEART RATE: 87 BPM | SYSTOLIC BLOOD PRESSURE: 134 MMHG | DIASTOLIC BLOOD PRESSURE: 81 MMHG | WEIGHT: 181.19 LBS | HEIGHT: 66 IN

## 2025-06-25 DIAGNOSIS — Z78.9 DRUG-DRUG INTERACTION: ICD-10-CM

## 2025-06-25 DIAGNOSIS — M48.07 SPINAL STENOSIS, LUMBOSACRAL REGION: ICD-10-CM

## 2025-06-25 DIAGNOSIS — I26.99 BILATERAL PULMONARY EMBOLISM: ICD-10-CM

## 2025-06-25 DIAGNOSIS — M54.50 CHRONIC BILATERAL LOW BACK PAIN WITHOUT SCIATICA: ICD-10-CM

## 2025-06-25 DIAGNOSIS — R29.898 LEG WEAKNESS, BILATERAL: ICD-10-CM

## 2025-06-25 DIAGNOSIS — Z96.641 HISTORY OF TOTAL RIGHT HIP REPLACEMENT: ICD-10-CM

## 2025-06-25 DIAGNOSIS — Z86.718 HISTORY OF DVT OF LOWER EXTREMITY: ICD-10-CM

## 2025-06-25 DIAGNOSIS — L21.9 SEBORRHEIC DERMATITIS OF SCALP: ICD-10-CM

## 2025-06-25 DIAGNOSIS — I10 ESSENTIAL HYPERTENSION: ICD-10-CM

## 2025-06-25 DIAGNOSIS — M47.816 LUMBAR SPONDYLOSIS: ICD-10-CM

## 2025-06-25 DIAGNOSIS — Z79.01 LONG TERM (CURRENT) USE OF ANTICOAGULANTS: ICD-10-CM

## 2025-06-25 DIAGNOSIS — M16.11 ARTHRITIS OF RIGHT HIP: ICD-10-CM

## 2025-06-25 DIAGNOSIS — E78.2 MIXED HYPERLIPIDEMIA: ICD-10-CM

## 2025-06-25 DIAGNOSIS — H52.7 REFRACTIVE ERROR: ICD-10-CM

## 2025-06-25 DIAGNOSIS — H25.13 NUCLEAR SCLEROSIS OF BOTH EYES: ICD-10-CM

## 2025-06-25 DIAGNOSIS — G89.29 CHRONIC BILATERAL LOW BACK PAIN WITHOUT SCIATICA: ICD-10-CM

## 2025-06-25 DIAGNOSIS — M16.12 ARTHRITIS OF LEFT HIP: ICD-10-CM

## 2025-06-25 DIAGNOSIS — E55.9 VITAMIN D DEFICIENCY: ICD-10-CM

## 2025-06-25 DIAGNOSIS — I26.99 BILATERAL PULMONARY EMBOLISM: Primary | ICD-10-CM

## 2025-06-25 DIAGNOSIS — G40.909 NONINTRACTABLE EPILEPSY WITHOUT STATUS EPILEPTICUS, UNSPECIFIED EPILEPSY TYPE: ICD-10-CM

## 2025-06-25 DIAGNOSIS — Z96.642 S/P HIP REPLACEMENT, LEFT: ICD-10-CM

## 2025-06-25 DIAGNOSIS — R73.03 PREDIABETES: ICD-10-CM

## 2025-06-25 DIAGNOSIS — G40.109 TEMPORAL LOBE EPILEPSY: Primary | ICD-10-CM

## 2025-06-25 DIAGNOSIS — D64.9 ANEMIA, UNSPECIFIED TYPE: ICD-10-CM

## 2025-06-25 DIAGNOSIS — Z96.642 HISTORY OF TOTAL LEFT HIP REPLACEMENT: Primary | ICD-10-CM

## 2025-06-25 DIAGNOSIS — Z86.0100 HISTORY OF COLONIC POLYPS: ICD-10-CM

## 2025-06-25 DIAGNOSIS — Z86.711 HISTORY OF PULMONARY EMBOLISM: ICD-10-CM

## 2025-06-25 DIAGNOSIS — Z96.641 STATUS POST RIGHT HIP REPLACEMENT: ICD-10-CM

## 2025-06-25 PROBLEM — R79.89 ELEVATED TROPONIN: Status: RESOLVED | Noted: 2025-06-17 | Resolved: 2025-06-25

## 2025-06-25 PROBLEM — R56.9 SEIZURES: Status: RESOLVED | Noted: 2020-11-17 | Resolved: 2025-06-25

## 2025-06-25 LAB
INR PPP: 2.3 (ref 0.8–1.2)
PROTHROMBIN TIME: 23.6 SECONDS (ref 9–12.5)

## 2025-06-25 PROCEDURE — 3077F SYST BP >= 140 MM HG: CPT | Mod: CPTII,S$GLB,, | Performed by: PSYCHIATRY & NEUROLOGY

## 2025-06-25 PROCEDURE — 3008F BODY MASS INDEX DOCD: CPT | Mod: CPTII,S$GLB,, | Performed by: PSYCHIATRY & NEUROLOGY

## 2025-06-25 PROCEDURE — 99999 PR PBB SHADOW E&M-EST. PATIENT-LVL III: CPT | Mod: PBBFAC,,, | Performed by: PHYSICIAN ASSISTANT

## 2025-06-25 PROCEDURE — 3079F DIAST BP 80-89 MM HG: CPT | Mod: CPTII,S$GLB,, | Performed by: PSYCHIATRY & NEUROLOGY

## 2025-06-25 PROCEDURE — 1126F AMNT PAIN NOTED NONE PRSNT: CPT | Mod: CPTII,S$GLB,, | Performed by: PHYSICIAN ASSISTANT

## 2025-06-25 PROCEDURE — 1126F AMNT PAIN NOTED NONE PRSNT: CPT | Mod: CPTII,S$GLB,, | Performed by: PSYCHIATRY & NEUROLOGY

## 2025-06-25 PROCEDURE — 3008F BODY MASS INDEX DOCD: CPT | Mod: CPTII,S$GLB,, | Performed by: PHYSICIAN ASSISTANT

## 2025-06-25 PROCEDURE — 99214 OFFICE O/P EST MOD 30 MIN: CPT | Mod: S$GLB,,, | Performed by: PHYSICIAN ASSISTANT

## 2025-06-25 PROCEDURE — 85610 PROTHROMBIN TIME: CPT

## 2025-06-25 PROCEDURE — 1101F PT FALLS ASSESS-DOCD LE1/YR: CPT | Mod: CPTII,S$GLB,, | Performed by: PSYCHIATRY & NEUROLOGY

## 2025-06-25 PROCEDURE — G2211 COMPLEX E/M VISIT ADD ON: HCPCS | Mod: S$GLB,,, | Performed by: PSYCHIATRY & NEUROLOGY

## 2025-06-25 PROCEDURE — 3044F HG A1C LEVEL LT 7.0%: CPT | Mod: CPTII,S$GLB,, | Performed by: PHYSICIAN ASSISTANT

## 2025-06-25 PROCEDURE — 3288F FALL RISK ASSESSMENT DOCD: CPT | Mod: CPTII,S$GLB,, | Performed by: PHYSICIAN ASSISTANT

## 2025-06-25 PROCEDURE — 3075F SYST BP GE 130 - 139MM HG: CPT | Mod: CPTII,S$GLB,, | Performed by: PHYSICIAN ASSISTANT

## 2025-06-25 PROCEDURE — 3079F DIAST BP 80-89 MM HG: CPT | Mod: CPTII,S$GLB,, | Performed by: PHYSICIAN ASSISTANT

## 2025-06-25 PROCEDURE — 36415 COLL VENOUS BLD VENIPUNCTURE: CPT

## 2025-06-25 PROCEDURE — 4010F ACE/ARB THERAPY RXD/TAKEN: CPT | Mod: CPTII,S$GLB,, | Performed by: PSYCHIATRY & NEUROLOGY

## 2025-06-25 PROCEDURE — 1111F DSCHRG MED/CURRENT MED MERGE: CPT | Mod: CPTII,S$GLB,, | Performed by: PSYCHIATRY & NEUROLOGY

## 2025-06-25 PROCEDURE — 93793 ANTICOAG MGMT PT WARFARIN: CPT | Mod: S$GLB,,,

## 2025-06-25 PROCEDURE — 1111F DSCHRG MED/CURRENT MED MERGE: CPT | Mod: CPTII,S$GLB,, | Performed by: PHYSICIAN ASSISTANT

## 2025-06-25 PROCEDURE — 4010F ACE/ARB THERAPY RXD/TAKEN: CPT | Mod: CPTII,S$GLB,, | Performed by: PHYSICIAN ASSISTANT

## 2025-06-25 PROCEDURE — 99999 PR PBB SHADOW E&M-EST. PATIENT-LVL IV: CPT | Mod: PBBFAC,,, | Performed by: PSYCHIATRY & NEUROLOGY

## 2025-06-25 PROCEDURE — 99215 OFFICE O/P EST HI 40 MIN: CPT | Mod: S$GLB,,, | Performed by: PSYCHIATRY & NEUROLOGY

## 2025-06-25 PROCEDURE — 1101F PT FALLS ASSESS-DOCD LE1/YR: CPT | Mod: CPTII,S$GLB,, | Performed by: PHYSICIAN ASSISTANT

## 2025-06-25 PROCEDURE — 3288F FALL RISK ASSESSMENT DOCD: CPT | Mod: CPTII,S$GLB,, | Performed by: PSYCHIATRY & NEUROLOGY

## 2025-06-25 PROCEDURE — 3044F HG A1C LEVEL LT 7.0%: CPT | Mod: CPTII,S$GLB,, | Performed by: PSYCHIATRY & NEUROLOGY

## 2025-06-25 PROCEDURE — 1159F MED LIST DOCD IN RCRD: CPT | Mod: CPTII,S$GLB,, | Performed by: PSYCHIATRY & NEUROLOGY

## 2025-06-25 RX ORDER — PHENYTOIN SODIUM 100 MG/1
500 CAPSULE, EXTENDED RELEASE ORAL NIGHTLY
Qty: 450 CAPSULE | Refills: 3 | Status: SHIPPED | OUTPATIENT
Start: 2025-06-25 | End: 2026-06-25

## 2025-06-25 NOTE — PROGRESS NOTES
INR at goal-2.3. Medications and chart reviewed. No changes noted to necessitate adjustment of warfarin or follow-up plan. See calendar.  STOP Lovenox.  Resume dose of warfarin 5 mg daily.  Repeat INR on Friday with [facility].  Mrs Lombardi contacted and advised.

## 2025-06-25 NOTE — PROGRESS NOTES
Subjective:       Patient ID: Conner Lombardi Jr. is a 75 y.o. male.    Chief Complaint: Temporal lobe epilepsy          HPI         BACKGROUND HISTORY       The patient was referred by Dr. Lopez for epilepsy evaluation.     Used to see Dr. Ramirez and Dr. Rainey.    The patient started having seizures since he was an infant after forceps delivery. The seizures start with dizziness, could progress to inability to communicate and GTC (grand mal seizures). On 11- Brain MRI was unremarkable. On 03- EEG LT TLE. He was maintained on Primidone and PHT was added much later. The last seizure was in  and was attributed to rapid tapering off Primidone. He is doing very well on  mg QHS with no seizure and no side effects and declined making any change of his regimen after several discussions regarding side effects and interactions.       INTERVAL HISTORY       The patient presented on 06- for follow up evaluation.     No seizures since  on  mg PO QHS. The patient was diagnosed with DVT and PE on 06-. He was started on Coumadin (Warfarin).  Labs showed a drop of PHT level to 5.2 (Baseline 11-15).       Review of Systems   Constitutional:  Negative for appetite change and fatigue.   HENT:  Negative for hearing loss and tinnitus.    Eyes:  Negative for photophobia and visual disturbance.   Respiratory:  Positive for shortness of breath. Negative for apnea.    Cardiovascular:  Positive for leg swelling. Negative for chest pain and palpitations.   Gastrointestinal:  Negative for nausea and vomiting.   Endocrine: Negative for cold intolerance and heat intolerance.   Genitourinary:  Negative for difficulty urinating and urgency.   Musculoskeletal:  Negative for arthralgias, back pain, gait problem, joint swelling, myalgias, neck pain and neck stiffness.   Skin:  Negative for color change and rash.   Allergic/Immunologic: Negative for environmental allergies and immunocompromised  state.   Neurological:  Negative for dizziness, tremors, seizures, syncope, facial asymmetry, speech difficulty, weakness, light-headedness, numbness and headaches.   Hematological:  Negative for adenopathy. Does not bruise/bleed easily.   Psychiatric/Behavioral:  Negative for agitation, behavioral problems, confusion, decreased concentration, dysphoric mood, hallucinations, self-injury, sleep disturbance and suicidal ideas. The patient is not hyperactive.                  Current Outpatient Medications:     atorvastatin (LIPITOR) 40 MG tablet, Take 1 tablet (40 mg total) by mouth every evening., Disp: 90 tablet, Rfl: 1    cholecalciferol, vitamin D3, (VITAMIN D3) 25 mcg (1,000 unit) capsule, Take 1 capsule (1,000 Units total) by mouth once daily., Disp: , Rfl: 0    enoxaparin (LOVENOX) 100 mg/mL Syrg, Inject 0.9 mLs (90 mg total) into the skin every 12 (twelve) hours., Disp: 75.6 mL, Rfl: 11    gabapentin (NEURONTIN) 300 MG capsule, Take 1 capsule (300 mg total) by mouth every evening., Disp: 30 capsule, Rfl: 0    [Paused] lisinopriL-hydrochlorothiazide (PRINZIDE,ZESTORETIC) 20-25 mg Tab, Take 2 tablets by mouth once daily. If BP is below 110/70 - decrease to 1 tablet daily, Disp: 180 tablet, Rfl: 1    metoprolol tartrate (LOPRESSOR) 25 MG tablet, Take 1 tablet (25 mg total) by mouth 2 (two) times daily., Disp: 180 tablet, Rfl: 3    phenytoin (DILANTIN) 100 MG ER capsule, Take 4 capsules (400 mg total) by mouth every evening., Disp: 360 capsule, Rfl: 3    warfarin (COUMADIN) 5 MG tablet, Take 1 tablet (5 mg total) by mouth Daily., Disp: 42 tablet, Rfl: 0  Past Medical History:   Diagnosis Date    Anemia     Arthritis     Epilepsy     Last seizure 2010.     Hyperlipidemia     Hypertension     Prediabetes     Tubular adenoma of colon 04/02/2012    Vitamin D deficiency      Past Surgical History:   Procedure Laterality Date    BLOCK, NERVE, PERIPHERAL Left 8/19/2024    Procedure: Left femoral and obturator nerve  block;  Surgeon: Helder Taylor MD;  Location: [facility] PAIN MGT;  Service: Pain Management;  Laterality: Left;    COLONOSCOPY N/A 07/12/2021    Procedure: COLONOSCOPY;  Surgeon: Subhash Martin MD;  Location: [facility] ENDO;  Service: Endoscopy;  Laterality: N/A;    HIP ARTHROPLASTY Left 1/28/2025    Procedure: ARTHROPLASTY, HIP;  Surgeon: Javier Lee MD;  Location: [facility] OR;  Service: Orthopedics;  Laterality: Left;    HIP ARTHROPLASTY Right 5/30/2025    Procedure: ARTHROPLASTY, HIP;  Surgeon: Javier Lee MD;  Location: [facility] OR;  Service: Orthopedics;  Laterality: Right;    TONSILLECTOMY      VASECTOMY  1984/1985     Social History     Socioeconomic History    Marital status:    Tobacco Use    Smoking status: Never    Smokeless tobacco: Never   Substance and Sexual Activity    Alcohol use: No    Drug use: Never    Sexual activity: Not Currently     Partners: Female     Birth control/protection: None     Social Drivers of Health     Financial Resource Strain: Low Risk  (6/17/2025)    Overall Financial Resource Strain (CARDIA)     Difficulty of Paying Living Expenses: Not hard at all   Food Insecurity: No Food Insecurity (6/17/2025)    Hunger Vital Sign     Worried About Running Out of Food in the Last Year: Never true     Ran Out of Food in the Last Year: Never true   Transportation Needs: No Transportation Needs (6/17/2025)    PRAPARE - Transportation     Lack of Transportation (Medical): No     Lack of Transportation (Non-Medical): No   Physical Activity: Inactive (2/26/2025)    Exercise Vital Sign     Days of Exercise per Week: 0 days     Minutes of Exercise per Session: 0 min   Stress: No Stress Concern Present (6/17/2025)    [employer] - Occupational Stress Questionnaire     Feeling of Stress : Not at all   Housing Stability: Low Risk  (6/17/2025)    Housing Stability Vital Sign     Unable to Pay for Housing in the Last Year: No     Number of Times Moved  in the Last Year: 0     Homeless in the Last Year: No             Past/Current Medical/Surgical History, Past/Current Social History, Past/Current Family History and Past/Current Medications were reviewed in detail.        Objective:           VITAL SIGNS WERE REVIEWED      GENERAL APPEARANCE:     The patient looks comfortable.    BMI 29.05    No signs of respiratory distress.    Normal breathing pattern.    No dysmorphic features    Normal eye contact.     GENERAL MEDICAL EXAM:    HEENT:  Head is atraumatic normocephalic.     Funduscopic (Ophthalmoscopic) exam showed no disc edema.      Neck and Axillae: No JVD. No visible lesions.    Cardiopulmonary: No cyanosis. No tachypnea. Normal respiratory effort.    Gastrointestinal/Urogenital:  No jaundice. No stomas or lesions. No visible hernias. No catheters.     Skin, Hair and Nails: No pathognonomic skin rash. No neurofibromatosis. No visible lesions.No stigmata of autoimmune disease. No clubbing.    Limbs: No varicose veins. BLE visible swelling.    Muskoskeletal: No visible deformities.No visible lesions.           Neurologic Exam     Mental Status   Oriented to person, place, and time.   Registration: recalls 3 of 3 objects. Recall at 5 minutes: recalls 3 of 3 objects. Follows 3 step commands.   Attention: normal. Concentration: normal.   Speech: speech is normal   Level of consciousness: alert  Knowledge: good and consistent with education. Able to perform simple calculations.   Able to name object. Able to read. Able to repeat. Able to write. Normal comprehension.     Cranial Nerves   Cranial nerves II through XII intact.     CN II   Visual fields full to confrontation.   Visual acuity: normal with correction  Right visual field deficit: none  Left visual field deficit: none     CN III, IV, VI   Pupils are equal, round, and reactive to light.  Extraocular motions are normal.   Right pupil: Size: 2 mm. Shape: regular. Reactivity: brisk. Consensual response:  intact. Accommodation: intact.   Left pupil: Size: 2 mm. Shape: regular. Reactivity: brisk. Consensual response: intact. Accommodation: intact.   CN III: no CN III palsy  CN VI: no CN VI palsy  Nystagmus: none   Diplopia: none  Ophthalmoparesis: none  Upgaze: normal  Downgaze: normal  Conjugate gaze: present  Vestibulo-ocular reflex: present    CN V   Facial sensation intact.   Right facial sensation deficit: none  Left facial sensation deficit: none    CN VII   Facial expression full, symmetric.   Right facial weakness: none  Left facial weakness: none    CN VIII   CN VIII normal.   Hearing: intact    CN IX, X   CN IX normal.   CN X normal.   Palate: symmetric    CN XI   CN XI normal.   Right sternocleidomastoid strength: normal  Left sternocleidomastoid strength: normal  Right trapezius strength: normal  Left trapezius strength: normal    CN XII   CN XII normal.   Tongue: not atrophic  Fasciculations: absent  Tongue deviation: none    Motor Exam   Muscle bulk: normal  Overall muscle tone: normal  Right arm tone: normal  Left arm tone: normal  Right arm pronator drift: absent  Left arm pronator drift: absent  Right leg tone: normal  Left leg tone: normal    Strength   Strength 5/5 throughout.   Right neck flexion: 5/5  Left neck flexion: 5/5  Right neck extension: 5/5  Left neck extension: 5/5  Right deltoid: 5/5  Left deltoid: 5/5  Right biceps: 5/5  Left biceps: 5/5  Right triceps: 5/5  Left triceps: 5/5  Right wrist flexion: 5/5  Left wrist flexion: 5/5  Right wrist extension: 5/5  Left wrist extension: 5/5  Right interossei: 5/5  Left interossei: 5/5  Right iliopsoas: 5/5  Left iliopsoas: 5/5  Right quadriceps: 5/5  Left quadriceps: 5/5  Right hamstrin/5  Left hamstrin/5  Right glutei: 5/5  Left glutei: 5/5  Right anterior tibial: 5/5  Left anterior tibial: 5/5  Right posterior tibial: 5/5  Left posterior tibial: 5/5  Right peroneal: 5/5  Left peroneal: 5/5  Right gastroc: 5/5  Left gastroc:  5/5    Sensory Exam   Light touch normal.   Right arm light touch: normal  Left arm light touch: normal  Right leg light touch: normal  Left leg light touch: normal  Vibration normal.   Right arm vibration: normal  Left arm vibration: normal  Right leg vibration: normal  Left leg vibration: normal  Proprioception normal.   Right arm proprioception: normal  Left arm proprioception: normal  Right leg proprioception: normal  Left leg proprioception: normal  Pinprick normal.   Right arm pinprick: normal  Left arm pinprick: normal  Right leg pinprick: normal  Left leg pinprick: normal  Graphesthesia: normal  Stereognosis: normal    Gait, Coordination, and Reflexes     Gait  Gait: normal    Coordination   Romberg: negative  Finger to nose coordination: normal  Heel to shin coordination: normal    Tremor   Resting tremor: absent  Intention tremor: absent  Action tremor: absent    Reflexes   Right brachioradialis: 2+  Left brachioradialis: 2+  Right biceps: 2+  Left biceps: 2+  Right triceps: 2+  Left triceps: 2+  Right patellar: 2+  Left patellar: 2+  Right achilles: 1+  Left achilles: 1+  Right plantar: normal  Left plantar: normal  Right Harris: absent  Left Harris: absent  Right ankle clonus: absent  Left ankle clonus: absent  Right pendular knee jerk: absent  Left pendular knee jerk: absent      Lab Results   Component Value Date    WBC 6.00 06/23/2025    HGB 10.7 (L) 06/23/2025    HCT 32.1 (L) 06/23/2025     (H) 06/23/2025     06/23/2025     Sodium   Date Value Ref Range Status   06/20/2025 139 136 - 145 mmol/L Final   02/07/2025 141 136 - 145 mmol/L Final     Potassium   Date Value Ref Range Status   06/20/2025 3.9 3.5 - 5.1 mmol/L Final   02/07/2025 4.6 3.5 - 5.1 mmol/L Final     Chloride   Date Value Ref Range Status   06/20/2025 106 95 - 110 mmol/L Final   02/07/2025 105 95 - 110 mmol/L Final     CO2   Date Value Ref Range Status   06/20/2025 23 23 - 29 mmol/L Final   02/07/2025 27 23 - 29 mmol/L  Final     Glucose   Date Value Ref Range Status   06/20/2025 106 70 - 110 mg/dL Final   02/07/2025 111 (H) 70 - 110 mg/dL Final     BUN   Date Value Ref Range Status   06/20/2025 25 (H) 8 - 23 mg/dL Final     Creatinine   Date Value Ref Range Status   06/20/2025 1.1 0.5 - 1.4 mg/dL Final     Calcium   Date Value Ref Range Status   06/20/2025 9.2 8.7 - 10.5 mg/dL Final   02/07/2025 9.4 8.7 - 10.5 mg/dL Final     Protein Total   Date Value Ref Range Status   06/19/2025 6.5 6.0 - 8.4 gm/dL Final     Total Protein   Date Value Ref Range Status   02/07/2025 6.8 6.0 - 8.4 g/dL Final     Albumin   Date Value Ref Range Status   06/19/2025 3.0 (L) 3.5 - 5.2 g/dL Final   02/07/2025 3.2 (L) 3.5 - 5.2 g/dL Final     Total Bilirubin   Date Value Ref Range Status   02/07/2025 0.3 0.1 - 1.0 mg/dL Final     Comment:     For infants and newborns, interpretation of results should be based  on gestational age, weight and in agreement with clinical  observations.    Premature Infant recommended reference ranges:  Up to 24 hours.............<8.0 mg/dL  Up to 48 hours............<12.0 mg/dL  3-5 days..................<15.0 mg/dL  6-29 days.................<15.0 mg/dL       Bilirubin Total   Date Value Ref Range Status   06/19/2025 0.3 0.1 - 1.0 mg/dL Final     Comment:     For infants and newborns, interpretation of results should be based   on gestational age, weight and in agreement with clinical   observations.    Premature Infant recommended reference ranges:   0-24 hours:  <8.0 mg/dL   24-48 hours: <12.0 mg/dL   3-5 days:    <15.0 mg/dL   6-29 days:   <15.0 mg/dL     Alkaline Phosphatase   Date Value Ref Range Status   02/07/2025 124 40 - 150 U/L Final     ALP   Date Value Ref Range Status   06/19/2025 140 40 - 150 unit/L Final     AST   Date Value Ref Range Status   06/19/2025 23 11 - 45 unit/L Final   02/07/2025 28 10 - 40 U/L Final     ALT   Date Value Ref Range Status   06/19/2025 24 10 - 44 unit/L Final   02/07/2025 28 10 - 44  U/L Final     Anion Gap   Date Value Ref Range Status   06/20/2025 10 8 - 16 mmol/L Final     eGFR if    Date Value Ref Range Status   01/19/2022 >60.0 >60 mL/min/1.73 m^2 Final     eGFR if non    Date Value Ref Range Status   01/19/2022 >60.0 >60 mL/min/1.73 m^2 Final     Comment:     Calculation used to obtain the estimated glomerular filtration  rate (eGFR) is the CKD-EPI equation.        Lab Results   Component Value Date    QYXVTCQO27 1029 (H) 08/29/2022     Lab Results   Component Value Date    TSH 1.240 08/02/2024    C2RSQRU 110 06/23/2010    L6OFPUL 4.5 06/23/2010         LABORATORY EVALUATION       AED LEVELS          AED: PHT  NORMAL LEVEL RANGE: 10-20   DATE LEVEL   8009-9131 11.1-15.4   06-  5.2                              [id number]      CBC, CMP, TFT Unremarkable    HA1C 5.3-5.9 (5.9)    Vit D 22-35 (29).      RADIOLOGY EVALUATION       11-     Brain MRI was unremarkable.         NEUROPHYSIOLOGY EVALUATION       03-    EEG LT TLE           Reviewed the neuroimaging independently       Assessment:       1. Temporal lobe epilepsy    2. Anemia, unspecified type    3. Arthritis of left hip    4. Arthritis of right hip    5. Chronic bilateral low back pain without sciatica    6. Essential hypertension    7. History of DVT of lower extremity    8. History of pulmonary embolism    9. Leg weakness, bilateral    10. Mixed hyperlipidemia    11. Nuclear sclerosis of both eyes    12. Personal history of colonic polyps    13. Prediabetes    14. Refractive error    15. S/P hip replacement, left    16. Seborrheic dermatitis of scalp    17. Status post right hip replacement    18. Vitamin D deficiency          EPILEPSY CLASSIFICATION    SEMIOLOGY: VERTIGINOUS AURA-->APHASIC SEIZURE-->GTC     EPILEPTOGENIC ZONE (S): LEFT TEMPORAL LOBE     ETIOLOGY: UNKNOWN     PRIOR AEDS: PRM.    CURRENT AEDS: PHT    LAST SEIZURE DATE:       COMPREHENSIVE LIST OF AEDs:      Acetazolamide (AZM-Diamox)   Benzos: clonazepam (CZP Klonopin), lorazepam (LZP-Ativan), diazepam (DZP-Valium), clorazepate (CLZ- Tranxene)  Brivaracetam (BRV-Briviact)  Cannabidiol (CBD- Epidiolex)  Carbamazepine (CBZ-Tegretol)  Cenobamate (CNB-Xcopri)  Clobazam (CLB-Onfi)  Eslicarbazepine (ESL-Aptiom)  Ethosuximide (ESX-Zarontin)  Felbamate (FBM-Felbatol)  Fenfluramine (FFA-Fintepla)  Gabapentin (GBP-Neurontin)  Lacosamide (LCM-Vimpat)  Lamotrigine (LTG-Lamitcal)  Levetiracetam (LEV- Keppra)  Oxcarbazepine (OXC-Trileptal)  Perampanel (PML-Fycompa)  Phenobarbital (PB)  Phenytoin (PHT-Dilantin)  Pregabalin (PGB-Lyrica)  Primidone (PRM)   Retigabine (RTG- Potiga) Discontinued in 2017  Rufinamide (RFN-Benzil)  Stiripentol (STP-Diacomit)  Tiagabine (TGB-Gabitril)  Topiramate (TPM-Topamax)  Valproate (VPA-Depakote)  Vigabatrin (VGB-Sabril)  Zonisamide (ZNS-Zonegran)    Plan:                     NON-LESIONAL LEFT TEMPORAL LOBE EPILEPSY (TLE), WELL-CONTROLED ON PHT MONOTHERAPY         The patient was encouraged to maintain full traditional seizure precautions which include but not limited to being careful with driving, high altitudes, being close to fire or fire source, being close to a body of water or swimming alone,operating heavy machinery and avoid using sharp objects if possible. The patient was encouraged to shower (without accumulation of water) instead of taking a bath if unsupervised. The patient was made aware that these precautions are especially important during concurrent illness. Adequate sleep and avoidance of alcohol as important measures to assure good seizure control were discussed with the patient. The patient was also advised to be careful with caring for children without company. The patient was advised to pad the side rails with pillows and blankets if applicable and sleep as close to the floor as possible. I strongly recommended lowering the bed to the floor level to decrease the risk of falls.  I also instructed the patient to avoid safety sensitive duties. In general, any activity that requires full awareness and would result in serious injury to self and others if a seizure occurs should be approached carefully. The patient verbalized full understanding.    The patient has been seizure-free for > 5 years.  The patient meets the legal criteria to resume driving. I explained to the patient that the risk of breakthrough seizures will ALWAYS be there. I instructed the patient to avoid alcohol, get enough sleep and be complaint with AED regimen. I instructed the patient to avoid driving if AED was skipped, if drank alcohol, sleep-deprived or not feeling well. The patient verbalized full understanding.     Had a very long discussion in the past  regarding PHT being effective but a risky AED with toxicity potential, narrow therapeutic window/index, interactions with many medications that are metabolized by the liver, interaction with radiation therapy and chemotherapy, numerous short term and long term detrimental SEs like gum problems, balance problems, neuropathy. Knowing all that, the patient does not want to make any changes.    Hand another length discussion regarding the ongoing bidirectional interaction between PHT and Warfarin (Coumadin). The patient still wanted to continue with PHT, so will Continue PHT and change form 400 mg QHS to 500 mg PO QHS. Will recheck trough level in 2 weeks.       Continue AEDs INDEFINITELY, FOR GOOD AND NEVER SKIP A DOSE. The patient verbalized full understanding. Stressed the extreme medical, personal safety, public safety and legal importance of compliance and seizure control. Will give as many refills as possible with or without face-to-face evaluation to make sure the patient and any patient with epilepsy will never run out of medications. Running out of seizure medications should never happen under our care. I explained to the patient that he should not, under any  circumstances, adjust or stop taking his seizure medication without discussing with me. The patient verbalized full understanding.     AVOID any circumstance or substance that could lower seizure threshold including but not limited to: (take extra precautions and extra dose when exposed to triggers)       SPECIFIC TRIGGERS IN REFLEX EPILEPSY     SLEEP DEPRIVATION    ALCOHOL AND WITHDRAWAL      TRAMADOL.     MEPERIDINE (DEMEROL)     ALL STIMULANTS-ALL ADHD MEDICATIONS.      CLOZAPINE.      BUPROPION (WELLBUTRIN)     CIPROFLOXACIN.    CYCLOSPORINE.     METOCLOPRAMIDE (REGLAN).     TETRAHYDROCANNABINOL (THC)    KRATOM     PHOSPHODIESTERASE 5 INHIBITORS (PDE5i) ED MEDICATIONS (SILDENAFIL (VIAGRA), VARDENAFIL (LEVITRA)  , TADALAFIL (CIALIS), AVANAFIL (STENDRA)               MEDICAL/SURGICAL COMORBIDITIES     All relevant medical comorbidities noted and managed by primary care physician and medical care team.              HEALTHY LIFESTYLE AND PREVENTATIVE CARE    The patient to adhere to the age-appropriate health maintenance guidelines including screening tests and vaccinations. The patient to adhere to  healthy lifestyle, optimal weight, exercise, healthy diet, good sleep hygiene and avoiding drugs including smoking, alcohol and recreational drugs.        RTC 4 WEEKS           Michelle Pena MD, FAAN    Attending Neurologist/Epileptologist         Diplomate, American Board of Psychiatry and Neurology    Diplomate, American Board of Clinical Neurophysiology     Fellow, American Academy of Neurology           I spent a total of 41 minutes on the day of the visit.  This includes face to face time and non-face to face time preparing to see the patient (eg, review of tests), obtaining and/or reviewing separately obtained history, documenting clinical information in the electronic or other health record, independently interpreting results and communicating results to the patient/family/caregiver, or care coordinator.

## 2025-06-25 NOTE — TELEPHONE ENCOUNTER
3:45 am-SW attempted to reach pt re: provider consult received to assess needs. No answer, vm left.    3:57-SW received call back from pt. Pt stated that he's doing ok, and was able to get a full night's rest last night because he was just tired. Pt stated that he can take some sleep aids that don't interact with his seizure meds. No needs at this time per pt, but pt agreed to call if needs arise. SW will remain available.

## 2025-06-26 LAB
AT III ACT/NOR PPP CHRO: 122 % (ref 82–139)
PROTHROM ACT/NOR PPP: 74.7 % (ref 70–130)

## 2025-06-27 ENCOUNTER — ANTI-COAG VISIT (OUTPATIENT)
Dept: CARDIOLOGY | Facility: CLINIC | Age: 75
End: 2025-06-27
Payer: COMMERCIAL

## 2025-06-27 DIAGNOSIS — Z86.711 HISTORY OF PULMONARY EMBOLISM: Primary | ICD-10-CM

## 2025-06-27 LAB
INR PPP: 2.5
W BETA-2 GLYCOPROTEIN 1 IGA AB: 1.6 U/ML
W BETA-2 GLYCOPROTEIN 1 IGG AB: <0.8 U/ML
W BETA-2 GLYCOPROTEIN 1 IGM AB: <2.4 U/ML
W FACTOR V LEIDEN MUTATION: NEGATIVE

## 2025-06-27 NOTE — PROGRESS NOTES
INR at goal-2.5. Medications and chart reviewed. No changes noted to necessitate adjustment of warfarin or follow-up plan. See calendar.  Pt will remain on warfarin 5 mg QD  Repeat INR next week with appointment at [facility].   Maria C contacted and advised.

## 2025-06-30 LAB
PROT C ACT/NOR PPP CHRO: 66 % (ref 70–150)
PROT S ACT/NOR PPP: 71 % (ref 65–150)

## 2025-07-02 ENCOUNTER — TELEPHONE (OUTPATIENT)
Dept: PHARMACY | Facility: CLINIC | Age: 75
End: 2025-07-02
Payer: COMMERCIAL

## 2025-07-02 ENCOUNTER — PATIENT MESSAGE (OUTPATIENT)
Dept: INTERNAL MEDICINE | Facility: CLINIC | Age: 75
End: 2025-07-02
Payer: COMMERCIAL

## 2025-07-02 ENCOUNTER — PATIENT MESSAGE (OUTPATIENT)
Dept: URGENT CARE | Facility: CLINIC | Age: 75
End: 2025-07-02

## 2025-07-02 ENCOUNTER — NURSE TRIAGE (OUTPATIENT)
Dept: ADMINISTRATIVE | Facility: CLINIC | Age: 75
End: 2025-07-02
Payer: COMMERCIAL

## 2025-07-02 ENCOUNTER — PATIENT MESSAGE (OUTPATIENT)
Dept: HEMATOLOGY/ONCOLOGY | Facility: CLINIC | Age: 75
End: 2025-07-02
Payer: COMMERCIAL

## 2025-07-02 ENCOUNTER — OCHSNER VIRTUAL EMERGENCY DEPARTMENT (OUTPATIENT)
Facility: CLINIC | Age: 75
End: 2025-07-02
Payer: COMMERCIAL

## 2025-07-02 ENCOUNTER — HOSPITAL ENCOUNTER (EMERGENCY)
Facility: HOSPITAL | Age: 75
Discharge: HOME | End: 2025-07-02
Attending: EMERGENCY MEDICINE
Payer: COMMERCIAL

## 2025-07-02 ENCOUNTER — PATIENT OUTREACH (OUTPATIENT)
Facility: OTHER | Age: 75
End: 2025-07-02
Payer: COMMERCIAL

## 2025-07-02 ENCOUNTER — PATIENT MESSAGE (OUTPATIENT)
Dept: ORTHOPEDICS | Facility: CLINIC | Age: 75
End: 2025-07-02
Payer: COMMERCIAL

## 2025-07-02 ENCOUNTER — PATIENT MESSAGE (OUTPATIENT)
Dept: CARDIOLOGY | Facility: CLINIC | Age: 75
End: 2025-07-02
Payer: COMMERCIAL

## 2025-07-02 VITALS
DIASTOLIC BLOOD PRESSURE: 66 MMHG | RESPIRATION RATE: 14 BRPM | HEART RATE: 92 BPM | TEMPERATURE: 98 F | WEIGHT: 185.19 LBS | BODY MASS INDEX: 29.76 KG/M2 | OXYGEN SATURATION: 97 % | HEIGHT: 66 IN | SYSTOLIC BLOOD PRESSURE: 133 MMHG

## 2025-07-02 DIAGNOSIS — M79.89 LEG SWELLING: ICD-10-CM

## 2025-07-02 DIAGNOSIS — I82.412 DEEP VEIN THROMBOSIS (DVT) OF FEMORAL VEIN OF LEFT LOWER EXTREMITY, UNSPECIFIED CHRONICITY: Primary | ICD-10-CM

## 2025-07-02 LAB
ABSOLUTE EOSINOPHIL (OHS): 0.62 K/UL
ABSOLUTE MONOCYTE (OHS): 0.63 K/UL (ref 0.3–1)
ABSOLUTE NEUTROPHIL COUNT (OHS): 2.21 K/UL (ref 1.8–7.7)
ALBUMIN SERPL BCP-MCNC: 3.4 G/DL (ref 3.5–5.2)
ALP SERPL-CCNC: 126 UNIT/L (ref 40–150)
ALT SERPL W/O P-5'-P-CCNC: 31 UNIT/L (ref 10–44)
ANION GAP (OHS): 7 MMOL/L (ref 8–16)
AST SERPL-CCNC: 25 UNIT/L (ref 11–45)
BASOPHILS # BLD AUTO: 0.04 K/UL
BASOPHILS NFR BLD AUTO: 0.7 %
BILIRUB SERPL-MCNC: 0.2 MG/DL (ref 0.1–1)
BILIRUB UR QL STRIP.AUTO: NEGATIVE
BUN SERPL-MCNC: 17 MG/DL (ref 8–23)
CALCIUM SERPL-MCNC: 9.1 MG/DL (ref 8.7–10.5)
CHLORIDE SERPL-SCNC: 106 MMOL/L (ref 95–110)
CLARITY UR: CLEAR
CO2 SERPL-SCNC: 26 MMOL/L (ref 23–29)
COLOR UR AUTO: COLORLESS
CREAT SERPL-MCNC: 1 MG/DL (ref 0.5–1.4)
ERYTHROCYTE [DISTWIDTH] IN BLOOD BY AUTOMATED COUNT: 14.2 % (ref 11.5–14.5)
GFR SERPLBLD CREATININE-BSD FMLA CKD-EPI: >60 ML/MIN/1.73/M2
GLUCOSE SERPL-MCNC: 105 MG/DL (ref 70–110)
GLUCOSE UR QL STRIP: NEGATIVE
HCT VFR BLD AUTO: 30.8 % (ref 40–54)
HGB BLD-MCNC: 10.4 GM/DL (ref 14–18)
HGB UR QL STRIP: NEGATIVE
IMM GRANULOCYTES # BLD AUTO: 0.01 K/UL (ref 0–0.04)
IMM GRANULOCYTES NFR BLD AUTO: 0.2 % (ref 0–0.5)
INR PPP: 3.6 (ref 0.8–1.2)
KETONES UR QL STRIP: NEGATIVE
LEUKOCYTE ESTERASE UR QL STRIP: NEGATIVE
LYMPHOCYTES # BLD AUTO: 2.48 K/UL (ref 1–4.8)
MCH RBC QN AUTO: 35 PG (ref 27–31)
MCHC RBC AUTO-ENTMCNC: 33.8 G/DL (ref 32–36)
MCV RBC AUTO: 104 FL (ref 82–98)
NITRITE UR QL STRIP: NEGATIVE
NUCLEATED RBC (/100WBC) (OHS): 0 /100 WBC
PH UR STRIP: 7 [PH]
PLATELET # BLD AUTO: 250 K/UL (ref 150–450)
PMV BLD AUTO: 9.1 FL (ref 9.2–12.9)
POTASSIUM SERPL-SCNC: 4.5 MMOL/L (ref 3.5–5.1)
PROT SERPL-MCNC: 6.9 GM/DL (ref 6–8.4)
PROT UR QL STRIP: NEGATIVE
PROTHROMBIN TIME: 38 SECONDS (ref 9–12.5)
RBC # BLD AUTO: 2.97 M/UL (ref 4.6–6.2)
RELATIVE EOSINOPHIL (OHS): 10.4 %
RELATIVE LYMPHOCYTE (OHS): 41.4 % (ref 18–48)
RELATIVE MONOCYTE (OHS): 10.5 % (ref 4–15)
RELATIVE NEUTROPHIL (OHS): 36.8 % (ref 38–73)
SODIUM SERPL-SCNC: 139 MMOL/L (ref 136–145)
SP GR UR STRIP: 1.01
UROBILINOGEN UR STRIP-ACNC: NEGATIVE EU/DL
WBC # BLD AUTO: 5.99 K/UL (ref 3.9–12.7)

## 2025-07-02 PROCEDURE — 85025 COMPLETE CBC W/AUTO DIFF WBC: CPT | Performed by: NURSE PRACTITIONER

## 2025-07-02 PROCEDURE — 85610 PROTHROMBIN TIME: CPT | Performed by: NURSE PRACTITIONER

## 2025-07-02 PROCEDURE — 81003 URINALYSIS AUTO W/O SCOPE: CPT | Performed by: NURSE PRACTITIONER

## 2025-07-02 PROCEDURE — 82374 ASSAY BLOOD CARBON DIOXIDE: CPT | Performed by: NURSE PRACTITIONER

## 2025-07-02 PROCEDURE — 99284 EMERGENCY DEPT VISIT MOD MDM: CPT | Mod: 25

## 2025-07-02 RX ORDER — ENOXAPARIN SODIUM 100 MG/ML
85 INJECTION SUBCUTANEOUS EVERY 12 HOURS
Qty: 54 ML | Refills: 3 | Status: SHIPPED | OUTPATIENT
Start: 2025-07-02

## 2025-07-02 NOTE — PROGRESS NOTES
"Patient's spouse spoke to [facility] RN on call nurse on patient's behalf to report the following, "Wife calling on behalf of pt. States that pt was discharged with blood clots in legs and lungs on 6/20/25. Pt is on Warfarin 5 mg as ordered. Now c/o lower left leg being swollen and warm again. Denies SOB. Denies pain to left calf."    [facility] RN on call nurse consulted with Henrry MD on call, Dr. Thu Gupta, and disposition is ED. Follow up scheduled on 7/3/25 to outreach to assess for any additional needs/concerns.     "

## 2025-07-02 NOTE — TELEPHONE ENCOUNTER
[facility]/Population Health Chart Review & Patient Outreach Details For Medication Adherence Project    Reason for Outreach Encounter: 3rd Party payor non-compliance report ([employer], [employer], C, etc)  2.  Patient Outreach Method: Reviewed patient chart   3.   Medication in question:    Hyperlipidemia Medications              atorvastatin (LIPITOR) 40 MG tablet Take 1 tablet (40 mg total) by mouth every evening.                  atorvastatin last filled  4/22/25 for 90 day supply    4.  Reviewed and or Updates Made To: Patient Chart  5. Outreach Outcomes and/or actions taken: Patient filled medication and is on track to be adherent  Additional Notes:

## 2025-07-02 NOTE — PLAN OF CARE-OVED
[facility] Plan of Care Note  Referral Source: Nurse On-Call                               Chief Complaint   Patient presents with    Leg Swelling   Wife calling on behalf of pt. States that pt was discharged with blood clots in legs and lungs on 6/20/25. Pt is on Warfarin 5 mg as ordered. Now c/o lower left leg being swollen and warm again. Denies SOB. Denies pain to left calf.     I have reviewed the patient's MR. Pt was recently admitted with bilateral DVT and PE with right heart strain. Looks like they were considering intervention for the PE but in the end pt was improving so they did not. Pt now reports that the swelling had gone down and is coming back.  Last INR on 6/27 was therapeutic. My concern is that patient is developing clots while therapeutic on coumadin.   Recommendation: Emergency Department                            No diagnosis found.

## 2025-07-02 NOTE — TELEPHONE ENCOUNTER
Wife calling on behalf of pt. States that pt was discharged with blood clots in legs and lungs on 6/20/25. Pt is on Warfarin 5 mg as ordered. Now c/o lower left leg being swollen and warm again. Denies SOB. Denies pain to left calf. Advise to be seen in office today or VV per protocol. Unable to schedule specialty appts. Secure chat sent to [facility] Dr. Thu Gupta per protocol. Advised ED now. Wife, Nena made aware. VU. Encounter routed to provider.   Reason for Disposition   Thigh, calf, or ankle swelling in only one leg    Additional Information   Negative: Sounds like a life-threatening emergency to the triager   Negative: Difficulty breathing at rest   Negative: Entire foot is cool or blue in comparison to other side   Negative: Cast on leg or ankle and has increasing pain   Negative: Can't walk or can barely stand (new-onset)   Negative: Fever and red area (or area very tender to touch)   Negative: Patient sounds very sick or weak to the triager   Negative: SEVERE swelling (e.g., swelling extends above knee, entire leg is swollen, weeping fluid)   Negative: Pregnant 20 or more weeks and sudden weight gain (i.e., > 2 lbs, 1 kg in one week)   Negative: Thigh or calf pain and only 1 side and present > 1 hour    Protocols used: Leg Swelling and Edema-A-[place]

## 2025-07-02 NOTE — ED PROVIDER NOTES
SCRIBE #1 NOTE: I, Angélica Todd, am scribing for, and in the presence of, Sandoval Wells Jr., MD. I have scribed the HPI and ROS.     SCRIBE #2 NOTE: I, Jerad Graham, am scribing for, and in the presence of,  Roddy Zapata MD. I have scribed the remaining portions of the note not scribed by Scribe #1.      History     Chief Complaint   Patient presents with    Deep Vein Thrombosis     Pt states he was recently in the hospital for DVT in both legs and PE. Pt states today he is having swelling in his left leg.      Review of patient's allergies indicates:  No Known Allergies      History of Present Illness     HPI    7/2/2025, 2:44 PM  History obtained from the patient, medical records, and wife      History of Present Illness: Conner Lombardi Jr. is a 75 y.o. male patient with a PMHx of HTN, HLD, prediabetes, anemia, and arthritis who presents to the Emergency Department for evaluation of worsening bilateral lower extremity swelling which began yesterday. Per spouse, pt's legs are warm to the touch. Pt recently discharged from hospital after treatment for blood clots in both lower extremities and PE. He is now being treated with coumadin. Symptoms are constant and moderate in severity. No mitigating or exacerbating factors reported. Patient denies any fever or chills. No prior Tx specified.  No further complaints or concerns at this time.       Arrival mode: Personal Transportation    PCP: Tia Lopez MD        Past Medical History:  Past Medical History:   Diagnosis Date    Anemia     Arthritis     Epilepsy     Last seizure 2010.     Hyperlipidemia     Hypertension     Prediabetes     Tubular adenoma of colon 04/02/2012    Vitamin D deficiency        Past Surgical History:  Past Surgical History:   Procedure Laterality Date    BLOCK, NERVE, PERIPHERAL Left 8/19/2024    Procedure: Left femoral and obturator nerve block;  Surgeon: Helder Taylor MD;  [facility];  Service: Pain  Management;  Laterality: Left;    COLONOSCOPY N/A 07/12/2021    Procedure: COLONOSCOPY;  Surgeon: Subhash Martin MD;  Location: [facility] ENDO;  Service: Endoscopy;  Laterality: N/A;    HIP ARTHROPLASTY Left 1/28/2025    Procedure: ARTHROPLASTY, HIP;  Surgeon: Javier Lee MD;  Location: [facility] OR;  Service: Orthopedics;  Laterality: Left;    HIP ARTHROPLASTY Right 5/30/2025    Procedure: ARTHROPLASTY, HIP;  Surgeon: Javier Lee MD;  Location: [facility] OR;  Service: Orthopedics;  Laterality: Right;    TONSILLECTOMY      VASECTOMY  1984/1985         Family History:  Family History   Problem Relation Name Age of Onset    Cancer Mother Batool pAmira Lombardi         Brain    Heart disease Father Conner Lombardi, Sr.     Cancer Father Conner Lombardi, Sr.         Liver and lung    Hypertension Father Conner Lombardi, Sr.     Heart disease Son          CABG in 2018    Diabetes Maternal Grandfather Aguilar Pepitone     Hypertension Other         Social History:  Social History     Tobacco Use    Smoking status: Never    Smokeless tobacco: Never   Substance and Sexual Activity    Alcohol use: No    Drug use: Never    Sexual activity: Not Currently     Partners: Female     Birth control/protection: None        Review of Systems     Review of Systems   Constitutional:  Negative for chills and fever.   HENT:  Negative for sore throat.    Respiratory:  Negative for shortness of breath.    Cardiovascular:  Positive for leg swelling (bilateral). Negative for chest pain.   Gastrointestinal:  Negative for nausea.   Genitourinary:  Negative for dysuria.   Musculoskeletal:  Negative for back pain.   Skin:  Negative for rash.        (+) warmth to bilateral lower extremities   Neurological:  Negative for weakness.   Hematological:  Does not bruise/bleed easily.   All other systems reviewed and are negative.     Physical Exam     Initial Vitals [07/02/25 1344]   BP Pulse Resp Temp SpO2   (!) 154/69 92 16 98.4 °F (36.9  "°C) 95 %      MAP       --          Physical Exam  Nursing Notes and Vital Signs Reviewed.  Constitutional: Patient is in no acute distress. Well-developed and well-nourished.  Head: Atraumatic. Normocephalic.  Eyes:  EOM intact.  No scleral icterus.  ENT: Mucous membranes are moist.  Nares clear   Neck:  Full ROM. No JVD.  Cardiovascular: Regular rate. Regular rhythm No murmurs, rubs, or gallops. Distal pulses are 2+ and symmetric  Pulmonary/Chest: No respiratory distress. Clear to auscultation bilaterally. No wheezing or rales.  Equal chest wall rise bilaterally  Abdominal: Soft and non-distended.  There is no tenderness.  No rebound, guarding, or rigidity. Good bowel sounds.  Genitourinary: No CVA tenderness.  No suprapubic tenderness  Musculoskeletal: Moves all extremities. No obvious deformities.  5 x 5 strength in all extremities   Skin: Warm and dry.  Neurological:  Alert, awake, and appropriate.  Normal speech.  No acute focal neurological deficits are appreciated.  Two through 12 intact bilaterally.  Psychiatric: Normal affect. Good eye contact. Appropriate in content.     ED Course   Procedures  ED Vital Signs:  Vitals:    07/02/25 1344 07/02/25 1448 07/02/25 1502 07/02/25 1602   BP: (!) 154/69  139/72 138/74   Pulse: 92 93 93 88   Resp: 16  17 14   Temp: 98.4 °F (36.9 °C)   97.8 °F (36.6 °C)   TempSrc: Oral   Oral   SpO2: 95%  97%    Weight: 84 kg (185 lb 3 oz)      Height: 5' 6" (1.676 m)       07/02/25 1719   BP: 133/66   Pulse: 92   Resp: 14   Temp: 97.7 °F (36.5 °C)   TempSrc: Oral   SpO2:    Weight:    Height:        Abnormal Lab Results:  Labs Reviewed   COMPREHENSIVE METABOLIC PANEL - Abnormal       Result Value    Sodium 139      Potassium 4.5      Chloride 106      CO2 26      Glucose 105      BUN 17      Creatinine 1.0      Calcium 9.1      Protein Total 6.9      Albumin 3.4 (*)     Bilirubin Total 0.2            AST 25      ALT 31      Anion Gap 7 (*)     eGFR >60     PROTIME-INR - " Abnormal    PT 38.0 (*)     INR 3.6 (*)    URINALYSIS, REFLEX TO URINE CULTURE - Abnormal    Color, UA Colorless (*)     Appearance, UA Clear      pH, UA 7.0      Spec Grav UA 1.010      Protein, UA Negative      Glucose, UA Negative      Ketones, UA Negative      Bilirubin, UA Negative      Blood, UA Negative      Nitrites, UA Negative      Urobilinogen, UA Negative      Leukocyte Esterase, UA Negative     CBC WITH DIFFERENTIAL - Abnormal    WBC 5.99      RBC 2.97 (*)     HGB 10.4 (*)     HCT 30.8 (*)      (*)     MCH 35.0 (*)     MCHC 33.8      RDW 14.2      Platelet Count 250      MPV 9.1 (*)     Nucleated RBC 0      Neut % 36.8 (*)     Lymph % 41.4      Mono % 10.5      Eos % 10.4 (*)     Basophil % 0.7      Imm Grans % 0.2      Neut # 2.21      Lymph # 2.48      Mono # 0.63      Eos # 0.62 (*)     Baso # 0.04      Imm Grans # 0.01     CBC W/ AUTO DIFFERENTIAL    Narrative:     The following orders were created for panel order CBC auto differential.  Procedure                               Abnormality         Status                     ---------                               -----------         ------                     CBC with Differential[[id number]]       Abnormal            Final result                 Please view results for these tests on the individual orders.   [facility] URINE HOLD        All Lab Results:  Results for orders placed or performed during the hospital encounter of 07/02/25   Comprehensive metabolic panel    Collection Time: 07/02/25  2:03 PM   Result Value Ref Range    Sodium 139 136 - 145 mmol/L    Potassium 4.5 3.5 - 5.1 mmol/L    Chloride 106 95 - 110 mmol/L    CO2 26 23 - 29 mmol/L    Glucose 105 70 - 110 mg/dL    BUN 17 8 - 23 mg/dL    Creatinine 1.0 0.5 - 1.4 mg/dL    Calcium 9.1 8.7 - 10.5 mg/dL    Protein Total 6.9 6.0 - 8.4 gm/dL    Albumin 3.4 (L) 3.5 - 5.2 g/dL    Bilirubin Total 0.2 0.1 - 1.0 mg/dL     40 - 150 unit/L    AST 25 11 - 45 unit/L    ALT 31 10 - 44  unit/L    Anion Gap 7 (L) 8 - 16 mmol/L    eGFR >60 >60 mL/min/1.73/m2   Protime-INR    Collection Time: 07/02/25  2:03 PM   Result Value Ref Range    PT 38.0 (H) 9.0 - 12.5 seconds    INR 3.6 (H) 0.8 - 1.2   CBC with Differential    Collection Time: 07/02/25  2:03 PM   Result Value Ref Range    WBC 5.99 3.90 - 12.70 K/uL    RBC 2.97 (L) 4.60 - 6.20 M/uL    HGB 10.4 (L) 14.0 - 18.0 gm/dL    HCT 30.8 (L) 40.0 - 54.0 %     (H) 82 - 98 fL    MCH 35.0 (H) 27.0 - 31.0 pg    MCHC 33.8 32.0 - 36.0 g/dL    RDW 14.2 11.5 - 14.5 %    Platelet Count 250 150 - 450 K/uL    MPV 9.1 (L) 9.2 - 12.9 fL    Nucleated RBC 0 <=0 /100 WBC    Neut % 36.8 (L) 38 - 73 %    Lymph % 41.4 18 - 48 %    Mono % 10.5 4 - 15 %    Eos % 10.4 (H) <=8 %    Basophil % 0.7 <=1.9 %    Imm Grans % 0.2 0.0 - 0.5 %    Neut # 2.21 1.8 - 7.7 K/uL    Lymph # 2.48 1 - 4.8 K/uL    Mono # 0.63 0.3 - 1 K/uL    Eos # 0.62 (H) <=0.5 K/uL    Baso # 0.04 <=0.2 K/uL    Imm Grans # 0.01 0.00 - 0.04 K/uL   Urinalysis, Reflex to Urine Culture Urine, Clean Catch    Collection Time: 07/02/25  3:16 PM    Specimen: Urine, Clean Catch   Result Value Ref Range    Color, UA Colorless (A) Straw, Em, Yellow, Light-Orange    Appearance, UA Clear Clear    pH, UA 7.0 5.0 - 8.0    Spec Grav UA 1.010 1.005 - 1.030    Protein, UA Negative Negative    Glucose, UA Negative Negative    Ketones, UA Negative Negative    Bilirubin, UA Negative Negative    Blood, UA Negative Negative    Nitrites, UA Negative Negative    Urobilinogen, UA Negative <2.0 EU/dL    Leukocyte Esterase, UA Negative Negative       Imaging Results:  Imaging Results               US Lower Extremity Veins Left (Final result)  Result time 07/02/25 15:27:45      Final result by Braulio Warren MD (07/02/25 15:27:45)                   Impression:      Worsening of known deep venous thrombus with new thrombus in the left superficial femoral vein distally.    Ordering provider notified at 15:20.    This report was  flagged in Epic as abnormal.      Electronically signed by: Braulio Warren  Date:    07/02/2025  Time:    15:27               Narrative:    EXAMINATION:  US LOWER EXTREMITY VEINS LEFT    CLINICAL HISTORY:  Other specified soft tissue disorders    TECHNIQUE:  Duplex and color flow Doppler evaluation and graded compression of the left lower extremity veins was performed.    COMPARISON:  None    FINDINGS:  Left thigh veins: Extension of known deep venous thrombus proximally into the distal left superficial femoral vein.  Known thrombus of the popliteal vein and posterior tibial veins and peroneal veins and anterior tibial veins.    Contralateral CFV: The contralateral (right) common femoral vein is patent and free of thrombus.    Miscellaneous: None                                     No EKG ordered.           The Emergency Provider reviewed the vital signs and test results, which are outlined above.     ED Discussion     3:38 PM: Discussed with Cassi Lujan MD (Heme/Onc). Recommends pt go back on Lovenox as continuing Warfarin is not an option due to clot propogation.     3:46 PM: Dr. Lujan notes Lovenox should be dosed at 85 mg BID. Will have him follow up with Dr. Blackburn early next week. This looks pretty extensive so is unsure IR would be able to do thrombectomy but this could also be an option.     4:00 PM: Dr. Wells transfers care of patient to Dr. Zapata pending lab results.    4:31 PM: Reassessed pt at this time. Discussed with patient and/or family/caretaker all pertinent ED information and results. Discussed pt dx and plan of tx. Gave the patient all f/u and return to the ED instructions. All questions and concerns were addressed at this time. Patient and/or family/caretaker expresses understanding of information and instructions, and is comfortable with plan to discharge. Pt is stable for discharge.     I discussed with patient and/or family/caretaker that evaluation in the ED does not suggest any  emergent or life threatening medical conditions requiring immediate intervention beyond what was provided in the ED, and I believe patient is safe for discharge. Regardless, an unremarkable evaluation in the ED does not preclude the development or presence of a serious or life threatening condition. As such, I instructed that the patient is to return immediately for any worsening or change in current symptoms.         Medical Decision Making  md Patrick  1203    75-year-old white male with a history of DVT left lower extremity.  Patient has not extension of the DVT with propagation.  He is currently on Coumadin.  He is therapeutic.  Discussion with Interventional Radiology.  Patient is ambulatory he has swelling but not a significant amount of pain.  Your recommendation is to begin Lovenox 85 b.i.d..  No thrombectomy indicated at this time.    Patient will be discharged on Lovenox 85 b.i.d..  Follow up with Dr. Blackburn.    Addendum:    Pharmacist called because of the odd mg dose of Lovenox.  Recommend changing the Lovenox prescription to 80 mg subQ q.12 hours.    There is no compelling reason to admit to the hospital for further workup or treatment.    Amount and/or Complexity of Data Reviewed  Independent Historian: spouse  Labs: ordered. Decision-making details documented in ED Course.  Radiology: ordered. Decision-making details documented in ED Course.    Risk  Prescription drug management.                ED Medication(s):  Medications - No data to display    Discharge Medication List as of 7/2/2025  4:30 PM        START taking these medications    Details   !! enoxaparin (LOVENOX) 40 mg/0.4 mL Syrg Inject 0.9 mLs (90 mg total) into the skin every 12 (twelve) hours., Starting Wed 7/2/2025, Print       !! - Potential duplicate medications found. Please discuss with provider.           Follow-up Information       Call  Nick Blackburn MD.    Specialty: Hematology and Oncology  Contact information:  [id number]  [place] [facility] [id number]  [phone]                                 Scribe Attestation:   Scribe #1: I performed the above scribed service and the documentation accurately describes the services I performed. I attest to the accuracy of the note.     Attending:   Physician Attestation Statement for Scribe #1: I, Sandoval Wells Jr., MD, personally performed the services described in this documentation, as scribed by Angélica Todd, in my presence, and it is both accurate and complete.       Scribe Attestation:   Scribe #2: I performed the above scribed service and the documentation accurately describes the services I performed. I attest to the accuracy of the note.    Attending Attestation:           Physician Attestation for Scribe:    Physician Attestation Statement for Scribe #2: I, Roddy Zapata MD, reviewed documentation, as scribed by Jerad Graham in my presence, and it is both accurate and complete. I also acknowledge and confirm the content of the note done by Scribe #1.           Clinical Impression       ICD-10-CM ICD-9-CM   1. Deep vein thrombosis (DVT) of femoral vein of left lower extremity, unspecified chronicity  I82.412 453.41   2. Leg swelling  M79.89 729.81   3. Leg swelling  M79.89 729.81       Disposition:   Disposition: Discharged  Condition: Stable         Roddy Zapata MD  07/02/25 2100

## 2025-07-02 NOTE — FIRST PROVIDER EVALUATION
"Medical screening examination initiated.  I have conducted a focused provider triage encounter, findings are as follows:    Brief history of present illness:  Reports that a known DVT in his left lower leg is worsening    Vitals:    07/02/25 1344   BP: (!) 154/69   BP Location: Right arm   Pulse: 92   Resp: 16   Temp: 98.4 °F (36.9 °C)   TempSrc: Oral   SpO2: 95%   Weight: 84 kg (185 lb 3 oz)   Height: 5' 6" (1.676 m)       Pertinent physical exam:  NAD    Brief workup plan:  Ultrasound    Preliminary workup initiated; this workup will be continued and followed by the physician or advanced practice provider that is assigned to the patient when roomed.  "

## 2025-07-03 ENCOUNTER — ANTI-COAG VISIT (OUTPATIENT)
Dept: CARDIOLOGY | Facility: CLINIC | Age: 75
End: 2025-07-03
Payer: COMMERCIAL

## 2025-07-03 ENCOUNTER — TELEPHONE (OUTPATIENT)
Dept: HEMATOLOGY/ONCOLOGY | Facility: CLINIC | Age: 75
End: 2025-07-03
Payer: COMMERCIAL

## 2025-07-03 ENCOUNTER — HOSPITAL ENCOUNTER (OUTPATIENT)
Dept: CARDIOLOGY | Facility: HOSPITAL | Age: 75
Discharge: HOME | End: 2025-07-03
Payer: COMMERCIAL

## 2025-07-03 ENCOUNTER — TELEPHONE (OUTPATIENT)
Dept: ORTHOPEDICS | Facility: CLINIC | Age: 75
End: 2025-07-03
Payer: COMMERCIAL

## 2025-07-03 DIAGNOSIS — Z86.711 HISTORY OF PULMONARY EMBOLISM: Primary | ICD-10-CM

## 2025-07-03 DIAGNOSIS — Z79.01 LONG TERM (CURRENT) USE OF ANTICOAGULANTS: ICD-10-CM

## 2025-07-03 DIAGNOSIS — I26.99 BILATERAL PULMONARY EMBOLISM: ICD-10-CM

## 2025-07-03 LAB
HOLD SPECIMEN: NORMAL
INR PPP: 4

## 2025-07-03 NOTE — PROGRESS NOTES
INR not at goal - lower warfarin dose temporarily and continue LMWH. Medications, chart, and patient findings reviewed. See calendar for adjustments to dose and follow up plan.

## 2025-07-03 NOTE — PROGRESS NOTES
Fax result received from [facility].  INR: 4.0 vs lab result today 3.8.  Discharged from ER on yesterday - leg swelling.  Patient reports he remains on warfarin 5 mg every day with Lovenox injections 80 mg twice a day.  No bleeding issues reported.  Will send to PharmD to review chart for dosing instructions.

## 2025-07-05 ENCOUNTER — PATIENT MESSAGE (OUTPATIENT)
Dept: ORTHOPEDICS | Facility: CLINIC | Age: 75
End: 2025-07-05
Payer: COMMERCIAL

## 2025-07-07 ENCOUNTER — OFFICE VISIT (OUTPATIENT)
Dept: HEMATOLOGY/ONCOLOGY | Facility: CLINIC | Age: 75
End: 2025-07-07
Payer: COMMERCIAL

## 2025-07-07 VITALS
TEMPERATURE: 98 F | HEIGHT: 66 IN | BODY MASS INDEX: 29.54 KG/M2 | WEIGHT: 183.81 LBS | DIASTOLIC BLOOD PRESSURE: 74 MMHG | SYSTOLIC BLOOD PRESSURE: 129 MMHG | HEART RATE: 92 BPM | OXYGEN SATURATION: 96 %

## 2025-07-07 DIAGNOSIS — I82.432 ACUTE DEEP VEIN THROMBOSIS (DVT) OF POPLITEAL VEIN OF LEFT LOWER EXTREMITY: ICD-10-CM

## 2025-07-07 DIAGNOSIS — I82.431 ACUTE DEEP VEIN THROMBOSIS (DVT) OF POPLITEAL VEIN OF RIGHT LOWER EXTREMITY: ICD-10-CM

## 2025-07-07 DIAGNOSIS — I82.442 ACUTE DEEP VEIN THROMBOSIS (DVT) OF TIBIAL VEIN OF LEFT LOWER EXTREMITY: ICD-10-CM

## 2025-07-07 DIAGNOSIS — I82.412 ACUTE DEEP VEIN THROMBOSIS (DVT) OF FEMORAL VEIN OF LEFT LOWER EXTREMITY: ICD-10-CM

## 2025-07-07 DIAGNOSIS — I26.99 BILATERAL PULMONARY EMBOLISM: Primary | ICD-10-CM

## 2025-07-07 DIAGNOSIS — I82.452 ACUTE DEEP VEIN THROMBOSIS (DVT) OF LEFT PERONEAL VEIN: ICD-10-CM

## 2025-07-07 DIAGNOSIS — I82.451 ACUTE DEEP VEIN THROMBOSIS (DVT) OF RIGHT PERONEAL VEIN: ICD-10-CM

## 2025-07-07 PROCEDURE — 4010F ACE/ARB THERAPY RXD/TAKEN: CPT | Mod: CPTII,S$GLB,, | Performed by: NURSE PRACTITIONER

## 2025-07-07 PROCEDURE — 3008F BODY MASS INDEX DOCD: CPT | Mod: CPTII,S$GLB,, | Performed by: NURSE PRACTITIONER

## 2025-07-07 PROCEDURE — 1126F AMNT PAIN NOTED NONE PRSNT: CPT | Mod: CPTII,S$GLB,, | Performed by: NURSE PRACTITIONER

## 2025-07-07 PROCEDURE — 1101F PT FALLS ASSESS-DOCD LE1/YR: CPT | Mod: CPTII,S$GLB,, | Performed by: NURSE PRACTITIONER

## 2025-07-07 PROCEDURE — 99214 OFFICE O/P EST MOD 30 MIN: CPT | Mod: S$GLB,,, | Performed by: NURSE PRACTITIONER

## 2025-07-07 PROCEDURE — 3074F SYST BP LT 130 MM HG: CPT | Mod: CPTII,S$GLB,, | Performed by: NURSE PRACTITIONER

## 2025-07-07 PROCEDURE — 3288F FALL RISK ASSESSMENT DOCD: CPT | Mod: CPTII,S$GLB,, | Performed by: NURSE PRACTITIONER

## 2025-07-07 PROCEDURE — 1160F RVW MEDS BY RX/DR IN RCRD: CPT | Mod: CPTII,S$GLB,, | Performed by: NURSE PRACTITIONER

## 2025-07-07 PROCEDURE — 3078F DIAST BP <80 MM HG: CPT | Mod: CPTII,S$GLB,, | Performed by: NURSE PRACTITIONER

## 2025-07-07 PROCEDURE — 99999 PR PBB SHADOW E&M-EST. PATIENT-LVL IV: CPT | Mod: PBBFAC,,, | Performed by: NURSE PRACTITIONER

## 2025-07-07 PROCEDURE — 1111F DSCHRG MED/CURRENT MED MERGE: CPT | Mod: CPTII,S$GLB,, | Performed by: NURSE PRACTITIONER

## 2025-07-07 PROCEDURE — 1159F MED LIST DOCD IN RCRD: CPT | Mod: CPTII,S$GLB,, | Performed by: NURSE PRACTITIONER

## 2025-07-07 PROCEDURE — 3044F HG A1C LEVEL LT 7.0%: CPT | Mod: CPTII,S$GLB,, | Performed by: NURSE PRACTITIONER

## 2025-07-07 RX ORDER — ENOXAPARIN SODIUM 100 MG/ML
INJECTION SUBCUTANEOUS
COMMUNITY
Start: 2025-07-03

## 2025-07-07 NOTE — PROGRESS NOTES
Subjective:       Patient ID: Conner Lombardi Jr. is a 75 y.o. male.    Chief Complaint:   1. Bilateral pulmonary embolism        2. Acute deep vein thrombosis (DVT) of popliteal vein of right lower extremity  CANCELED: CV Ultrasound doppler venous legs bilat      3. Acute deep vein thrombosis (DVT) of right peroneal vein  CANCELED: CV Ultrasound doppler venous legs bilat      4. Acute deep vein thrombosis (DVT) of popliteal vein of left lower extremity  CV Ultrasound doppler venous DVT leg left    CANCELED: CV Ultrasound doppler venous legs bilat      5. Acute deep vein thrombosis (DVT) of left peroneal vein  CV Ultrasound doppler venous DVT leg left    CANCELED: CV Ultrasound doppler venous legs bilat      6. Acute deep vein thrombosis (DVT) of tibial vein of left lower extremity  CV Ultrasound doppler venous DVT leg left    CANCELED: CV Ultrasound doppler venous legs bilat      7. Acute deep vein thrombosis (DVT) of femoral vein of left lower extremity  CV Ultrasound doppler venous DVT leg left    CANCELED: CV Ultrasound doppler venous legs bilat        Current Treatment:  Warfarin 5mg po daily        Lovenox 80mg BID    Treatment History:    HPI: This is a 75 year old  male with vitamin D deficiency, arthritis, epilepsy, HTN, prediabetes, and hyperlipidemia who is seen in Hem/Onc for bilateral LE DVT and bilateral PE following left hip replacement surgery in 1/2025 and right hip replacement surgery in 5/2025. He presented to the ER in mid 6/2025 with difficulty breathing. CTA noted bilateral PE with right heart strain. US doppler noted bilateral DVT in the right popliteal and peroneal veins and in the left popliteal and anterior tibial and peroneal veins. He was started on warfarin as Eliquis was not an option due to his seizure medication. He was also started on heparin drip then bridged to Lovenox. He experienced swelling in his left leg and presented to the ER again in 7/2025. US doppler showed  worsening of the DVT in the left LE and a new thrombus in the left superficial femoral vein; no change in DVTs in the right LE. His Lovenox dose was adjusted to ensure it was the correct dose, and he was continued on warfarin managed by the Coumadin clinic.     His primary Hematologist/Oncologist is Dr. Blackburn.    Interval History: Patient presents for follow up on warfarin and Lovenox. He presents in a wheelchair today with a significant other who gives some history. She states his leg improved after recent ER visit but has worsened since then. She reports swelling has resumed despite his adherence to warfarin and Lovenox. She states he was advised during the recent ER visit not to walk; he admits he did walk but she corrects him and states he only walked to the kitchen. Will obtain US doppler left LE to evaluate cause for recent edema. Reviewed hypercoag workup which revealed negative results.     Reviewed labs with patient:   CBC:   Recent Labs   Lab 07/02/25  1403   WBC 5.99   RBC 2.97 L   HGB 10.4 L   HCT 30.8 L   Platelet Count 250    H   MCH 35.0 H   MCHC 33.8     CMP:  Recent Labs   Lab 07/02/25  1403   Glucose 105   Calcium 9.1   Albumin 3.4 L   Protein Total 6.9   Sodium 139   Potassium 4.5   CO2 26   Chloride 106   BUN 17   Creatinine 1.0      ALT 31   AST 25   Bilirubin Total 0.2       Social History[1]  Past Medical History:   Diagnosis Date    Anemia     Arthritis     Epilepsy     Last seizure 2010.     Hyperlipidemia     Hypertension     Prediabetes     Tubular adenoma of colon 04/02/2012    Vitamin D deficiency      Family History   Problem Relation Name Age of Onset    Cancer Mother Batool kevin Lombardi         Brain    Heart disease Father Conner Lombardi, Sr.     Cancer Father Conner Lombardi, Sr.         Liver and lung    Hypertension Father Conner Lombardi, Sr.     Heart disease Son          CABG in 2018    Diabetes Maternal Grandfather Aguilar Pepitone     Hypertension Other        Past Surgical History:   Procedure Laterality Date    BLOCK, NERVE, PERIPHERAL Left 8/19/2024    Procedure: Left femoral and obturator nerve block;  Surgeon: Helder Taylor MD;  Location: [facility];  Service: Pain Management;  Laterality: Left;    COLONOSCOPY N/A 07/12/2021    Procedure: COLONOSCOPY;  Surgeon: Subhash Martin MD;  Location: [facility];  Service: Endoscopy;  Laterality: N/A;    HIP ARTHROPLASTY Left 1/28/2025    Procedure: ARTHROPLASTY, HIP;  Surgeon: Javier Lee MD;  Location: [facility] OR;  Service: Orthopedics;  Laterality: Left;    HIP ARTHROPLASTY Right 5/30/2025    Procedure: ARTHROPLASTY, HIP;  Surgeon: Javier Lee MD;  Location: [facility] OR;  Service: Orthopedics;  Laterality: Right;    TONSILLECTOMY      VASECTOMY  1984/1985     Review of Systems   Constitutional:  Negative for appetite change and fatigue.   HENT:  Negative for mouth sores, rhinorrhea and sore throat.    Eyes: Negative.    Respiratory: Negative.     Cardiovascular:  Positive for leg swelling (left leg).   Gastrointestinal:  Negative for constipation, diarrhea, nausea and vomiting.   Endocrine: Negative.    Genitourinary: Negative.    Musculoskeletal: Negative.    Integumentary:  Negative.   Allergic/Immunologic: Negative.    Neurological:  Negative for weakness and numbness.   Hematological: Negative.    Psychiatric/Behavioral: Negative.         Medication List with Changes/Refills   Current Medications    ATORVASTATIN (LIPITOR) 40 MG TABLET    Take 1 tablet (40 mg total) by mouth every evening.    CHOLECALCIFEROL, VITAMIN D3, (VITAMIN D3) 25 MCG (1,000 UNIT) CAPSULE    Take 1 capsule (1,000 Units total) by mouth once daily.    ENOXAPARIN (LOVENOX) 100 MG/ML SYRG    Inject 0.9 mLs (90 mg total) into the skin every 12 (twelve) hours.    ENOXAPARIN (LOVENOX) 40 MG/0.4 ML SYRG    Inject 0.9 mLs (90 mg total) into the skin every 12 (twelve) hours.    ENOXAPARIN (LOVENOX) 80 MG/0.8 ML SYRG     SMARTSI.8 Milliliter(s) SUB-Q Every 12 Hours    GABAPENTIN (NEURONTIN) 300 MG CAPSULE    Take 1 capsule (300 mg total) by mouth every evening.    LISINOPRIL-HYDROCHLOROTHIAZIDE (PRINZIDE,ZESTORETIC) 20-25 MG TAB    Take 2 tablets by mouth once daily. If BP is below 110/70 - decrease to 1 tablet daily    METOPROLOL TARTRATE (LOPRESSOR) 25 MG TABLET    Take 1 tablet (25 mg total) by mouth 2 (two) times daily.    PHENYTOIN (DILANTIN) 100 MG ER CAPSULE    Take 5 capsules (500 mg total) by mouth every evening.    WARFARIN (COUMADIN) 5 MG TABLET    Take 1 tablet (5 mg total) by mouth Daily.     Objective:     Vitals:    25 1435   BP: 129/74   Pulse: 92   Temp: 97.7 °F (36.5 °C)     Physical Exam  Vitals reviewed.   Constitutional:       Appearance: Normal appearance.   HENT:      Head: Normocephalic.      Mouth/Throat:      Comments:     Eyes:      Extraocular Movements: Extraocular movements intact.      Pupils: Pupils are equal, round, and reactive to light.   Cardiovascular:      Rate and Rhythm: Normal rate and regular rhythm.      Heart sounds: Normal heart sounds.   Pulmonary:      Effort: Pulmonary effort is normal.      Breath sounds: Normal breath sounds.   Abdominal:      General: Bowel sounds are normal.      Palpations: Abdomen is soft.      Comments: rounded     Genitourinary:     Comments: deferred    Musculoskeletal:      Cervical back: Normal range of motion and neck supple.      Comments: Presents in wheelchair   Skin:     General: Skin is warm and dry.   Neurological:      Mental Status: He is alert and oriented to person, place, and time.   Psychiatric:         Behavior: Behavior normal.         Thought Content: Thought content normal.        (2) Ambulatory and capable of self care, unable to carry out work activity, up and about > 50% or waking hours  Assessment:     Problem List Items Addressed This Visit          Hematology    Acute deep vein thrombosis (DVT) of popliteal vein of right lower  extremity    Acute deep vein thrombosis (DVT) of popliteal vein of left lower extremity    Relevant Orders    CV Ultrasound doppler venous DVT leg left    Acute deep vein thrombosis (DVT) of right peroneal vein    Acute deep vein thrombosis (DVT) of tibial vein of left lower extremity    Diagnosed following right hip replacement. Currently on Lovenox and warfarin.          Relevant Orders    CV Ultrasound doppler venous DVT leg left    Acute deep vein thrombosis (DVT) of left peroneal vein    Relevant Orders    CV Ultrasound doppler venous DVT leg left    Bilateral pulmonary embolism - Primary    Diagnosed following right hip replacement. Currently on Lovenox and warfarin.          Acute deep vein thrombosis (DVT) of femoral vein of left lower extremity    Diagnosed following right hip replacement despite being on Lovenox and warfarin. Lovenox dose adjusted.          Relevant Orders    CV Ultrasound doppler venous DVT leg left     Plan:     Bilateral pulmonary embolism    Acute deep vein thrombosis (DVT) of popliteal vein of right lower extremity  -     Cancel: CV Ultrasound doppler venous legs bilat; Future; Expected date: 07/07/2025    Acute deep vein thrombosis (DVT) of right peroneal vein  -     Cancel: CV Ultrasound doppler venous legs bilat; Future; Expected date: 07/07/2025    Acute deep vein thrombosis (DVT) of popliteal vein of left lower extremity  -     Cancel: CV Ultrasound doppler venous legs bilat; Future; Expected date: 07/07/2025  -     CV Ultrasound doppler venous DVT leg left; Future; Expected date: 07/07/2025    Acute deep vein thrombosis (DVT) of left peroneal vein  -     Cancel: CV Ultrasound doppler venous legs bilat; Future; Expected date: 07/07/2025  -     CV Ultrasound doppler venous DVT leg left; Future; Expected date: 07/07/2025    Acute deep vein thrombosis (DVT) of tibial vein of left lower extremity  -     Cancel: CV Ultrasound doppler venous legs bilat; Future; Expected date:  07/07/2025  -     CV Ultrasound doppler venous DVT leg left; Future; Expected date: 07/07/2025    Acute deep vein thrombosis (DVT) of femoral vein of left lower extremity  -     Cancel: CV Ultrasound doppler venous legs bilat; Future; Expected date: 07/07/2025  -     CV Ultrasound doppler venous DVT leg left; Future; Expected date: 07/07/2025    Labs reviewed.   Continue Lovenox as directed.   US doppler bilateral LE for worsening leg edema.   Will communicate results and recommendations via patient portal upon receipt of results.   Follow up in 8 weeks with CBC and Comprehensive Metabolic Panel.    Route Chart for Scheduling    Med Onc Chart Routing      Follow up with physician    Follow up with ERAN Other. 8 weeks   Infusion scheduling note    Injection scheduling note    Labs CBC and CMP   Scheduling:  Preferred lab:  Lab interval:  in 8 weeks   Imaging Other   US doppler left LE   Pharmacy appointment No pharmacy appointment needed      Other referrals No nutrition appointment needed -        No additional referrals needed          I will review assessment/plan with collaborating physician.      ELVIN Champagne         [1]   Social History  Socioeconomic History    Marital status:    Tobacco Use    Smoking status: Never    Smokeless tobacco: Never   Substance and Sexual Activity    Alcohol use: No    Drug use: Never    Sexual activity: Not Currently     Partners: Female     Birth control/protection: None     Social Drivers of Health     Financial Resource Strain: Low Risk  (6/17/2025)    Overall Financial Resource Strain (CARDIA)     Difficulty of Paying Living Expenses: Not hard at all   Food Insecurity: No Food Insecurity (6/17/2025)    Hunger Vital Sign     Worried About Running Out of Food in the Last Year: Never true     Ran Out of Food in the Last Year: Never true   Transportation Needs: No Transportation Needs (6/17/2025)    PRAPARE - Transportation     Lack of Transportation (Medical): No      Lack of Transportation (Non-Medical): No   Physical Activity: Inactive (2/26/2025)    Exercise Vital Sign     Days of Exercise per Week: 0 days     Minutes of Exercise per Session: 0 min   Stress: No Stress Concern Present (6/17/2025)    [employer] - Occupational Stress Questionnaire     Feeling of Stress : Not at all   Housing Stability: Low Risk  (6/17/2025)    Housing Stability Vital Sign     Unable to Pay for Housing in the Last Year: No     Number of Times Moved in the Last Year: 0     Homeless in the Last Year: No

## 2025-07-07 NOTE — ASSESSMENT & PLAN NOTE
Diagnosed following right hip replacement despite being on Lovenox and warfarin. Lovenox dose adjusted.

## 2025-07-08 ENCOUNTER — HOSPITAL ENCOUNTER (OUTPATIENT)
Dept: CARDIOLOGY | Facility: HOSPITAL | Age: 75
Discharge: HOME | End: 2025-07-08
Attending: NURSE PRACTITIONER
Payer: COMMERCIAL

## 2025-07-08 ENCOUNTER — PATIENT MESSAGE (OUTPATIENT)
Dept: HEMATOLOGY/ONCOLOGY | Facility: CLINIC | Age: 75
End: 2025-07-08
Payer: COMMERCIAL

## 2025-07-08 ENCOUNTER — LAB REQUISITION (OUTPATIENT)
Dept: LAB | Facility: HOSPITAL | Age: 75
End: 2025-07-08
Payer: COMMERCIAL

## 2025-07-08 VITALS — HEIGHT: 66 IN | WEIGHT: 183 LBS | BODY MASS INDEX: 29.41 KG/M2

## 2025-07-08 DIAGNOSIS — I82.403 ACUTE EMBOLISM AND THROMBOSIS OF UNSPECIFIED DEEP VEINS OF LOWER EXTREMITY, BILATERAL: ICD-10-CM

## 2025-07-08 DIAGNOSIS — I82.432 ACUTE DEEP VEIN THROMBOSIS (DVT) OF POPLITEAL VEIN OF LEFT LOWER EXTREMITY: ICD-10-CM

## 2025-07-08 DIAGNOSIS — I82.412 ACUTE DEEP VEIN THROMBOSIS (DVT) OF FEMORAL VEIN OF LEFT LOWER EXTREMITY: ICD-10-CM

## 2025-07-08 DIAGNOSIS — I82.452 ACUTE DEEP VEIN THROMBOSIS (DVT) OF LEFT PERONEAL VEIN: ICD-10-CM

## 2025-07-08 DIAGNOSIS — I82.442 ACUTE DEEP VEIN THROMBOSIS (DVT) OF TIBIAL VEIN OF LEFT LOWER EXTREMITY: ICD-10-CM

## 2025-07-08 DIAGNOSIS — I26.99 OTHER PULMONARY EMBOLISM WITHOUT ACUTE COR PULMONALE: ICD-10-CM

## 2025-07-08 LAB
INR PPP: 3.4 (ref 0.8–1.2)
PROTHROMBIN TIME: 36 SECONDS (ref 9–12.5)

## 2025-07-08 PROCEDURE — 85610 PROTHROMBIN TIME: CPT | Performed by: INTERNAL MEDICINE

## 2025-07-08 PROCEDURE — 93971 EXTREMITY STUDY: CPT | Mod: 26,LT,, | Performed by: INTERNAL MEDICINE

## 2025-07-08 PROCEDURE — 93971 EXTREMITY STUDY: CPT | Mod: LT

## 2025-07-09 ENCOUNTER — ANTI-COAG VISIT (OUTPATIENT)
Dept: CARDIOLOGY | Facility: CLINIC | Age: 75
End: 2025-07-09
Payer: COMMERCIAL

## 2025-07-09 ENCOUNTER — PATIENT MESSAGE (OUTPATIENT)
Dept: HEMATOLOGY/ONCOLOGY | Facility: CLINIC | Age: 75
End: 2025-07-09

## 2025-07-09 ENCOUNTER — OFFICE VISIT (OUTPATIENT)
Dept: HEMATOLOGY/ONCOLOGY | Facility: CLINIC | Age: 75
End: 2025-07-09
Payer: COMMERCIAL

## 2025-07-09 ENCOUNTER — LAB VISIT (OUTPATIENT)
Dept: LAB | Facility: HOSPITAL | Age: 75
End: 2025-07-09
Attending: PSYCHIATRY & NEUROLOGY
Payer: COMMERCIAL

## 2025-07-09 DIAGNOSIS — I26.99 BILATERAL PULMONARY EMBOLISM: ICD-10-CM

## 2025-07-09 DIAGNOSIS — Z78.9 DRUG-DRUG INTERACTION: ICD-10-CM

## 2025-07-09 DIAGNOSIS — I82.432 ACUTE DEEP VEIN THROMBOSIS (DVT) OF POPLITEAL VEIN OF LEFT LOWER EXTREMITY: ICD-10-CM

## 2025-07-09 DIAGNOSIS — Z86.711 HISTORY OF PULMONARY EMBOLISM: Primary | ICD-10-CM

## 2025-07-09 DIAGNOSIS — Z79.01 LONG TERM (CURRENT) USE OF ANTICOAGULANTS: ICD-10-CM

## 2025-07-09 DIAGNOSIS — I82.431 ACUTE DEEP VEIN THROMBOSIS (DVT) OF POPLITEAL VEIN OF RIGHT LOWER EXTREMITY: Primary | ICD-10-CM

## 2025-07-09 DIAGNOSIS — G40.109 TEMPORAL LOBE EPILEPSY: ICD-10-CM

## 2025-07-09 DIAGNOSIS — Z86.711 HISTORY OF PULMONARY EMBOLISM: ICD-10-CM

## 2025-07-09 PROBLEM — Z86.718 HISTORY OF DVT OF LOWER EXTREMITY: Status: RESOLVED | Noted: 2025-06-16 | Resolved: 2025-07-09

## 2025-07-09 PROBLEM — I82.442 ACUTE DEEP VEIN THROMBOSIS (DVT) OF TIBIAL VEIN OF LEFT LOWER EXTREMITY: Status: RESOLVED | Noted: 2025-07-07 | Resolved: 2025-07-09

## 2025-07-09 PROBLEM — I82.451 ACUTE DEEP VEIN THROMBOSIS (DVT) OF RIGHT PERONEAL VEIN: Status: RESOLVED | Noted: 2025-07-07 | Resolved: 2025-07-09

## 2025-07-09 PROBLEM — I82.412 ACUTE DEEP VEIN THROMBOSIS (DVT) OF FEMORAL VEIN OF LEFT LOWER EXTREMITY: Status: RESOLVED | Noted: 2025-07-07 | Resolved: 2025-07-09

## 2025-07-09 PROBLEM — I82.452 ACUTE DEEP VEIN THROMBOSIS (DVT) OF LEFT PERONEAL VEIN: Status: RESOLVED | Noted: 2025-07-07 | Resolved: 2025-07-09

## 2025-07-09 LAB
INR PPP: 2.3 (ref 0.8–1.2)
PHENYTOIN SERPL-MCNC: 13.2 UG/ML (ref 10–20)
PROTHROMBIN TIME: 24.4 SECONDS (ref 9–12.5)

## 2025-07-09 PROCEDURE — 3044F HG A1C LEVEL LT 7.0%: CPT | Mod: CPTII,95,, | Performed by: INTERNAL MEDICINE

## 2025-07-09 PROCEDURE — 98006 SYNCH AUDIO-VIDEO EST MOD 30: CPT | Mod: 95,,, | Performed by: INTERNAL MEDICINE

## 2025-07-09 PROCEDURE — 80185 ASSAY OF PHENYTOIN TOTAL: CPT

## 2025-07-09 PROCEDURE — 4010F ACE/ARB THERAPY RXD/TAKEN: CPT | Mod: CPTII,95,, | Performed by: INTERNAL MEDICINE

## 2025-07-09 PROCEDURE — 85610 PROTHROMBIN TIME: CPT

## 2025-07-09 PROCEDURE — 36415 COLL VENOUS BLD VENIPUNCTURE: CPT

## 2025-07-09 NOTE — PROGRESS NOTES
INR not at goal-3.4. Medications, chart, and patient findings reviewed. See calendar for adjustments to dose and follow up plan.   Pt was seen in ER fro continued leg pain, Dr Turner recommended enoxaparin alone as his clot had propagated while on warfarin in therapeutic range.  Pt continued warfarin and Lovenox after ER discharge.  Hem/Onc notes on 7/725 per SERENA Martin  - pt was to continue warfarin and enoxaparin.  Message sent to  Hem/Onc yesterday regarding use of therapeutic Lovenox and therapeutic warfarin (supratherapeutic INRs).   Unsure if goal had changed and to what goal would Lovenox cease. BANG Martin clarified the AC plan and recommended d/c of warfarin and Lovenox use alone.  Pt's wife was contacted yesterday 7/8/25 by myself and discussed d/c of warfarin.  Mrs Lombardi voiced understanding to d/c warfarin as of yesterday 7/8/25.  We will close coumadin clinic chart at this time as he will continue on Lovenox alone.

## 2025-07-09 NOTE — PROGRESS NOTES
Subjective:       Patient ID: Conner Lombardi Jr. is a 75 y.o. male.    Chief Complaint: Results and Coagulation Disorder    HPI:  75-year-old male with DVT and history of pulmonary embolus.  The patient has been on warfarin and Lovenox.  Patient appears to have progressive disease while on low on therapeutic levels of warfarin variant patient is here for review seen in virtual visit    Past Medical History:   Diagnosis Date    Anemia     Arthritis     Epilepsy     Last seizure 2010.     Hyperlipidemia     Hypertension     Prediabetes     Tubular adenoma of colon 04/02/2012    Vitamin D deficiency      Family History   Problem Relation Name Age of Onset    Cancer Mother Batool Lombardi         Brain    Heart disease Father Conner Lombardi, Sr.     Cancer Father Conner Lombardi, Sr.         Liver and lung    Hypertension Father Conner Lombardi, Sr.     Heart disease Son          CABG in 2018    Diabetes Maternal Grandfather Aguilar Pepitone     Hypertension Other       Social History[1]  Past Surgical History:   Procedure Laterality Date    BLOCK, NERVE, PERIPHERAL Left 8/19/2024    Procedure: Left femoral and obturator nerve block;  Surgeon: Helder Taylor MD;  Location: [facility];  Service: Pain Management;  Laterality: Left;    COLONOSCOPY N/A 07/12/2021    Procedure: COLONOSCOPY;  Surgeon: Subhash Martin MD;  Location: [facility] ENDO;  Service: Endoscopy;  Laterality: N/A;    HIP ARTHROPLASTY Left 1/28/2025    Procedure: ARTHROPLASTY, HIP;  Surgeon: Javier Lee MD;  Location: [facility] OR;  Service: Orthopedics;  Laterality: Left;    HIP ARTHROPLASTY Right 5/30/2025    Procedure: ARTHROPLASTY, HIP;  Surgeon: Javier Lee MD;  Location: [facility] OR;  Service: Orthopedics;  Laterality: Right;    TONSILLECTOMY      VASECTOMY  1984/1985       Labs:  Lab Results   Component Value Date    WBC 5.99 07/02/2025    HGB 10.4 (L) 07/02/2025    HCT 30.8 (L) 07/02/2025     (H) 07/02/2025      07/02/2025     BMP  Lab Results   Component Value Date     07/02/2025    K 4.5 07/02/2025     07/02/2025    CO2 26 07/02/2025    BUN 17 07/02/2025    CREATININE 1.0 07/02/2025    CALCIUM 9.1 07/02/2025    ANIONGAP 7 (L) 07/02/2025    ESTGFRAFRICA >60.0 01/19/2022    EGFRNONAA >60.0 01/19/2022     Lab Results   Component Value Date    ALT 31 07/02/2025    AST 25 07/02/2025    ALKPHOS 126 07/02/2025    BILITOT 0.2 07/02/2025       Lab Results   Component Value Date    IRON 84 06/19/2025    TIBC 303 06/19/2025    FERRITIN 224.5 06/19/2025     Lab Results   Component Value Date    KEWSFEZX56 1029 (H) 08/29/2022     Lab Results   Component Value Date    FOLATE 8.3 08/29/2022     Lab Results   Component Value Date    TSH 1.240 08/02/2024         Review of Systems   Constitutional:  Positive for fatigue.   Neurological:  Positive for weakness.       Objective:      Physical Exam  Constitutional:       Appearance: Normal appearance. He is obese.             Assessment:      1. Acute deep vein thrombosis (DVT) of popliteal vein of right lower extremity    2. Acute deep vein thrombosis (DVT) of popliteal vein of left lower extremity    3. History of pulmonary embolism           Med Onc Chart Routing      Follow up with physician . Return to clinic to see me after ultrasound of bilateral lower extremities in Radiology to be compared to previous Radiology imaging   Follow up with ERAN    Infusion scheduling note    Injection scheduling note    Labs    Imaging    Pharmacy appointment    Other referrals                 Plan:     The patient location is:  Home  The chief complaint leading to consultation is:  Results    Visit type: audiovisual    Face to Face time with patient: 30 minutes of total time spent on the encounter, which includes face to face time and non-face to face time preparing to see the patient (eg, review of tests), Obtaining and/or reviewing separately obtained history, Documenting clinical  information in the electronic or other health record, Independently interpreting results (not separately reported) and communicating results to the patient/family/caregiver, or Care coordination (not separately reported).         Each patient to whom he or she provides medical services by telemedicine is:  (1) informed of the relationship between the physician and patient and the respective role of any other health care provider with respect to management of the patient; and (2) notified that he or she may decline to receive medical services by telemedicine and may withdraw from such care at any time.    Notes:    Confusing situation unable to take either Eliquis or Xarelto because of previous seizure disorder and interaction with medications.  At this time appears to maybe be having progressive disease on warfarin variant at this point would like to compare ultrasounds in Radiology to see whether not progression has occurred from June variant discussed implications of answered questions variant at this time follow-up with me afterwards for review      Nick Blackburn Jr, MD FACP         [1]   Social History  Socioeconomic History    Marital status:    Tobacco Use    Smoking status: Never    Smokeless tobacco: Never   Substance and Sexual Activity    Alcohol use: No    Drug use: Never    Sexual activity: Not Currently     Partners: Female     Birth control/protection: None     Social Drivers of Health     Financial Resource Strain: Low Risk  (6/17/2025)    Overall Financial Resource Strain (CARDIA)     Difficulty of Paying Living Expenses: Not hard at all   Food Insecurity: No Food Insecurity (6/17/2025)    Hunger Vital Sign     Worried About Running Out of Food in the Last Year: Never true     Ran Out of Food in the Last Year: Never true   Transportation Needs: No Transportation Needs (6/17/2025)    PRAPARE - Transportation     Lack of Transportation (Medical): No     Lack of Transportation (Non-Medical): No    Physical Activity: Inactive (2/26/2025)    Exercise Vital Sign     Days of Exercise per Week: 0 days     Minutes of Exercise per Session: 0 min   Stress: No Stress Concern Present (6/17/2025)    [employer] [employer] - Occupational Stress Questionnaire     Feeling of Stress : Not at all   Housing Stability: Low Risk  (6/17/2025)    Housing Stability Vital Sign     Unable to Pay for Housing in the Last Year: No     Number of Times Moved in the Last Year: 0     Homeless in the Last Year: No

## 2025-07-11 ENCOUNTER — HOSPITAL ENCOUNTER (OUTPATIENT)
Dept: RADIOLOGY | Facility: HOSPITAL | Age: 75
Discharge: HOME | End: 2025-07-11
Attending: INTERNAL MEDICINE
Payer: COMMERCIAL

## 2025-07-11 DIAGNOSIS — I82.431 ACUTE DEEP VEIN THROMBOSIS (DVT) OF POPLITEAL VEIN OF RIGHT LOWER EXTREMITY: ICD-10-CM

## 2025-07-11 PROCEDURE — 93970 EXTREMITY STUDY: CPT | Mod: 26,,, | Performed by: RADIOLOGY

## 2025-07-11 PROCEDURE — 93970 EXTREMITY STUDY: CPT | Mod: TC

## 2025-07-18 ENCOUNTER — PATIENT MESSAGE (OUTPATIENT)
Dept: HEMATOLOGY/ONCOLOGY | Facility: CLINIC | Age: 75
End: 2025-07-18
Payer: COMMERCIAL

## 2025-07-21 NOTE — TELEPHONE ENCOUNTER
Spoke to Nena  (wife): Informed her it is okay to change the time of the Lovenox Inj for patient, just as long as it is given 12 hours apart.    Nena verbalized understanding

## 2025-07-23 ENCOUNTER — TELEPHONE (OUTPATIENT)
Dept: INTERNAL MEDICINE | Facility: CLINIC | Age: 75
End: 2025-07-23
Payer: COMMERCIAL

## 2025-07-23 ENCOUNTER — HOSPITAL ENCOUNTER (OUTPATIENT)
Dept: RADIOLOGY | Facility: HOSPITAL | Age: 75
Discharge: HOME | End: 2025-07-23
Attending: INTERNAL MEDICINE
Payer: COMMERCIAL

## 2025-07-23 DIAGNOSIS — I82.431 ACUTE DEEP VEIN THROMBOSIS (DVT) OF POPLITEAL VEIN OF RIGHT LOWER EXTREMITY: Primary | ICD-10-CM

## 2025-07-23 DIAGNOSIS — I82.431 ACUTE DEEP VEIN THROMBOSIS (DVT) OF POPLITEAL VEIN OF RIGHT LOWER EXTREMITY: ICD-10-CM

## 2025-07-23 DIAGNOSIS — Z86.711 HISTORY OF PULMONARY EMBOLISM: ICD-10-CM

## 2025-07-23 DIAGNOSIS — I82.432 ACUTE DEEP VEIN THROMBOSIS (DVT) OF POPLITEAL VEIN OF LEFT LOWER EXTREMITY: ICD-10-CM

## 2025-07-23 PROCEDURE — 93971 EXTREMITY STUDY: CPT | Mod: 26,LT,, | Performed by: RADIOLOGY

## 2025-07-23 PROCEDURE — 93971 EXTREMITY STUDY: CPT | Mod: TC,LT

## 2025-07-23 NOTE — TELEPHONE ENCOUNTER
Spoke to patient's wife, Nena. New order placed by Dr. Blackburn for US of LLE due to swelling. Scheduled today at the [facility] location.    Nena acknowledged  understanding

## 2025-07-23 NOTE — TELEPHONE ENCOUNTER
HH called and said the patient left ankle is swollen from the ankle up to the knee   and the wife noticed yesterday   Pt on Lovenox from the hospital       It was measured last week 22.5 cm   Today measurement of the ankle  25.5 cm   Wife states he has history of blood clots  No pain or reddness noted  No SOB  Please advise

## 2025-07-23 NOTE — TELEPHONE ENCOUNTER
Copied from CRM #[id number]. Topic: General Inquiry - Return Call  >> Jul 23, 2025  3:51 PM Yola wrote:  .Type: Patient Call Back    Who called: Chrissie([facility])    What is the request in detail: Patient left leg is swelling and he has a history of blood clots in that leg    Can the clinic reply by [facility]? NO    Would the patient rather a call back or a response via [facility]? callback    Best call back number:[phone]

## 2025-07-25 ENCOUNTER — PATIENT MESSAGE (OUTPATIENT)
Dept: HEMATOLOGY/ONCOLOGY | Facility: CLINIC | Age: 75
End: 2025-07-25
Payer: COMMERCIAL

## 2025-07-25 DIAGNOSIS — I82.431 ACUTE DEEP VEIN THROMBOSIS (DVT) OF POPLITEAL VEIN OF RIGHT LOWER EXTREMITY: Primary | ICD-10-CM

## 2025-07-27 RX ORDER — ENOXAPARIN SODIUM 100 MG/ML
80 INJECTION SUBCUTANEOUS DAILY
Qty: 24 ML | Refills: 3 | Status: SHIPPED | OUTPATIENT
Start: 2025-07-27

## 2025-07-31 ENCOUNTER — OFFICE VISIT (OUTPATIENT)
Dept: ORTHOPEDICS | Facility: CLINIC | Age: 75
End: 2025-07-31
Payer: COMMERCIAL

## 2025-07-31 VITALS — WEIGHT: 183 LBS | BODY MASS INDEX: 29.41 KG/M2 | HEIGHT: 66 IN

## 2025-07-31 DIAGNOSIS — Z96.641 S/P TOTAL RIGHT HIP ARTHROPLASTY: Primary | ICD-10-CM

## 2025-07-31 PROCEDURE — 1159F MED LIST DOCD IN RCRD: CPT | Mod: CPTII,S$GLB,, | Performed by: PHYSICIAN ASSISTANT

## 2025-07-31 PROCEDURE — 1101F PT FALLS ASSESS-DOCD LE1/YR: CPT | Mod: CPTII,S$GLB,, | Performed by: PHYSICIAN ASSISTANT

## 2025-07-31 PROCEDURE — 3044F HG A1C LEVEL LT 7.0%: CPT | Mod: CPTII,S$GLB,, | Performed by: PHYSICIAN ASSISTANT

## 2025-07-31 PROCEDURE — 99999 PR PBB SHADOW E&M-EST. PATIENT-LVL III: CPT | Mod: PBBFAC,,, | Performed by: PHYSICIAN ASSISTANT

## 2025-07-31 PROCEDURE — 1160F RVW MEDS BY RX/DR IN RCRD: CPT | Mod: CPTII,S$GLB,, | Performed by: PHYSICIAN ASSISTANT

## 2025-07-31 PROCEDURE — 4010F ACE/ARB THERAPY RXD/TAKEN: CPT | Mod: CPTII,S$GLB,, | Performed by: PHYSICIAN ASSISTANT

## 2025-07-31 PROCEDURE — 3288F FALL RISK ASSESSMENT DOCD: CPT | Mod: CPTII,S$GLB,, | Performed by: PHYSICIAN ASSISTANT

## 2025-07-31 PROCEDURE — 1126F AMNT PAIN NOTED NONE PRSNT: CPT | Mod: CPTII,S$GLB,, | Performed by: PHYSICIAN ASSISTANT

## 2025-07-31 PROCEDURE — 99024 POSTOP FOLLOW-UP VISIT: CPT | Mod: S$GLB,,, | Performed by: PHYSICIAN ASSISTANT

## 2025-07-31 NOTE — PROGRESS NOTES
Patient ID: Conner Lombardi Jr. is a 75 y.o. male.    Chief Complaint: Post-op Evaluation of the Right Hip      HPI: Conner Lombardi Jr.  is a 75 y.o. male who c/o Post-op Evaluation of the Right Hip     Post op visit 2  Patient notes pain is 0/10   The patient is doing quite well since surgery regarding the right hip he is not using any devices to assist with ambulation   He has not yet returned to driving     Aside roughly 2-1/2 weeks from postop patient was found to have bilateral DVTs as well as pulmonary embolism  He is currently on Lovenox shots twice a day week   He has a follow up with heme Onc next week  We will defer to their services in appreciate their assistance    The patient has not had external PT due to above   We will place an order and resume this once cleared by heme Onc next week    Patient is presently denying any shortness of breath, chest pain, fever/chills, nausea/vomiting, loss of taste or smell, numbness/tingling or sensation changes, loss of bladder or bowel function, loss of taste/smell.     Surgery: Right Total Hip    Surgery Date:  5/30/25    Past Medical History:   Diagnosis Date    Anemia     Arthritis     Epilepsy     Last seizure 2010.     Hyperlipidemia     Hypertension     Prediabetes     Tubular adenoma of colon 04/02/2012    Vitamin D deficiency      Past Surgical History:   Procedure Laterality Date    BLOCK, NERVE, PERIPHERAL Left 8/19/2024    Procedure: Left femoral and obturator nerve block;  Surgeon: Helder Taylor MD;  Location: [facility];  Service: Pain Management;  Laterality: Left;    COLONOSCOPY N/A 07/12/2021    Procedure: COLONOSCOPY;  Surgeon: Subhash Martin MD;  Location: [facility] ENDO;  Service: Endoscopy;  Laterality: N/A;    HIP ARTHROPLASTY Left 1/28/2025    Procedure: ARTHROPLASTY, HIP;  Surgeon: Javier Lee MD;  Location: [facility] OR;  Service: Orthopedics;  Laterality: Left;    HIP ARTHROPLASTY Right 5/30/2025    Procedure: ARTHROPLASTY,  HIP;  Surgeon: Javier Lee MD;  Location: [facility];  Service: Orthopedics;  Laterality: Right;    TONSILLECTOMY      VASECTOMY  1984/1985     Family History   Problem Relation Name Age of Onset    Cancer Mother Batool Lombardi         Brain    Heart disease Father Conner Lombardi, Sr.     Cancer Father Conner Lombardi, Sr.         Liver and lung    Hypertension Father Conner Lombardi, Sr.     Heart disease Son          CABG in 2018    Diabetes Maternal Grandfather Aguilar Pepitone     Hypertension Other       Social History[1]  Medication List with Changes/Refills   Current Medications    ATORVASTATIN (LIPITOR) 40 MG TABLET    Take 1 tablet (40 mg total) by mouth every evening.    CHOLECALCIFEROL, VITAMIN D3, (VITAMIN D3) 25 MCG (1,000 UNIT) CAPSULE    Take 1 capsule (1,000 Units total) by mouth once daily.    ENOXAPARIN (LOVENOX) 80 MG/0.8 ML SYRG    Inject 0.8 mLs (80 mg total) into the skin once daily.    GABAPENTIN (NEURONTIN) 300 MG CAPSULE    Take 1 capsule (300 mg total) by mouth every evening.    LISINOPRIL-HYDROCHLOROTHIAZIDE (PRINZIDE,ZESTORETIC) 20-25 MG TAB    Take 2 tablets by mouth once daily. If BP is below 110/70 - decrease to 1 tablet daily    METOPROLOL TARTRATE (LOPRESSOR) 25 MG TABLET    Take 1 tablet (25 mg total) by mouth 2 (two) times daily.    PHENYTOIN (DILANTIN) 100 MG ER CAPSULE    Take 5 capsules (500 mg total) by mouth every evening.    WARFARIN (COUMADIN) 5 MG TABLET    Take 1 tablet (5 mg total) by mouth Daily.     Review of patient's allergies indicates:  No Known Allergies    Objective:     Right Lower Extremity  NVI  WWP foot  Comp soft  Cap refill < 2 sec  Calf NT, soft  (-) Darling sign  TUAN  ROM : Patient is able to easily exhibit full flexion and extension on passive range of motion.   Abduction and adduction is full   Quadrants a simple  Wiggles toes  DF/PF intact  Sensation intact  Inc C/D/I  No SOI    Imaging:    No imaging obtained today    Assessment:        Encounter Diagnosis   Name Primary?    S/P total right hip arthroplasty Yes          Plan:       Conner was seen today for post-op evaluation.    Diagnoses and all orders for this visit:    S/P total right hip arthroplasty        Conner Lombardi Jr. is an established pt here for postop follow-up after right total hip replacement by Dr. Lee.  The patient will continue the current medication regimen and treatment plan.  Patient should notify the office of any signs or symptoms of infection including fevers, erythema, purulent drainage, increasing pain.  We will defer patient's blood thinners to a heme Onc department.  We will hold off on outpatient PT until cleared by heme Onc. Will follow-up as scheduled. Patient verbalized understanding of all instructions and agreed with the above plan.    No follow-ups on file.    The patient understands, chooses and consents to this plan and accepts all   the risks which include but are not limited to the risks mentioned above.     Disclaimer: This note was prepared using a voice recognition system and is likely to have sound alike errors within the text.            [1]   Social History  Socioeconomic History    Marital status:    Tobacco Use    Smoking status: Never    Smokeless tobacco: Never   Substance and Sexual Activity    Alcohol use: No    Drug use: Never    Sexual activity: Not Currently     Partners: Female     Birth control/protection: None     Social Drivers of Health     Financial Resource Strain: Low Risk  (6/17/2025)    Overall Financial Resource Strain (CARDIA)     Difficulty of Paying Living Expenses: Not hard at all   Food Insecurity: No Food Insecurity (6/17/2025)    Hunger Vital Sign     Worried About Running Out of Food in the Last Year: Never true     Ran Out of Food in the Last Year: Never true   Transportation Needs: No Transportation Needs (6/17/2025)    PRAPARE - Transportation     Lack of Transportation (Medical): No     Lack of  Transportation (Non-Medical): No   Physical Activity: Inactive (2/26/2025)    Exercise Vital Sign     Days of Exercise per Week: 0 days     Minutes of Exercise per Session: 0 min   Stress: No Stress Concern Present (6/17/2025)    [employer] [place] [employer] - Occupational Stress Questionnaire     Feeling of Stress : Not at all   Housing Stability: Low Risk  (6/17/2025)    Housing Stability Vital Sign     Unable to Pay for Housing in the Last Year: No     Number of Times Moved in the Last Year: 0     Homeless in the Last Year: No

## 2025-08-05 ENCOUNTER — OFFICE VISIT (OUTPATIENT)
Dept: HEMATOLOGY/ONCOLOGY | Facility: CLINIC | Age: 75
End: 2025-08-05
Payer: COMMERCIAL

## 2025-08-05 ENCOUNTER — LAB VISIT (OUTPATIENT)
Dept: LAB | Facility: HOSPITAL | Age: 75
End: 2025-08-05
Attending: INTERNAL MEDICINE
Payer: COMMERCIAL

## 2025-08-05 ENCOUNTER — ANTI-COAG VISIT (OUTPATIENT)
Dept: CARDIOLOGY | Facility: CLINIC | Age: 75
End: 2025-08-05
Payer: COMMERCIAL

## 2025-08-05 VITALS
HEIGHT: 66 IN | TEMPERATURE: 97 F | SYSTOLIC BLOOD PRESSURE: 127 MMHG | OXYGEN SATURATION: 95 % | WEIGHT: 189.81 LBS | HEART RATE: 89 BPM | DIASTOLIC BLOOD PRESSURE: 65 MMHG | BODY MASS INDEX: 30.51 KG/M2

## 2025-08-05 DIAGNOSIS — I82.431 ACUTE DEEP VEIN THROMBOSIS (DVT) OF POPLITEAL VEIN OF RIGHT LOWER EXTREMITY: ICD-10-CM

## 2025-08-05 DIAGNOSIS — I82.432 ACUTE DEEP VEIN THROMBOSIS (DVT) OF POPLITEAL VEIN OF LEFT LOWER EXTREMITY: ICD-10-CM

## 2025-08-05 DIAGNOSIS — I82.431 ACUTE DEEP VEIN THROMBOSIS (DVT) OF POPLITEAL VEIN OF RIGHT LOWER EXTREMITY: Primary | ICD-10-CM

## 2025-08-05 DIAGNOSIS — I26.99 BILATERAL PULMONARY EMBOLISM: ICD-10-CM

## 2025-08-05 DIAGNOSIS — G40.109 TEMPORAL LOBE EPILEPSY: ICD-10-CM

## 2025-08-05 DIAGNOSIS — I82.4Y3 ACUTE DEEP VEIN THROMBOSIS (DVT) OF PROXIMAL VEIN OF BOTH LOWER EXTREMITIES: ICD-10-CM

## 2025-08-05 DIAGNOSIS — Z78.9 DRUG-DRUG INTERACTION: ICD-10-CM

## 2025-08-05 DIAGNOSIS — D64.9 ANEMIA, UNSPECIFIED TYPE: ICD-10-CM

## 2025-08-05 DIAGNOSIS — Z86.711 HISTORY OF PULMONARY EMBOLISM: ICD-10-CM

## 2025-08-05 DIAGNOSIS — Z86.711 HISTORY OF PULMONARY EMBOLISM: Primary | ICD-10-CM

## 2025-08-05 LAB
ABSOLUTE EOSINOPHIL (OHS): 0.49 K/UL
ABSOLUTE MONOCYTE (OHS): 0.49 K/UL (ref 0.3–1)
ABSOLUTE NEUTROPHIL COUNT (OHS): 2.33 K/UL (ref 1.8–7.7)
BASOPHILS # BLD AUTO: 0.05 K/UL
BASOPHILS NFR BLD AUTO: 0.9 %
ERYTHROCYTE [DISTWIDTH] IN BLOOD BY AUTOMATED COUNT: 13.3 % (ref 11.5–14.5)
HCT VFR BLD AUTO: 34.8 % (ref 40–54)
HGB BLD-MCNC: 11.8 GM/DL (ref 14–18)
IMM GRANULOCYTES # BLD AUTO: 0.02 K/UL (ref 0–0.04)
IMM GRANULOCYTES NFR BLD AUTO: 0.3 % (ref 0–0.5)
LYMPHOCYTES # BLD AUTO: 2.45 K/UL (ref 1–4.8)
MCH RBC QN AUTO: 34.5 PG (ref 27–31)
MCHC RBC AUTO-ENTMCNC: 33.9 G/DL (ref 32–36)
MCV RBC AUTO: 102 FL (ref 82–98)
NUCLEATED RBC (/100WBC) (OHS): 0 /100 WBC
PLATELET # BLD AUTO: 200 K/UL (ref 150–450)
PMV BLD AUTO: 9 FL (ref 9.2–12.9)
RBC # BLD AUTO: 3.42 M/UL (ref 4.6–6.2)
RELATIVE EOSINOPHIL (OHS): 8.4 %
RELATIVE LYMPHOCYTE (OHS): 42 % (ref 18–48)
RELATIVE MONOCYTE (OHS): 8.4 % (ref 4–15)
RELATIVE NEUTROPHIL (OHS): 40 % (ref 38–73)
WBC # BLD AUTO: 5.83 K/UL (ref 3.9–12.7)

## 2025-08-05 PROCEDURE — 36415 COLL VENOUS BLD VENIPUNCTURE: CPT

## 2025-08-05 PROCEDURE — 99999 PR PBB SHADOW E&M-EST. PATIENT-LVL IV: CPT | Mod: PBBFAC,,, | Performed by: INTERNAL MEDICINE

## 2025-08-05 PROCEDURE — 85025 COMPLETE CBC W/AUTO DIFF WBC: CPT

## 2025-08-05 RX ORDER — ENOXAPARIN SODIUM 100 MG/ML
80 INJECTION SUBCUTANEOUS DAILY
Qty: 24 ML | Refills: 3 | Status: SHIPPED | OUTPATIENT
Start: 2025-08-05

## 2025-08-05 RX ORDER — WARFARIN SODIUM 5 MG/1
5 TABLET ORAL DAILY
Qty: 42 TABLET | Refills: 3 | Status: SHIPPED | OUTPATIENT
Start: 2025-08-05 | End: 2025-09-16

## 2025-08-05 NOTE — PROGRESS NOTES
Re-enroll.  Recurrent DVT patient unable to tolerate Eliquis and Xarelto because of seizure disorder. Pt placed back on warfarin by Dr Blackburn.  He will remain on Lovenox until INR is in range.  He was prescribed warfarin 5 mg QD along with Lovenox 80 mg ONCE daily?.    This was his warfarin dose prior to discharge from our clinic - however, his INR was above goal range.  Mrs Lombardi contacted.  They will overlap warfarin and Lovenox.  Pt will need INR at the end of this week and may need lower weekly dose of warfarin.  CC scheduled for Friday 8/8/25 for INR check.  Mrs Lombardi voices understanding.

## 2025-08-05 NOTE — PROGRESS NOTES
Subjective:       Patient ID: Conner Lombardi Jr. is a 75 y.o. male.    Chief Complaint: Results, Coagulation Disorder, and Anemia    HPI:   HPI:  75-year-old male history of recurrent DVT patient unable to tolerate Eliquis and Xarelto because of seizure disorder.  Patient is currently on Lovenox question as to whether not therapeutic dosing of warfarin.  Patient is here for review      Past Medical History:   Diagnosis Date    Anemia     Arthritis     Epilepsy     Last seizure 2010.     Hyperlipidemia     Hypertension     Prediabetes     Tubular adenoma of colon 04/02/2012    Vitamin D deficiency      Family History   Problem Relation Name Age of Onset    Cancer Mother Batool Lombardi         Brain    Heart disease Father Conner Lombardi, Sr.     Cancer Father Conner Lombarid, Sr.         Liver and lung    Hypertension Father Conner Lombardi, Sr.     Heart disease Son          CABG in 2018    Diabetes Maternal Grandfather Aguilar Pepitone     Hypertension Other       Social History[1]  Past Surgical History:   Procedure Laterality Date    BLOCK, NERVE, PERIPHERAL Left 8/19/2024    Procedure: Left femoral and obturator nerve block;  Surgeon: Helder Taylor MD;  Location: [facility] PAIN MGT;  Service: Pain Management;  Laterality: Left;    COLONOSCOPY N/A 07/12/2021    Procedure: COLONOSCOPY;  Surgeon: Subhash Martin MD;  Location: [facility] ENDO;  Service: Endoscopy;  Laterality: N/A;    HIP ARTHROPLASTY Left 1/28/2025    Procedure: ARTHROPLASTY, HIP;  Surgeon: Javier Lee MD;  Location: [facility] OR;  Service: Orthopedics;  Laterality: Left;    HIP ARTHROPLASTY Right 5/30/2025    Procedure: ARTHROPLASTY, HIP;  Surgeon: Javier Lee MD;  Location: [facility] OR;  Service: Orthopedics;  Laterality: Right;    TONSILLECTOMY      VASECTOMY  1984/1985       Labs:  Lab Results   Component Value Date    WBC 5.83 08/05/2025    HGB 11.8 (L) 08/05/2025    HCT 34.8 (L) 08/05/2025     (H) 08/05/2025      08/05/2025     BMP  Lab Results   Component Value Date     07/02/2025    K 4.5 07/02/2025     07/02/2025    CO2 26 07/02/2025    BUN 17 07/02/2025    CREATININE 1.0 07/02/2025    CALCIUM 9.1 07/02/2025    ANIONGAP 7 (L) 07/02/2025    ESTGFRAFRICA >60.0 01/19/2022    EGFRNONAA >60.0 01/19/2022     Lab Results   Component Value Date    ALT 31 07/02/2025    AST 25 07/02/2025    ALKPHOS 126 07/02/2025    BILITOT 0.2 07/02/2025       Lab Results   Component Value Date    IRON 84 06/19/2025    TIBC 303 06/19/2025    FERRITIN 224.5 06/19/2025     Lab Results   Component Value Date    [id number] 1029 (H) 08/29/2022     Lab Results   Component Value Date    FOLATE 8.3 08/29/2022     Lab Results   Component Value Date    TSH 1.240 08/02/2024         Review of Systems   Constitutional:  Negative for activity change, appetite change, chills, diaphoresis, fatigue, fever and unexpected weight change.   HENT:  Negative for congestion, dental problem, drooling, ear discharge, ear pain, facial swelling, hearing loss, mouth sores, nosebleeds, postnasal drip, rhinorrhea, sinus pressure, sneezing, sore throat, tinnitus, trouble swallowing and voice change.    Eyes:  Negative for photophobia, pain, discharge, redness, itching and visual disturbance.   Respiratory:  Negative for apnea, cough, choking, chest tightness, shortness of breath, wheezing and stridor.    Cardiovascular:  Negative for chest pain, palpitations and leg swelling.   Gastrointestinal:  Negative for abdominal distention, abdominal pain, anal bleeding, blood in stool, constipation, diarrhea, nausea, rectal pain and vomiting.   Endocrine: Negative for cold intolerance, heat intolerance, polydipsia, polyphagia and polyuria.   Genitourinary:  Negative for decreased urine volume, difficulty urinating, dysuria, enuresis, flank pain, frequency, genital sores, hematuria, penile discharge, penile pain, penile swelling, scrotal swelling, testicular pain and  urgency.   Musculoskeletal:  Negative for arthralgias, back pain, gait problem, joint swelling, myalgias, neck pain and neck stiffness.   Skin:  Negative for color change, pallor, rash and wound.   Allergic/Immunologic: Negative for environmental allergies, food allergies and immunocompromised state.   Neurological:  Negative for dizziness, tremors, seizures, syncope, facial asymmetry, speech difficulty, weakness, light-headedness, numbness and headaches.   Hematological:  Negative for adenopathy. Does not bruise/bleed easily.   Psychiatric/Behavioral:  Negative for agitation, behavioral problems, confusion, decreased concentration, dysphoric mood, hallucinations, self-injury, sleep disturbance and suicidal ideas. The patient is not nervous/anxious and is not hyperactive.        Objective:      Physical Exam  Vitals reviewed.   Constitutional:       General: He is not in acute distress.     Appearance: He is well-developed. He is not diaphoretic.   HENT:      Head: Normocephalic.      Right Ear: External ear normal.      Left Ear: External ear normal.      Nose: Nose normal.      Right Sinus: No maxillary sinus tenderness or frontal sinus tenderness.      Left Sinus: No maxillary sinus tenderness or frontal sinus tenderness.      Mouth/Throat:      Pharynx: No oropharyngeal exudate.   Eyes:      General: Lids are normal. No scleral icterus.        Right eye: No discharge.         Left eye: No discharge.      Extraocular Movements:      Right eye: Normal extraocular motion.      Left eye: Normal extraocular motion.      Conjunctiva/sclera:      Right eye: Right conjunctiva is not injected. No hemorrhage.     Left eye: Left conjunctiva is not injected. No hemorrhage.     Pupils: Pupils are equal, round, and reactive to light.   Neck:      Thyroid: No thyromegaly.      Vascular: No JVD.      Trachea: No tracheal deviation.   Cardiovascular:      Rate and Rhythm: Normal rate.   Pulmonary:      Effort: Pulmonary effort  is normal. No respiratory distress.      Breath sounds: No stridor.   Abdominal:      General: Bowel sounds are normal.      Palpations: Abdomen is soft. There is no hepatomegaly, splenomegaly or mass.      Tenderness: There is no abdominal tenderness.   Musculoskeletal:         General: No tenderness. Normal range of motion.      Cervical back: Normal range of motion and neck supple.   Lymphadenopathy:      Head:      Right side of head: No posterior auricular or occipital adenopathy.      Left side of head: No posterior auricular or occipital adenopathy.      Cervical: No cervical adenopathy.      Right cervical: No superficial, deep or posterior cervical adenopathy.     Left cervical: No superficial, deep or posterior cervical adenopathy.      Upper Body:      Right upper body: No supraclavicular adenopathy.      Left upper body: No supraclavicular adenopathy.   Skin:     General: Skin is dry.      Findings: No erythema or rash.      Nails: There is no clubbing.   Neurological:      Mental Status: He is alert and oriented to person, place, and time.      Cranial Nerves: No cranial nerve deficit.      Coordination: Coordination normal.   Psychiatric:         Behavior: Behavior normal.         Thought Content: Thought content normal.         Judgment: Judgment normal.             Assessment:      1. Acute deep vein thrombosis (DVT) of popliteal vein of right lower extremity    2. History of pulmonary embolism    3. Anemia, unspecified type    4. Acute deep vein thrombosis (DVT) of popliteal vein of left lower extremity    5. Drug-drug interaction    6. Temporal lobe epilepsy           Med Onc Chart Routing      Follow up with physician . Return to clinic in 2 months to be seen with laboratory studies standing ordered today done 4-5 days prior   Follow up with ERAN    Infusion scheduling note    Injection scheduling note    Labs    Imaging    Pharmacy appointment    Other referrals                 Plan:     Reviewed  information.  Drug drug interaction with medications not able to take Eliquis Xarelto.  Currently on Lovenox would recommend retry on warfarin therapeutic between 2 and 3.  Continue discontinuation of Luvox return in 2 months.  Patient aware no evidence of hypercoagulable situation.        Nick Blackburn Jr, MD FACP         [1]   Social History  Socioeconomic History    Marital status:    Tobacco Use    Smoking status: Never    Smokeless tobacco: Never   Substance and Sexual Activity    Alcohol use: No    Drug use: Never    Sexual activity: Not Currently     Partners: Female     Birth control/protection: None     Social Drivers of Health     Financial Resource Strain: Low Risk  (6/17/2025)    Overall Financial Resource Strain (CARDIA)     Difficulty of Paying Living Expenses: Not hard at all   Food Insecurity: No Food Insecurity (6/17/2025)    Hunger Vital Sign     Worried About Running Out of Food in the Last Year: Never true     Ran Out of Food in the Last Year: Never true   Transportation Needs: No Transportation Needs (6/17/2025)    PRAPARE - Transportation     Lack of Transportation (Medical): No     Lack of Transportation (Non-Medical): No   Physical Activity: Inactive (2/26/2025)    Exercise Vital Sign     Days of Exercise per Week: 0 days     Minutes of Exercise per Session: 0 min   Stress: No Stress Concern Present (6/17/2025)    [employer] [place] [employer] - Occupational Stress Questionnaire     Feeling of Stress : Not at all   Housing Stability: Low Risk  (6/17/2025)    Housing Stability Vital Sign     Unable to Pay for Housing in the Last Year: No     Number of Times Moved in the Last Year: 0     Homeless in the Last Year: No

## 2025-08-05 NOTE — PROGRESS NOTES
Subjective:       Patient ID: Conner Lombardi Jr. is a 75 y.o. male.    Chief Complaint: Results, Coagulation Disorder, and Anemia    HPI: ***    Past Medical History:   Diagnosis Date    Anemia     Arthritis     Epilepsy     Last seizure 2010.     Hyperlipidemia     Hypertension     Prediabetes     Tubular adenoma of colon 04/02/2012    Vitamin D deficiency      Family History   Problem Relation Name Age of Onset    Cancer Mother Batool Lombardi         Brain    Heart disease Father Conner Lombardi, Sr.     Cancer Father Conner Lombardi, Sr.         Liver and lung    Hypertension Father Conner Lombardi, Sr.     Heart disease Son          CABG in 2018    Diabetes Maternal Grandfather Aguilar Pepitone     Hypertension Other       Social History[1]  Past Surgical History:   Procedure Laterality Date    BLOCK, NERVE, PERIPHERAL Left 8/19/2024    Procedure: Left femoral and obturator nerve block;  Surgeon: Helder Taylor MD;  Location: [facility] PAIN MGT;  Service: Pain Management;  Laterality: Left;    COLONOSCOPY N/A 07/12/2021    Procedure: COLONOSCOPY;  Surgeon: Subhash Martin MD;  Location: [facility] ENDO;  Service: Endoscopy;  Laterality: N/A;    HIP ARTHROPLASTY Left 1/28/2025    Procedure: ARTHROPLASTY, HIP;  Surgeon: Javier Lee MD;  Location: [facility] OR;  Service: Orthopedics;  Laterality: Left;    HIP ARTHROPLASTY Right 5/30/2025    Procedure: ARTHROPLASTY, HIP;  Surgeon: Javier Lee MD;  Location: [facility] OR;  Service: Orthopedics;  Laterality: Right;    TONSILLECTOMY      VASECTOMY  1984/1985       Labs:  Lab Results   Component Value Date    WBC 5.83 08/05/2025    HGB 11.8 (L) 08/05/2025    HCT 34.8 (L) 08/05/2025     (H) 08/05/2025     08/05/2025     BMP  Lab Results   Component Value Date     07/02/2025    K 4.5 07/02/2025     07/02/2025    CO2 26 07/02/2025    BUN 17 07/02/2025    CREATININE 1.0 07/02/2025    CALCIUM 9.1 07/02/2025    ANIONGAP 7 (L)  07/02/2025    ESTGFRAFRICA >60.0 01/19/2022    EGFRNONAA >60.0 01/19/2022     Lab Results   Component Value Date    ALT 31 07/02/2025    AST 25 07/02/2025    ALKPHOS 126 07/02/2025    BILITOT 0.2 07/02/2025       Lab Results   Component Value Date    IRON 84 06/19/2025    TIBC 303 06/19/2025    FERRITIN 224.5 06/19/2025     Lab Results   Component Value Date    [id number] 1029 (H) 08/29/2022     Lab Results   Component Value Date    FOLATE 8.3 08/29/2022     Lab Results   Component Value Date    TSH 1.240 08/02/2024         Review of Systems    Objective:      Physical Exam        Assessment:      No diagnosis found.     Med Onc Route Chart for Scheduling     Plan:     ***        Nick Blackburn Jr, MD FACP         [1]   Social History  Socioeconomic History    Marital status:    Tobacco Use    Smoking status: Never    Smokeless tobacco: Never   Substance and Sexual Activity    Alcohol use: No    Drug use: Never    Sexual activity: Not Currently     Partners: Female     Birth control/protection: None     Social Drivers of Health     Financial Resource Strain: Low Risk  (6/17/2025)    Overall Financial Resource Strain (CARDIA)     Difficulty of Paying Living Expenses: Not hard at all   Food Insecurity: No Food Insecurity (6/17/2025)    Hunger Vital Sign     Worried About Running Out of Food in the Last Year: Never true     Ran Out of Food in the Last Year: Never true   Transportation Needs: No Transportation Needs (6/17/2025)    PRAPARE - Transportation     Lack of Transportation (Medical): No     Lack of Transportation (Non-Medical): No   Physical Activity: Inactive (2/26/2025)    Exercise Vital Sign     Days of Exercise per Week: 0 days     Minutes of Exercise per Session: 0 min   Stress: No Stress Concern Present (6/17/2025)    [employer] [place] [employer] - Occupational Stress Questionnaire     Feeling of Stress : Not at all   Housing Stability: Low Risk  (6/17/2025)    Housing Stability Vital  Sign     Unable to Pay for Housing in the Last Year: No     Number of Times Moved in the Last Year: 0     Homeless in the Last Year: No

## 2025-08-06 ENCOUNTER — PATIENT MESSAGE (OUTPATIENT)
Dept: HEMATOLOGY/ONCOLOGY | Facility: CLINIC | Age: 75
End: 2025-08-06
Payer: COMMERCIAL

## 2025-08-06 ENCOUNTER — TELEPHONE (OUTPATIENT)
Dept: HEMATOLOGY/ONCOLOGY | Facility: CLINIC | Age: 75
End: 2025-08-06
Payer: COMMERCIAL

## 2025-08-06 NOTE — TELEPHONE ENCOUNTER
Spoke to wife Nena who stated she will opt to teach patient cano  to self administer his Lovenox  injection .

## 2025-08-07 ENCOUNTER — TELEPHONE (OUTPATIENT)
Dept: PHARMACY | Facility: CLINIC | Age: 75
End: 2025-08-07
Payer: COMMERCIAL

## 2025-08-07 NOTE — TELEPHONE ENCOUNTER
[facility]/Population Health Chart Review & Patient Outreach Details For Medication Adherence Project    Reason for Outreach Encounter: 3rd Party payor non-compliance report ([employer], [employer], C, etc)  2.  Patient Outreach Method: Reviewed Patient Chart  3.   Medication in question: atorvastatin    LAST FILLED: 7/31/25 for 90 day supply  Hyperlipidemia Medications              atorvastatin (LIPITOR) 40 MG tablet Take 1 tablet (40 mg total) by mouth every evening.               4.  Reviewed and or Updates Made To: Patient Chart  5. Outreach Outcomes and/or actions taken: Patient filled medication and is on track to be adherent

## 2025-08-08 ENCOUNTER — ANTI-COAG VISIT (OUTPATIENT)
Dept: CARDIOLOGY | Facility: CLINIC | Age: 75
End: 2025-08-08
Payer: COMMERCIAL

## 2025-08-08 DIAGNOSIS — I82.431 ACUTE DEEP VEIN THROMBOSIS (DVT) OF POPLITEAL VEIN OF RIGHT LOWER EXTREMITY: ICD-10-CM

## 2025-08-08 DIAGNOSIS — Z79.01 LONG TERM (CURRENT) USE OF ANTICOAGULANTS: Primary | ICD-10-CM

## 2025-08-08 DIAGNOSIS — Z86.711 HISTORY OF PULMONARY EMBOLISM: ICD-10-CM

## 2025-08-08 LAB
CTP QC/QA: YES
INR PPP: 1.3 (ref 2–3)

## 2025-08-08 NOTE — PROGRESS NOTES
INR not at goal. Medications, chart, and patient findings reviewed. See calendar for adjustments to dose and follow up plan.  INR is starting to move towards goal.  Pt should continue to overlap warfarin 5 mg QD and Lovenox 80 mg QD prescribed but using BID (treatment dose).  Repeat INR on Monday.

## 2025-08-08 NOTE — PROGRESS NOTES
INR is subtherapeutic at 1.3.  Patient reports taking warfarin 5 mg every day and enoxaparin 80 mg BID as directed.  Patient does remain with enoxaparin at home.  No s/s reported.  Will send to PharmD for further dosing instructions.

## 2025-08-09 ENCOUNTER — DOCUMENT SCAN (OUTPATIENT)
Dept: HOME HEALTH SERVICES | Facility: HOSPITAL | Age: 75
End: 2025-08-09
Payer: COMMERCIAL

## 2025-08-11 ENCOUNTER — PATIENT MESSAGE (OUTPATIENT)
Dept: ORTHOPEDICS | Facility: CLINIC | Age: 75
End: 2025-08-11
Payer: COMMERCIAL

## 2025-08-11 ENCOUNTER — DOCUMENT SCAN (OUTPATIENT)
Dept: HOME HEALTH SERVICES | Facility: HOSPITAL | Age: 75
End: 2025-08-11
Payer: COMMERCIAL

## 2025-08-12 ENCOUNTER — ANTI-COAG VISIT (OUTPATIENT)
Dept: CARDIOLOGY | Facility: CLINIC | Age: 75
End: 2025-08-12
Payer: COMMERCIAL

## 2025-08-12 ENCOUNTER — LAB VISIT (OUTPATIENT)
Dept: LAB | Facility: HOSPITAL | Age: 75
End: 2025-08-12
Attending: INTERNAL MEDICINE
Payer: COMMERCIAL

## 2025-08-12 DIAGNOSIS — Z79.01 LONG TERM (CURRENT) USE OF ANTICOAGULANTS: Primary | ICD-10-CM

## 2025-08-12 DIAGNOSIS — Z86.711 HISTORY OF PULMONARY EMBOLISM: ICD-10-CM

## 2025-08-12 DIAGNOSIS — D64.9 ANEMIA, UNSPECIFIED TYPE: ICD-10-CM

## 2025-08-12 DIAGNOSIS — I82.431 ACUTE DEEP VEIN THROMBOSIS (DVT) OF POPLITEAL VEIN OF RIGHT LOWER EXTREMITY: ICD-10-CM

## 2025-08-12 LAB
CTP QC/QA: YES
INR PPP: 2.5 (ref 2–3)
VIT B12 SERPL-MCNC: 629 PG/ML (ref 210–950)

## 2025-08-12 PROCEDURE — 85610 PROTHROMBIN TIME: CPT | Mod: QW,S$GLB,, | Performed by: INTERNAL MEDICINE

## 2025-08-12 PROCEDURE — 93793 ANTICOAG MGMT PT WARFARIN: CPT | Mod: S$GLB,,,

## 2025-08-12 PROCEDURE — 82607 VITAMIN B-12: CPT

## 2025-08-12 PROCEDURE — 36415 COLL VENOUS BLD VENIPUNCTURE: CPT

## 2025-08-13 ENCOUNTER — OFFICE VISIT (OUTPATIENT)
Dept: NEUROLOGY | Facility: CLINIC | Age: 75
End: 2025-08-13
Payer: COMMERCIAL

## 2025-08-13 VITALS
HEART RATE: 92 BPM | RESPIRATION RATE: 18 BRPM | WEIGHT: 189 LBS | BODY MASS INDEX: 30.37 KG/M2 | SYSTOLIC BLOOD PRESSURE: 154 MMHG | DIASTOLIC BLOOD PRESSURE: 84 MMHG | HEIGHT: 66 IN

## 2025-08-13 DIAGNOSIS — G40.109 TEMPORAL LOBE EPILEPSY: Primary | ICD-10-CM

## 2025-08-13 DIAGNOSIS — M16.12 ARTHRITIS OF LEFT HIP: ICD-10-CM

## 2025-08-13 DIAGNOSIS — E78.2 MIXED HYPERLIPIDEMIA: ICD-10-CM

## 2025-08-13 DIAGNOSIS — M54.50 CHRONIC BILATERAL LOW BACK PAIN WITHOUT SCIATICA: ICD-10-CM

## 2025-08-13 DIAGNOSIS — G89.29 CHRONIC BILATERAL LOW BACK PAIN WITHOUT SCIATICA: ICD-10-CM

## 2025-08-13 DIAGNOSIS — L21.9 SEBORRHEIC DERMATITIS OF SCALP: ICD-10-CM

## 2025-08-13 DIAGNOSIS — M16.11 ARTHRITIS OF RIGHT HIP: ICD-10-CM

## 2025-08-13 DIAGNOSIS — I10 ESSENTIAL HYPERTENSION: ICD-10-CM

## 2025-08-13 DIAGNOSIS — R29.898 LEG WEAKNESS, BILATERAL: ICD-10-CM

## 2025-08-13 DIAGNOSIS — H25.13 NUCLEAR SCLEROSIS OF BOTH EYES: ICD-10-CM

## 2025-08-13 DIAGNOSIS — H52.7 REFRACTIVE ERROR: ICD-10-CM

## 2025-08-13 DIAGNOSIS — Z96.642 S/P HIP REPLACEMENT, LEFT: ICD-10-CM

## 2025-08-13 DIAGNOSIS — Z78.9 DRUG-DRUG INTERACTION: ICD-10-CM

## 2025-08-13 DIAGNOSIS — R73.03 PREDIABETES: ICD-10-CM

## 2025-08-13 DIAGNOSIS — E55.9 VITAMIN D DEFICIENCY: ICD-10-CM

## 2025-08-13 DIAGNOSIS — Z86.711 HISTORY OF PULMONARY EMBOLISM: ICD-10-CM

## 2025-08-13 DIAGNOSIS — D64.9 ANEMIA, UNSPECIFIED TYPE: ICD-10-CM

## 2025-08-13 DIAGNOSIS — Z86.718 HISTORY OF DEEP VEIN THROMBOSIS (DVT) OF LOWER EXTREMITY: ICD-10-CM

## 2025-08-13 DIAGNOSIS — Z96.641 STATUS POST RIGHT HIP REPLACEMENT: ICD-10-CM

## 2025-08-13 PROBLEM — I82.432 ACUTE DEEP VEIN THROMBOSIS (DVT) OF POPLITEAL VEIN OF LEFT LOWER EXTREMITY: Status: RESOLVED | Noted: 2025-07-07 | Resolved: 2025-08-13

## 2025-08-13 PROBLEM — I82.431 ACUTE DEEP VEIN THROMBOSIS (DVT) OF POPLITEAL VEIN OF RIGHT LOWER EXTREMITY: Status: RESOLVED | Noted: 2025-07-07 | Resolved: 2025-08-13

## 2025-08-13 PROCEDURE — 3044F HG A1C LEVEL LT 7.0%: CPT | Mod: CPTII,S$GLB,, | Performed by: PSYCHIATRY & NEUROLOGY

## 2025-08-13 PROCEDURE — 3288F FALL RISK ASSESSMENT DOCD: CPT | Mod: CPTII,S$GLB,, | Performed by: PSYCHIATRY & NEUROLOGY

## 2025-08-13 PROCEDURE — 1159F MED LIST DOCD IN RCRD: CPT | Mod: CPTII,S$GLB,, | Performed by: PSYCHIATRY & NEUROLOGY

## 2025-08-13 PROCEDURE — 3079F DIAST BP 80-89 MM HG: CPT | Mod: CPTII,S$GLB,, | Performed by: PSYCHIATRY & NEUROLOGY

## 2025-08-13 PROCEDURE — G2211 COMPLEX E/M VISIT ADD ON: HCPCS | Mod: S$GLB,,, | Performed by: PSYCHIATRY & NEUROLOGY

## 2025-08-13 PROCEDURE — 4010F ACE/ARB THERAPY RXD/TAKEN: CPT | Mod: CPTII,S$GLB,, | Performed by: PSYCHIATRY & NEUROLOGY

## 2025-08-13 PROCEDURE — 1101F PT FALLS ASSESS-DOCD LE1/YR: CPT | Mod: CPTII,S$GLB,, | Performed by: PSYCHIATRY & NEUROLOGY

## 2025-08-13 PROCEDURE — 99999 PR PBB SHADOW E&M-EST. PATIENT-LVL III: CPT | Mod: PBBFAC,,, | Performed by: PSYCHIATRY & NEUROLOGY

## 2025-08-13 PROCEDURE — 3077F SYST BP >= 140 MM HG: CPT | Mod: CPTII,S$GLB,, | Performed by: PSYCHIATRY & NEUROLOGY

## 2025-08-13 PROCEDURE — 3008F BODY MASS INDEX DOCD: CPT | Mod: CPTII,S$GLB,, | Performed by: PSYCHIATRY & NEUROLOGY

## 2025-08-13 PROCEDURE — 99214 OFFICE O/P EST MOD 30 MIN: CPT | Mod: S$GLB,,, | Performed by: PSYCHIATRY & NEUROLOGY

## 2025-08-13 PROCEDURE — 1126F AMNT PAIN NOTED NONE PRSNT: CPT | Mod: CPTII,S$GLB,, | Performed by: PSYCHIATRY & NEUROLOGY

## 2025-08-15 ENCOUNTER — ANTI-COAG VISIT (OUTPATIENT)
Dept: CARDIOLOGY | Facility: CLINIC | Age: 75
End: 2025-08-15
Payer: COMMERCIAL

## 2025-08-15 ENCOUNTER — HOSPITAL ENCOUNTER (OUTPATIENT)
Dept: CARDIOLOGY | Facility: HOSPITAL | Age: 75
Discharge: HOME | End: 2025-08-15
Payer: COMMERCIAL

## 2025-08-15 VITALS
DIASTOLIC BLOOD PRESSURE: 84 MMHG | WEIGHT: 189 LBS | SYSTOLIC BLOOD PRESSURE: 154 MMHG | HEIGHT: 66 IN | BODY MASS INDEX: 30.37 KG/M2

## 2025-08-15 DIAGNOSIS — Z86.711 HISTORY OF PULMONARY EMBOLISM: Primary | ICD-10-CM

## 2025-08-15 PROCEDURE — 93306 TTE W/DOPPLER COMPLETE: CPT | Mod: 26,,, | Performed by: INTERNAL MEDICINE

## 2025-08-15 PROCEDURE — 93306 TTE W/DOPPLER COMPLETE: CPT

## 2025-08-19 ENCOUNTER — ANTI-COAG VISIT (OUTPATIENT)
Dept: CARDIOLOGY | Facility: CLINIC | Age: 75
End: 2025-08-19
Payer: COMMERCIAL

## 2025-08-19 ENCOUNTER — LAB VISIT (OUTPATIENT)
Dept: LAB | Facility: HOSPITAL | Age: 75
End: 2025-08-19
Attending: FAMILY MEDICINE
Payer: COMMERCIAL

## 2025-08-19 DIAGNOSIS — E78.2 MIXED HYPERLIPIDEMIA: ICD-10-CM

## 2025-08-19 DIAGNOSIS — Z79.01 LONG TERM (CURRENT) USE OF ANTICOAGULANTS: ICD-10-CM

## 2025-08-19 DIAGNOSIS — E55.9 VITAMIN D DEFICIENCY: ICD-10-CM

## 2025-08-19 DIAGNOSIS — I26.99 BILATERAL PULMONARY EMBOLISM: ICD-10-CM

## 2025-08-19 DIAGNOSIS — I10 ESSENTIAL HYPERTENSION: ICD-10-CM

## 2025-08-19 DIAGNOSIS — Z86.711 HISTORY OF PULMONARY EMBOLISM: Primary | ICD-10-CM

## 2025-08-19 DIAGNOSIS — R73.03 PREDIABETES: ICD-10-CM

## 2025-08-19 LAB
25(OH)D3+25(OH)D2 SERPL-MCNC: 41 NG/ML (ref 30–96)
ALBUMIN SERPL BCP-MCNC: 4.2 G/DL (ref 3.5–5.2)
ALP SERPL-CCNC: 117 UNIT/L (ref 40–150)
ALT SERPL W/O P-5'-P-CCNC: 48 UNIT/L (ref 0–55)
ANION GAP (OHS): 12 MMOL/L (ref 8–16)
AST SERPL-CCNC: 48 UNIT/L (ref 0–50)
BILIRUB SERPL-MCNC: 0.2 MG/DL (ref 0.1–1)
BUN SERPL-MCNC: 24 MG/DL (ref 8–23)
CALCIUM SERPL-MCNC: 9.3 MG/DL (ref 8.7–10.5)
CHLORIDE SERPL-SCNC: 112 MMOL/L (ref 95–110)
CHOLEST SERPL-MCNC: 184 MG/DL (ref 120–199)
CHOLEST/HDLC SERPL: 4.4 {RATIO} (ref 2–5)
CO2 SERPL-SCNC: 20 MMOL/L (ref 23–29)
CREAT SERPL-MCNC: 1.1 MG/DL (ref 0.5–1.4)
EAG (OHS): 111 MG/DL (ref 68–131)
GFR SERPLBLD CREATININE-BSD FMLA CKD-EPI: >60 ML/MIN/1.73/M2
GLUCOSE SERPL-MCNC: 123 MG/DL (ref 70–110)
HBA1C MFR BLD: 5.5 % (ref 4–5.6)
HDLC SERPL-MCNC: 42 MG/DL (ref 40–75)
HDLC SERPL: 22.8 % (ref 20–50)
INR PPP: 2.9 (ref 0.8–1.2)
LDLC SERPL CALC-MCNC: 105.8 MG/DL (ref 63–159)
NONHDLC SERPL-MCNC: 142 MG/DL
POTASSIUM SERPL-SCNC: 4.5 MMOL/L (ref 3.5–5.1)
PROT SERPL-MCNC: 7.6 GM/DL (ref 6–8.4)
PROTHROMBIN TIME: 30.3 SECONDS (ref 9–12.5)
SODIUM SERPL-SCNC: 144 MMOL/L (ref 136–145)
TRIGL SERPL-MCNC: 181 MG/DL (ref 30–150)
TSH SERPL-ACNC: 0.85 UIU/ML (ref 0.4–4)

## 2025-08-19 PROCEDURE — 36415 COLL VENOUS BLD VENIPUNCTURE: CPT

## 2025-08-19 PROCEDURE — 82465 ASSAY BLD/SERUM CHOLESTEROL: CPT

## 2025-08-19 PROCEDURE — 85610 PROTHROMBIN TIME: CPT

## 2025-08-19 PROCEDURE — 93793 ANTICOAG MGMT PT WARFARIN: CPT | Mod: S$GLB,,,

## 2025-08-19 PROCEDURE — 83036 HEMOGLOBIN GLYCOSYLATED A1C: CPT

## 2025-08-19 PROCEDURE — 84443 ASSAY THYROID STIM HORMONE: CPT

## 2025-08-19 PROCEDURE — 82306 VITAMIN D 25 HYDROXY: CPT

## 2025-08-19 PROCEDURE — 80053 COMPREHEN METABOLIC PANEL: CPT

## 2025-08-20 ENCOUNTER — TELEPHONE (OUTPATIENT)
Dept: INTERNAL MEDICINE | Facility: CLINIC | Age: 75
End: 2025-08-20
Payer: COMMERCIAL

## 2025-08-20 DIAGNOSIS — D64.9 ANEMIA, UNSPECIFIED TYPE: Primary | ICD-10-CM

## 2025-08-21 ENCOUNTER — LAB VISIT (OUTPATIENT)
Dept: LAB | Facility: HOSPITAL | Age: 75
End: 2025-08-21
Attending: FAMILY MEDICINE
Payer: COMMERCIAL

## 2025-08-21 DIAGNOSIS — D64.9 ANEMIA, UNSPECIFIED TYPE: ICD-10-CM

## 2025-08-21 LAB
ABSOLUTE EOSINOPHIL (OHS): 0.29 K/UL
ABSOLUTE MONOCYTE (OHS): 0.46 K/UL (ref 0.3–1)
ABSOLUTE NEUTROPHIL COUNT (OHS): 2.1 K/UL (ref 1.8–7.7)
BASOPHILS # BLD AUTO: 0.04 K/UL
BASOPHILS NFR BLD AUTO: 0.9 %
ERYTHROCYTE [DISTWIDTH] IN BLOOD BY AUTOMATED COUNT: 13.4 % (ref 11.5–14.5)
HCT VFR BLD AUTO: 34.8 % (ref 40–54)
HGB BLD-MCNC: 12 GM/DL (ref 14–18)
IMM GRANULOCYTES # BLD AUTO: 0.01 K/UL (ref 0–0.04)
IMM GRANULOCYTES NFR BLD AUTO: 0.2 % (ref 0–0.5)
LYMPHOCYTES # BLD AUTO: 1.77 K/UL (ref 1–4.8)
MCH RBC QN AUTO: 34.8 PG (ref 27–31)
MCHC RBC AUTO-ENTMCNC: 34.5 G/DL (ref 32–36)
MCV RBC AUTO: 101 FL (ref 82–98)
NUCLEATED RBC (/100WBC) (OHS): 0 /100 WBC
PLATELET # BLD AUTO: 190 K/UL (ref 150–450)
PMV BLD AUTO: 10.2 FL (ref 9.2–12.9)
RBC # BLD AUTO: 3.45 M/UL (ref 4.6–6.2)
RELATIVE EOSINOPHIL (OHS): 6.2 %
RELATIVE LYMPHOCYTE (OHS): 37.9 % (ref 18–48)
RELATIVE MONOCYTE (OHS): 9.9 % (ref 4–15)
RELATIVE NEUTROPHIL (OHS): 44.9 % (ref 38–73)
WBC # BLD AUTO: 4.67 K/UL (ref 3.9–12.7)

## 2025-08-21 PROCEDURE — 85025 COMPLETE CBC W/AUTO DIFF WBC: CPT

## 2025-08-21 PROCEDURE — 36415 COLL VENOUS BLD VENIPUNCTURE: CPT

## 2025-08-26 ENCOUNTER — ANTI-COAG VISIT (OUTPATIENT)
Dept: CARDIOLOGY | Facility: CLINIC | Age: 75
End: 2025-08-26
Payer: COMMERCIAL

## 2025-08-26 ENCOUNTER — OFFICE VISIT (OUTPATIENT)
Dept: INTERNAL MEDICINE | Facility: CLINIC | Age: 75
End: 2025-08-26
Payer: COMMERCIAL

## 2025-08-26 VITALS
SYSTOLIC BLOOD PRESSURE: 138 MMHG | HEART RATE: 77 BPM | TEMPERATURE: 98 F | WEIGHT: 184.06 LBS | HEIGHT: 66 IN | DIASTOLIC BLOOD PRESSURE: 70 MMHG | OXYGEN SATURATION: 98 % | BODY MASS INDEX: 29.58 KG/M2

## 2025-08-26 DIAGNOSIS — R73.03 PREDIABETES: ICD-10-CM

## 2025-08-26 DIAGNOSIS — Z86.718 HISTORY OF DEEP VEIN THROMBOSIS (DVT) OF LOWER EXTREMITY: ICD-10-CM

## 2025-08-26 DIAGNOSIS — Z00.00 ROUTINE GENERAL MEDICAL EXAMINATION AT A HEALTH CARE FACILITY: Primary | ICD-10-CM

## 2025-08-26 DIAGNOSIS — H61.21 IMPACTED CERUMEN OF RIGHT EAR: ICD-10-CM

## 2025-08-26 DIAGNOSIS — G40.109 TEMPORAL LOBE EPILEPSY: ICD-10-CM

## 2025-08-26 DIAGNOSIS — Z86.711 HISTORY OF PULMONARY EMBOLISM: ICD-10-CM

## 2025-08-26 DIAGNOSIS — G89.29 CHRONIC BILATERAL LOW BACK PAIN WITHOUT SCIATICA: ICD-10-CM

## 2025-08-26 DIAGNOSIS — E55.9 VITAMIN D DEFICIENCY: ICD-10-CM

## 2025-08-26 DIAGNOSIS — I10 ESSENTIAL HYPERTENSION: ICD-10-CM

## 2025-08-26 DIAGNOSIS — M54.50 CHRONIC BILATERAL LOW BACK PAIN WITHOUT SCIATICA: ICD-10-CM

## 2025-08-26 DIAGNOSIS — E78.2 MIXED HYPERLIPIDEMIA: ICD-10-CM

## 2025-08-26 DIAGNOSIS — D64.9 ANEMIA, UNSPECIFIED TYPE: ICD-10-CM

## 2025-08-26 DIAGNOSIS — Z79.01 LONG TERM (CURRENT) USE OF ANTICOAGULANTS: Primary | ICD-10-CM

## 2025-08-26 LAB
CTP QC/QA: YES
INR PPP: 3.8 (ref 2–3)

## 2025-08-26 PROCEDURE — 1160F RVW MEDS BY RX/DR IN RCRD: CPT | Mod: CPTII,S$GLB,, | Performed by: PHYSICIAN ASSISTANT

## 2025-08-26 PROCEDURE — 1101F PT FALLS ASSESS-DOCD LE1/YR: CPT | Mod: CPTII,S$GLB,, | Performed by: PHYSICIAN ASSISTANT

## 2025-08-26 PROCEDURE — 3288F FALL RISK ASSESSMENT DOCD: CPT | Mod: CPTII,S$GLB,, | Performed by: PHYSICIAN ASSISTANT

## 2025-08-26 PROCEDURE — 1159F MED LIST DOCD IN RCRD: CPT | Mod: CPTII,S$GLB,, | Performed by: PHYSICIAN ASSISTANT

## 2025-08-26 PROCEDURE — 3075F SYST BP GE 130 - 139MM HG: CPT | Mod: CPTII,S$GLB,, | Performed by: PHYSICIAN ASSISTANT

## 2025-08-26 PROCEDURE — 1126F AMNT PAIN NOTED NONE PRSNT: CPT | Mod: CPTII,S$GLB,, | Performed by: PHYSICIAN ASSISTANT

## 2025-08-26 PROCEDURE — 93793 ANTICOAG MGMT PT WARFARIN: CPT | Mod: S$GLB,,,

## 2025-08-26 PROCEDURE — 99397 PER PM REEVAL EST PAT 65+ YR: CPT | Mod: S$GLB,,, | Performed by: PHYSICIAN ASSISTANT

## 2025-08-26 PROCEDURE — 4010F ACE/ARB THERAPY RXD/TAKEN: CPT | Mod: CPTII,S$GLB,, | Performed by: PHYSICIAN ASSISTANT

## 2025-08-26 PROCEDURE — 99999 PR PBB SHADOW E&M-EST. PATIENT-LVL IV: CPT | Mod: PBBFAC,,, | Performed by: PHYSICIAN ASSISTANT

## 2025-08-26 PROCEDURE — 3078F DIAST BP <80 MM HG: CPT | Mod: CPTII,S$GLB,, | Performed by: PHYSICIAN ASSISTANT

## 2025-08-26 PROCEDURE — 85610 PROTHROMBIN TIME: CPT | Mod: QW,S$GLB,, | Performed by: INTERNAL MEDICINE

## 2025-08-26 PROCEDURE — 3008F BODY MASS INDEX DOCD: CPT | Mod: CPTII,S$GLB,, | Performed by: PHYSICIAN ASSISTANT

## 2025-08-26 PROCEDURE — 3044F HG A1C LEVEL LT 7.0%: CPT | Mod: CPTII,S$GLB,, | Performed by: PHYSICIAN ASSISTANT

## 2025-08-26 RX ORDER — ATORVASTATIN CALCIUM 40 MG/1
40 TABLET, FILM COATED ORAL NIGHTLY
Qty: 90 TABLET | Refills: 3 | Status: SHIPPED | OUTPATIENT
Start: 2025-08-26

## 2025-08-28 ENCOUNTER — DOCUMENT SCAN (OUTPATIENT)
Dept: HOME HEALTH SERVICES | Facility: HOSPITAL | Age: 75
End: 2025-08-28
Payer: COMMERCIAL

## 2025-09-04 ENCOUNTER — ANTI-COAG VISIT (OUTPATIENT)
Dept: CARDIOLOGY | Facility: CLINIC | Age: 75
End: 2025-09-04
Payer: COMMERCIAL

## 2025-09-04 ENCOUNTER — OFFICE VISIT (OUTPATIENT)
Dept: ORTHOPEDICS | Facility: CLINIC | Age: 75
End: 2025-09-04
Payer: COMMERCIAL

## 2025-09-04 ENCOUNTER — HOSPITAL ENCOUNTER (OUTPATIENT)
Dept: RADIOLOGY | Facility: HOSPITAL | Age: 75
Discharge: HOME | End: 2025-09-04
Attending: ORTHOPAEDIC SURGERY
Payer: COMMERCIAL

## 2025-09-04 ENCOUNTER — OFFICE VISIT (OUTPATIENT)
Dept: OTOLARYNGOLOGY | Facility: CLINIC | Age: 75
End: 2025-09-04
Payer: COMMERCIAL

## 2025-09-04 VITALS — BODY MASS INDEX: 30.22 KG/M2 | HEIGHT: 66 IN | WEIGHT: 188.06 LBS

## 2025-09-04 VITALS — BODY MASS INDEX: 29.58 KG/M2 | HEIGHT: 66 IN | WEIGHT: 184.06 LBS

## 2025-09-04 DIAGNOSIS — Z86.711 HISTORY OF PULMONARY EMBOLISM: ICD-10-CM

## 2025-09-04 DIAGNOSIS — H61.21 IMPACTED CERUMEN OF RIGHT EAR: Primary | ICD-10-CM

## 2025-09-04 DIAGNOSIS — M94.262 CHONDROMALACIA, LEFT KNEE: ICD-10-CM

## 2025-09-04 DIAGNOSIS — Z96.642 HX OF TOTAL HIP ARTHROPLASTY, LEFT: ICD-10-CM

## 2025-09-04 DIAGNOSIS — M47.816 LUMBAR FACET ARTHROPATHY: ICD-10-CM

## 2025-09-04 DIAGNOSIS — M94.261 CHONDROMALACIA, RIGHT KNEE: ICD-10-CM

## 2025-09-04 DIAGNOSIS — Z79.01 LONG TERM (CURRENT) USE OF ANTICOAGULANTS: Primary | ICD-10-CM

## 2025-09-04 DIAGNOSIS — M16.11 ARTHRITIS OF RIGHT HIP: ICD-10-CM

## 2025-09-04 DIAGNOSIS — M48.061 SPINAL STENOSIS OF LUMBAR REGION AT MULTIPLE LEVELS: ICD-10-CM

## 2025-09-04 DIAGNOSIS — Z96.641 S/P TOTAL RIGHT HIP ARTHROPLASTY: Primary | ICD-10-CM

## 2025-09-04 LAB
CTP QC/QA: YES
INR PPP: 3.3 (ref 2–3)

## 2025-09-04 PROCEDURE — 1160F RVW MEDS BY RX/DR IN RCRD: CPT | Mod: CPTII,S$GLB,, | Performed by: ORTHOPAEDIC SURGERY

## 2025-09-04 PROCEDURE — 99499 UNLISTED E&M SERVICE: CPT | Mod: S$GLB,,,

## 2025-09-04 PROCEDURE — 85610 PROTHROMBIN TIME: CPT | Mod: QW,S$GLB,, | Performed by: INTERNAL MEDICINE

## 2025-09-04 PROCEDURE — 99999 PR PBB SHADOW E&M-EST. PATIENT-LVL III: CPT | Mod: PBBFAC,,,

## 2025-09-04 PROCEDURE — 99024 POSTOP FOLLOW-UP VISIT: CPT | Mod: S$GLB,,, | Performed by: ORTHOPAEDIC SURGERY

## 2025-09-04 PROCEDURE — 69210 REMOVE IMPACTED EAR WAX UNI: CPT | Mod: S$GLB,,,

## 2025-09-04 PROCEDURE — 4010F ACE/ARB THERAPY RXD/TAKEN: CPT | Mod: CPTII,S$GLB,, | Performed by: ORTHOPAEDIC SURGERY

## 2025-09-04 PROCEDURE — 93793 ANTICOAG MGMT PT WARFARIN: CPT | Mod: S$GLB,,,

## 2025-09-04 PROCEDURE — 1101F PT FALLS ASSESS-DOCD LE1/YR: CPT | Mod: CPTII,S$GLB,, | Performed by: ORTHOPAEDIC SURGERY

## 2025-09-04 PROCEDURE — 1159F MED LIST DOCD IN RCRD: CPT | Mod: CPTII,S$GLB,, | Performed by: ORTHOPAEDIC SURGERY

## 2025-09-04 PROCEDURE — 1126F AMNT PAIN NOTED NONE PRSNT: CPT | Mod: CPTII,S$GLB,, | Performed by: ORTHOPAEDIC SURGERY

## 2025-09-04 PROCEDURE — 99999 PR PBB SHADOW E&M-EST. PATIENT-LVL III: CPT | Mod: PBBFAC,,, | Performed by: ORTHOPAEDIC SURGERY

## 2025-09-04 PROCEDURE — 3288F FALL RISK ASSESSMENT DOCD: CPT | Mod: CPTII,S$GLB,, | Performed by: ORTHOPAEDIC SURGERY

## 2025-09-04 PROCEDURE — 3044F HG A1C LEVEL LT 7.0%: CPT | Mod: CPTII,S$GLB,, | Performed by: ORTHOPAEDIC SURGERY

## 2025-09-04 PROCEDURE — 73502 X-RAY EXAM HIP UNI 2-3 VIEWS: CPT | Mod: 26,RT,, | Performed by: RADIOLOGY

## 2025-09-04 PROCEDURE — 73502 X-RAY EXAM HIP UNI 2-3 VIEWS: CPT | Mod: TC,RT

## 2025-09-05 ENCOUNTER — OFFICE VISIT (OUTPATIENT)
Dept: CARDIOLOGY | Facility: CLINIC | Age: 75
End: 2025-09-05
Payer: COMMERCIAL

## 2025-09-05 VITALS
SYSTOLIC BLOOD PRESSURE: 126 MMHG | OXYGEN SATURATION: 98 % | WEIGHT: 186.94 LBS | DIASTOLIC BLOOD PRESSURE: 72 MMHG | BODY MASS INDEX: 30.04 KG/M2 | HEIGHT: 66 IN | HEART RATE: 72 BPM

## 2025-09-05 DIAGNOSIS — I10 ESSENTIAL HYPERTENSION: ICD-10-CM

## 2025-09-05 DIAGNOSIS — Z96.642 S/P HIP REPLACEMENT, LEFT: ICD-10-CM

## 2025-09-05 DIAGNOSIS — Z86.711 HISTORY OF PULMONARY EMBOLISM: Primary | ICD-10-CM

## 2025-09-05 DIAGNOSIS — M16.12 ARTHRITIS OF LEFT HIP: ICD-10-CM

## 2025-09-05 DIAGNOSIS — Z79.01 LONG TERM (CURRENT) USE OF ANTICOAGULANTS: ICD-10-CM

## 2025-09-05 DIAGNOSIS — M79.89 CALF SWELLING: ICD-10-CM

## 2025-09-05 DIAGNOSIS — E78.2 MIXED HYPERLIPIDEMIA: ICD-10-CM

## 2025-09-05 DIAGNOSIS — I26.99 BILATERAL PULMONARY EMBOLISM: ICD-10-CM

## 2025-09-05 DIAGNOSIS — G89.29 CHRONIC BILATERAL LOW BACK PAIN WITHOUT SCIATICA: ICD-10-CM

## 2025-09-05 DIAGNOSIS — R06.02 SOB (SHORTNESS OF BREATH): ICD-10-CM

## 2025-09-05 DIAGNOSIS — M54.50 CHRONIC BILATERAL LOW BACK PAIN WITHOUT SCIATICA: ICD-10-CM

## 2025-09-05 PROCEDURE — 99999 PR PBB SHADOW E&M-EST. PATIENT-LVL III: CPT | Mod: PBBFAC,,, | Performed by: INTERNAL MEDICINE

## 2025-09-05 RX ORDER — METOPROLOL TARTRATE 25 MG/1
25 TABLET, FILM COATED ORAL 2 TIMES DAILY
Qty: 180 TABLET | Refills: 3 | Status: SHIPPED | OUTPATIENT
Start: 2025-09-05 | End: 2026-09-05

## (undated) DEVICE — DRAPE HIP PCH 112X137X89IN

## (undated) DEVICE — SYS SURGIPHOR STRL IRRG

## (undated) DEVICE — TAPE SILK 3IN

## (undated) DEVICE — POUCH INSTRUMENT 2 POCKET

## (undated) DEVICE — SOL NACL IRR 3000ML

## (undated) DEVICE — HOOD FLYTE PEELWY STERISHIELD

## (undated) DEVICE — SOCKINETTE IMPERVIOUS 12X48IN

## (undated) DEVICE — COVER LIGHT HANDLE 80/CA

## (undated) DEVICE — POSITIONER PINKPROTECT ARC MED

## (undated) DEVICE — DRAPE U SPLIT SHEET 54X76IN

## (undated) DEVICE — ELECTRODE BLD EXT 6.50 ST DISP

## (undated) DEVICE — PAD CONVOLUTED 34X72

## (undated) DEVICE — SYR 30CC LUER LOCK

## (undated) DEVICE — BLADE EZ CLEAN 2 1/2

## (undated) DEVICE — ALCOHOL ISOPROPYL 4OZ

## (undated) DEVICE — PAD ABDOMINAL STERILE 8X10IN

## (undated) DEVICE — SUT STRATAFIX PGAPCL 3 FS-1

## (undated) DEVICE — ELECTRODE REM PLYHSV RETURN 9

## (undated) DEVICE — SUT VICRYL 2-0 27 CT-1

## (undated) DEVICE — NDL SPINAL 18GX3.5 SPINOCAN

## (undated) DEVICE — BLADES SAW SAGITTAL SYSTEM 6

## (undated) DEVICE — SPONGE COTTON TRAY 4X4IN

## (undated) DEVICE — SUT VICRYL 1 OB 36 CTX

## (undated) DEVICE — SUT 0 27IN COATED VICRYL CP

## (undated) DEVICE — APPLICATOR CHLORAPREP ORN 26ML

## (undated) DEVICE — DRAPE FULL SHEET 70X100IN

## (undated) DEVICE — UNDERGLOVES BIOGEL PI SIZE 7.5

## (undated) DEVICE — TOWEL OR DISP STRL BLUE 4/PK

## (undated) DEVICE — DRESSING PICO 7 TWO 10X30CM

## (undated) DEVICE — DRAPE STERI U-SHAPED 47X51IN

## (undated) DEVICE — DRAPE THREE-QTR REINF 53X77IN

## (undated) DEVICE — GOWN NONREINF SET-IN SLV 2XL

## (undated) DEVICE — DRAPE INCISE IOBAN 2 23X33IN

## (undated) DEVICE — PACK BASIC SETUP SC BR

## (undated) DEVICE — BNDG COFLEX FOAM LF2 ST 4X5YD

## (undated) DEVICE — GLOVE SURG PLYSPHRN ORTH SZ7.5

## (undated) DEVICE — IMMOB KNEE UNIV TRI PANEL 19IN

## (undated) DEVICE — COVER TABLE HVY DTY 60X90IN

## (undated) DEVICE — MANIFOLD 4 PORT

## (undated) DEVICE — KIT IRR SUCTION HND PIECE

## (undated) DEVICE — COVER PROXIMA MAYO STAND